# Patient Record
Sex: MALE | Race: WHITE | Employment: OTHER | URBAN - METROPOLITAN AREA
[De-identification: names, ages, dates, MRNs, and addresses within clinical notes are randomized per-mention and may not be internally consistent; named-entity substitution may affect disease eponyms.]

---

## 2013-09-03 LAB
LEFT VENTRICULAR EJECTION FRACTION MODE: NORMAL
LV EF: 65 %

## 2018-04-09 ENCOUNTER — TELEPHONE (OUTPATIENT)
Dept: CARDIOLOGY CLINIC | Age: 68
End: 2018-04-09

## 2018-04-20 DIAGNOSIS — I48.91 ATRIAL FIBRILLATION, UNSPECIFIED TYPE (HCC): ICD-10-CM

## 2018-04-20 DIAGNOSIS — J43.9 PULMONARY EMPHYSEMA, UNSPECIFIED EMPHYSEMA TYPE (HCC): ICD-10-CM

## 2018-10-04 ENCOUNTER — HOSPITAL ENCOUNTER (OUTPATIENT)
Dept: CT IMAGING | Age: 68
Discharge: HOME | End: 2018-10-06
Payer: MEDICARE

## 2018-10-04 DIAGNOSIS — Z87.891 HX OF TOBACCO USE, PRESENTING HAZARDS TO HEALTH: ICD-10-CM

## 2018-10-04 DIAGNOSIS — J98.4 APICAL LUNG SCARRING: ICD-10-CM

## 2018-10-04 PROCEDURE — 71250 CT THORAX DX C-: CPT

## 2018-10-16 ENCOUNTER — HOSPITAL ENCOUNTER (OUTPATIENT)
Age: 68
Discharge: HOME | End: 2018-10-18

## 2018-10-16 PROCEDURE — 88305 TISSUE EXAM BY PATHOLOGIST: CPT

## 2019-03-12 ENCOUNTER — OFFICE VISIT (OUTPATIENT)
Dept: VASCULAR SURGERY | Age: 69
End: 2019-03-12
Payer: MEDICARE

## 2019-03-12 ENCOUNTER — HOSPITAL ENCOUNTER (OUTPATIENT)
Dept: CARDIOLOGY | Age: 69
Discharge: HOME | End: 2019-03-12
Payer: MEDICARE

## 2019-03-12 DIAGNOSIS — Z95.828 S/P AAA REPAIR USING BIFURCATION GRAFT: Primary | ICD-10-CM

## 2019-03-12 DIAGNOSIS — Z86.79 S/P AAA REPAIR USING BIFURCATION GRAFT: ICD-10-CM

## 2019-03-12 DIAGNOSIS — Z95.828 S/P AAA REPAIR USING BIFURCATION GRAFT: ICD-10-CM

## 2019-03-12 DIAGNOSIS — Z86.79 S/P AAA REPAIR USING BIFURCATION GRAFT: Primary | ICD-10-CM

## 2019-03-12 PROCEDURE — 93978 VASCULAR STUDY: CPT

## 2019-03-12 PROCEDURE — 99213 OFFICE O/P EST LOW 20 MIN: CPT | Performed by: SURGERY

## 2019-05-07 ENCOUNTER — OFFICE VISIT (OUTPATIENT)
Dept: CARDIOLOGY CLINIC | Age: 69
End: 2019-05-07
Payer: MEDICARE

## 2019-05-07 VITALS
DIASTOLIC BLOOD PRESSURE: 60 MMHG | RESPIRATION RATE: 20 BRPM | HEART RATE: 77 BPM | HEIGHT: 74 IN | BODY MASS INDEX: 29.39 KG/M2 | SYSTOLIC BLOOD PRESSURE: 132 MMHG | WEIGHT: 229 LBS

## 2019-05-07 DIAGNOSIS — I48.0 PAROXYSMAL ATRIAL FIBRILLATION (HCC): Primary | ICD-10-CM

## 2019-05-07 DIAGNOSIS — I10 ESSENTIAL HYPERTENSION: ICD-10-CM

## 2019-05-07 PROCEDURE — 99213 OFFICE O/P EST LOW 20 MIN: CPT | Performed by: INTERNAL MEDICINE

## 2019-05-07 PROCEDURE — 93000 ELECTROCARDIOGRAM COMPLETE: CPT | Performed by: INTERNAL MEDICINE

## 2019-05-07 RX ORDER — FLUTICASONE FUROATE AND VILANTEROL 100; 25 UG/1; UG/1
1 POWDER RESPIRATORY (INHALATION) DAILY
Status: ON HOLD | COMMUNITY
End: 2019-07-11 | Stop reason: HOSPADM

## 2019-05-07 RX ORDER — DILTIAZEM HYDROCHLORIDE 180 MG/1
180 CAPSULE, COATED, EXTENDED RELEASE ORAL DAILY
COMMUNITY
End: 2019-07-26 | Stop reason: ALTCHOICE

## 2019-05-07 NOTE — PATIENT INSTRUCTIONS
Patient Education        A Healthy Heart: Care Instructions  Your Care Instructions    Heart disease occurs when a substance called plaque builds up in the vessels that supply oxygen-rich blood to your heart. This can narrow the blood vessels and reduce blood flow. A heart attack happens when blood flow is completely blocked. A high-fat diet, smoking, and other factors increase the risk of heart disease. Your doctor has found that you have a chance of having heart disease. You can do lots of things to keep your heart healthy. It may not be easy, but you can change your diet, exercise more, and quit smoking. These steps really work to lower your chance of heart disease. Follow-up care is a key part of your treatment and safety. Be sure to make and go to all appointments, and call your doctor if you are having problems. It's also a good idea to know your test results and keep a list of the medicines you take. How can you care for yourself at home? Diet    · Use less salt when you cook and eat. This helps lower your blood pressure. Taste food before salting. Add only a little salt when you think you need it. With time, your taste buds will adjust to less salt.     · Eat fewer snack items, fast foods, canned soups, and other high-salt, high-fat, processed foods.     · Read food labels and try to avoid saturated and trans fats. They increase your risk of heart disease by raising cholesterol levels.     · Limit the amount of solid fat-butter, margarine, and shortening-you eat. Use olive, peanut, or canola oil when you cook. Bake, broil, and steam foods instead of frying them.     · Eating fish can lower your risk for heart disease. Eat at least 2 servings of fish a week. [place], mackerel, herring, sardines, and chunk light tuna are very good choices. These fish contain omega-3 fatty acids.     · Eat a variety of fruit and vegetables every day.  Dark green, deep orange, red, or yellow fruits and vegetables are especially good for you. Examples include spinach, carrots, peaches, and berries.     · Foods high in fiber can reduce your cholesterol and provide important vitamins and minerals. High-fiber foods include whole-grain cereals and breads, oatmeal, beans, brown rice, citrus fruits, and apples.     · Limit drinks and foods with added sugar. These include candy, desserts, and soda pop.    Lifestyle changes    · If your doctor recommends it, get more exercise. Walking is a good choice. Bit by bit, increase the amount you walk every day. Try for at least 30 minutes on most days of the week. You also may want to swim, bike, or do other activities.     · Do not smoke. If you need help quitting, talk to your doctor about stop-smoking programs and medicines. These can increase your chances of quitting for good. Quitting smoking may be the most important step you can take to protect your heart. It is never too late to quit. You will get health benefits right away.     · Limit alcohol to 2 drinks a day for men and 1 drink a day for women. Too much alcohol can cause health problems. Medicines    · Take your medicines exactly as prescribed. Call your doctor if you think you are having a problem with your medicine.     · If your doctor recommends aspirin, take the amount directed each day. Make sure you take aspirin and not another kind of pain reliever, such as acetaminophen (Tylenol). If you take ibuprofen (such as Advil or Motrin) for other problems, take aspirin at least 2 hours before taking ibuprofen. When should you call for help? Call 911 if you have symptoms of a heart attack.  These may include:    · Chest pain or pressure, or a strange feeling in the chest.     · Sweating.     · Shortness of breath.     · Pain, pressure, or a strange feeling in the back, neck, jaw, or upper belly or in one or both shoulders or arms.     · Lightheadedness or sudden weakness.     · A fast or irregular heartbeat.    After you call 911,

## 2019-05-09 NOTE — PROGRESS NOTES
' [facility] Cardiology  MD Raquel Hall MD Milagros Canary, MD Darreld Gory. MD Tamica Mcpherson   1950  Reg Holland MD    Mr. Mir Mcallister was in the outpatient office on 5/7/2019 for follow up of his PAF. This 25-year-old gentleman has a history of AAA repair, COPD, and atrial fibrillation. He underwent ablation in 01/2017. He was maintained on Sotalol therapy prior to the ablation, which was discontinued. He is maintained on Eliquis for anticoagulation. Event monitor last year revealed sinus rhythm with PAC's. No AF was demonstrated. Stroke risk with occult AF was discussed with him. Patient decided to continue anticoagulation therapy. He presented to our office today for a yearly follow up visit. He notes no new cardiac complaints. He denied any chest pain or shortness of breath. No episodes of palpitations. He monitors his heart rate multiple times a day and his rates have been in the 70's-80's. Past medical history:   1. NKDA. 2. 80-pack-year smoking history; quit 2009. 3. Hyperlipidemia. 4. Sleep apnea. Compliant with CPAP therapy. 5. COPD. Follows Dr. Ashley Hebert. 6. AAA repair. Dr. Nikhil Verdugo. 11/17/2008. 7. AF, status post cardioversion 09/2013. Maintained on sotalol therapy and Pradaxa. 8. Echocardiogram 03/18/2013. EF 65%. Mildly dilated left atrium. 9. DCCV 03/21/2013. Maintained on sotalol therapy. 10. Second opinion at [facility]. Seen by Dr. Christiana Silva. 11. AF ablation at [facility] 01/03/2017. Sotalol discontinued after ablation. 12. 14 day event monitor, 3/5-3/18/2018, predominantly sinus rhythm. PAC's. No AF demonstrated.   Average heart rate 77 bpm.  No pauses noted.      Review of Systems:  HEENT: negative for acute visual and auditory problems  Constitutional: negative for fever and chills  Respiratory: negative for cough and hemoptysis  Cardiovascular: as per HPI  Gastrointestinal: negative for abdominal pain, Gluconate (COPPER CAPS) 2 MG CAPS Take by mouth daily    ELIQUIS 5 MG TABS tablet 2 times daily    Zinc 50 MG TABS Take by mouth daily   CPAP Machine MISC Inhale into the lungs nightly    metoprolol succinate (TOPROL XL) 25 MG extended release tablet Take 25 mg by mouth daily   Selenium (SELENIMIN-200 PO) Take 200 mcg by mouth daily   Arginine 500 MG CAPS Take 500 mg by mouth daily    Coenzyme Q10 (COQ10) 200 MG CAPS Take by mouth daily    b complex vitamins capsule Take 1 capsule by mouth daily   Magnesium 500 MG CAPS Take 250 mg by mouth daily    Flaxseed, Linseed, (FLAXSEED OIL) 1000 MG CAPS Take by mouth daily    albuterol (PROVENTIL) (2.5 MG/3ML) 0.083% nebulizer solution Take 2.5 mg by nebulization every 6 hours as needed for Wheezing    OXYGEN 2 L by Nasal route nightly as needed    albuterol (PROVENTIL HFA;VENTOLIN HFA) 108 (90 BASE) MCG/ACT inhaler Inhale 2 puffs into the lungs every 6 hours as needed. Multiple Vitamins-Minerals (CENTRUM SILVER PO) Take 1 tablet by mouth daily. For his COPD, he follows with Dr. Vladimir Perez. Should he remain cardiovascularly stable, he will be seen back in our office in a year. Thank you for allowing us to participate in the care of this patient.       RPV/tlr

## 2019-07-03 ENCOUNTER — TELEPHONE (OUTPATIENT)
Dept: CARDIOLOGY CLINIC | Age: 69
End: 2019-07-03

## 2019-07-03 PROBLEM — D49.1 LARYNGEAL NEOPLASM: Status: ACTIVE | Noted: 2019-07-03

## 2019-07-03 NOTE — PROGRESS NOTES
[facility] PRE-ADMISSION TESTING INSTRUCTIONS    The Preadmission Testing patient is instructed accordingly using the following criteria (check applicable):    ARRIVAL INSTRUCTIONS:  [x] Parking the day of Surgery is located in the Main Entrance lot. Upon entering the door, make an immediate right to the surgery reception desk    [] [phone] is available Monday through Friday 6 am to 6 pm    [x] Bring photo ID and insurance card    [] Bring in a copy of Living will or Durable Power of  papers. [x] Please be sure to arrange for responsible adult to provide transportation to and from the hospital    [x] Please arrange for responsible adult to be with you for the 24 hour period post procedure due to having anesthesia      GENERAL INSTRUCTIONS:    [x] Nothing by mouth after midnight, including gum, candy, mints or water    [x] You may brush your teeth, but do not swallow any water    [x] Take medications as instructed with 1-2 oz of water    [] Stop herbal supplements and vitamins 5 days prior to procedure    [x] Follow preop dosing of blood thinners per physician instructions    [] Take 1/2 dose of evening insulin, but no insulin after midnight    [] No oral diabetic medications after midnight    [] If diabetic and have low blood sugar or feel symptomatic, take 1-2oz apple juice only    [x] Bring inhalers day of surgery    [x] Bring C-PAP/ Bi-Pap day of surgery    [] Bring urine specimen day of surgery    [x] Shower or bath with soap, lather and rinse well, AM of Surgery, no lotion, powders or creams to surgical site    [] Follow bowel prep as instructed per surgeon    [] No tobacco products within 24 hours of surgery     [] No alcohol or illegal drug use within 24 hours of surgery.     [x] Jewelry, body piercing's, eyeglasses, contact lenses and dentures are not permitted into surgery (bring cases)      [] Please do not wear any nail polish, make up or hair

## 2019-07-03 NOTE — TELEPHONE ENCOUNTER
Viji Peck was instructed by Dr. Rashid Lindo to let you know he is having surgery Monday, 07/08/2019 (throat biopsy).

## 2019-07-05 ENCOUNTER — ANESTHESIA EVENT (OUTPATIENT)
Dept: OPERATING ROOM | Age: 69
DRG: 011 | End: 2019-07-05
Payer: MEDICARE

## 2019-07-08 ENCOUNTER — ANESTHESIA (OUTPATIENT)
Dept: OPERATING ROOM | Age: 69
DRG: 011 | End: 2019-07-08
Payer: MEDICARE

## 2019-07-08 ENCOUNTER — APPOINTMENT (OUTPATIENT)
Dept: GENERAL RADIOLOGY | Age: 69
DRG: 011 | End: 2019-07-08
Attending: OTOLARYNGOLOGY
Payer: MEDICARE

## 2019-07-08 ENCOUNTER — HOSPITAL ENCOUNTER (INPATIENT)
Age: 69
LOS: 3 days | Discharge: HOME HEALTH | DRG: 011 | End: 2019-07-11
Attending: OTOLARYNGOLOGY | Admitting: OTOLARYNGOLOGY
Payer: MEDICARE

## 2019-07-08 VITALS — DIASTOLIC BLOOD PRESSURE: 112 MMHG | SYSTOLIC BLOOD PRESSURE: 175 MMHG | OXYGEN SATURATION: 98 %

## 2019-07-08 DIAGNOSIS — D49.1 LARYNGEAL NEOPLASM: Primary | ICD-10-CM

## 2019-07-08 PROBLEM — C13.9 HYPOPHARYNGEAL CANCER (HCC): Status: ACTIVE | Noted: 2019-07-08

## 2019-07-08 LAB
ANION GAP SERPL CALCULATED.3IONS-SCNC: 11 MMOL/L (ref 7–16)
ANION GAP SERPL CALCULATED.3IONS-SCNC: 12 MMOL/L (ref 7–16)
BASOPHILS ABSOLUTE: 0.06 E9/L (ref 0–0.2)
BASOPHILS RELATIVE PERCENT: 0.5 % (ref 0–2)
BUN BLDV-MCNC: 12 MG/DL (ref 8–23)
BUN BLDV-MCNC: 14 MG/DL (ref 8–23)
CALCIUM SERPL-MCNC: 8.9 MG/DL (ref 8.6–10.2)
CALCIUM SERPL-MCNC: 9.3 MG/DL (ref 8.6–10.2)
CHLORIDE BLD-SCNC: 97 MMOL/L (ref 98–107)
CHLORIDE BLD-SCNC: 99 MMOL/L (ref 98–107)
CO2: 30 MMOL/L (ref 22–29)
CO2: 30 MMOL/L (ref 22–29)
CREAT SERPL-MCNC: 0.6 MG/DL (ref 0.7–1.2)
CREAT SERPL-MCNC: 0.7 MG/DL (ref 0.7–1.2)
EOSINOPHILS ABSOLUTE: 0.01 E9/L (ref 0.05–0.5)
EOSINOPHILS RELATIVE PERCENT: 0.1 % (ref 0–6)
GFR AFRICAN AMERICAN: >60
GFR AFRICAN AMERICAN: >60
GFR NON-AFRICAN AMERICAN: >60 ML/MIN/1.73
GFR NON-AFRICAN AMERICAN: >60 ML/MIN/1.73
GLUCOSE BLD-MCNC: 115 MG/DL (ref 74–99)
GLUCOSE BLD-MCNC: 143 MG/DL (ref 74–99)
HCT VFR BLD CALC: 44.5 % (ref 37–54)
HCT VFR BLD CALC: 44.8 % (ref 37–54)
HEMOGLOBIN: 14.4 G/DL (ref 12.5–16.5)
HEMOGLOBIN: 14.6 G/DL (ref 12.5–16.5)
IMMATURE GRANULOCYTES #: 0.05 E9/L
IMMATURE GRANULOCYTES %: 0.4 % (ref 0–5)
LYMPHOCYTES ABSOLUTE: 0.81 E9/L (ref 1.5–4)
LYMPHOCYTES RELATIVE PERCENT: 6.4 % (ref 20–42)
MCH RBC QN AUTO: 30.1 PG (ref 26–35)
MCH RBC QN AUTO: 30.4 PG (ref 26–35)
MCHC RBC AUTO-ENTMCNC: 32.1 % (ref 32–34.5)
MCHC RBC AUTO-ENTMCNC: 32.8 % (ref 32–34.5)
MCV RBC AUTO: 92.7 FL (ref 80–99.9)
MCV RBC AUTO: 93.5 FL (ref 80–99.9)
MONOCYTES ABSOLUTE: 0.16 E9/L (ref 0.1–0.95)
MONOCYTES RELATIVE PERCENT: 1.3 % (ref 2–12)
NEUTROPHILS ABSOLUTE: 11.54 E9/L (ref 1.8–7.3)
NEUTROPHILS RELATIVE PERCENT: 91.3 % (ref 43–80)
PDW BLD-RTO: 14.5 FL (ref 11.5–15)
PDW BLD-RTO: 14.5 FL (ref 11.5–15)
PLATELET # BLD: 249 E9/L (ref 130–450)
PLATELET # BLD: 249 E9/L (ref 130–450)
PMV BLD AUTO: 10.1 FL (ref 7–12)
PMV BLD AUTO: 10.2 FL (ref 7–12)
POTASSIUM REFLEX MAGNESIUM: 4.6 MMOL/L (ref 3.5–5)
POTASSIUM SERPL-SCNC: 4.4 MMOL/L (ref 3.5–5)
RBC # BLD: 4.79 E12/L (ref 3.8–5.8)
RBC # BLD: 4.8 E12/L (ref 3.8–5.8)
SODIUM BLD-SCNC: 139 MMOL/L (ref 132–146)
SODIUM BLD-SCNC: 140 MMOL/L (ref 132–146)
WBC # BLD: 10.1 E9/L (ref 4.5–11.5)
WBC # BLD: 12.6 E9/L (ref 4.5–11.5)

## 2019-07-08 PROCEDURE — 3600000002 HC SURGERY LEVEL 2 BASE: Performed by: OTOLARYNGOLOGY

## 2019-07-08 PROCEDURE — 2700000000 HC OXYGEN THERAPY PER DAY

## 2019-07-08 PROCEDURE — 88305 TISSUE EXAM BY PATHOLOGIST: CPT

## 2019-07-08 PROCEDURE — 6370000000 HC RX 637 (ALT 250 FOR IP): Performed by: OTOLARYNGOLOGY

## 2019-07-08 PROCEDURE — 7100000000 HC PACU RECOVERY - FIRST 15 MIN: Performed by: OTOLARYNGOLOGY

## 2019-07-08 PROCEDURE — 7100000001 HC PACU RECOVERY - ADDTL 15 MIN: Performed by: OTOLARYNGOLOGY

## 2019-07-08 PROCEDURE — 85027 COMPLETE CBC AUTOMATED: CPT

## 2019-07-08 PROCEDURE — 0CBS8ZX EXCISION OF LARYNX, VIA NATURAL OR ARTIFICIAL OPENING ENDOSCOPIC, DIAGNOSTIC: ICD-10-PCS | Performed by: OTOLARYNGOLOGY

## 2019-07-08 PROCEDURE — 3600000012 HC SURGERY LEVEL 2 ADDTL 15MIN: Performed by: OTOLARYNGOLOGY

## 2019-07-08 PROCEDURE — 71045 X-RAY EXAM CHEST 1 VIEW: CPT

## 2019-07-08 PROCEDURE — 88331 PATH CONSLTJ SURG 1 BLK 1SPC: CPT

## 2019-07-08 PROCEDURE — 2709999900 HC NON-CHARGEABLE SUPPLY: Performed by: OTOLARYNGOLOGY

## 2019-07-08 PROCEDURE — 2000000000 HC ICU R&B

## 2019-07-08 PROCEDURE — 2500000003 HC RX 250 WO HCPCS: Performed by: OTOLARYNGOLOGY

## 2019-07-08 PROCEDURE — 6360000002 HC RX W HCPCS

## 2019-07-08 PROCEDURE — 2500000003 HC RX 250 WO HCPCS

## 2019-07-08 PROCEDURE — 2720000010 HC SURG SUPPLY STERILE: Performed by: OTOLARYNGOLOGY

## 2019-07-08 PROCEDURE — 36415 COLL VENOUS BLD VENIPUNCTURE: CPT

## 2019-07-08 PROCEDURE — 87081 CULTURE SCREEN ONLY: CPT

## 2019-07-08 PROCEDURE — 2580000003 HC RX 258: Performed by: OTOLARYNGOLOGY

## 2019-07-08 PROCEDURE — 80048 BASIC METABOLIC PNL TOTAL CA: CPT

## 2019-07-08 PROCEDURE — 85025 COMPLETE CBC W/AUTO DIFF WBC: CPT

## 2019-07-08 PROCEDURE — 3700000001 HC ADD 15 MINUTES (ANESTHESIA): Performed by: OTOLARYNGOLOGY

## 2019-07-08 PROCEDURE — 6360000002 HC RX W HCPCS: Performed by: STUDENT IN AN ORGANIZED HEALTH CARE EDUCATION/TRAINING PROGRAM

## 2019-07-08 PROCEDURE — 0B113F4 BYPASS TRACHEA TO CUTANEOUS WITH TRACHEOSTOMY DEVICE, PERCUTANEOUS APPROACH: ICD-10-PCS | Performed by: OTOLARYNGOLOGY

## 2019-07-08 PROCEDURE — 2580000003 HC RX 258

## 2019-07-08 PROCEDURE — 3700000000 HC ANESTHESIA ATTENDED CARE: Performed by: OTOLARYNGOLOGY

## 2019-07-08 RX ORDER — OXYCODONE HYDROCHLORIDE AND ACETAMINOPHEN 5; 325 MG/1; MG/1
1 TABLET ORAL PRN
Status: DISCONTINUED | OUTPATIENT
Start: 2019-07-08 | End: 2019-07-08 | Stop reason: HOSPADM

## 2019-07-08 RX ORDER — ACETAMINOPHEN 325 MG/1
650 TABLET ORAL EVERY 4 HOURS PRN
Status: DISCONTINUED | OUTPATIENT
Start: 2019-07-08 | End: 2019-07-11 | Stop reason: HOSPADM

## 2019-07-08 RX ORDER — IPRATROPIUM BROMIDE AND ALBUTEROL SULFATE 2.5; .5 MG/3ML; MG/3ML
1 SOLUTION RESPIRATORY (INHALATION) EVERY 4 HOURS PRN
Status: DISCONTINUED | OUTPATIENT
Start: 2019-07-08 | End: 2019-07-11 | Stop reason: HOSPADM

## 2019-07-08 RX ORDER — DILTIAZEM HYDROCHLORIDE 180 MG/1
180 CAPSULE, COATED, EXTENDED RELEASE ORAL DAILY
Status: DISCONTINUED | OUTPATIENT
Start: 2019-07-09 | End: 2019-07-09

## 2019-07-08 RX ORDER — METOPROLOL SUCCINATE 25 MG/1
25 TABLET, EXTENDED RELEASE ORAL DAILY
Status: DISCONTINUED | OUTPATIENT
Start: 2019-07-09 | End: 2019-07-09

## 2019-07-08 RX ORDER — SODIUM CHLORIDE 0.9 % (FLUSH) 0.9 %
10 SYRINGE (ML) INJECTION PRN
Status: DISCONTINUED | OUTPATIENT
Start: 2019-07-08 | End: 2019-07-11 | Stop reason: HOSPADM

## 2019-07-08 RX ORDER — MIDAZOLAM HYDROCHLORIDE 1 MG/ML
INJECTION INTRAMUSCULAR; INTRAVENOUS PRN
Status: DISCONTINUED | OUTPATIENT
Start: 2019-07-08 | End: 2019-07-08 | Stop reason: SDUPTHER

## 2019-07-08 RX ORDER — FLUTICASONE FUROATE AND VILANTEROL 100; 25 UG/1; UG/1
1 POWDER RESPIRATORY (INHALATION) DAILY
Status: DISCONTINUED | OUTPATIENT
Start: 2019-07-08 | End: 2019-07-08

## 2019-07-08 RX ORDER — CEFAZOLIN SODIUM 2 G/50ML
SOLUTION INTRAVENOUS PRN
Status: DISCONTINUED | OUTPATIENT
Start: 2019-07-08 | End: 2019-07-08 | Stop reason: SDUPTHER

## 2019-07-08 RX ORDER — LIDOCAINE HYDROCHLORIDE AND EPINEPHRINE 10; 10 MG/ML; UG/ML
INJECTION, SOLUTION INFILTRATION; PERINEURAL PRN
Status: DISCONTINUED | OUTPATIENT
Start: 2019-07-08 | End: 2019-07-08 | Stop reason: ALTCHOICE

## 2019-07-08 RX ORDER — KETAMINE HYDROCHLORIDE 10 MG/ML
INJECTION, SOLUTION INTRAMUSCULAR; INTRAVENOUS PRN
Status: DISCONTINUED | OUTPATIENT
Start: 2019-07-08 | End: 2019-07-08 | Stop reason: SDUPTHER

## 2019-07-08 RX ORDER — LIDOCAINE HYDROCHLORIDE 20 MG/ML
INJECTION, SOLUTION EPIDURAL; INFILTRATION; INTRACAUDAL; PERINEURAL PRN
Status: DISCONTINUED | OUTPATIENT
Start: 2019-07-08 | End: 2019-07-08 | Stop reason: SDUPTHER

## 2019-07-08 RX ORDER — SODIUM CHLORIDE, SODIUM LACTATE, POTASSIUM CHLORIDE, CALCIUM CHLORIDE 600; 310; 30; 20 MG/100ML; MG/100ML; MG/100ML; MG/100ML
INJECTION, SOLUTION INTRAVENOUS CONTINUOUS PRN
Status: DISCONTINUED | OUTPATIENT
Start: 2019-07-08 | End: 2019-07-08 | Stop reason: SDUPTHER

## 2019-07-08 RX ORDER — ONDANSETRON 2 MG/ML
4 INJECTION INTRAMUSCULAR; INTRAVENOUS
Status: DISCONTINUED | OUTPATIENT
Start: 2019-07-08 | End: 2019-07-08 | Stop reason: HOSPADM

## 2019-07-08 RX ORDER — EPINEPHRINE NASAL SOLUTION 1 MG/ML
SOLUTION NASAL PRN
Status: DISCONTINUED | OUTPATIENT
Start: 2019-07-08 | End: 2019-07-08 | Stop reason: ALTCHOICE

## 2019-07-08 RX ORDER — MEPERIDINE HYDROCHLORIDE 25 MG/ML
12.5 INJECTION INTRAMUSCULAR; INTRAVENOUS; SUBCUTANEOUS
Status: DISCONTINUED | OUTPATIENT
Start: 2019-07-08 | End: 2019-07-08 | Stop reason: HOSPADM

## 2019-07-08 RX ORDER — MORPHINE SULFATE 2 MG/ML
2 INJECTION, SOLUTION INTRAMUSCULAR; INTRAVENOUS EVERY 5 MIN PRN
Status: DISCONTINUED | OUTPATIENT
Start: 2019-07-08 | End: 2019-07-08 | Stop reason: HOSPADM

## 2019-07-08 RX ORDER — SUCCINYLCHOLINE CHLORIDE 20 MG/ML
INJECTION INTRAMUSCULAR; INTRAVENOUS PRN
Status: DISCONTINUED | OUTPATIENT
Start: 2019-07-08 | End: 2019-07-08 | Stop reason: SDUPTHER

## 2019-07-08 RX ORDER — GLYCOPYRROLATE 1 MG/5 ML
SYRINGE (ML) INTRAVENOUS PRN
Status: DISCONTINUED | OUTPATIENT
Start: 2019-07-08 | End: 2019-07-08 | Stop reason: SDUPTHER

## 2019-07-08 RX ORDER — PROPOFOL 10 MG/ML
INJECTION, EMULSION INTRAVENOUS PRN
Status: DISCONTINUED | OUTPATIENT
Start: 2019-07-08 | End: 2019-07-08 | Stop reason: SDUPTHER

## 2019-07-08 RX ORDER — ALBUTEROL SULFATE 2.5 MG/3ML
2.5 SOLUTION RESPIRATORY (INHALATION) EVERY 6 HOURS PRN
Status: DISCONTINUED | OUTPATIENT
Start: 2019-07-08 | End: 2019-07-11 | Stop reason: HOSPADM

## 2019-07-08 RX ORDER — OXYCODONE HYDROCHLORIDE AND ACETAMINOPHEN 5; 325 MG/1; MG/1
2 TABLET ORAL PRN
Status: DISCONTINUED | OUTPATIENT
Start: 2019-07-08 | End: 2019-07-08 | Stop reason: HOSPADM

## 2019-07-08 RX ORDER — DEXAMETHASONE SODIUM PHOSPHATE 4 MG/ML
INJECTION, SOLUTION INTRA-ARTICULAR; INTRALESIONAL; INTRAMUSCULAR; INTRAVENOUS; SOFT TISSUE PRN
Status: DISCONTINUED | OUTPATIENT
Start: 2019-07-08 | End: 2019-07-08 | Stop reason: SDUPTHER

## 2019-07-08 RX ORDER — HYDROCODONE BITARTRATE AND ACETAMINOPHEN 5; 325 MG/1; MG/1
1 TABLET ORAL EVERY 4 HOURS PRN
Status: DISCONTINUED | OUTPATIENT
Start: 2019-07-08 | End: 2019-07-11 | Stop reason: HOSPADM

## 2019-07-08 RX ORDER — FENTANYL CITRATE 50 UG/ML
INJECTION, SOLUTION INTRAMUSCULAR; INTRAVENOUS PRN
Status: DISCONTINUED | OUTPATIENT
Start: 2019-07-08 | End: 2019-07-08 | Stop reason: SDUPTHER

## 2019-07-08 RX ORDER — SODIUM CHLORIDE 0.9 % (FLUSH) 0.9 %
10 SYRINGE (ML) INJECTION EVERY 12 HOURS SCHEDULED
Status: DISCONTINUED | OUTPATIENT
Start: 2019-07-08 | End: 2019-07-11 | Stop reason: HOSPADM

## 2019-07-08 RX ORDER — SODIUM CHLORIDE, SODIUM LACTATE, POTASSIUM CHLORIDE, CALCIUM CHLORIDE 600; 310; 30; 20 MG/100ML; MG/100ML; MG/100ML; MG/100ML
INJECTION, SOLUTION INTRAVENOUS CONTINUOUS
Status: DISCONTINUED | OUTPATIENT
Start: 2019-07-08 | End: 2019-07-08

## 2019-07-08 RX ORDER — SODIUM CHLORIDE, SODIUM LACTATE, POTASSIUM CHLORIDE, CALCIUM CHLORIDE 600; 310; 30; 20 MG/100ML; MG/100ML; MG/100ML; MG/100ML
INJECTION, SOLUTION INTRAVENOUS CONTINUOUS
Status: DISCONTINUED | OUTPATIENT
Start: 2019-07-08 | End: 2019-07-11 | Stop reason: HOSPADM

## 2019-07-08 RX ORDER — SODIUM CHLORIDE 0.9 % (FLUSH) 0.9 %
10 SYRINGE (ML) INJECTION PRN
Status: DISCONTINUED | OUTPATIENT
Start: 2019-07-08 | End: 2019-07-08 | Stop reason: HOSPADM

## 2019-07-08 RX ORDER — MORPHINE SULFATE 2 MG/ML
1 INJECTION, SOLUTION INTRAMUSCULAR; INTRAVENOUS EVERY 5 MIN PRN
Status: DISCONTINUED | OUTPATIENT
Start: 2019-07-08 | End: 2019-07-08 | Stop reason: HOSPADM

## 2019-07-08 RX ORDER — ONDANSETRON 2 MG/ML
INJECTION INTRAMUSCULAR; INTRAVENOUS PRN
Status: DISCONTINUED | OUTPATIENT
Start: 2019-07-08 | End: 2019-07-08 | Stop reason: SDUPTHER

## 2019-07-08 RX ORDER — FENTANYL CITRATE 50 UG/ML
25 INJECTION, SOLUTION INTRAMUSCULAR; INTRAVENOUS EVERY 6 HOURS PRN
Status: DISCONTINUED | OUTPATIENT
Start: 2019-07-08 | End: 2019-07-11 | Stop reason: HOSPADM

## 2019-07-08 RX ORDER — SODIUM CHLORIDE 0.9 % (FLUSH) 0.9 %
10 SYRINGE (ML) INJECTION EVERY 12 HOURS SCHEDULED
Status: DISCONTINUED | OUTPATIENT
Start: 2019-07-08 | End: 2019-07-08 | Stop reason: HOSPADM

## 2019-07-08 RX ORDER — ALBUTEROL SULFATE 90 UG/1
2 AEROSOL, METERED RESPIRATORY (INHALATION) EVERY 6 HOURS PRN
Status: DISCONTINUED | OUTPATIENT
Start: 2019-07-08 | End: 2019-07-09

## 2019-07-08 RX ORDER — ONDANSETRON 2 MG/ML
4 INJECTION INTRAMUSCULAR; INTRAVENOUS EVERY 6 HOURS PRN
Status: DISCONTINUED | OUTPATIENT
Start: 2019-07-08 | End: 2019-07-11 | Stop reason: HOSPADM

## 2019-07-08 RX ADMIN — FENTANYL CITRATE 50 MCG: 50 INJECTION, SOLUTION INTRAMUSCULAR; INTRAVENOUS at 07:51

## 2019-07-08 RX ADMIN — LIDOCAINE HYDROCHLORIDE 40 MG: 20 INJECTION, SOLUTION EPIDURAL; INFILTRATION; INTRACAUDAL; PERINEURAL at 07:55

## 2019-07-08 RX ADMIN — ONDANSETRON HYDROCHLORIDE 4 MG: 2 INJECTION, SOLUTION INTRAMUSCULAR; INTRAVENOUS at 07:52

## 2019-07-08 RX ADMIN — KETAMINE HYDROCHLORIDE 5 MG: 10 INJECTION INTRAMUSCULAR; INTRAVENOUS at 07:34

## 2019-07-08 RX ADMIN — FENTANYL CITRATE 50 MCG: 50 INJECTION, SOLUTION INTRAMUSCULAR; INTRAVENOUS at 08:01

## 2019-07-08 RX ADMIN — LIDOCAINE HYDROCHLORIDE 60 MG: 20 INJECTION, SOLUTION EPIDURAL; INFILTRATION; INTRACAUDAL; PERINEURAL at 07:51

## 2019-07-08 RX ADMIN — PROPOFOL 150 MG: 10 INJECTION, EMULSION INTRAVENOUS at 07:51

## 2019-07-08 RX ADMIN — KETAMINE HYDROCHLORIDE 5 MG: 10 INJECTION INTRAMUSCULAR; INTRAVENOUS at 07:32

## 2019-07-08 RX ADMIN — Medication 0.1 MG: at 07:24

## 2019-07-08 RX ADMIN — MIDAZOLAM HYDROCHLORIDE 1 MG: 1 INJECTION, SOLUTION INTRAMUSCULAR; INTRAVENOUS at 07:35

## 2019-07-08 RX ADMIN — SUCCINYLCHOLINE CHLORIDE 100 MG: 20 INJECTION, SOLUTION INTRAMUSCULAR; INTRAVENOUS at 08:01

## 2019-07-08 RX ADMIN — CEFAZOLIN SODIUM 2 G: 2 SOLUTION INTRAVENOUS at 07:32

## 2019-07-08 RX ADMIN — SODIUM CHLORIDE, POTASSIUM CHLORIDE, SODIUM LACTATE AND CALCIUM CHLORIDE: 600; 310; 30; 20 INJECTION, SOLUTION INTRAVENOUS at 07:24

## 2019-07-08 RX ADMIN — SODIUM CHLORIDE, POTASSIUM CHLORIDE, SODIUM LACTATE AND CALCIUM CHLORIDE: 600; 310; 30; 20 INJECTION, SOLUTION INTRAVENOUS at 13:50

## 2019-07-08 RX ADMIN — DEXAMETHASONE SODIUM PHOSPHATE 10 MG: 4 INJECTION, SOLUTION INTRAMUSCULAR; INTRAVENOUS at 07:52

## 2019-07-08 RX ADMIN — FENTANYL CITRATE 25 MCG: 50 INJECTION INTRAMUSCULAR; INTRAVENOUS at 20:14

## 2019-07-08 RX ADMIN — MIDAZOLAM HYDROCHLORIDE 1 MG: 1 INJECTION, SOLUTION INTRAMUSCULAR; INTRAVENOUS at 07:31

## 2019-07-08 RX ADMIN — PROPOFOL 50 MG: 10 INJECTION, EMULSION INTRAVENOUS at 07:54

## 2019-07-08 ASSESSMENT — PULMONARY FUNCTION TESTS
PIF_VALUE: 21
PIF_VALUE: 3
PIF_VALUE: 11
PIF_VALUE: 3
PIF_VALUE: 13
PIF_VALUE: 18
PIF_VALUE: 4
PIF_VALUE: 13
PIF_VALUE: 18
PIF_VALUE: 3
PIF_VALUE: 17
PIF_VALUE: 4
PIF_VALUE: 16
PIF_VALUE: 18
PIF_VALUE: 4
PIF_VALUE: 16
PIF_VALUE: 19
PIF_VALUE: 5
PIF_VALUE: 3
PIF_VALUE: 16
PIF_VALUE: 13
PIF_VALUE: 18
PIF_VALUE: 16
PIF_VALUE: 17
PIF_VALUE: 16
PIF_VALUE: 17
PIF_VALUE: 16
PIF_VALUE: 16
PIF_VALUE: 7
PIF_VALUE: 3
PIF_VALUE: 27
PIF_VALUE: 13
PIF_VALUE: 35
PIF_VALUE: 16
PIF_VALUE: 13

## 2019-07-08 ASSESSMENT — PAIN - FUNCTIONAL ASSESSMENT
PAIN_FUNCTIONAL_ASSESSMENT: 0-10
PAIN_FUNCTIONAL_ASSESSMENT: PREVENTS OR INTERFERES SOME ACTIVE ACTIVITIES AND ADLS

## 2019-07-08 ASSESSMENT — PAIN SCALES - GENERAL
PAINLEVEL_OUTOF10: 0
PAINLEVEL_OUTOF10: 7
PAINLEVEL_OUTOF10: 0

## 2019-07-08 ASSESSMENT — PAIN DESCRIPTION - DESCRIPTORS: DESCRIPTORS: ACHING;DISCOMFORT;DULL

## 2019-07-08 ASSESSMENT — PAIN DESCRIPTION - LOCATION: LOCATION: NECK

## 2019-07-08 ASSESSMENT — PAIN DESCRIPTION - PROGRESSION: CLINICAL_PROGRESSION: GRADUALLY WORSENING

## 2019-07-08 ASSESSMENT — PAIN DESCRIPTION - ONSET: ONSET: GRADUAL

## 2019-07-08 ASSESSMENT — PAIN DESCRIPTION - FREQUENCY: FREQUENCY: INTERMITTENT

## 2019-07-08 ASSESSMENT — PAIN DESCRIPTION - ORIENTATION: ORIENTATION: MID;LOWER

## 2019-07-08 ASSESSMENT — PAIN DESCRIPTION - PAIN TYPE: TYPE: SURGICAL PAIN

## 2019-07-08 NOTE — CONSULTS
GENERAL SURGERY  CONSULT NOTE  7/8/2019    Physician Consulted: Dr. Ky Olguin  Reason for Consult: PEG  Referring Physician: Dr. Qureshi Inch    CC: laryngeal cancer, dysphagia    HPI  Sarah Resendiz is a 77 y.o. male who is admitted after elective tracheostomy today for laryngeal cancer. History of EVAR, Afib on Eliquis, COPD. Initially presented with dysphagia and was found to have hypopharyngeal mass, and pathology is pending. Dr. Laura Giles noted that due to mass and edema he will likely have difficulty swallowing, and recommended PEG placement for supplemental tube feeds and medications. No complaints since surgery this morning. No abdominal pain, nausea, or vomiting. His Eliquis has been held for today's tracheostomy. No abdominal surgeries    Past Medical History:   Diagnosis Date    AAA (abdominal aortic aneurysm) ([facility] 2007    repair with stent    Arthritis     Atrial fibrillation ([facility] [facility]     follows with Dr. Lucina Suarez yearly (EPIC)    COPD (chronic obstructive pulmonary disease) ([facility] [address]     alexs with Dr. Jorge Luis Brito every 6 months     CPAP (continuous positive airway pressure) dependence     Hyperlipidemia     Traumatic leg ulcer ([facility] 07/08/2013       Past Surgical History:   Procedure Laterality Date    ABDOMINAL AORTIC ANEURYSM REPAIR, ENDOVASCULAR  11/17/08    Excluder - Delatore    BRONCHOSCOPY  9/9/2015    CARDIOVERSION  9-2013    COLONOSCOPY      ECHOCARDIOGRAM TRANSESOPHAGEAL  7/2013         ECHOCARDIOGRAM TRANSESOPHAGEAL  3/18/2013         ECHOCARDIOGRAM TRANSESOPHAGEAL  9/3/2013         LARYNGOSCOPY N/A 7/8/2019    DIRECT LARYNGOSCOPY WITH BIOPSY, ESOPHAGOSCOPY performed by Jin Ramirez MD at [address] [place] [address] N/A 7/8/2019    AWAKE TRACHEOSTOMY performed by Jin Ramirez MD at [facility] OR       Medications Prior to Admission:    Prior to Admission medications    Medication Sig Start Date End Date Taking?  Authorizing Provider   tiotropium (SPIRIVA RESPIMAT) 2.5 MCG/ACT AERS inhaler

## 2019-07-08 NOTE — ANESTHESIA PRE PROCEDURE
Department of Anesthesiology  Preprocedure Note       Name:  Liz Mathis   Age:  77 y.o.  :  1950                                          MRN:  [id number]         Date:  2019      Surgeon: Shanel Kyle):  Senia Brown MD    Procedure: AWAKE TRACHEOTOMY (N/A )  DIRECT LARYNGOSCOPY WITH BIOPSY, ESOPHAGOSCOPY (N/A Neck)    Medications prior to admission:   Prior to Admission medications    Medication Sig Start Date End Date Taking? Authorizing Provider   tiotropium (SPIRIVA RESPIMAT) 2.5 MCG/ACT AERS inhaler Inhale 2 puffs into the lungs daily   Yes Historical Provider, MD   diltiazem (CARTIA XT) 180 MG extended release capsule Take 180 mg by mouth daily   Yes Historical Provider, MD   fluticasone-vilanterol (BREO ELLIPTA) 100-25 MCG/INH AEPB inhaler Inhale 1 puff into the lungs daily   Yes Historical Provider, MD   ELIQUIS 5 MG TABS tablet 2 times daily  18  Yes Historical Provider, MD   metoprolol succinate (TOPROL XL) 25 MG extended release tablet Take 25 mg by mouth daily 17  Yes Historical Provider, MD   albuterol (PROVENTIL) (2.5 MG/3ML) 0.083% nebulizer solution Take 2.5 mg by nebulization every 6 hours as needed for Wheezing    Yes Historical Provider, MD   OXYGEN 2 L by Nasal route nightly as needed    Yes Historical Provider, MD   albuterol (PROVENTIL HFA;VENTOLIN HFA) 108 (90 BASE) MCG/ACT inhaler Inhale 2 puffs into the lungs every 6 hours as needed.    Yes Historical Provider, MD       Current medications:    Current Facility-Administered Medications   Medication Dose Route Frequency Provider Last Rate Last Dose    lactated ringers infusion   Intravenous Continuous Senia Brown MD        sodium chloride flush 0.9 % injection 10 mL  10 mL Intravenous 2 times per day Senia Brown MD        sodium chloride flush 0.9 % injection 10 mL  10 mL Intravenous PRN Senia Brown MD        ceFAZolin (ANCEF) 2 g in dextrose 5 % 100 mL IVPB  2 g Intravenous On Call to Devendra Bah MD

## 2019-07-08 NOTE — PROGRESS NOTES
INTRAOPERATIVE CONSULTATION (with FROZEN SECTION)    A. Laryngeal tumor, biopsy: Squamous cell carcinoma, moderately differentiated.

## 2019-07-08 NOTE — ANESTHESIA POSTPROCEDURE EVALUATION
Department of Anesthesiology  Postprocedure Note    Patient: Marielle Escudero  MRN: [id number]  YOB: 1950  Date of evaluation: 7/8/2019  Time:  9:51 AM     Procedure Summary     Date:  07/08/19 Room / Location:  [facility] OR 01 / [facility] OR    Anesthesia Start:  0724 Anesthesia Stop:  0830    Procedures:       AWAKE TRACHEOSTOMY (N/A )      DIRECT LARYNGOSCOPY WITH BIOPSY, ESOPHAGOSCOPY (N/A Neck) Diagnosis:  (HYPOPHARYNGEAL LARYNGEAL TUMOR)    Surgeon:  Avery Gonzales MD Responsible Provider:  Catherine Mims DO    Anesthesia Type:  MAC ASA Status:  3          Anesthesia Type: MAC    Mario Phase I: Mario Score: 8    Mario Phase II:      Last vitals: Reviewed and per EMR flowsheets.        Anesthesia Post Evaluation    Patient location during evaluation: PACU  Patient participation: complete - patient participated  Level of consciousness: awake and alert  Airway patency: patent  Nausea & Vomiting: no nausea and no vomiting  Complications: no  Cardiovascular status: blood pressure returned to baseline  Respiratory status: acceptable  Hydration status: euvolemic

## 2019-07-08 NOTE — BRIEF OP NOTE
Brief Postoperative Note  ______________________________________________________________    Patient: Clara Anaya  YOB: 1950  MRN: [id number]  Date of Procedure: 7/8/2019    Pre-Op Diagnosis: HYPOPHARYNGEAL LARYNGEAL TUMOR    Post-Op Diagnosis: Same       Procedure(s):  AWAKE TRACHEOSTOMY  DIRECT LARYNGOSCOPY WITH BIOPSY, ESOPHAGOSCOPY    Anesthesia: Monitor Anesthesia Care    Surgeon(s):  Zack Ulloa MD    Assistant: mel    Estimated Blood Loss (mL): min    Complications: None    Specimens:   ID Type Source Tests Collected by Time Destination   A : LARYNGEAL TUMOR Specimen Neck SURGICAL PATHOLOGY Zack Ulloa MD 7/8/2019 0801    B Virgene Ashley TUMOR Specimen Neck SURGICAL PATHOLOGY Zack Ulloa MD 7/8/2019 0813        Implants:  * No implants in log *      Drains: * No LDAs found *    Findings: large hypopharyngeal neoplasm    Zack Ulloa MD  Date: 7/8/2019  Time: 8:15 AM

## 2019-07-08 NOTE — CONSULTS
of recreational or IV drug use. There is not hot tube exposure. The patient does not pets, dogs, cats turtles or exotic birds. Current Medications   Current Medications    sodium chloride flush  10 mL Intravenous 2 times per day    ceFAZolin (ANCEF) IVPB  2 g Intravenous On Call to OR     sodium chloride flush, HYDROmorphone, HYDROmorphone, morphine, morphine, oxyCODONE-acetaminophen **OR** oxyCODONE-acetaminophen, ondansetron, meperidine, lidocaine-EPINEPHrine, EPINEPHrine  IV Drips/Infusions   lactated ringers       Home Medications  Medications Prior to Admission: tiotropium (SPIRIVA RESPIMAT) 2.5 MCG/ACT AERS inhaler, Inhale 2 puffs into the lungs daily  diltiazem (CARTIA XT) 180 MG extended release capsule, Take 180 mg by mouth daily  fluticasone-vilanterol (BREO ELLIPTA) 100-25 MCG/INH AEPB inhaler, Inhale 1 puff into the lungs daily  ELIQUIS 5 MG TABS tablet, 2 times daily   metoprolol succinate (TOPROL XL) 25 MG extended release tablet, Take 25 mg by mouth daily  albuterol (PROVENTIL) (2.5 MG/3ML) 0.083% nebulizer solution, Take 2.5 mg by nebulization every 6 hours as needed for Wheezing   OXYGEN, 2 L by Nasal route nightly as needed   albuterol (PROVENTIL HFA;VENTOLIN HFA) 108 (90 BASE) MCG/ACT inhaler, Inhale 2 puffs into the lungs every 6 hours as needed. Diet/Nutrition   Diet NPO Effective Now    Allergies   Patient has no known allergies. Social History   Tobacco   reports that he quit smoking about 10 years ago. His smoking use included cigarettes. He has a 80.00 pack-year smoking history. He has never used smokeless tobacco.    Alcohol     reports that he does not drink alcohol. Occupational history :    Family History         Problem Relation Age of Onset    Diabetes Mother     Emphysema Father        Sleep History   n/a    ROS     REVIEW OF SYSTEMS:  Please see HPI above. All bolded are positive. All un-bolded are negative.   Constitutional Symptoms: fever, chills, Alexandria Calixto, DO PGY2 at 9:12 AM on 07/08/19      I personally saw, examined and provided care for the patient. Radiographs, labs and medication list were reviewed by me independently. I spoke with bedside nursing, therapists and consultants. Critical care services and times documented are independent of procedures and multidisciplinary rounds with Residents. Additionally comprehensive, multidisciplinary rounds were conducted with the MICU team. The case was discussed in detail and plans for care were established. Review of Residents documentation was conducted and revisions were made as appropriate. I agree with the above documented exam, problem list and plan of care. Monitor airway for bleeding sutures are in place. ENT following   No need for cpap with a trach. Per ENT, dysphagia is evident will likely need peg tube   Order post operative cxr     Tash Casiano M.D.    Pulmonary/Critical Care Medicine   35 min cct excluding procedures

## 2019-07-09 ENCOUNTER — ANESTHESIA EVENT (OUTPATIENT)
Dept: ENDOSCOPY | Age: 69
DRG: 011 | End: 2019-07-09
Payer: MEDICARE

## 2019-07-09 ENCOUNTER — ANESTHESIA (OUTPATIENT)
Dept: ENDOSCOPY | Age: 69
DRG: 011 | End: 2019-07-09
Payer: MEDICARE

## 2019-07-09 VITALS
OXYGEN SATURATION: 97 % | SYSTOLIC BLOOD PRESSURE: 148 MMHG | RESPIRATION RATE: 20 BRPM | DIASTOLIC BLOOD PRESSURE: 66 MMHG

## 2019-07-09 PROBLEM — J96.90 RESPIRATORY FAILURE (HCC): Status: ACTIVE | Noted: 2019-07-09

## 2019-07-09 LAB
ANION GAP SERPL CALCULATED.3IONS-SCNC: 10 MMOL/L (ref 7–16)
BUN BLDV-MCNC: 13 MG/DL (ref 8–23)
CALCIUM SERPL-MCNC: 9.3 MG/DL (ref 8.6–10.2)
CHLORIDE BLD-SCNC: 98 MMOL/L (ref 98–107)
CO2: 33 MMOL/L (ref 22–29)
CREAT SERPL-MCNC: 0.6 MG/DL (ref 0.7–1.2)
GFR AFRICAN AMERICAN: >60
GFR NON-AFRICAN AMERICAN: >60 ML/MIN/1.73
GLUCOSE BLD-MCNC: 121 MG/DL (ref 74–99)
HCT VFR BLD CALC: 45.2 % (ref 37–54)
HEMOGLOBIN: 14.7 G/DL (ref 12.5–16.5)
INR BLD: 1.2
MAGNESIUM: 2.1 MG/DL (ref 1.6–2.6)
MCH RBC QN AUTO: 30.3 PG (ref 26–35)
MCHC RBC AUTO-ENTMCNC: 32.5 % (ref 32–34.5)
MCV RBC AUTO: 93.2 FL (ref 80–99.9)
PDW BLD-RTO: 14.5 FL (ref 11.5–15)
PHOSPHORUS: 2.7 MG/DL (ref 2.5–4.5)
PLATELET # BLD: 269 E9/L (ref 130–450)
PMV BLD AUTO: 10 FL (ref 7–12)
POTASSIUM SERPL-SCNC: 5 MMOL/L (ref 3.5–5)
PROTHROMBIN TIME: 14.3 SEC (ref 9.3–12.4)
RBC # BLD: 4.85 E12/L (ref 3.8–5.8)
SODIUM BLD-SCNC: 141 MMOL/L (ref 132–146)
WBC # BLD: 15 E9/L (ref 4.5–11.5)

## 2019-07-09 PROCEDURE — 51798 US URINE CAPACITY MEASURE: CPT

## 2019-07-09 PROCEDURE — 3700000001 HC ADD 15 MINUTES (ANESTHESIA): Performed by: SURGERY

## 2019-07-09 PROCEDURE — 6360000002 HC RX W HCPCS: Performed by: OTOLARYNGOLOGY

## 2019-07-09 PROCEDURE — 2060000000 HC ICU INTERMEDIATE R&B

## 2019-07-09 PROCEDURE — 3609013300 HC EGD TUBE PLACEMENT: Performed by: SURGERY

## 2019-07-09 PROCEDURE — 6360000002 HC RX W HCPCS: Performed by: NURSE ANESTHETIST, CERTIFIED REGISTERED

## 2019-07-09 PROCEDURE — 2709999900 HC NON-CHARGEABLE SUPPLY: Performed by: SURGERY

## 2019-07-09 PROCEDURE — 83735 ASSAY OF MAGNESIUM: CPT

## 2019-07-09 PROCEDURE — 2580000003 HC RX 258: Performed by: OTOLARYNGOLOGY

## 2019-07-09 PROCEDURE — 6360000002 HC RX W HCPCS: Performed by: STUDENT IN AN ORGANIZED HEALTH CARE EDUCATION/TRAINING PROGRAM

## 2019-07-09 PROCEDURE — 2500000003 HC RX 250 WO HCPCS: Performed by: STUDENT IN AN ORGANIZED HEALTH CARE EDUCATION/TRAINING PROGRAM

## 2019-07-09 PROCEDURE — 85027 COMPLETE CBC AUTOMATED: CPT

## 2019-07-09 PROCEDURE — 36415 COLL VENOUS BLD VENIPUNCTURE: CPT

## 2019-07-09 PROCEDURE — 85610 PROTHROMBIN TIME: CPT

## 2019-07-09 PROCEDURE — 2580000003 HC RX 258: Performed by: NURSE ANESTHETIST, CERTIFIED REGISTERED

## 2019-07-09 PROCEDURE — 80048 BASIC METABOLIC PNL TOTAL CA: CPT

## 2019-07-09 PROCEDURE — 94640 AIRWAY INHALATION TREATMENT: CPT

## 2019-07-09 PROCEDURE — 2700000000 HC OXYGEN THERAPY PER DAY

## 2019-07-09 PROCEDURE — 6370000000 HC RX 637 (ALT 250 FOR IP): Performed by: OTOLARYNGOLOGY

## 2019-07-09 PROCEDURE — 3700000000 HC ANESTHESIA ATTENDED CARE: Performed by: SURGERY

## 2019-07-09 PROCEDURE — 94664 DEMO&/EVAL PT USE INHALER: CPT

## 2019-07-09 PROCEDURE — 84100 ASSAY OF PHOSPHORUS: CPT

## 2019-07-09 PROCEDURE — 6370000000 HC RX 637 (ALT 250 FOR IP): Performed by: INTERNAL MEDICINE

## 2019-07-09 PROCEDURE — 0DH68UZ INSERTION OF FEEDING DEVICE INTO STOMACH, VIA NATURAL OR ARTIFICIAL OPENING ENDOSCOPIC: ICD-10-PCS | Performed by: SURGERY

## 2019-07-09 RX ORDER — CEFAZOLIN SODIUM 1 G/3ML
INJECTION, POWDER, FOR SOLUTION INTRAMUSCULAR; INTRAVENOUS PRN
Status: DISCONTINUED | OUTPATIENT
Start: 2019-07-09 | End: 2019-07-09 | Stop reason: SDUPTHER

## 2019-07-09 RX ORDER — SODIUM CHLORIDE, SODIUM LACTATE, POTASSIUM CHLORIDE, CALCIUM CHLORIDE 600; 310; 30; 20 MG/100ML; MG/100ML; MG/100ML; MG/100ML
INJECTION, SOLUTION INTRAVENOUS CONTINUOUS PRN
Status: DISCONTINUED | OUTPATIENT
Start: 2019-07-09 | End: 2019-07-09 | Stop reason: SDUPTHER

## 2019-07-09 RX ORDER — BUDESONIDE 0.5 MG/2ML
0.5 INHALANT ORAL 2 TIMES DAILY
Status: DISCONTINUED | OUTPATIENT
Start: 2019-07-09 | End: 2019-07-11 | Stop reason: HOSPADM

## 2019-07-09 RX ORDER — FORMOTEROL FUMARATE 20 UG/2ML
20 SOLUTION RESPIRATORY (INHALATION) 2 TIMES DAILY
Status: DISCONTINUED | OUTPATIENT
Start: 2019-07-09 | End: 2019-07-11 | Stop reason: HOSPADM

## 2019-07-09 RX ORDER — PROPOFOL 10 MG/ML
INJECTION, EMULSION INTRAVENOUS PRN
Status: DISCONTINUED | OUTPATIENT
Start: 2019-07-09 | End: 2019-07-09 | Stop reason: SDUPTHER

## 2019-07-09 RX ORDER — METOPROLOL TARTRATE 5 MG/5ML
5 INJECTION INTRAVENOUS EVERY 8 HOURS PRN
Status: DISCONTINUED | OUTPATIENT
Start: 2019-07-09 | End: 2019-07-11 | Stop reason: HOSPADM

## 2019-07-09 RX ADMIN — SODIUM CHLORIDE, POTASSIUM CHLORIDE, SODIUM LACTATE AND CALCIUM CHLORIDE: 600; 310; 30; 20 INJECTION, SOLUTION INTRAVENOUS at 13:00

## 2019-07-09 RX ADMIN — BUDESONIDE 500 MCG: 0.5 SUSPENSION RESPIRATORY (INHALATION) at 08:19

## 2019-07-09 RX ADMIN — PROPOFOL 150 MG: 10 INJECTION, EMULSION INTRAVENOUS at 14:00

## 2019-07-09 RX ADMIN — SODIUM CHLORIDE, POTASSIUM CHLORIDE, SODIUM LACTATE AND CALCIUM CHLORIDE: 600; 310; 30; 20 INJECTION, SOLUTION INTRAVENOUS at 05:04

## 2019-07-09 RX ADMIN — ONDANSETRON 4 MG: 2 INJECTION INTRAMUSCULAR; INTRAVENOUS at 21:11

## 2019-07-09 RX ADMIN — HYDROCODONE BITARTRATE AND ACETAMINOPHEN 1 TABLET: 5; 325 TABLET ORAL at 21:11

## 2019-07-09 RX ADMIN — FORMOTEROL FUMARATE DIHYDRATE 20 MCG: 20 SOLUTION RESPIRATORY (INHALATION) at 08:19

## 2019-07-09 RX ADMIN — METOPROLOL TARTRATE 25 MG: 25 TABLET ORAL at 23:48

## 2019-07-09 RX ADMIN — FENTANYL CITRATE 25 MCG: 50 INJECTION INTRAMUSCULAR; INTRAVENOUS at 17:19

## 2019-07-09 RX ADMIN — Medication 10 ML: at 20:55

## 2019-07-09 RX ADMIN — FENTANYL CITRATE 25 MCG: 50 INJECTION INTRAMUSCULAR; INTRAVENOUS at 05:32

## 2019-07-09 RX ADMIN — FORMOTEROL FUMARATE DIHYDRATE 20 MCG: 20 SOLUTION RESPIRATORY (INHALATION) at 19:53

## 2019-07-09 RX ADMIN — DILTIAZEM HYDROCHLORIDE 30 MG: 30 TABLET, FILM COATED ORAL at 23:48

## 2019-07-09 RX ADMIN — ONDANSETRON 4 MG: 2 INJECTION INTRAMUSCULAR; INTRAVENOUS at 17:19

## 2019-07-09 RX ADMIN — METOPROLOL TARTRATE 5 MG: 5 INJECTION INTRAVENOUS at 17:19

## 2019-07-09 RX ADMIN — BUDESONIDE 500 MCG: 0.5 SUSPENSION RESPIRATORY (INHALATION) at 19:53

## 2019-07-09 RX ADMIN — CEFAZOLIN 2000 MG: 1 INJECTION, POWDER, FOR SOLUTION INTRAMUSCULAR; INTRAVENOUS at 13:58

## 2019-07-09 ASSESSMENT — PAIN DESCRIPTION - PROGRESSION: CLINICAL_PROGRESSION: GRADUALLY WORSENING

## 2019-07-09 ASSESSMENT — PAIN DESCRIPTION - LOCATION
LOCATION: ABDOMEN
LOCATION: NECK;THROAT

## 2019-07-09 ASSESSMENT — PAIN DESCRIPTION - PAIN TYPE
TYPE: SURGICAL PAIN
TYPE: SURGICAL PAIN

## 2019-07-09 ASSESSMENT — PAIN DESCRIPTION - ORIENTATION: ORIENTATION: LOWER;MID

## 2019-07-09 ASSESSMENT — PAIN SCALES - GENERAL
PAINLEVEL_OUTOF10: 7
PAINLEVEL_OUTOF10: 0
PAINLEVEL_OUTOF10: 0
PAINLEVEL_OUTOF10: 6
PAINLEVEL_OUTOF10: 0
PAINLEVEL_OUTOF10: 5
PAINLEVEL_OUTOF10: 0

## 2019-07-09 ASSESSMENT — PAIN DESCRIPTION - FREQUENCY: FREQUENCY: INTERMITTENT

## 2019-07-09 ASSESSMENT — PAIN - FUNCTIONAL ASSESSMENT: PAIN_FUNCTIONAL_ASSESSMENT: PREVENTS OR INTERFERES SOME ACTIVE ACTIVITIES AND ADLS

## 2019-07-09 ASSESSMENT — PAIN DESCRIPTION - ONSET: ONSET: GRADUAL

## 2019-07-09 ASSESSMENT — PAIN DESCRIPTION - DESCRIPTORS: DESCRIPTORS: ACHING;DISCOMFORT;DULL

## 2019-07-09 NOTE — PLAN OF CARE
Problem: Malnutrition  (NI-5.2)  Goal: Food and/or Nutrient Delivery  Pt tolerate EN at goal rate via new PEG   Description  Individualized approach for food/nutrient provision.   Outcome: Met This Shift

## 2019-07-09 NOTE — CARE COORDINATION
Met w/ patient and wife. Lives in 1 [place] house-2 steps to entrance. Independent PTA. Hx [facility] and agreeable to [facility] on discharge. Currently on 40% trach mask- PTA has home O2 at night and CPAP  through [facility] . PCP is Dr. Christina Day and [facility]. Scheduled for PEG today. Plan is for tube feeding on discharge. Wife drives and will be available to take to any needed appointments- planning on chemo/radiation- oncology to see yet.  Will continue to follow for discharge needs Reza Sarkar

## 2019-07-09 NOTE — PROGRESS NOTES
[x] No    [] Yes    If yes -   [] Levophed       [] Dopamine     [] Vasopressin       [] Dobutamine  [] Phenylephrine         [] Epinephrine    CENTRAL LINES:     [x] No   [] Yes   (Date of Insertion:   )           If yes -     [] Right IJ     [] Left IJ [] Right Femoral [] Left Femoral                   [] Right Subclavian [] Left Subclavian       BARNES'S CATHETER:   [x] No   [] Yes  (Date of Insertion:   )     URINE OUTPUT:            [] Good   [] Low              [] Anuric  Patient urinating freely    OBJECTIVE:     VITAL SIGNS:  /68   Pulse 96   Temp 97.6 °F (36.4 °C) (Oral)   Resp 21   Ht 6' 1.5\" (1.867 m)   Wt 209 lb 7 oz (95 kg)   SpO2 91%   BMI 27.25 kg/m²   Tmax over 24 hours:  Temp (24hrs), Av.2 °F (36.8 °C), Min:97.6 °F (36.4 °C), Max:98.5 °F (36.9 °C)      Patient Vitals for the past 6 hrs:   BP Temp Temp src Pulse Resp SpO2 Weight   19 0820 -- -- -- -- 21 -- --   19 0819 -- -- -- -- (!) 42 -- --   19 0800 137/68 97.6 °F (36.4 °C) Oral 96 30 91 % --   19 0700 120/62 -- -- 85 18 90 % --   19 0600 120/62 -- -- 99 19 99 % --   19 0500 127/62 -- -- 97 (!) 33 95 % 209 lb 7 oz (95 kg)   19 0400 120/62 97.8 °F (36.6 °C) -- 105 18 98 % --         Intake/Output Summary (Last 24 hours) at 2019 0918  Last data filed at 2019 0600  Gross per 24 hour   Intake 1337 ml   Output 325 ml   Net 1012 ml     Wt Readings from Last 2 Encounters:   19 209 lb 7 oz (95 kg)   19 229 lb (103.9 kg)     Body mass index is 27.25 kg/m².         PHYSICAL EXAMINATION:    General: awake, alert, oriented to person, place, time, and purpose, appears stated age, cooperative, no acute distress, pleasant  Eyes: conjunctivae/corneas clear, sclera non icteric, EOMI  Ears: no obvious scars, no lesions, no masses, hearing intact  Mouth: mucous membranes moist, no obvious oral sores  Head: normocephalic, atraumatic  Neck: S/P tracheostomy #6 shiley cuffed, blood and Dysphagia   -Consult general surgery for possible PEG tube placement for tube feeding  -Peg today at 2     Renal   Continue to monitor electrolytes and I/O     Infectious Disease      Afebrile   leukocytosis, 15, continue to monitor     Hematology/Oncology   Eliquis on hold since surgery  surgical pathology 7/8 of laryngeal mass: Squamous cell carcinoma, moderately differentiated. Continue to monitor H&H     Endocrine   Continue to monitor glucose     Social/Spiritual/DNR/Other   Full code    Seen, examined, and discussed with attending. Timmy Ramirez, DO PGY2 at 9:18 AM on 07/09/19      I personally saw, examined and provided care for the patient. Radiographs, labs and medication list were reviewed by me independently. I spoke with bedside nursing, therapists and consultants. Critical care services and times documented are independent of procedures and multidisciplinary rounds with Residents. Additionally comprehensive, multidisciplinary rounds were conducted with the MICU team. The case was discussed in detail and plans for care were established. Review of Residents documentation was conducted and revisions were made as appropriate. I agree with the above documented exam, problem list and plan of care. Cisco Weeks M.D.    Pulmonary/Critical Care Medicine

## 2019-07-09 NOTE — CONSULTS
by mouth daily  albuterol (PROVENTIL) (2.5 MG/3ML) 0.083% nebulizer solution, Take 2.5 mg by nebulization every 6 hours as needed for Wheezing   OXYGEN, 2 L by Nasal route nightly as needed   albuterol (PROVENTIL HFA;VENTOLIN HFA) 108 (90 BASE) MCG/ACT inhaler, Inhale 2 puffs into the lungs every 6 hours as needed. Note that the patient's home medications were reviewed and the above list is accurate to the best of my knowledge at the time of the exam.      Allergies:    Patient has no known allergies. Social History:    reports that he quit smoking about 10 years ago. His smoking use included cigarettes. He has a 80.00 pack-year smoking history. He has never used smokeless tobacco. He reports that he does not drink alcohol or use drugs. Family History:   family history includes Diabetes in his mother; Emphysema in his father. REVIEW OF SYSTEMS:  As above in the HPI, otherwise negative    PHYSICAL EXAM:    Vitals:  /68   Pulse 96   Temp 97.6 °F (36.4 °C) (Oral)   Resp 21   Ht 6' 1.5\" (1.867 m)   Wt 209 lb 7 oz (95 kg)   SpO2 91%   BMI 27.25 kg/m²     General:  Awake, alert, oriented X 3. Well developed, well nourished, well groomed. No apparent distress. HEENT:  Trach w s/s scant drainage, trach mask  Normocephalic, atraumatic. Pupils equal, round, reactive to light. No scleral icterus. No conjunctival injection. Normal lips, teeth, and gums. No nasal discharge. Neck:  Supple  Heart:  RRR, no murmurs, gallops, rubs  Lungs:  CTA bilaterally, bilat symmetrical expansion, no wheeze, rales, or rhonchi  Abdomen:   Bowel sounds present, soft, nontender, no masses, no organomegaly, no peritoneal signs  Extremities:  No clubbing, cyanosis, or edema  Skin:  Warm and dry, no open lesions or rash  Neuro:  Cranial nerves 2-12 intact, no focal deficits  Breast: deferred  Rectal: deferred  Genitalia:  deferred    LABS:    CBC with Differential:    Lab Results   Component Value Date    WBC 15.0

## 2019-07-09 NOTE — PROGRESS NOTES
Otolaryngology Daily Progress Note -- 7/9/19  S:  Patient doing well, communicating with writing pad. No issues per Nursing staff. Denies complaints. O:  Benjamen Kales in place, cuff down, ties secure, SpO2 95%, no bleeding    A/P: 77 y.o. male with left hypopharyngeal/laryngeal SCC POD 1 S/P Trach/DL/Esophagoscopy.   · Patient is doing well  · PEG today with Gen Surg  · Will discuss further plans with Dr. Maximo Kramer      Electronically signed by Fortunato Li DO on 7/9/19 at 6:02 AM

## 2019-07-09 NOTE — OP NOTE
[facility]  ENDOSCOPY LAB  UPPER ENDOSCOPY REPORT      DATE OF PROCEDURE: 7/9/2019     PREOPERATIVE DIAGNOSIS: Failure to take adequate PO    POSTOPERATIVE DIAGNOSIS/FINDINGS: same    SURGEON: SALAS Eppersonanita Myers:     OPERATION: 1. Esophagogastroduodenoscopy 2. Percutaneous Endoscopic Gastrostomy (PEG)  placement    ANESTHESIA: Local monitored anesthesia. COMPLICATIONS: None. PRIOR TO EXAM: Gen: comfortable, no distress, awake and alert; Lungs: Clear;  Heart:regular rate and rhythm, normal S1S2     BRIEF HISTORY:  This is a 77 y.o. male who presents with the complaint of failure to take adequate PO. My recommendation is to proceed with esophagogastroduodenoscopy/ PEG tube placement. The patient was advised of the risks, benefits, complications and options including the risk of bleeding and perforation. The patient understood and agreed to proceed. Under [facility] anesthesia, the patient was positioned in the left side down decubitus position. A bite block was inserted. Under direct visualization the scope was passed through the oral cavity, into the esophagus and then into the stomach. The scope was then passed through a normal appearing pylorus into the duodenum. The proximal duodenum and bulb were unremarkable. The scope was pulled back into the stomach and retroflexed. Visualization on the gastroesophageal junction and fundus was unremarkable. The scope was then straightened and the distal stomach was inspected. This showed no evidence of gastritis or ulcer. No biopsies were performed. A good one to one was obtained with the scope in the distal stomach    The area of the abdomen was prepped with iodine. 10 cc1% lidocaine was used for local anesthetic for the skin and subcutaneous tissue. The 11 blade was used to make a skin incision. The 18 gauge angio cath was used to stick the skin into the stomach. This was performed under direct visualization.   Next the wire was placed

## 2019-07-09 NOTE — ANESTHESIA PRE PROCEDURE
Department of Anesthesiology  Preprocedure Note       Name:  Sarah Resendiz   Age:  77 y.o.  :  1950                                          MRN:  [id number]         Date:  2019      Surgeon: Pedro Arevalo):  Anshu Gale MD    Procedure: EGD PEG TUBE PLACEMENT (N/A )    Medications prior to admission:   Prior to Admission medications    Medication Sig Start Date End Date Taking? Authorizing Provider   tiotropium (SPIRIVA RESPIMAT) 2.5 MCG/ACT AERS inhaler Inhale 2 puffs into the lungs daily    Historical Provider, MD   diltiazem (CARTIA XT) 180 MG extended release capsule Take 180 mg by mouth daily    Historical Provider, MD   fluticasone-vilanterol (BREO ELLIPTA) 100-25 MCG/INH AEPB inhaler Inhale 1 puff into the lungs daily    Historical Provider, MD   ELIQUIS 5 MG TABS tablet 2 times daily  18   Historical Provider, MD   metoprolol succinate (TOPROL XL) 25 MG extended release tablet Take 25 mg by mouth daily 17   Historical Provider, MD   albuterol (PROVENTIL) (2.5 MG/3ML) 0.083% nebulizer solution Take 2.5 mg by nebulization every 6 hours as needed for Wheezing     Historical Provider, MD   OXYGEN 2 L by Nasal route nightly as needed     Historical Provider, MD   albuterol (PROVENTIL HFA;VENTOLIN HFA) 108 (90 BASE) MCG/ACT inhaler Inhale 2 puffs into the lungs every 6 hours as needed. Historical Provider, MD       Current medications:    No current facility-administered medications for this visit. No current outpatient medications on file.      Facility-Administered Medications Ordered in Other Visits   Medication Dose Route Frequency Provider Last Rate Last Dose    budesonide (PULMICORT) nebulizer suspension 500 mcg  0.5 mg Nebulization BID Marina Storm MD   500 mcg at 19 0819    formoterol (PERFOROMIST) nebulizer solution 20 mcg  20 mcg Nebulization BID Marina Storm MD   20 mcg at 19 0819    albuterol (PROVENTIL) nebulizer solution 2.5 mg  2.5 mg Nebulization 07/09/2019    K 4.6 07/08/2019    CL 98 07/09/2019    CO2 33 07/09/2019    BUN 13 07/09/2019    CREATININE 0.6 07/09/2019    GFRAA >60 07/09/2019    LABGLOM >60 07/09/2019    GLUCOSE 121 07/09/2019    GLUCOSE 116 01/20/2011    PROT 8.7 01/09/2014    CALCIUM 9.3 07/09/2019    BILITOT 0.3 01/09/2014    ALKPHOS 101 01/09/2014    AST 13 01/09/2014    ALT 18 01/09/2014       POC Tests: No results for input(s): POCGLU, POCNA, POCK, POCCL, POCBUN, POCHEMO, POCHCT in the last 72 hours. Coags:   Lab Results   Component Value Date    PROTIME 14.3 07/09/2019    PROTIME 11.3 01/20/2011    INR 1.2 07/09/2019    APTT 48.9 01/08/2014       HCG (If Applicable): No results found for: PREGTESTUR, PREGSERUM, HCG, HCGQUANT     ABGs: No results found for: PHART, PO2ART, QUI5NFR, YXM1PEY, BEART, U9BSHOLG     Type & Screen (If Applicable):  No results found for: LABABO, [address]    Anesthesia Evaluation  Patient summary reviewed and Nursing notes reviewed no history of anesthetic complications:   Airway: Mallampati: I  TM distance: >3 FB   Neck ROM: full  Mouth opening: > = 3 FB Dental:    (+) caps  Comment: Front teeth are permanent    Pulmonary:   (+) COPD:  decreased breath sounds,                            ROS comment: Quit smoking 11 years ago    2L oxygen at night   Cardiovascular:  Exercise tolerance: poor (<4 METS),   (+) dysrhythmias (s/p afib ablation 3 years ago): atrial fibrillation,         Rhythm: regular  Rate: normal                    Neuro/Psych:   Negative Neuro/Psych ROS              GI/Hepatic/Renal:             Endo/Other:    (+) blood dyscrasia: anticoagulation therapy:., .                  ROS comment: Stopped eloquis 5 days ago    Laryngeal mass Abdominal:           Vascular:                                          Anesthesia Plan      MAC     ASA 3       Induction: intravenous. Anesthetic plan and risks discussed with patient. Plan discussed with CRNA.     Attending anesthesiologist reviewed and agrees

## 2019-07-10 ENCOUNTER — HOSPITAL ENCOUNTER (OUTPATIENT)
Dept: RADIATION ONCOLOGY | Age: 69
Discharge: HOME | End: 2019-07-10
Payer: MEDICARE

## 2019-07-10 ENCOUNTER — APPOINTMENT (OUTPATIENT)
Dept: CT IMAGING | Age: 69
DRG: 011 | End: 2019-07-10
Attending: OTOLARYNGOLOGY
Payer: MEDICARE

## 2019-07-10 PROBLEM — E43 SEVERE PROTEIN-CALORIE MALNUTRITION (HCC): Status: ACTIVE | Noted: 2019-07-10

## 2019-07-10 LAB — MRSA CULTURE ONLY: NORMAL

## 2019-07-10 PROCEDURE — 2700000000 HC OXYGEN THERAPY PER DAY

## 2019-07-10 PROCEDURE — 6360000002 HC RX W HCPCS: Performed by: OTOLARYNGOLOGY

## 2019-07-10 PROCEDURE — 94640 AIRWAY INHALATION TREATMENT: CPT

## 2019-07-10 PROCEDURE — 92507 TX SP LANG VOICE COMM INDIV: CPT | Performed by: SPEECH-LANGUAGE PATHOLOGIST

## 2019-07-10 PROCEDURE — 92523 SPEECH SOUND LANG COMPREHEN: CPT | Performed by: SPEECH-LANGUAGE PATHOLOGIST

## 2019-07-10 PROCEDURE — 6370000000 HC RX 637 (ALT 250 FOR IP): Performed by: OTOLARYNGOLOGY

## 2019-07-10 PROCEDURE — 92597 ORAL SPEECH DEVICE EVAL: CPT | Performed by: SPEECH-LANGUAGE PATHOLOGIST

## 2019-07-10 PROCEDURE — 6370000000 HC RX 637 (ALT 250 FOR IP): Performed by: INTERNAL MEDICINE

## 2019-07-10 PROCEDURE — 2580000003 HC RX 258: Performed by: OTOLARYNGOLOGY

## 2019-07-10 PROCEDURE — 6360000004 HC RX CONTRAST MEDICATION: Performed by: RADIOLOGY

## 2019-07-10 PROCEDURE — 70491 CT SOFT TISSUE NECK W/DYE: CPT

## 2019-07-10 PROCEDURE — 2060000000 HC ICU INTERMEDIATE R&B

## 2019-07-10 PROCEDURE — 71260 CT THORAX DX C+: CPT

## 2019-07-10 PROCEDURE — 6360000002 HC RX W HCPCS: Performed by: STUDENT IN AN ORGANIZED HEALTH CARE EDUCATION/TRAINING PROGRAM

## 2019-07-10 RX ORDER — METOPROLOL TARTRATE 50 MG/1
50 TABLET, FILM COATED ORAL 2 TIMES DAILY
Status: DISCONTINUED | OUTPATIENT
Start: 2019-07-10 | End: 2019-07-11 | Stop reason: HOSPADM

## 2019-07-10 RX ORDER — METOPROLOL TARTRATE 50 MG/1
50 TABLET, FILM COATED ORAL 2 TIMES DAILY
Status: DISCONTINUED | OUTPATIENT
Start: 2019-07-10 | End: 2019-07-10

## 2019-07-10 RX ADMIN — HYDROCODONE BITARTRATE AND ACETAMINOPHEN 1 TABLET: 5; 325 TABLET ORAL at 11:26

## 2019-07-10 RX ADMIN — DILTIAZEM HYDROCHLORIDE 30 MG: 30 TABLET, FILM COATED ORAL at 12:12

## 2019-07-10 RX ADMIN — ONDANSETRON 4 MG: 2 INJECTION INTRAMUSCULAR; INTRAVENOUS at 11:26

## 2019-07-10 RX ADMIN — METOPROLOL TARTRATE 50 MG: 50 TABLET ORAL at 12:12

## 2019-07-10 RX ADMIN — IOPAMIDOL 90 ML: 755 INJECTION, SOLUTION INTRAVENOUS at 21:08

## 2019-07-10 RX ADMIN — Medication 10 ML: at 20:18

## 2019-07-10 RX ADMIN — BUDESONIDE 500 MCG: 0.5 SUSPENSION RESPIRATORY (INHALATION) at 08:29

## 2019-07-10 RX ADMIN — BUDESONIDE 500 MCG: 0.5 SUSPENSION RESPIRATORY (INHALATION) at 20:05

## 2019-07-10 RX ADMIN — DILTIAZEM HYDROCHLORIDE 30 MG: 30 TABLET, FILM COATED ORAL at 07:18

## 2019-07-10 RX ADMIN — METOPROLOL TARTRATE 50 MG: 50 TABLET ORAL at 20:20

## 2019-07-10 RX ADMIN — Medication 10 ML: at 08:49

## 2019-07-10 RX ADMIN — METOPROLOL TARTRATE 25 MG: 25 TABLET ORAL at 08:49

## 2019-07-10 RX ADMIN — FORMOTEROL FUMARATE DIHYDRATE 20 MCG: 20 SOLUTION RESPIRATORY (INHALATION) at 08:29

## 2019-07-10 RX ADMIN — HYDROCODONE BITARTRATE AND ACETAMINOPHEN 1 TABLET: 5; 325 TABLET ORAL at 15:48

## 2019-07-10 RX ADMIN — FORMOTEROL FUMARATE DIHYDRATE 20 MCG: 20 SOLUTION RESPIRATORY (INHALATION) at 20:05

## 2019-07-10 ASSESSMENT — PAIN - FUNCTIONAL ASSESSMENT: PAIN_FUNCTIONAL_ASSESSMENT: PREVENTS OR INTERFERES SOME ACTIVE ACTIVITIES AND ADLS

## 2019-07-10 ASSESSMENT — PAIN SCALES - GENERAL
PAINLEVEL_OUTOF10: 7
PAINLEVEL_OUTOF10: 0
PAINLEVEL_OUTOF10: 5
PAINLEVEL_OUTOF10: 0
PAINLEVEL_OUTOF10: 0

## 2019-07-10 ASSESSMENT — PAIN DESCRIPTION - PROGRESSION: CLINICAL_PROGRESSION: GRADUALLY WORSENING

## 2019-07-10 ASSESSMENT — PAIN DESCRIPTION - ONSET: ONSET: GRADUAL

## 2019-07-10 ASSESSMENT — PAIN DESCRIPTION - DESCRIPTORS: DESCRIPTORS: ACHING;SORE;DISCOMFORT

## 2019-07-10 ASSESSMENT — PAIN DESCRIPTION - ORIENTATION: ORIENTATION: LEFT;MID

## 2019-07-10 ASSESSMENT — PAIN DESCRIPTION - PAIN TYPE: TYPE: SURGICAL PAIN

## 2019-07-10 ASSESSMENT — PAIN DESCRIPTION - FREQUENCY: FREQUENCY: INTERMITTENT

## 2019-07-10 ASSESSMENT — PAIN DESCRIPTION - LOCATION: LOCATION: ABDOMEN

## 2019-07-10 NOTE — PROGRESS NOTES
Perioperative care per primary  Pain control  trach mask   pum/CCM  Gen Surg sp PEG    Medications for other co morbidities cont as appropriate w dosage adjustments as necessary   -- formulation adjusted for PEG  - increase metoprolol for tachycardia  -- resume Eloquis when OK ENT/surgery  Thank you for allowing me to participate in the care of this patient. Will sign off and follow peripherally. Please call with any questions.         Electronically signed by Margaux Villalpando MD on 7/10/2019 at 10:09 AM

## 2019-07-10 NOTE — CARE COORDINATION
Social Work / Discharge planning : ELLOIT received call from Tha Hollingsworth from [facility]. Tha Hollingsworth did verify pricing with patient spouse in regarding to dietary recommended supplement and Tube feeding. Patient will also need 0xygen through Trache mask. 02 Order noted. Tha Hollingsworth provided SW with a list of items needed in the home prior to discharge in order to provide proper care for patients newly placed trache. ELLIOT did update patient RN with list as well as the need for DME orders. ELLIOT also contacted Nicky/ Sarah from [facility] with the items needed. Await return call to see if [facility] can provide oxygen, trache kit,suction machine and inner cannula at discharge. Also, trache supplies will need to be sent to patient at discharge to get [address]i [address] through until they reorder what is needed in the home per Tha Hollingsworth at [facility]. ELLIOT to follow.  Electronically signed by Sima Gitelman, LSW on 7/10/19 at 9:48 AM

## 2019-07-10 NOTE — CONSULTS
and his chest.  I am  going to proceed forward with chemo and radiation therapy. Hopefully,  we are going to preserve the larynx. I have given him my card. We will  follow him as an outpatient. Let us thank you very much for allowing us to see him in consultation.         Black Man MD    D: 07/10/2019 7:14:35       T: 07/10/2019 8:29:14     [id number]  Job#: [id number]     Doc#: [id number]

## 2019-07-10 NOTE — PROGRESS NOTES
[facility] Quality Flow/Interdisciplinary Rounds Progress Note        Quality Flow Rounds held on July 10, 2019    Disciplines Attending:  Bedside Nurse, ,  and Nursing Unit Leadership    Marielle Escudero was admitted on 7/8/2019  5:34 AM    Anticipated Discharge Date:  Expected Discharge Date: 07/11/19    Disposition:    Antonio Score:  Antonio Scale Score: 20    Readmission Risk              Risk of Unplanned Readmission:        11           Discussed patient goal for the day, patient clinical progression, and barriers to discharge. The following Goal(s) of the Day/Commitment(s) have been identified:  Wean oxygen as tolerated, increase activity, check Pulmonary plan.       Adriel Stearns  July 10, 2019

## 2019-07-10 NOTE — PROGRESS NOTES
SPEAKING VALVE EVALUATION    Consult received for assessment of integrity of voice following PMV placement. Chart reviewed. Patient seen upright in bed. Min amount of secretions noted from trach site. Kaitlin Razo kept deflated. The PMV was placed on outer cannula without incident. Patient was immediately able to achieve purposeful voicing of adequate intensity for a variety of structured/unstructured verbal tasks. No respiratory distress noted, good pacing strategies utilized. Completed education with patient and wife regarding use of the valve. Alert information to deflate trach cuff placed on whiteboard note and alert sticker placed on  balloon tubing. RECOMMENDATIONS    Given positive outcome, will not follow. Nursing aware. Arabella PRICE. CCC/SLP [id number]  Speech-Language Pathologist

## 2019-07-11 VITALS
RESPIRATION RATE: 18 BRPM | HEIGHT: 74 IN | TEMPERATURE: 95.7 F | WEIGHT: 213 LBS | OXYGEN SATURATION: 90 % | SYSTOLIC BLOOD PRESSURE: 132 MMHG | BODY MASS INDEX: 27.34 KG/M2 | DIASTOLIC BLOOD PRESSURE: 57 MMHG | HEART RATE: 101 BPM

## 2019-07-11 DIAGNOSIS — C32.3 MALIGNANT NEOPLASM OF LARYNGEAL CARTILAGE (HCC): ICD-10-CM

## 2019-07-11 DIAGNOSIS — C32.9 LARYNX CANCER (HCC): ICD-10-CM

## 2019-07-11 DIAGNOSIS — C32.9 LARYNX CARCINOMA (HCC): Primary | ICD-10-CM

## 2019-07-11 LAB — TSH SERPL DL<=0.05 MIU/L-ACNC: 0.55 UIU/ML (ref 0.27–4.2)

## 2019-07-11 PROCEDURE — 94640 AIRWAY INHALATION TREATMENT: CPT

## 2019-07-11 PROCEDURE — 99222 1ST HOSP IP/OBS MODERATE 55: CPT | Performed by: RADIOLOGY

## 2019-07-11 PROCEDURE — 36415 COLL VENOUS BLD VENIPUNCTURE: CPT

## 2019-07-11 PROCEDURE — 6360000002 HC RX W HCPCS: Performed by: STUDENT IN AN ORGANIZED HEALTH CARE EDUCATION/TRAINING PROGRAM

## 2019-07-11 PROCEDURE — 84443 ASSAY THYROID STIM HORMONE: CPT

## 2019-07-11 PROCEDURE — 2580000003 HC RX 258: Performed by: OTOLARYNGOLOGY

## 2019-07-11 PROCEDURE — 6370000000 HC RX 637 (ALT 250 FOR IP): Performed by: INTERNAL MEDICINE

## 2019-07-11 PROCEDURE — 6370000000 HC RX 637 (ALT 250 FOR IP): Performed by: OTOLARYNGOLOGY

## 2019-07-11 PROCEDURE — 6360000002 HC RX W HCPCS: Performed by: OTOLARYNGOLOGY

## 2019-07-11 PROCEDURE — 2700000000 HC OXYGEN THERAPY PER DAY

## 2019-07-11 RX ORDER — FORMOTEROL FUMARATE 20 UG/2ML
20 SOLUTION RESPIRATORY (INHALATION) 2 TIMES DAILY
Qty: 120 ML | Refills: 5 | Status: SHIPPED | OUTPATIENT
Start: 2019-07-11 | End: 2020-02-11

## 2019-07-11 RX ORDER — IPRATROPIUM BROMIDE AND ALBUTEROL SULFATE 2.5; .5 MG/3ML; MG/3ML
3 SOLUTION RESPIRATORY (INHALATION) EVERY 6 HOURS PRN
Qty: 360 ML | Refills: 5 | Status: SHIPPED | OUTPATIENT
Start: 2019-07-11 | End: 2020-07-17 | Stop reason: ALTCHOICE

## 2019-07-11 RX ADMIN — BUDESONIDE 500 MCG: 0.5 SUSPENSION RESPIRATORY (INHALATION) at 08:40

## 2019-07-11 RX ADMIN — ONDANSETRON 4 MG: 2 INJECTION INTRAMUSCULAR; INTRAVENOUS at 10:05

## 2019-07-11 RX ADMIN — DILTIAZEM HYDROCHLORIDE 30 MG: 30 TABLET, FILM COATED ORAL at 06:37

## 2019-07-11 RX ADMIN — HYDROCODONE BITARTRATE AND ACETAMINOPHEN 1 TABLET: 5; 325 TABLET ORAL at 10:04

## 2019-07-11 RX ADMIN — DILTIAZEM HYDROCHLORIDE 30 MG: 30 TABLET, FILM COATED ORAL at 12:32

## 2019-07-11 RX ADMIN — Medication 10 ML: at 08:30

## 2019-07-11 RX ADMIN — HYDROCODONE BITARTRATE AND ACETAMINOPHEN 1 TABLET: 5; 325 TABLET ORAL at 01:23

## 2019-07-11 RX ADMIN — DILTIAZEM HYDROCHLORIDE 30 MG: 30 TABLET, FILM COATED ORAL at 01:28

## 2019-07-11 RX ADMIN — FORMOTEROL FUMARATE DIHYDRATE 20 MCG: 20 SOLUTION RESPIRATORY (INHALATION) at 08:40

## 2019-07-11 RX ADMIN — METOPROLOL TARTRATE 50 MG: 50 TABLET ORAL at 08:29

## 2019-07-11 ASSESSMENT — PAIN SCALES - GENERAL
PAINLEVEL_OUTOF10: 4
PAINLEVEL_OUTOF10: 0
PAINLEVEL_OUTOF10: 7
PAINLEVEL_OUTOF10: 6

## 2019-07-11 ASSESSMENT — PAIN DESCRIPTION - FREQUENCY: FREQUENCY: INTERMITTENT

## 2019-07-11 ASSESSMENT — PAIN - FUNCTIONAL ASSESSMENT: PAIN_FUNCTIONAL_ASSESSMENT: PREVENTS OR INTERFERES SOME ACTIVE ACTIVITIES AND ADLS

## 2019-07-11 ASSESSMENT — PAIN DESCRIPTION - PROGRESSION: CLINICAL_PROGRESSION: GRADUALLY WORSENING

## 2019-07-11 ASSESSMENT — PAIN DESCRIPTION - DESCRIPTORS: DESCRIPTORS: ACHING;DISCOMFORT;SORE

## 2019-07-11 ASSESSMENT — PAIN DESCRIPTION - ONSET: ONSET: GRADUAL

## 2019-07-11 ASSESSMENT — PAIN DESCRIPTION - PAIN TYPE: TYPE: ACUTE PAIN

## 2019-07-11 ASSESSMENT — PAIN DESCRIPTION - ORIENTATION: ORIENTATION: OTHER (COMMENT)

## 2019-07-11 ASSESSMENT — PAIN DESCRIPTION - LOCATION: LOCATION: GENERALIZED

## 2019-07-11 NOTE — PROGRESS NOTES
P Quality Flow/Interdisciplinary Rounds Progress Note        Quality Flow Rounds held on July 11, 2019    Disciplines Attending:  Bedside Nurse, ,  and Nursing Unit Leadership    Ramesh Sequeira was admitted on 7/8/2019  5:34 AM    Anticipated Discharge Date:  Expected Discharge Date: 07/11/19    Disposition:    Antonio Score:  Antonio Scale Score: 21    Readmission Risk              Risk of Unplanned Readmission:        12           Discussed patient goal for the day, patient clinical progression, and barriers to discharge. The following Goal(s) of the Day/Commitment(s) have been identified:  Wean oxygen, check Pulmonary plan.       Aimee Navarro  July 11, 2019

## 2019-07-11 NOTE — ONCOLOGY
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 Patient had been treated with a course of antibiotics and a Medrol Dosepak with essentially no change. He was referred to Ashley Gilliland MD of the ENT service on 7/2/2019 and advise to be admitted to the hospital.  Subsequent to Valerio Marilou 7/8/2019 patient underwent exam under anesthesia placement of a tracheostomy tube and panendoscopy under care of Dr. Ashley Gilliland. Operative note indicates that no abnormality was found within the cervical thoracic esophagus. bronchoscopy was not performed but recent CAT scan of chest with contrast the aorta does not reveal any abnormality. In any event,after placement of tracheostomy a direct laryngoscopy was performed revealing a normal epiglottis. The right aryepiglottic fold was also normal.  However, the left aryepiglottic fold was grossly abnormal with extension to the arytenoid with extension into the medial wall of the piriform sinus.   The left vocal cord was Excluder - Delatore    BRONCHOSCOPY  9/9/2015    CARDIOVERSION  9-2013    COLONOSCOPY      ECHOCARDIOGRAM TRANSESOPHAGEAL  7/2013         ECHOCARDIOGRAM TRANSESOPHAGEAL  3/18/2013         ECHOCARDIOGRAM TRANSESOPHAGEAL  9/3/2013         GASTROSTOMY TUBE PLACEMENT N/A 7/9/2019    EGD PEG TUBE PLACEMENT performed by Anshu Gale MD at [facility] N/A 7/8/2019    DIRECT LARYNGOSCOPY WITH BIOPSY, ESOPHAGOSCOPY performed by Jin Ramirez MD at [facility] N/A 7/8/2019    AWAKE TRACHEOSTOMY performed by Jin Ramirez MD at [facility] OR         Prior Radiation Therapy:  denies    Prior Chemotherapy:   denies    Prior Hormonal Treatment:  denies        Family History   Problem Relation Age of Onset    Diabetes Mother     Emphysema Father        Current Facility-Administered Medications   Medication Dose Route Frequency Provider Last Rate Last Dose    metoprolol tartrate (LOPRESSOR) tablet 50 mg  50 mg PEG Tube BID Amy Schaefer MD   50 mg at 07/11/19 0829    budesonide (PULMICORT) nebulizer suspension 500 mcg  0.5 mg Nebulization BID Marina Storm MD   500 mcg at 07/11/19 0840    formoterol (PERFOROMIST) nebulizer solution 20 mcg  20 mcg Nebulization BID Marina Storm MD   20 mcg at 07/11/19 0840    metoprolol (LOPRESSOR) injection 5 mg  5 mg Intravenous Q8H PRN Marina Storm MD   5 mg at 07/09/19 1719    diltiazem (CARDIZEM) tablet 30 mg  30 mg PEG Tube 4 times per day Amy Schaefer MD   30 mg at 07/11/19 1232    albuterol (PROVENTIL) nebulizer solution 2.5 mg  2.5 mg Nebulization Q6H PRN Jin Ramirez MD        lactated ringers infusion   Intravenous Continuous Jin Ramirez MD   Stopped at 07/10/19 1420    sodium chloride flush 0.9 % injection 10 mL  10 mL Intravenous 2 times per day Jin Ramirez MD   10 mL at 07/11/19 0830    sodium chloride flush 0.9 % injection 10 mL  10 mL Intravenous PRN Jin Ramirez MD        acetaminophen (TYLENOL) level: None   Occupational History    Occupation: retired-   Social Needs    Financial resource strain: None    Food insecurity:     Worry: None     Inability: None    Transportation needs:     Medical: None     Non-medical: None   Tobacco Use    Smoking status: Former Smoker     Packs/day: 2.00     Years: 40.00     Pack years: 80.00     Types: Cigarettes     Last attempt to quit: 1/15/2009     Years since quitting: 10.4    Smokeless tobacco: Never Used   Substance and Sexual Activity    Alcohol use: No     Comment:      Drug use: No    Sexual activity: None   Lifestyle    Physical activity:     Days per week: None     Minutes per session: None    Stress: None   Relationships    Social connections:     Talks on phone: None     Gets together: None     Attends Christian service: None     Active member of club or organization: None     Attends meetings of clubs or organizations: None     Relationship status: None    Intimate partner violence:     Fear of current or ex partner: None     Emotionally abused: None     Physically abused: None     Forced sexual activity: None   Other Topics Concern    None   Social History Narrative    3 cups green tea daily; 2 cups coffee daily         Review of Systems the above-mentioned several months of progressively worsening hoarseness with associated left-sided otalgia dysphagia and some odynophagia without any associated loss of weight and some worsening of respiratory distress beyond his baseline from COPD on the remainder of comprehensive review of systems patient denies any other issues or difficultiesother than some issues with the leg or slurred being anticoagulated for atrial fibrillation and some pain and discomfort from arthritis      Physical Exam   Constitutional: He is oriented to person, place, and time. He appears well-developed and well-nourished. HENT:   Head: Normocephalic and atraumatic.    Right Ear: External ear normal.   Left Ear:

## 2019-07-11 NOTE — PROGRESS NOTES
Pulse ox was 80% on room air at rest.  Oxygen applied. Recovery pulse ox was 94% on 40% DEE.   Electronically signed by Tianna Madrid RN on 7/11/2019 at 9:04 AM

## 2019-07-11 NOTE — PROGRESS NOTES
I/O:    Intake/Output Summary (Last 24 hours) at 7/11/2019 1306  Last data filed at 7/11/2019 0951  Gross per 24 hour   Intake 690 ml   Output 200 ml   Net 490 ml       Vent Information  FiO2 : 35 %                CURRENT MEDS :  Scheduled Meds:   metoprolol tartrate  50 mg PEG Tube BID    budesonide  0.5 mg Nebulization BID    formoterol  20 mcg Nebulization BID    diltiazem  30 mg PEG Tube 4 times per day    sodium chloride flush  10 mL Intravenous 2 times per day    ceFAZolin  2 g Intravenous See Admin Instructions       Physical Exam:  General Appearance: appears comfortable in no acute distress. HEENT: Normocephalic atraumatic without obvious abnormality   Neck: Lips, mucosa, and tongue normal.  Supple, symmetrical, trachea midline, no adenopathy;thyroid:  no enlargement/tenderness/nodules or JVD. size 6 cuffed trache intact  Lung: Breath sounds clear. Respirations   unlabored. Symmetrical expansion. Heart: RRR, normal S1, S2. No MRG  Abdomen: Soft, NT, ND. BS present x 4 quadrants. No bruit or organomegaly. Extremities: Pedal pulses 2+ symmetric b/l. Extremities normal, no cyanosis, clubbing, or edema. Musculokeletal: No joint swelling, no muscle tenderness. ROM normal in all joints of extremities. Neurologic: Mental status: Alert and Oriented X3 . Pertinent/ New Labs and Imaging Studies   PFT testing 2018  IMPRESSION   This pulmonary function test is consistent with severe obstructive airways disease.  Although there is not a significant response acutely in flow rates following bronchodilator administration, chronic use of these agents may be of some benefit.  Maximum inspiratory and expiratory pressures are measures of respiratory muscle strength.   Maximum inspiratory and expiratory muscle pressures are Normal. There is not restrictive lung dysfunction present.  The increase in residual volume and functional residual capacity is consistent with air trapping.  Diffusing capacity is

## 2019-07-11 NOTE — CARE COORDINATION
Social Work / Discharge Planning : SW spoke to Gabriel Moses from [facility] and also Nicky/Sarah from [facility]. Patient DME is all set for when patient is ready for discharge. This may occur today per nursing and await final discharge orders. Gabriel Moses from [facility] also updated. Await final discharge order. SW to follow. Electronically signed by DUSTIN Chadwick on 7/11/19 at 9:07 AM      Addendum : ELLIOT spoke to Gabriel Moses from [facility] as well as René Pedraza from [facility]. Both have been in contact with patient family and are ready for when the patient is discharged home. SW to follow.  Electronically signed by DUSTIN Chadwick on 7/11/19 at 12:57 PM

## 2019-07-11 NOTE — DISCHARGE SUMMARY
assist  ______________________________________________________________________  COMPLEXITY OF FOLLOW UP:   [] Moderate Complexity: follow up within 7-14 calendar days (98047)   [] Severe Complexity: follow up within 7 calendar days (32390)    FOLLOW UP TESTING, PENDING RESULTS OR REFERRALS AT TRANSITIONAL CARE VISIT:   []  Yes    []  No    PENDING STUDIES:   PET    DISPOSITION: Home    FACILITY/HOME CARE AGENCY NAME:[facility]  home health care    Follow up with Jacklynn Lanes, MD  [address] , [address] [facility]  [address] [facility], MD  [address] [place] [address], [address] [place] [address] [phone]      As needed    Ynes Box MD  [address] [place] [address]. [place] [address]  [phone]    Go on 7/15/2019  Monday 7/15/19 8:30AM, please fast for 4 hours before coming, may drink water    on per their instuctions    INSTRUCTIONS TO MA/SW: Please call patient on day after discharge (must document patient  contacted within 2 business days of discharge). FOLLOW UP QUESTIONS FOR MA/SW:  1. Did you get medications filled and taking them as instructed from discharge? 2. Are you following your discharge instructions from your hospital stay? 3. Please confirm patient is scheduled for a follow up appointment within the above time frame.     DISCHARGE TIME: > 30 minutes    SIGNED:  Kailash Mckeon MD   7/11/2019, 12:13 PM      Follow up Dr. Natividad Stanley Wednesday, July 17 at 12:30 pm

## 2019-07-15 ENCOUNTER — TELEPHONE (OUTPATIENT)
Dept: CARDIOLOGY CLINIC | Age: 69
End: 2019-07-15

## 2019-07-15 ENCOUNTER — HOSPITAL ENCOUNTER (OUTPATIENT)
Dept: RADIATION ONCOLOGY | Age: 69
Discharge: HOME | End: 2019-07-15
Attending: RADIOLOGY
Payer: MEDICARE

## 2019-07-15 ENCOUNTER — TELEPHONE (OUTPATIENT)
Dept: RADIATION ONCOLOGY | Age: 69
End: 2019-07-15

## 2019-07-15 PROCEDURE — 77334 RADIATION TREATMENT AID(S): CPT | Performed by: RADIOLOGY

## 2019-07-15 PROCEDURE — 6360000004 HC RX CONTRAST MEDICATION: Performed by: RADIOLOGY

## 2019-07-15 RX ADMIN — IOPAMIDOL 125 ML: 755 INJECTION, SOLUTION INTRAVENOUS at 09:30

## 2019-07-16 ENCOUNTER — TELEPHONE (OUTPATIENT)
Dept: CASE MANAGEMENT | Age: 69
End: 2019-07-16

## 2019-07-17 ENCOUNTER — TELEPHONE (OUTPATIENT)
Dept: RADIATION ONCOLOGY | Age: 69
End: 2019-07-17

## 2019-07-18 ENCOUNTER — HOSPITAL ENCOUNTER (OUTPATIENT)
Dept: PET IMAGING | Age: 69
Discharge: HOME | End: 2019-07-20
Payer: MEDICARE

## 2019-07-18 ENCOUNTER — TELEPHONE (OUTPATIENT)
Dept: CASE MANAGEMENT | Age: 69
End: 2019-07-18

## 2019-07-18 DIAGNOSIS — C32.9 LARYNX CARCINOMA (HCC): ICD-10-CM

## 2019-07-18 DIAGNOSIS — C32.3 MALIGNANT NEOPLASM OF LARYNGEAL CARTILAGE (HCC): ICD-10-CM

## 2019-07-18 DIAGNOSIS — C32.9 LARYNX CANCER (HCC): ICD-10-CM

## 2019-07-18 LAB — METER GLUCOSE: 115 MG/DL (ref 74–99)

## 2019-07-18 PROCEDURE — A9552 F18 FDG: HCPCS | Performed by: RADIOLOGY

## 2019-07-18 PROCEDURE — 78815 PET IMAGE W/CT SKULL-THIGH: CPT

## 2019-07-18 PROCEDURE — 82962 GLUCOSE BLOOD TEST: CPT

## 2019-07-18 PROCEDURE — 3430000000 HC RX DIAGNOSTIC RADIOPHARMACEUTICAL: Performed by: RADIOLOGY

## 2019-07-18 RX ORDER — FLUDEOXYGLUCOSE F 18 200 MCI/ML
15 INJECTION, SOLUTION INTRAVENOUS
Status: COMPLETED | OUTPATIENT
Start: 2019-07-18 | End: 2019-07-18

## 2019-07-18 RX ADMIN — FLUDEOXYGLUCOSE F 18 15 MILLICURIE: 200 INJECTION, SOLUTION INTRAVENOUS at 09:15

## 2019-07-23 ENCOUNTER — TELEPHONE (OUTPATIENT)
Dept: CASE MANAGEMENT | Age: 69
End: 2019-07-23

## 2019-07-26 ENCOUNTER — APPOINTMENT (OUTPATIENT)
Dept: GENERAL RADIOLOGY | Age: 69
DRG: 393 | End: 2019-07-26
Payer: MEDICARE

## 2019-07-26 ENCOUNTER — ANESTHESIA EVENT (OUTPATIENT)
Dept: OPERATING ROOM | Age: 69
DRG: 393 | End: 2019-07-26
Payer: MEDICARE

## 2019-07-26 ENCOUNTER — ANESTHESIA (OUTPATIENT)
Dept: OPERATING ROOM | Age: 69
DRG: 393 | End: 2019-07-26
Payer: MEDICARE

## 2019-07-26 ENCOUNTER — APPOINTMENT (OUTPATIENT)
Dept: CT IMAGING | Age: 69
DRG: 393 | End: 2019-07-26
Payer: MEDICARE

## 2019-07-26 ENCOUNTER — HOSPITAL ENCOUNTER (INPATIENT)
Age: 69
LOS: 3 days | Discharge: HOME HEALTH | DRG: 393 | End: 2019-07-29
Attending: EMERGENCY MEDICINE | Admitting: INTERNAL MEDICINE
Payer: MEDICARE

## 2019-07-26 DIAGNOSIS — K94.23 PEG TUBE MALFUNCTION (HCC): Primary | ICD-10-CM

## 2019-07-26 PROBLEM — D49.1 LARYNGEAL NEOPLASM: Status: RESOLVED | Noted: 2019-07-03 | Resolved: 2019-07-26

## 2019-07-26 PROBLEM — J96.01 ACUTE RESPIRATORY FAILURE WITH HYPOXEMIA (HCC): Status: ACTIVE | Noted: 2019-07-26

## 2019-07-26 PROBLEM — I48.0 PAROXYSMAL ATRIAL FIBRILLATION (HCC): Chronic | Status: ACTIVE | Noted: 2019-07-26

## 2019-07-26 PROBLEM — R13.10 DYSPHAGIA: Chronic | Status: ACTIVE | Noted: 2019-07-26

## 2019-07-26 PROBLEM — J96.90 RESPIRATORY FAILURE (HCC): Status: RESOLVED | Noted: 2019-07-09 | Resolved: 2019-07-26

## 2019-07-26 LAB
ALBUMIN SERPL-MCNC: 3 G/DL (ref 3.5–5.2)
ALP BLD-CCNC: 94 U/L (ref 40–129)
ALT SERPL-CCNC: 40 U/L (ref 0–40)
ANION GAP SERPL CALCULATED.3IONS-SCNC: 11 MMOL/L (ref 7–16)
AST SERPL-CCNC: 21 U/L (ref 0–39)
BASOPHILS ABSOLUTE: 0.09 E9/L (ref 0–0.2)
BASOPHILS RELATIVE PERCENT: 0.8 % (ref 0–2)
BILIRUB SERPL-MCNC: 0.5 MG/DL (ref 0–1.2)
BUN BLDV-MCNC: 21 MG/DL (ref 8–23)
CALCIUM SERPL-MCNC: 9.1 MG/DL (ref 8.6–10.2)
CHLORIDE BLD-SCNC: 101 MMOL/L (ref 98–107)
CO2: 31 MMOL/L (ref 22–29)
CREAT SERPL-MCNC: 0.7 MG/DL (ref 0.7–1.2)
EOSINOPHILS ABSOLUTE: 0.23 E9/L (ref 0.05–0.5)
EOSINOPHILS RELATIVE PERCENT: 2.2 % (ref 0–6)
GFR AFRICAN AMERICAN: >60
GFR NON-AFRICAN AMERICAN: >60 ML/MIN/1.73
GLUCOSE BLD-MCNC: 122 MG/DL (ref 74–99)
HCT VFR BLD CALC: 39.6 % (ref 37–54)
HEMOGLOBIN: 12.6 G/DL (ref 12.5–16.5)
IMMATURE GRANULOCYTES #: 0.04 E9/L
IMMATURE GRANULOCYTES %: 0.4 % (ref 0–5)
LYMPHOCYTES ABSOLUTE: 1.7 E9/L (ref 1.5–4)
LYMPHOCYTES RELATIVE PERCENT: 16 % (ref 20–42)
MCH RBC QN AUTO: 30.4 PG (ref 26–35)
MCHC RBC AUTO-ENTMCNC: 31.8 % (ref 32–34.5)
MCV RBC AUTO: 95.4 FL (ref 80–99.9)
MONOCYTES ABSOLUTE: 1.15 E9/L (ref 0.1–0.95)
MONOCYTES RELATIVE PERCENT: 10.8 % (ref 2–12)
NEUTROPHILS ABSOLUTE: 7.44 E9/L (ref 1.8–7.3)
NEUTROPHILS RELATIVE PERCENT: 69.8 % (ref 43–80)
PDW BLD-RTO: 14.7 FL (ref 11.5–15)
PLATELET # BLD: 353 E9/L (ref 130–450)
PMV BLD AUTO: 10.7 FL (ref 7–12)
POTASSIUM REFLEX MAGNESIUM: 4.2 MMOL/L (ref 3.5–5)
RBC # BLD: 4.15 E12/L (ref 3.8–5.8)
SODIUM BLD-SCNC: 143 MMOL/L (ref 132–146)
TOTAL PROTEIN: 7.3 G/DL (ref 6.4–8.3)
WBC # BLD: 10.7 E9/L (ref 4.5–11.5)

## 2019-07-26 PROCEDURE — 2580000003 HC RX 258: Performed by: SURGERY

## 2019-07-26 PROCEDURE — 2060000000 HC ICU INTERMEDIATE R&B

## 2019-07-26 PROCEDURE — 6360000002 HC RX W HCPCS: Performed by: SURGERY

## 2019-07-26 PROCEDURE — 2500000003 HC RX 250 WO HCPCS: Performed by: SURGERY

## 2019-07-26 PROCEDURE — 6360000002 HC RX W HCPCS

## 2019-07-26 PROCEDURE — 0DH63UZ INSERTION OF FEEDING DEVICE INTO STOMACH, PERCUTANEOUS APPROACH: ICD-10-PCS | Performed by: SURGERY

## 2019-07-26 PROCEDURE — 77338 DESIGN MLC DEVICE FOR IMRT: CPT | Performed by: RADIOLOGY

## 2019-07-26 PROCEDURE — 93005 ELECTROCARDIOGRAM TRACING: CPT | Performed by: EMERGENCY MEDICINE

## 2019-07-26 PROCEDURE — 77301 RADIOTHERAPY DOSE PLAN IMRT: CPT | Performed by: RADIOLOGY

## 2019-07-26 PROCEDURE — 96374 THER/PROPH/DIAG INJ IV PUSH: CPT

## 2019-07-26 PROCEDURE — 99285 EMERGENCY DEPT VISIT HI MDM: CPT

## 2019-07-26 PROCEDURE — 2580000003 HC RX 258: Performed by: EMERGENCY MEDICINE

## 2019-07-26 PROCEDURE — 2580000003 HC RX 258: Performed by: INTERNAL MEDICINE

## 2019-07-26 PROCEDURE — 6360000002 HC RX W HCPCS: Performed by: INTERNAL MEDICINE

## 2019-07-26 PROCEDURE — 77300 RADIATION THERAPY DOSE PLAN: CPT | Performed by: RADIOLOGY

## 2019-07-26 PROCEDURE — 6360000004 HC RX CONTRAST MEDICATION: Performed by: RADIOLOGY

## 2019-07-26 PROCEDURE — 6370000000 HC RX 637 (ALT 250 FOR IP): Performed by: INTERNAL MEDICINE

## 2019-07-26 PROCEDURE — 94664 DEMO&/EVAL PT USE INHALER: CPT

## 2019-07-26 PROCEDURE — 74018 RADEX ABDOMEN 1 VIEW: CPT

## 2019-07-26 PROCEDURE — 80053 COMPREHEN METABOLIC PANEL: CPT

## 2019-07-26 PROCEDURE — 94640 AIRWAY INHALATION TREATMENT: CPT

## 2019-07-26 PROCEDURE — 0DH67UZ INSERTION OF FEEDING DEVICE INTO STOMACH, VIA NATURAL OR ARTIFICIAL OPENING: ICD-10-PCS | Performed by: INTERNAL MEDICINE

## 2019-07-26 PROCEDURE — 85025 COMPLETE CBC W/AUTO DIFF WBC: CPT

## 2019-07-26 PROCEDURE — 74177 CT ABD & PELVIS W/CONTRAST: CPT

## 2019-07-26 RX ORDER — 0.9 % SODIUM CHLORIDE 0.9 %
1000 INTRAVENOUS SOLUTION INTRAVENOUS ONCE
Status: COMPLETED | OUTPATIENT
Start: 2019-07-26 | End: 2019-07-26

## 2019-07-26 RX ORDER — BUDESONIDE 0.5 MG/2ML
500 INHALANT ORAL 2 TIMES DAILY
Status: DISCONTINUED | OUTPATIENT
Start: 2019-07-26 | End: 2019-07-29 | Stop reason: HOSPADM

## 2019-07-26 RX ORDER — DEXTROSE AND SODIUM CHLORIDE 5; .45 G/100ML; G/100ML
INJECTION, SOLUTION INTRAVENOUS CONTINUOUS
Status: DISCONTINUED | OUTPATIENT
Start: 2019-07-26 | End: 2019-07-28

## 2019-07-26 RX ORDER — MORPHINE SULFATE 2 MG/ML
2 INJECTION, SOLUTION INTRAMUSCULAR; INTRAVENOUS ONCE
Status: COMPLETED | OUTPATIENT
Start: 2019-07-26 | End: 2019-07-26

## 2019-07-26 RX ORDER — SODIUM CHLORIDE 0.9 % (FLUSH) 0.9 %
10 SYRINGE (ML) INJECTION EVERY 12 HOURS SCHEDULED
Status: DISCONTINUED | OUTPATIENT
Start: 2019-07-26 | End: 2019-07-29 | Stop reason: HOSPADM

## 2019-07-26 RX ORDER — SODIUM CHLORIDE 0.9 % (FLUSH) 0.9 %
10 SYRINGE (ML) INJECTION PRN
Status: DISCONTINUED | OUTPATIENT
Start: 2019-07-26 | End: 2019-07-29 | Stop reason: HOSPADM

## 2019-07-26 RX ORDER — ONDANSETRON 2 MG/ML
4 INJECTION INTRAMUSCULAR; INTRAVENOUS EVERY 6 HOURS PRN
Status: DISCONTINUED | OUTPATIENT
Start: 2019-07-26 | End: 2019-07-29 | Stop reason: HOSPADM

## 2019-07-26 RX ORDER — METOPROLOL TARTRATE 5 MG/5ML
5 INJECTION INTRAVENOUS EVERY 8 HOURS
Status: DISCONTINUED | OUTPATIENT
Start: 2019-07-26 | End: 2019-07-29 | Stop reason: HOSPADM

## 2019-07-26 RX ORDER — FENTANYL CITRATE 50 UG/ML
INJECTION, SOLUTION INTRAMUSCULAR; INTRAVENOUS
Status: COMPLETED
Start: 2019-07-26 | End: 2019-07-26

## 2019-07-26 RX ORDER — IPRATROPIUM BROMIDE AND ALBUTEROL SULFATE 2.5; .5 MG/3ML; MG/3ML
1 SOLUTION RESPIRATORY (INHALATION)
Status: DISCONTINUED | OUTPATIENT
Start: 2019-07-26 | End: 2019-07-29 | Stop reason: HOSPADM

## 2019-07-26 RX ORDER — FENTANYL CITRATE 50 UG/ML
50 INJECTION, SOLUTION INTRAMUSCULAR; INTRAVENOUS ONCE
Status: DISCONTINUED | OUTPATIENT
Start: 2019-07-26 | End: 2019-07-29 | Stop reason: HOSPADM

## 2019-07-26 RX ORDER — FORMOTEROL FUMARATE 20 UG/2ML
20 SOLUTION RESPIRATORY (INHALATION) 2 TIMES DAILY
Status: DISCONTINUED | OUTPATIENT
Start: 2019-07-26 | End: 2019-07-29 | Stop reason: HOSPADM

## 2019-07-26 RX ADMIN — FENTANYL CITRATE 100 MCG: 50 INJECTION INTRAMUSCULAR; INTRAVENOUS at 18:30

## 2019-07-26 RX ADMIN — SODIUM CHLORIDE 1000 ML: 9 INJECTION, SOLUTION INTRAVENOUS at 17:36

## 2019-07-26 RX ADMIN — IOPAMIDOL 110 ML: 755 INJECTION, SOLUTION INTRAVENOUS at 17:26

## 2019-07-26 RX ADMIN — DEXTROSE AND SODIUM CHLORIDE: 5; 450 INJECTION, SOLUTION INTRAVENOUS at 18:31

## 2019-07-26 RX ADMIN — FORMOTEROL FUMARATE DIHYDRATE 20 MCG: 20 SOLUTION RESPIRATORY (INHALATION) at 20:22

## 2019-07-26 RX ADMIN — CEFTRIAXONE SODIUM 2 G: 2 INJECTION, POWDER, FOR SOLUTION INTRAMUSCULAR; INTRAVENOUS at 21:40

## 2019-07-26 RX ADMIN — BUDESONIDE 500 MCG: 0.5 SUSPENSION RESPIRATORY (INHALATION) at 20:22

## 2019-07-26 RX ADMIN — IPRATROPIUM BROMIDE AND ALBUTEROL SULFATE 1 AMPULE: .5; 3 SOLUTION RESPIRATORY (INHALATION) at 20:21

## 2019-07-26 RX ADMIN — METRONIDAZOLE 500 MG: 500 INJECTION, SOLUTION INTRAVENOUS at 20:55

## 2019-07-26 RX ADMIN — MORPHINE SULFATE 2 MG: 2 INJECTION, SOLUTION INTRAMUSCULAR; INTRAVENOUS at 14:45

## 2019-07-26 ASSESSMENT — ENCOUNTER SYMPTOMS
DIARRHEA: 0
WHEEZING: 0
SHORTNESS OF BREATH: 0
EYE REDNESS: 0
SORE THROAT: 0
CONSTIPATION: 1
BACK PAIN: 0
ABDOMINAL PAIN: 0
SINUS PRESSURE: 0
EYE DISCHARGE: 0
EYE PAIN: 0
VOMITING: 0
COUGH: 0
NAUSEA: 0

## 2019-07-26 ASSESSMENT — PAIN DESCRIPTION - LOCATION: LOCATION: ABDOMEN

## 2019-07-26 ASSESSMENT — PAIN SCALES - GENERAL
PAINLEVEL_OUTOF10: 6
PAINLEVEL_OUTOF10: 3
PAINLEVEL_OUTOF10: 3
PAINLEVEL_OUTOF10: 0

## 2019-07-26 ASSESSMENT — PAIN DESCRIPTION - ORIENTATION: ORIENTATION: MID

## 2019-07-26 ASSESSMENT — PAIN DESCRIPTION - PAIN TYPE: TYPE: ACUTE PAIN

## 2019-07-26 NOTE — CONSULTS
lungs every 6 hours as needed for Shortness of Breath 7/11/19   Chick Harada, MD   formoterol (PERFOROMIST) 20 MCG/2ML nebulizer solution Take 2 mLs by nebulization 2 times daily 7/11/19   Chick Harada, MD   Respiratory Therapy Supplies (FULL KIT NEBULIZER SET) MISC Use as directed with nebulized medication. 7/11/19   Chick Harada, MD   diltiazem (CARTIA XT) 180 MG extended release capsule Take 180 mg by mouth daily    Historical Provider, MD   ELIQUIS 5 MG TABS tablet 2 times daily  8/11/18   Historical Provider, MD   metoprolol succinate (TOPROL XL) 25 MG extended release tablet Take 25 mg by mouth daily 9/22/17   Historical Provider, MD   albuterol (PROVENTIL) (2.5 MG/3ML) 0.083% nebulizer solution Take 2.5 mg by nebulization every 6 hours as needed for Wheezing     Historical Provider, MD   albuterol (PROVENTIL HFA;VENTOLIN HFA) 108 (90 BASE) MCG/ACT inhaler Inhale 2 puffs into the lungs every 6 hours as needed. Historical Provider, MD       No Known Allergies    Family History   Problem Relation Age of Onset    Diabetes Mother     Emphysema Father        Social History     Tobacco Use    Smoking status: Former Smoker     Packs/day: 2.00     Years: 40.00     Pack years: 80.00     Types: Cigarettes     Last attempt to quit: 1/15/2009     Years since quitting: 10.5    Smokeless tobacco: Never Used   Substance Use Topics    Alcohol use: No     Comment:      Drug use: No         Review of Systems   General ROS: negative for - chills or fever  Hematological and Lymphatic ROS: negative  Respiratory ROS: trach  Cardiovascular ROS: no chest pain   Gastrointestinal ROS: +abdominal pain around tube        PHYSICAL EXAM:    Vitals:    07/26/19 1449   BP: 124/66   Pulse: 108   Resp: 16   Temp:    SpO2: 96%       General Appearance:  awake, alert, oriented, in no acute distress  Head/face:  NCAT  Eyes:  No gross abnormalities. Lungs:  Normal expansion.    Heart:  Heart regular rate   Abdomen:  Soft, tenderness at

## 2019-07-26 NOTE — ED PROVIDER NOTES
1931 Fri Jul 26, 2019   1540 Informed by surgical resident that there is concern the patient's PEG tube is eroding through his stomach. CT image has been ordered, they are requesting due to patient's multiple comorbidities that he be admitted to medicine and they will provide consultation. [KS]      ED Course User Index  [KS] Kavitha Dawn DO          --------------------------------------------- PAST HISTORY ---------------------------------------------  Past Medical History:  has a past medical history of AAA (abdominal aortic aneurysm) ([facility] [address] Arthritis, Atrial fibrillation ([facility] [address] COPD (chronic obstructive pulmonary disease) ([facility] [address] CPAP (continuous positive airway pressure) dependence, Hyperlipidemia, and Traumatic leg ulcer ([facility] [address] Past Surgical History:  has a past surgical history that includes AAA repair, endovascular (11/17/08); ECHO Transesophageal (7/2013); ECHO Transesophageal (3/18/2013); ECHO Transesophageal (9/3/2013); Cardioversion (5-2165); bronchoscopy (9/9/2015); Colonoscopy; Tracheotomy (N/A, 7/8/2019); laryngoscopy (N/A, 7/8/2019); and Gastrostomy tube placement (N/A, 7/9/2019). Social History:  reports that he quit smoking about 10 years ago. His smoking use included cigarettes. He has a 80.00 pack-year smoking history. He has never used smokeless tobacco. He reports that he does not drink alcohol or use drugs. Family History: family history includes Diabetes in his mother; Emphysema in his father. The patients home medications have been reviewed. Allergies: Patient has no known allergies. -------------------------------------------------- RESULTS -------------------------------------------------    LABS:  No results found for this visit on 07/26/19.     RADIOLOGY:  Ct Soft Tissue Neck W Contrast    Result Date: 7/10/2019  This examination and all examinations utilizing ionizing radiation at this facility are done so according to the ALARA (as low as reasonably achievable) principal for radiation dose reduction. Axial, sagittal, and coronal computed tomography of the neck was performed following intravenous contrast administration. There is abnormal thickening of epiglottis and a fungating mass within the left piriform sinus extending to the aryepiglottic folds. This neoplastic process extends approximately 6.5 cm in craniocaudal, 3.6 cm in anterior to posterior and 3.3 cm in transverse dimensions. There is mild lymphadenopathy within the posterior triangle and jugulodigastric chains. Tracheostomy tubing is appropriately configured. There is emphysema with small cystic spaces within the lung apices. There is scarring of the lung apices. Degenerative spondylosis, rotoscoliosis and facet arthropathy are identified within the spine. There are calcifications within the carotid arteries. Otherwise the regional soft tissue and osseous structures are unremarkable. 6.5 x 3.6 x 3.3 cm mass within the epiglottis extending to the left piriform sinus and glottic portion of the oropharynx. Lymphadenopathy within the posterior triangle and jugulodigastric chains. Ct Chest W Contrast    Result Date: 7/10/2019  Patient MRN: [id number] : 1950 Age:  77 years Gender: Male Order Date: 7/10/2019 3:45 PM Exam: CT CHEST W CONTRAST Number of Images: views Indication:   HEAD/NECK CANCER, LUNG MET SCREEN  Comparison: None. Technique: Contiguous transaxial cuts were obtained from the thoracic inlet to the base of the diaphragm with IV contrast enhancement. Multiplanar reformats were obtained. Radiation Output: CTDIvol 32.60 (mGy); DLP 1197.36 (mGy-cm) Findings: The study demonstrates consolidation of the right lower lung posterior basal segment with air bronchogram.. There is no pleural effusion. There is no nodule seen. Patient has a tracheostomy tube in place.  The cardiovascular structures are normal. There is atherosclerotic change of thoracic aorta and the great vessels with calcified plaque. No mediastinal or hilar lymphadenopathy is seen. The chest wall demonstrate multilevel osteoarthritic change of thoracic spine. Gillie Drones Axillary lymphadenopathy is not identified. The visualized portion of the upper abdomen demonstrates no abnormality. The gallbladder has multiple large gallstones in the lumen of the gallbladder. The visualized portion of the liver spleen and upper pole of both kidneys and adrenal glands appears to be normal.. There is aortic endovascular stent in place. Airspace consolidation right lower lung posterior basal segment with air bronchogram. Cholelithiasis     Xr Chest Portable    Result Date: 2019  Patient MRN: [id number] : 1950 Age:  77 years Gender: Male Order Date: 2019 12:15 PM Exam: XR CHEST PORTABLE Number of Views: 1 Indication:   Postop tracheostomy and neck biopsy. Comparison: Chest x-ray 9/10/2015. CT chest 10/4/2018. Findings: There is a stable cardiomediastinal silhouette with mild central pulmonary vascular congestion with tracheostomy noted. No pneumothorax is seen. Airspace disease lung bases. Vascular calcifications thoracic aorta. . . 1. Nonspecific bibasilar airspace disease, findings can be seen in infiltrate/pneumonia and/or atelectasis. Rafa Major 2. Vascular calcifications thoracic aorta. 3. Suspected underlying mild central pulmonary vascular congestion 4. No identifiable pneumothorax is seen. Xr Abdomen For Ng/og/ne Tube Placement    Result Date: 2019  Patient MRN:  [id number] : 1950 Age: 77 years Gender: Male Order Date:  2019 10:45 AM EXAM: XR ABDOMEN FOR NG/OG/NE TUBE PLACEMENT NUMBER OF IMAGES:  1 view(s) INDICATION:  Confirm G-tube placement Confirm G-tube placement COMPARISON: 2008 FINDINGS: There is a gastrostomy tube indwelling in the left upper quadrant in typical position. A bifurcated aortic stent graft is noted as before. The bowel gas pattern is nonobstructive. No free air or bowel pneumatosis.

## 2019-07-26 NOTE — CARE COORDINATION
7/26/2019 introduced myself to patient and wife and described my role as a . The patient was recently discharge to home after his last visit. on 7/11/2019. The patient was not happy with his discharge from the last visit . The nurses that answered the call light did not take into consideration that the patient could not speak and could not say what he needed because of his tracheostomy. He had a PEG tube placed also and is currently being treated by radiaition and chemo for throat cancer. He uses [facility] 2 days a week. He also gets his oxygen and respiratory supplies through [facility]. He is presently on 40% trach mask. He no longer uses a CPAP. He lives in a one story home with his wife Minnie Oppenheim [phone] with 2 steps to enter. He remains independent in his mobility at this time. He no longer drives and depends on his wife for transportation. He sees Dr Tra Carney as his PCP. He goes to [facility] in [facility] for his prescriptions.

## 2019-07-26 NOTE — ANESTHESIA PRE PROCEDURE
07/26/2019    CREATININE 0.7 07/26/2019    GFRAA >60 07/26/2019    LABGLOM >60 07/26/2019    GLUCOSE 122 07/26/2019    GLUCOSE 116 01/20/2011    PROT 7.3 07/26/2019    CALCIUM 9.1 07/26/2019    BILITOT 0.5 07/26/2019    ALKPHOS 94 07/26/2019    AST 21 07/26/2019    ALT 40 07/26/2019       POC Tests: No results for input(s): POCGLU, POCNA, POCK, POCCL, POCBUN, POCHEMO, POCHCT in the last 72 hours. Coags:   Lab Results   Component Value Date    PROTIME 14.3 07/09/2019    PROTIME 11.3 01/20/2011    INR 1.2 07/09/2019    APTT 48.9 01/08/2014       HCG (If Applicable): No results found for: PREGTESTUR, PREGSERUM, HCG, HCGQUANT     ABGs: No results found for: PHART, PO2ART, BMI4MVT, PVL8XLX, BEART, M6DRNIZJ     Type & Screen (If Applicable):  No results found for: LABABO, [address]    Anesthesia Evaluation  Patient summary reviewed and Nursing notes reviewed no history of anesthetic complications:   Airway: Mallampati: I  TM distance: >3 FB   Neck ROM: full  Mouth opening: > = 3 FB Dental:    (+) caps  Comment: Front teeth are permanent    Pulmonary:   (+) COPD:  decreased breath sounds,                            ROS comment: Quit smoking 11 years ago    2L oxygen at night   Cardiovascular:  Exercise tolerance: poor (<4 METS),   (+) dysrhythmias (s/p afib ablation 3 years ago): atrial fibrillation,         Rhythm: regular  Rate: normal                    Neuro/Psych:   Negative Neuro/Psych ROS              GI/Hepatic/Renal:             Endo/Other:    (+) blood dyscrasia: anticoagulation therapy:., .                  ROS comment: Stopped eloquis 5 days ago    Laryngeal mass Abdominal:           Vascular:                                          Anesthesia Plan      MAC     ASA 3       Induction: intravenous. Anesthetic plan and risks discussed with patient. Plan discussed with CRNA.                   Yvonne Bass, DO   7/26/2019        DOS STAFF ADDENDUM:    Pt seen and examined, physical exam updated,

## 2019-07-27 ENCOUNTER — APPOINTMENT (OUTPATIENT)
Dept: GENERAL RADIOLOGY | Age: 69
DRG: 393 | End: 2019-07-27
Payer: MEDICARE

## 2019-07-27 VITALS — OXYGEN SATURATION: 95 % | SYSTOLIC BLOOD PRESSURE: 143 MMHG | DIASTOLIC BLOOD PRESSURE: 75 MMHG

## 2019-07-27 LAB
ALBUMIN SERPL-MCNC: 2.8 G/DL (ref 3.5–5.2)
ALP BLD-CCNC: 94 U/L (ref 40–129)
ALT SERPL-CCNC: 36 U/L (ref 0–40)
ANION GAP SERPL CALCULATED.3IONS-SCNC: 9 MMOL/L (ref 7–16)
AST SERPL-CCNC: 19 U/L (ref 0–39)
BASOPHILS ABSOLUTE: 0.08 E9/L (ref 0–0.2)
BASOPHILS RELATIVE PERCENT: 0.9 % (ref 0–2)
BILIRUB SERPL-MCNC: 0.5 MG/DL (ref 0–1.2)
BUN BLDV-MCNC: 16 MG/DL (ref 8–23)
C-REACTIVE PROTEIN: 5.6 MG/DL (ref 0–0.4)
CALCIUM SERPL-MCNC: 9 MG/DL (ref 8.6–10.2)
CHLORIDE BLD-SCNC: 101 MMOL/L (ref 98–107)
CO2: 29 MMOL/L (ref 22–29)
CREAT SERPL-MCNC: 0.6 MG/DL (ref 0.7–1.2)
EKG ATRIAL RATE: 102 BPM
EKG P AXIS: 85 DEGREES
EKG P-R INTERVAL: 178 MS
EKG Q-T INTERVAL: 344 MS
EKG QRS DURATION: 84 MS
EKG QTC CALCULATION (BAZETT): 448 MS
EKG R AXIS: 81 DEGREES
EKG T AXIS: 73 DEGREES
EKG VENTRICULAR RATE: 102 BPM
EOSINOPHILS ABSOLUTE: 0.27 E9/L (ref 0.05–0.5)
EOSINOPHILS RELATIVE PERCENT: 3 % (ref 0–6)
FILM ARRAY ADENOVIRUS: NORMAL
FILM ARRAY BORDETELLA PERTUSSIS: NORMAL
FILM ARRAY CHLAMYDOPHILIA PNEUMONIAE: NORMAL
FILM ARRAY CORONAVIRUS 229E: NORMAL
FILM ARRAY CORONAVIRUS HKU1: NORMAL
FILM ARRAY CORONAVIRUS NL63: NORMAL
FILM ARRAY CORONAVIRUS OC43: NORMAL
FILM ARRAY INFLUENZA A VIRUS 09H1: NORMAL
FILM ARRAY INFLUENZA A VIRUS H1: NORMAL
FILM ARRAY INFLUENZA A VIRUS H3: NORMAL
FILM ARRAY INFLUENZA A VIRUS: NORMAL
FILM ARRAY INFLUENZA B: NORMAL
FILM ARRAY METAPNEUMOVIRUS: NORMAL
FILM ARRAY MYCOPLASMA PNEUMONIAE: NORMAL
FILM ARRAY PARAINFLUENZA VIRUS 1: NORMAL
FILM ARRAY PARAINFLUENZA VIRUS 2: NORMAL
FILM ARRAY PARAINFLUENZA VIRUS 3: NORMAL
FILM ARRAY PARAINFLUENZA VIRUS 4: NORMAL
FILM ARRAY RESPIRATORY SYNCITIAL VIRUS: NORMAL
FILM ARRAY RHINOVIRUS/ENTEROVIRUS: NORMAL
GFR AFRICAN AMERICAN: >60
GFR NON-AFRICAN AMERICAN: >60 ML/MIN/1.73
GLUCOSE BLD-MCNC: 148 MG/DL (ref 74–99)
HCT VFR BLD CALC: 40.3 % (ref 37–54)
HEMOGLOBIN: 12.8 G/DL (ref 12.5–16.5)
IMMATURE GRANULOCYTES #: 0.03 E9/L
IMMATURE GRANULOCYTES %: 0.3 % (ref 0–5)
LYMPHOCYTES ABSOLUTE: 1.34 E9/L (ref 1.5–4)
LYMPHOCYTES RELATIVE PERCENT: 14.8 % (ref 20–42)
MAGNESIUM: 2.6 MG/DL (ref 1.6–2.6)
MCH RBC QN AUTO: 30.3 PG (ref 26–35)
MCHC RBC AUTO-ENTMCNC: 31.8 % (ref 32–34.5)
MCV RBC AUTO: 95.3 FL (ref 80–99.9)
MONOCYTES ABSOLUTE: 1.17 E9/L (ref 0.1–0.95)
MONOCYTES RELATIVE PERCENT: 12.9 % (ref 2–12)
NEUTROPHILS ABSOLUTE: 6.16 E9/L (ref 1.8–7.3)
NEUTROPHILS RELATIVE PERCENT: 68.1 % (ref 43–80)
PDW BLD-RTO: 14.8 FL (ref 11.5–15)
PLATELET # BLD: 347 E9/L (ref 130–450)
PMV BLD AUTO: 11 FL (ref 7–12)
POTASSIUM SERPL-SCNC: 3.9 MMOL/L (ref 3.5–5)
PROCALCITONIN: 0.06 NG/ML (ref 0–0.08)
RBC # BLD: 4.23 E12/L (ref 3.8–5.8)
SODIUM BLD-SCNC: 139 MMOL/L (ref 132–146)
TOTAL PROTEIN: 7.4 G/DL (ref 6.4–8.3)
WBC # BLD: 9.1 E9/L (ref 4.5–11.5)

## 2019-07-27 PROCEDURE — 6370000000 HC RX 637 (ALT 250 FOR IP): Performed by: INTERNAL MEDICINE

## 2019-07-27 PROCEDURE — 80053 COMPREHEN METABOLIC PANEL: CPT

## 2019-07-27 PROCEDURE — 6360000002 HC RX W HCPCS: Performed by: INTERNAL MEDICINE

## 2019-07-27 PROCEDURE — 2700000000 HC OXYGEN THERAPY PER DAY

## 2019-07-27 PROCEDURE — 86140 C-REACTIVE PROTEIN: CPT

## 2019-07-27 PROCEDURE — 2500000003 HC RX 250 WO HCPCS: Performed by: INTERNAL MEDICINE

## 2019-07-27 PROCEDURE — 2580000003 HC RX 258: Performed by: NURSE ANESTHETIST, CERTIFIED REGISTERED

## 2019-07-27 PROCEDURE — 83735 ASSAY OF MAGNESIUM: CPT

## 2019-07-27 PROCEDURE — 3700000001 HC ADD 15 MINUTES (ANESTHESIA): Performed by: SURGERY

## 2019-07-27 PROCEDURE — 6360000002 HC RX W HCPCS: Performed by: SURGERY

## 2019-07-27 PROCEDURE — 87633 RESP VIRUS 12-25 TARGETS: CPT

## 2019-07-27 PROCEDURE — 94640 AIRWAY INHALATION TREATMENT: CPT

## 2019-07-27 PROCEDURE — 2720000010 HC SURG SUPPLY STERILE: Performed by: SURGERY

## 2019-07-27 PROCEDURE — 3600007502: Performed by: SURGERY

## 2019-07-27 PROCEDURE — 2500000003 HC RX 250 WO HCPCS: Performed by: SURGERY

## 2019-07-27 PROCEDURE — 93010 ELECTROCARDIOGRAM REPORT: CPT | Performed by: INTERNAL MEDICINE

## 2019-07-27 PROCEDURE — 84145 PROCALCITONIN (PCT): CPT

## 2019-07-27 PROCEDURE — 85025 COMPLETE CBC W/AUTO DIFF WBC: CPT

## 2019-07-27 PROCEDURE — 3700000000 HC ANESTHESIA ATTENDED CARE: Performed by: SURGERY

## 2019-07-27 PROCEDURE — 87486 CHLMYD PNEUM DNA AMP PROBE: CPT

## 2019-07-27 PROCEDURE — 87581 M.PNEUMON DNA AMP PROBE: CPT

## 2019-07-27 PROCEDURE — 2060000000 HC ICU INTERMEDIATE R&B

## 2019-07-27 PROCEDURE — 2580000003 HC RX 258: Performed by: INTERNAL MEDICINE

## 2019-07-27 PROCEDURE — 71045 X-RAY EXAM CHEST 1 VIEW: CPT

## 2019-07-27 PROCEDURE — 87798 DETECT AGENT NOS DNA AMP: CPT

## 2019-07-27 PROCEDURE — 7100000010 HC PHASE II RECOVERY - FIRST 15 MIN: Performed by: SURGERY

## 2019-07-27 PROCEDURE — 2709999900 HC NON-CHARGEABLE SUPPLY: Performed by: SURGERY

## 2019-07-27 PROCEDURE — 2580000003 HC RX 258: Performed by: SURGERY

## 2019-07-27 PROCEDURE — 6360000002 HC RX W HCPCS: Performed by: NURSE ANESTHETIST, CERTIFIED REGISTERED

## 2019-07-27 PROCEDURE — 36415 COLL VENOUS BLD VENIPUNCTURE: CPT

## 2019-07-27 PROCEDURE — 3600007512: Performed by: SURGERY

## 2019-07-27 PROCEDURE — 7100000011 HC PHASE II RECOVERY - ADDTL 15 MIN: Performed by: SURGERY

## 2019-07-27 PROCEDURE — 2500000003 HC RX 250 WO HCPCS: Performed by: NURSE ANESTHETIST, CERTIFIED REGISTERED

## 2019-07-27 RX ORDER — LIDOCAINE HYDROCHLORIDE 20 MG/ML
INJECTION, SOLUTION EPIDURAL; INFILTRATION; INTRACAUDAL; PERINEURAL PRN
Status: DISCONTINUED | OUTPATIENT
Start: 2019-07-27 | End: 2019-07-27 | Stop reason: SDUPTHER

## 2019-07-27 RX ORDER — PROPOFOL 10 MG/ML
INJECTION, EMULSION INTRAVENOUS PRN
Status: DISCONTINUED | OUTPATIENT
Start: 2019-07-27 | End: 2019-07-27 | Stop reason: SDUPTHER

## 2019-07-27 RX ORDER — SODIUM CHLORIDE, SODIUM LACTATE, POTASSIUM CHLORIDE, CALCIUM CHLORIDE 600; 310; 30; 20 MG/100ML; MG/100ML; MG/100ML; MG/100ML
INJECTION, SOLUTION INTRAVENOUS CONTINUOUS PRN
Status: DISCONTINUED | OUTPATIENT
Start: 2019-07-27 | End: 2019-07-27 | Stop reason: SDUPTHER

## 2019-07-27 RX ORDER — MIDAZOLAM HYDROCHLORIDE 1 MG/ML
INJECTION INTRAMUSCULAR; INTRAVENOUS PRN
Status: DISCONTINUED | OUTPATIENT
Start: 2019-07-27 | End: 2019-07-27 | Stop reason: SDUPTHER

## 2019-07-27 RX ORDER — LIDOCAINE HYDROCHLORIDE 10 MG/ML
INJECTION, SOLUTION EPIDURAL; INFILTRATION; INTRACAUDAL; PERINEURAL PRN
Status: DISCONTINUED | OUTPATIENT
Start: 2019-07-27 | End: 2019-07-27 | Stop reason: ALTCHOICE

## 2019-07-27 RX ADMIN — METRONIDAZOLE 500 MG: 500 INJECTION, SOLUTION INTRAVENOUS at 12:09

## 2019-07-27 RX ADMIN — PROPOFOL 50 MG: 10 INJECTION, EMULSION INTRAVENOUS at 09:03

## 2019-07-27 RX ADMIN — FORMOTEROL FUMARATE DIHYDRATE 20 MCG: 20 SOLUTION RESPIRATORY (INHALATION) at 21:15

## 2019-07-27 RX ADMIN — BUDESONIDE 500 MCG: 0.5 SUSPENSION RESPIRATORY (INHALATION) at 10:50

## 2019-07-27 RX ADMIN — FORMOTEROL FUMARATE DIHYDRATE 20 MCG: 20 SOLUTION RESPIRATORY (INHALATION) at 10:50

## 2019-07-27 RX ADMIN — METOPROLOL TARTRATE 5 MG: 5 INJECTION INTRAVENOUS at 02:23

## 2019-07-27 RX ADMIN — SODIUM CHLORIDE, POTASSIUM CHLORIDE, SODIUM LACTATE AND CALCIUM CHLORIDE: 600; 310; 30; 20 INJECTION, SOLUTION INTRAVENOUS at 09:01

## 2019-07-27 RX ADMIN — LIDOCAINE HYDROCHLORIDE 40 MG: 20 INJECTION, SOLUTION EPIDURAL; INFILTRATION; INTRACAUDAL; PERINEURAL at 09:03

## 2019-07-27 RX ADMIN — PROPOFOL 70 MG: 10 INJECTION, EMULSION INTRAVENOUS at 09:05

## 2019-07-27 RX ADMIN — DEXTROSE AND SODIUM CHLORIDE: 5; 450 INJECTION, SOLUTION INTRAVENOUS at 10:54

## 2019-07-27 RX ADMIN — DEXTROSE AND SODIUM CHLORIDE: 5; 450 INJECTION, SOLUTION INTRAVENOUS at 02:23

## 2019-07-27 RX ADMIN — METRONIDAZOLE 500 MG: 500 INJECTION, SOLUTION INTRAVENOUS at 04:58

## 2019-07-27 RX ADMIN — METOPROLOL TARTRATE 5 MG: 5 INJECTION INTRAVENOUS at 10:56

## 2019-07-27 RX ADMIN — ENOXAPARIN SODIUM 40 MG: 40 INJECTION SUBCUTANEOUS at 20:32

## 2019-07-27 RX ADMIN — IPRATROPIUM BROMIDE AND ALBUTEROL SULFATE 1 AMPULE: .5; 3 SOLUTION RESPIRATORY (INHALATION) at 10:50

## 2019-07-27 RX ADMIN — METOPROLOL TARTRATE 5 MG: 5 INJECTION INTRAVENOUS at 18:36

## 2019-07-27 RX ADMIN — IPRATROPIUM BROMIDE AND ALBUTEROL SULFATE 1 AMPULE: .5; 3 SOLUTION RESPIRATORY (INHALATION) at 16:16

## 2019-07-27 RX ADMIN — DEXTROSE AND SODIUM CHLORIDE: 5; 450 INJECTION, SOLUTION INTRAVENOUS at 18:31

## 2019-07-27 RX ADMIN — MIDAZOLAM HYDROCHLORIDE 2 MG: 1 INJECTION, SOLUTION INTRAMUSCULAR; INTRAVENOUS at 09:01

## 2019-07-27 RX ADMIN — BUDESONIDE 500 MCG: 0.5 SUSPENSION RESPIRATORY (INHALATION) at 21:15

## 2019-07-27 RX ADMIN — IPRATROPIUM BROMIDE AND ALBUTEROL SULFATE 1 AMPULE: .5; 3 SOLUTION RESPIRATORY (INHALATION) at 21:15

## 2019-07-27 RX ADMIN — CEFTRIAXONE SODIUM 2 G: 2 INJECTION, POWDER, FOR SOLUTION INTRAMUSCULAR; INTRAVENOUS at 20:29

## 2019-07-27 RX ADMIN — METRONIDAZOLE 500 MG: 500 INJECTION, SOLUTION INTRAVENOUS at 20:54

## 2019-07-27 ASSESSMENT — PAIN SCALES - GENERAL
PAINLEVEL_OUTOF10: 7
PAINLEVEL_OUTOF10: 0
PAINLEVEL_OUTOF10: 0
PAINLEVEL_OUTOF10: 3
PAINLEVEL_OUTOF10: 0

## 2019-07-27 ASSESSMENT — PAIN DESCRIPTION - PAIN TYPE
TYPE: ACUTE PAIN;SURGICAL PAIN
TYPE: ACUTE PAIN;SURGICAL PAIN

## 2019-07-27 ASSESSMENT — ENCOUNTER SYMPTOMS
ABDOMINAL PAIN: 1
COUGH: 1
COLOR CHANGE: 0
EYES NEGATIVE: 1
NAUSEA: 0
VOMITING: 0

## 2019-07-27 ASSESSMENT — PAIN DESCRIPTION - FREQUENCY
FREQUENCY: INTERMITTENT
FREQUENCY: INTERMITTENT

## 2019-07-27 ASSESSMENT — PULMONARY FUNCTION TESTS
PIF_VALUE: 1
PIF_VALUE: 0
PIF_VALUE: 1
PIF_VALUE: 2
PIF_VALUE: 1
PIF_VALUE: 1
PIF_VALUE: 0
PIF_VALUE: 2
PIF_VALUE: 1
PIF_VALUE: 1
PIF_VALUE: 0
PIF_VALUE: 1

## 2019-07-27 ASSESSMENT — PAIN DESCRIPTION - DESCRIPTORS
DESCRIPTORS: ACHING;DISCOMFORT
DESCRIPTORS: ACHING;DISCOMFORT

## 2019-07-27 ASSESSMENT — PAIN DESCRIPTION - ORIENTATION
ORIENTATION: LEFT
ORIENTATION: LEFT

## 2019-07-27 ASSESSMENT — PAIN - FUNCTIONAL ASSESSMENT
PAIN_FUNCTIONAL_ASSESSMENT: ACTIVITIES ARE NOT PREVENTED
PAIN_FUNCTIONAL_ASSESSMENT: ACTIVITIES ARE NOT PREVENTED

## 2019-07-27 ASSESSMENT — PAIN DESCRIPTION - LOCATION
LOCATION: ABDOMEN
LOCATION: ABDOMEN

## 2019-07-27 NOTE — ANESTHESIA POSTPROCEDURE EVALUATION
Department of Anesthesiology  Postprocedure Note    Patient: Magali Lopez  MRN: [id number]  YOB: 1950  Date of evaluation: 7/27/2019  Time:  10:01 AM     Procedure Summary     Date:  07/27/19 Room / Location:  [facility] OR 01 / [facility] OR    Anesthesia Start:  3222 Anesthesia Stop:  0920    Procedures:       EGD ESOPHAGOGASTRODUODENOSCOPY PEG TUBE INSERTION (N/A Esophagus)      PEG REPLACEMENT Diagnosis:  (Peg dysfunction)    Surgeon:  Madeline Campos MD Responsible Provider:  Billie Garzon MD    Anesthesia Type:  MAC ASA Status:  3          Anesthesia Type: MAC    Mario Phase I:      Mario Phase II: Mario Score: 10    Last vitals: Reviewed and per EMR flowsheets.        Anesthesia Post Evaluation    Patient location during evaluation: PACU  Patient participation: complete - patient participated  Level of consciousness: awake and alert  Pain score: 0  Airway patency: patent  Nausea & Vomiting: no vomiting and no nausea  Complications: no  Cardiovascular status: hemodynamically stable  Respiratory status: spontaneous ventilation  Hydration status: stable

## 2019-07-27 NOTE — PROGRESS NOTES
stomach. I would recommend   further evaluation with water-soluble contrast to further delineate   position of percutaneous endogastric feeding tube. There is extensive   stranding and haziness surrounding the tube and bulb within the   anterior subcutaneous tissues an abdominal wall suggesting the   possibility of cellulitis or hemorrhage. Cholelithiasis. No evidence to suggest acute cholecystitis. Aortobiiliac stent is present, in satisfactory and stable   configuration. There is no evidence for endoleak. XR ABDOMEN FOR NG/OG/NE TUBE PLACEMENT   Final Result   G-tube present in the usual position.             XR CHEST PORTABLE    (Results Pending)         Current Facility-Administered Medications:     metoprolol (LOPRESSOR) injection 5 mg, 5 mg, Intravenous, Q8H, Sharifa Vizcaino MD, 5 mg at 07/27/19 1056    sodium chloride flush 0.9 % injection 10 mL, 10 mL, Intravenous, 2 times per day, Sharifa Vizcaino MD    sodium chloride flush 0.9 % injection 10 mL, 10 mL, Intravenous, PRN, Sharifa Vizcaino MD    magnesium hydroxide (MILK OF MAGNESIA) 400 MG/5ML suspension 30 mL, 30 mL, Oral, Daily PRN, Sharifa Vizcaino MD    ondansetron [facility]) injection 4 mg, 4 mg, Intravenous, Q6H PRN, Sharifa Vizcaino MD    enoxaparin (LOVENOX) injection 40 mg, 40 mg, Subcutaneous, Daily, Sharifa Vizcaino MD, Stopped at 07/26/19 2140    budesonide (PULMICORT) nebulizer suspension 500 mcg, 500 mcg, Nebulization, BID, Sharifa Vizcaino MD, 500 mcg at 07/27/19 1050    formoterol (PERFOROMIST) nebulizer solution 20 mcg, 20 mcg, Nebulization, BID, Sharifa Vizcaino MD, 20 mcg at 07/27/19 1050    ipratropium-albuterol (DUONEB) nebulizer solution 1 ampule, 1 ampule, Inhalation, Q4H WA, Sharifa Vizcaino MD, 1 ampule at 07/27/19 1050    dextrose 5 % and 0.45 % sodium chloride infusion, , Intravenous, Continuous, Sharifa Vizcaino MD, Last Rate: 125 mL/hr at 07/27/19 1054    fentaNYL (SUBLIMAZE) injection 50 mcg, 50 mcg, Intravenous,

## 2019-07-27 NOTE — CONSULTS
TRANSESOPHAGEAL  9/3/2013         GASTROSTOMY TUBE PLACEMENT N/A 7/9/2019    EGD PEG TUBE PLACEMENT performed by Roman Davis MD at [facility] N/A 7/8/2019    DIRECT LARYNGOSCOPY WITH BIOPSY, ESOPHAGOSCOPY performed by Loli Valero MD at [facility] N/A 7/8/2019    AWAKE TRACHEOSTOMY performed by Loli Valero MD at [facility] OR     Family History   Problem Relation Age of Onset    Diabetes Mother     Emphysema Father          Social History:   Social History     Socioeconomic History    Marital status:      Spouse name: Not on file    Number of children: Not on file    Years of education: Not on file    Highest education level: Not on file   Occupational History    Occupation: retired-   Social Needs    Financial resource strain: Not on file    Food insecurity:     Worry: Not on file     Inability: Not on file   Webalo needs:     Medical: Not on file     Non-medical: Not on file   Tobacco Use    Smoking status: Former Smoker     Packs/day: 2.00     Years: 40.00     Pack years: 80.00     Types: Cigarettes     Start date: 7/26/1969     Last attempt to quit: 1/15/2009     Years since quitting: 10.5    Smokeless tobacco: Never Used   Substance and Sexual Activity    Alcohol use: No     Comment:      Drug use: Never    Sexual activity: Not Currently     Partners: Female   Lifestyle    Physical activity:     Days per week: Not on file     Minutes per session: Not on file    Stress: Not on file   Relationships    Social connections:     Talks on phone: Not on file     Gets together: Not on file     Attends Gnosticist service: Not on file     Active member of club or organization: Not on file     Attends meetings of clubs or organizations: Not on file     Relationship status: Not on file    Intimate partner violence:     Fear of current or ex partner: Not on file     Emotionally abused: Not on file     Physically abused: Not on file     Forced

## 2019-07-28 PROCEDURE — 2500000003 HC RX 250 WO HCPCS: Performed by: INTERNAL MEDICINE

## 2019-07-28 PROCEDURE — 2060000000 HC ICU INTERMEDIATE R&B

## 2019-07-28 PROCEDURE — 2500000003 HC RX 250 WO HCPCS: Performed by: SURGERY

## 2019-07-28 PROCEDURE — 94640 AIRWAY INHALATION TREATMENT: CPT

## 2019-07-28 PROCEDURE — 6370000000 HC RX 637 (ALT 250 FOR IP): Performed by: INTERNAL MEDICINE

## 2019-07-28 PROCEDURE — 6360000002 HC RX W HCPCS: Performed by: INTERNAL MEDICINE

## 2019-07-28 PROCEDURE — 2580000003 HC RX 258: Performed by: INTERNAL MEDICINE

## 2019-07-28 PROCEDURE — 6360000002 HC RX W HCPCS: Performed by: SURGERY

## 2019-07-28 PROCEDURE — 2580000003 HC RX 258: Performed by: SURGERY

## 2019-07-28 PROCEDURE — 2700000000 HC OXYGEN THERAPY PER DAY

## 2019-07-28 RX ADMIN — METRONIDAZOLE 500 MG: 500 INJECTION, SOLUTION INTRAVENOUS at 12:01

## 2019-07-28 RX ADMIN — BUDESONIDE 500 MCG: 0.5 SUSPENSION RESPIRATORY (INHALATION) at 07:31

## 2019-07-28 RX ADMIN — FORMOTEROL FUMARATE DIHYDRATE 20 MCG: 20 SOLUTION RESPIRATORY (INHALATION) at 07:30

## 2019-07-28 RX ADMIN — Medication 10 ML: at 08:47

## 2019-07-28 RX ADMIN — METOPROLOL TARTRATE 5 MG: 5 INJECTION INTRAVENOUS at 20:33

## 2019-07-28 RX ADMIN — ENOXAPARIN SODIUM 40 MG: 40 INJECTION SUBCUTANEOUS at 20:33

## 2019-07-28 RX ADMIN — Medication 10 ML: at 20:33

## 2019-07-28 RX ADMIN — IPRATROPIUM BROMIDE AND ALBUTEROL SULFATE 1 AMPULE: .5; 3 SOLUTION RESPIRATORY (INHALATION) at 20:54

## 2019-07-28 RX ADMIN — METRONIDAZOLE 500 MG: 500 INJECTION, SOLUTION INTRAVENOUS at 20:33

## 2019-07-28 RX ADMIN — IPRATROPIUM BROMIDE AND ALBUTEROL SULFATE 1 AMPULE: .5; 3 SOLUTION RESPIRATORY (INHALATION) at 17:06

## 2019-07-28 RX ADMIN — BUDESONIDE 500 MCG: 0.5 SUSPENSION RESPIRATORY (INHALATION) at 20:54

## 2019-07-28 RX ADMIN — METOPROLOL TARTRATE 5 MG: 5 INJECTION INTRAVENOUS at 02:24

## 2019-07-28 RX ADMIN — ONDANSETRON 4 MG: 2 INJECTION INTRAMUSCULAR; INTRAVENOUS at 21:28

## 2019-07-28 RX ADMIN — FORMOTEROL FUMARATE DIHYDRATE 20 MCG: 20 SOLUTION RESPIRATORY (INHALATION) at 20:54

## 2019-07-28 RX ADMIN — METRONIDAZOLE 500 MG: 500 INJECTION, SOLUTION INTRAVENOUS at 04:41

## 2019-07-28 RX ADMIN — CEFTRIAXONE SODIUM 2 G: 2 INJECTION, POWDER, FOR SOLUTION INTRAMUSCULAR; INTRAVENOUS at 20:33

## 2019-07-28 RX ADMIN — DEXTROSE AND SODIUM CHLORIDE: 5; 450 INJECTION, SOLUTION INTRAVENOUS at 04:41

## 2019-07-28 RX ADMIN — IPRATROPIUM BROMIDE AND ALBUTEROL SULFATE 1 AMPULE: .5; 3 SOLUTION RESPIRATORY (INHALATION) at 07:30

## 2019-07-28 RX ADMIN — METOPROLOL TARTRATE 5 MG: 5 INJECTION INTRAVENOUS at 12:00

## 2019-07-28 RX ADMIN — IPRATROPIUM BROMIDE AND ALBUTEROL SULFATE 1 AMPULE: .5; 3 SOLUTION RESPIRATORY (INHALATION) at 11:51

## 2019-07-28 ASSESSMENT — PAIN SCALES - GENERAL
PAINLEVEL_OUTOF10: 0

## 2019-07-28 NOTE — PROGRESS NOTES
Carmelo Morrison M.D.,[facility]  Anette Araya D.O., FLUDINORobertI., Chan Dunlap M.D. Miky Lamb M.D., Dallas Hatch M.D. Andrew Ortiz D.O. Daily Pulmonary Progress Note    Patient:  Aurora Hansen 77 y.o. male MRN: [id number]     Date of Service: 7/28/2019        Subjective      Patient was seen and examined. Patient feels well. Breathing near baseline, no cough  Waiting to start tube feeds    Objective   Vitals: /70   Pulse 97   Temp 98.2 °F (36.8 °C) (Oral)   Resp 16   Ht 6' 1\" (1.854 m)   Wt 208 lb 1.8 oz (94.4 kg)   SpO2 93%   BMI 27.46 kg/m²     I/O:    Intake/Output Summary (Last 24 hours) at 7/28/2019 1154  Last data filed at 7/28/2019 1044  Gross per 24 hour   Intake 2245 ml   Output 1045 ml   Net 1200 ml       CURRENT MEDS :  Scheduled Meds:   metoprolol  5 mg Intravenous Q8H    sodium chloride flush  10 mL Intravenous 2 times per day    enoxaparin  40 mg Subcutaneous Daily    budesonide  500 mcg Nebulization BID    formoterol  20 mcg Nebulization BID    ipratropium-albuterol  1 ampule Inhalation Q4H WA    fentanNYL  50 mcg Intravenous Once    cefTRIAXone (ROCEPHIN) IV  2 g Intravenous Q24H    metroNIDAZOLE  500 mg Intravenous Q8H       Physical Exam:  Physical Exam   Constitutional: He is oriented to person, place, and time. No distress. HENT:   Head: Normocephalic and atraumatic. Trach midline with PMV   Eyes: Conjunctivae are normal.   Neck: Neck supple. No tracheal deviation present. Cardiovascular: Normal rate, regular rhythm and normal heart sounds. Pulmonary/Chest: Effort normal and breath sounds normal.   Abdominal: Soft. Bowel sounds are normal. There is no tenderness. Musculoskeletal: He exhibits no edema or deformity. Lymphadenopathy:     He has no cervical adenopathy. Neurological: He is alert and oriented to person, place, and time. Skin: Skin is warm and dry. No rash noted.    Psychiatric: He has a normal mood and affect. His behavior is normal.       Pertinent/ New Labs and Imaging Studies     Pulmonary Function Testing personally reviewed and interpreted. PERTINENT LAB RESULTS: Labs reviewed. DIAGNOSTICS: Pertinent imaging reviewed. Assessment: This is a pleasant 68-year-old male with a medical history of laryngeal cancer status post trach 7/8/2019 and PEG, moderate to severe COPD, ISSAC that presents with PEG tube dysfunction. New PEG tube placed 7/27. He had acute respiratory failure with concern for possible aspiration. 1.  Acute on chronic hypoxemic respiratory failure -improved  Chest x-ray clear, procalcitonin low, in no concern for pulmonary infection at this time    2. PEG tube dysfunction with PEG exchange and 7/27 placement  3. Moderate to severe COPD GOLD  FEV1 49% pred, FEV1 51 % post bronchodilator , not in acute exacerbation  4. ISSAC  5. Hypopharyngeal neoplasm-squamous cell carcinoma  6. S/p tracheostomy 6 shiley insert 7/8/19  7. Dysphagia s/p peg tube insert 7/9/19  8. Hx a fib      Plan:     Continue bronchodilators pulmicort/perforomist, with DuoNeb  Trach care  PMV as tolerated, remove qhs  Supplemental O2 for SpO2 88 - 92%  Resume TF's per GI  Follow with Onc, ENT, Rad Onc as outpatient - has planned chemo/XRT Tuesday    Patient doing well from a pulmonary perspective and can be discharged soon. Patient has regular follow-up appointment with Dr. Kenney Trivedi in the office, September      Thank you for allowing me to participate in the care of Magali Lopez. Please feel free to call with questions.      Electronically signed by Samantha Mitchell DO on 7/28/2019 at 11:54 AM

## 2019-07-29 ENCOUNTER — TELEPHONE (OUTPATIENT)
Dept: CASE MANAGEMENT | Age: 69
End: 2019-07-29

## 2019-07-29 VITALS
RESPIRATION RATE: 16 BRPM | HEIGHT: 73 IN | BODY MASS INDEX: 27.58 KG/M2 | OXYGEN SATURATION: 95 % | DIASTOLIC BLOOD PRESSURE: 68 MMHG | TEMPERATURE: 97.6 F | SYSTOLIC BLOOD PRESSURE: 94 MMHG | HEART RATE: 100 BPM | WEIGHT: 208.11 LBS

## 2019-07-29 LAB
ALBUMIN SERPL-MCNC: 2.9 G/DL (ref 3.5–5.2)
ALP BLD-CCNC: 94 U/L (ref 40–129)
ALT SERPL-CCNC: 25 U/L (ref 0–40)
ANION GAP SERPL CALCULATED.3IONS-SCNC: 10 MMOL/L (ref 7–16)
AST SERPL-CCNC: 17 U/L (ref 0–39)
BASOPHILS ABSOLUTE: 0.07 E9/L (ref 0–0.2)
BASOPHILS RELATIVE PERCENT: 0.8 % (ref 0–2)
BILIRUB SERPL-MCNC: 0.4 MG/DL (ref 0–1.2)
BUN BLDV-MCNC: 14 MG/DL (ref 8–23)
CALCIUM SERPL-MCNC: 9.1 MG/DL (ref 8.6–10.2)
CHLORIDE BLD-SCNC: 100 MMOL/L (ref 98–107)
CO2: 28 MMOL/L (ref 22–29)
CREAT SERPL-MCNC: 0.7 MG/DL (ref 0.7–1.2)
EOSINOPHILS ABSOLUTE: 0.28 E9/L (ref 0.05–0.5)
EOSINOPHILS RELATIVE PERCENT: 3.3 % (ref 0–6)
GFR AFRICAN AMERICAN: >60
GFR NON-AFRICAN AMERICAN: >60 ML/MIN/1.73
GLUCOSE BLD-MCNC: 108 MG/DL (ref 74–99)
HCT VFR BLD CALC: 38.4 % (ref 37–54)
HEMOGLOBIN: 12.6 G/DL (ref 12.5–16.5)
IMMATURE GRANULOCYTES #: 0.02 E9/L
IMMATURE GRANULOCYTES %: 0.2 % (ref 0–5)
LYMPHOCYTES ABSOLUTE: 1.67 E9/L (ref 1.5–4)
LYMPHOCYTES RELATIVE PERCENT: 19.7 % (ref 20–42)
MAGNESIUM: 2.1 MG/DL (ref 1.6–2.6)
MCH RBC QN AUTO: 30.7 PG (ref 26–35)
MCHC RBC AUTO-ENTMCNC: 32.8 % (ref 32–34.5)
MCV RBC AUTO: 93.4 FL (ref 80–99.9)
MONOCYTES ABSOLUTE: 1.13 E9/L (ref 0.1–0.95)
MONOCYTES RELATIVE PERCENT: 13.3 % (ref 2–12)
NEUTROPHILS ABSOLUTE: 5.31 E9/L (ref 1.8–7.3)
NEUTROPHILS RELATIVE PERCENT: 62.7 % (ref 43–80)
PDW BLD-RTO: 14.4 FL (ref 11.5–15)
PLATELET # BLD: 303 E9/L (ref 130–450)
PMV BLD AUTO: 11.8 FL (ref 7–12)
POTASSIUM SERPL-SCNC: 3.6 MMOL/L (ref 3.5–5)
RBC # BLD: 4.11 E12/L (ref 3.8–5.8)
SODIUM BLD-SCNC: 138 MMOL/L (ref 132–146)
TOTAL PROTEIN: 7.1 G/DL (ref 6.4–8.3)
WBC # BLD: 8.5 E9/L (ref 4.5–11.5)

## 2019-07-29 PROCEDURE — 6370000000 HC RX 637 (ALT 250 FOR IP): Performed by: INTERNAL MEDICINE

## 2019-07-29 PROCEDURE — 2500000003 HC RX 250 WO HCPCS: Performed by: SURGERY

## 2019-07-29 PROCEDURE — 2580000003 HC RX 258: Performed by: INTERNAL MEDICINE

## 2019-07-29 PROCEDURE — 80053 COMPREHEN METABOLIC PANEL: CPT

## 2019-07-29 PROCEDURE — 36415 COLL VENOUS BLD VENIPUNCTURE: CPT

## 2019-07-29 PROCEDURE — 2700000000 HC OXYGEN THERAPY PER DAY

## 2019-07-29 PROCEDURE — 97161 PT EVAL LOW COMPLEX 20 MIN: CPT

## 2019-07-29 PROCEDURE — 85025 COMPLETE CBC W/AUTO DIFF WBC: CPT

## 2019-07-29 PROCEDURE — 6360000002 HC RX W HCPCS: Performed by: INTERNAL MEDICINE

## 2019-07-29 PROCEDURE — 83735 ASSAY OF MAGNESIUM: CPT

## 2019-07-29 PROCEDURE — 94640 AIRWAY INHALATION TREATMENT: CPT

## 2019-07-29 RX ADMIN — METRONIDAZOLE 500 MG: 500 INJECTION, SOLUTION INTRAVENOUS at 03:23

## 2019-07-29 RX ADMIN — IPRATROPIUM BROMIDE AND ALBUTEROL SULFATE 1 AMPULE: .5; 3 SOLUTION RESPIRATORY (INHALATION) at 08:10

## 2019-07-29 RX ADMIN — BUDESONIDE 500 MCG: 0.5 SUSPENSION RESPIRATORY (INHALATION) at 08:10

## 2019-07-29 RX ADMIN — FORMOTEROL FUMARATE DIHYDRATE 20 MCG: 20 SOLUTION RESPIRATORY (INHALATION) at 08:09

## 2019-07-29 RX ADMIN — Medication 10 ML: at 09:35

## 2019-07-29 ASSESSMENT — PAIN SCALES - GENERAL: PAINLEVEL_OUTOF10: 0

## 2019-07-29 NOTE — CARE COORDINATION
7/29/2019  Social Work Discharge Planning: This worker met with Pt to discuss  role and transition of care/discharge planning. Pt resides with his spouse. Pt is on 5l o2 here and has 5l home o2 through [facility]. [facility] YANNI order will be needed. Pt has a trache, PEG and is on IVATB. Electronically signed by DUSTIN Interiano on 7/29/2019 at 10:05 AM    7/29/2019  Social Work Discharge Planning:  SW notified Stefano Epperson with [facility] of Pts discharge.  Electronically signed by DUSTIN Interiano on 7/29/2019 at 11:36 AM

## 2019-07-29 NOTE — DISCHARGE SUMMARY
has an NG tube in place. PEG tube was replaced; seen by pulmonary and continue bronchodilators; seen by oncology and discharged in stable condition to follow-up with radiation therapy and oncology. Discharge Exam:  See progress note from today    Disposition: home    Patient Instructions:   Current Discharge Medication List      CONTINUE these medications which have NOT CHANGED    Details   diltiazem (CARDIZEM) 30 MG tablet Take 30 mg by mouth See Admin Instructions Take 3 tabs twice daily      ipratropium-albuterol (DUONEB) 0.5-2.5 (3) MG/3ML SOLN nebulizer solution Inhale 3 mLs into the lungs every 6 hours as needed for Shortness of Breath  Qty: 360 mL, Refills: 5      formoterol (PERFOROMIST) 20 MCG/2ML nebulizer solution Take 2 mLs by nebulization 2 times daily  Qty: 120 mL, Refills: 5      ELIQUIS 5 MG TABS tablet 2 times daily       metoprolol succinate (TOPROL XL) 25 MG extended release tablet Take 25 mg by mouth daily  Refills: 3      albuterol (PROVENTIL) (2.5 MG/3ML) 0.083% nebulizer solution Take 2.5 mg by nebulization every 6 hours as needed for Wheezing       Respiratory Therapy Supplies (FULL KIT NEBULIZER SET) MISC Use as directed with nebulized medication. Qty: 1 each, Refills: 0    Comments: Supply prescription to Pharmacy or [address] [place] [address] location of patient or coverage preference. Dispense full nebulizer kit - compressor, tubing, mouthpiece, mask, ancillary supplies - per requirements of ordered product, patient preference, or coverage allowances. albuterol (PROVENTIL HFA;VENTOLIN HFA) 108 (90 BASE) MCG/ACT inhaler Inhale 2 puffs into the lungs every 6 hours as needed. Activity: activity as tolerated  Diet: tube feedings    Follow-up with Dr Quinten Laurent in 1 week. Note that over 30 minutes was spent in preparing discharge papers, discussing discharge with patient, medication review, etc.    Signed:  RORY Simms  7/29/2019  10:00 AM

## 2019-07-29 NOTE — PROGRESS NOTES
07/29/2019    CO2 28 07/29/2019    BUN 14 07/29/2019    CREATININE 0.7 07/29/2019    LABALBU 2.9 07/29/2019    CALCIUM 9.1 07/29/2019    GFRAA >60 07/29/2019    LABGLOM >60 07/29/2019     Lab Results   Component Value Date    PROTIME 14.3 07/09/2019    PROTIME 11.3 01/20/2011    INR 1.2 07/09/2019     No results for input(s): PROBNP in the last 72 hours. Recent Labs     07/27/19  0430   PROCAL 0.06     This SmartLink has not been configured with any valid records. Micro:  No results for input(s): CULTRESP in the last 72 hours. No results for input(s): LABGRAM in the last 72 hours. No results for input(s): LEGUR in the last 72 hours. No results for input(s): STREPNEUMAGU in the last 72 hours. No results for input(s): LP1UAG in the last 72 hours. Assessment:    1. Acute on chronic respiratory failure with hypoxia-improved   2. PEG tube malfunction-replaced 07/27  3. COPD-moderate to severe chronic, no acute exacerbation  4. ISSAC  5. Hypopharyngeal neoplasm- squamous cell carcinoma  6. S/p tracheostomy #6 shiley-07/08/19  7. Dysphagia s/p peg tube insertion 07/09/19  8. Hx of afib-eliquis at home. Plan:   1. Continue trach care and supplemental O2 as needed for spO2 goal of 88-92%. 2. Continue bronchodilators and duonebs as needed. 3. Tube feeds per GI/Surg  4. Chemo/radiation treatment starts tomorrow, follow up with heme/onc/ENT/radiology outpatient. Patient is stable to be discharged from a pulmonary stand point. He has an appointment with Dr. Isamar Gray in September. This plan of care was reviewed in collaboration with Dr. Royer Dowling    Electronically signed by Richmond Perdomo DO on 7/29/2019 at 9:01 AM    I personally saw, examined, and cared for the patient. Labs, medications, radiographs reviewed.  I agree with history exam and plans detailed in the resident note with the following additions:    Okay for d/c today from a pulmonary standpoint    Mercedes Muhammad

## 2019-07-29 NOTE — PROGRESS NOTES
Physical Therapy    Facility/Department: [address] INTERNAL MEDICINE 2  Initial Assessment    NAME: Anabela Shaw  : 1950  MRN: [id number]    Date of Service: 2019      Patient Diagnosis(es): The encounter diagnosis was PEG tube malfunction ([facility] has a past medical history of AAA (abdominal aortic aneurysm) ([facility] Arthritis, Atrial fibrillation ([facility] COPD (chronic obstructive pulmonary disease) ([facility] CPAP (continuous positive airway pressure) dependence, Hyperlipidemia, and Traumatic leg ulcer ([facility] has a past surgical history that includes AAA repair, endovascular (08); ECHO Transesophageal (2013); ECHO Transesophageal (3/18/2013); ECHO Transesophageal (9/3/2013); Cardioversion (0-8616); bronchoscopy (2015); Colonoscopy; Tracheotomy (N/A, 2019); laryngoscopy (N/A, 2019); and Gastrostomy tube placement (N/A, 2019). Evaluating Therapist: Ferdinand Bell PT    Room #:401  DIAGNOSIS: Acute Respiratory failure  Additional Pertinent History:Laryngeal CA  PRECAUTIONS: PEG, Trach    Social:  Pt lives with wife in a 1 floor plan. Prior to admission independent without device     Initial Evaluation  Date: 19 Treatment      Short Term/ Long Term   Goals   Was pt agreeable to Eval/treatment? yes     Does pt have pain? No c/o pain     Bed Mobility  Rolling: independent  Supine to sit: independent  Sit to supine: NT  Scooting: independent  independent   Transfers Sit to stand: SBA  Stand to sit: SBA  Stand pivot: SBA  independnet   Ambulation    150 feet with no device with SBA  300 feet with no device independent   Stair Negotiation  Ascended and descended  NT   4 steps with 1 rail independent   AM-PAC Raw score               20/24       Pt is alert and Oriented   LE ROM: WFL  LE strength: 4/5  Balance: good  Sensation: intact  Edema: none  Endurance: fair+     ASSESSMENT  Pt displays functional ability as noted in the objective portion of this evaluation. Comments/Treatment:  Spo2 on 8L via trach 98% after ambulation       Patient education  Pt educated on PT objectives    Patient response to education:   Pt verbalized understanding Pt demonstrated skill Pt requires further education in this area   yes         Rehab potential is good for reaching above PT goals. Pts/ family goals   1. To go home    Patient and or family understand(s) diagnosis, prognosis, and plan of care. PLAN  PT care will be provided in accordance with the objectives noted above. Whenever appropriate, clear delegation orders will be provided for nursing staff. Exercises and functional mobility practice will be used as well as appropriate assistive devices or modalities to obtain goals. Patient and family education will also be administered as needed. Frequency of treatments will be 2-5x/week x 3 days.     Phill Canavan PT  [phone]

## 2019-07-30 ENCOUNTER — TELEPHONE (OUTPATIENT)
Dept: CASE MANAGEMENT | Age: 69
End: 2019-07-30

## 2019-07-30 ENCOUNTER — HOSPITAL ENCOUNTER (OUTPATIENT)
Dept: RADIATION ONCOLOGY | Age: 69
Discharge: HOME | End: 2019-07-30
Payer: MEDICARE

## 2019-07-30 ENCOUNTER — HOSPITAL ENCOUNTER (OUTPATIENT)
Dept: RADIATION ONCOLOGY | Age: 69
Discharge: HOME | End: 2019-07-30
Attending: RADIOLOGY
Payer: MEDICARE

## 2019-07-30 PROCEDURE — 77386 HC NTSTY MODUL RAD TX DLVR CPLX: CPT | Performed by: RADIOLOGY

## 2019-07-30 NOTE — PROGRESS NOTES
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

## 2019-07-31 ENCOUNTER — HOSPITAL ENCOUNTER (OUTPATIENT)
Dept: RADIATION ONCOLOGY | Age: 69
Discharge: HOME | End: 2019-07-31
Attending: RADIOLOGY
Payer: MEDICARE

## 2019-07-31 PROCEDURE — 77386 HC NTSTY MODUL RAD TX DLVR CPLX: CPT | Performed by: RADIOLOGY

## 2019-08-01 ENCOUNTER — HOSPITAL ENCOUNTER (OUTPATIENT)
Dept: RADIATION ONCOLOGY | Age: 69
Discharge: HOME | End: 2019-08-01
Attending: RADIOLOGY
Payer: MEDICARE

## 2019-08-01 PROCEDURE — 77386 HC NTSTY MODUL RAD TX DLVR CPLX: CPT | Performed by: RADIOLOGY

## 2019-08-02 ENCOUNTER — HOSPITAL ENCOUNTER (OUTPATIENT)
Dept: RADIATION ONCOLOGY | Age: 69
Discharge: HOME | End: 2019-08-02
Attending: RADIOLOGY
Payer: MEDICARE

## 2019-08-02 PROCEDURE — 77386 HC NTSTY MODUL RAD TX DLVR CPLX: CPT | Performed by: RADIOLOGY

## 2019-08-05 ENCOUNTER — HOSPITAL ENCOUNTER (OUTPATIENT)
Dept: RADIATION ONCOLOGY | Age: 69
Discharge: HOME | End: 2019-08-05
Attending: RADIOLOGY
Payer: MEDICARE

## 2019-08-05 PROCEDURE — 77386 HC NTSTY MODUL RAD TX DLVR CPLX: CPT | Performed by: RADIOLOGY

## 2019-08-05 PROCEDURE — 77336 RADIATION PHYSICS CONSULT: CPT | Performed by: RADIOLOGY

## 2019-08-06 ENCOUNTER — HOSPITAL ENCOUNTER (OUTPATIENT)
Dept: RADIATION ONCOLOGY | Age: 69
Discharge: HOME | End: 2019-08-06
Attending: RADIOLOGY
Payer: MEDICARE

## 2019-08-06 VITALS — BODY MASS INDEX: 26.98 KG/M2 | WEIGHT: 204.5 LBS

## 2019-08-06 PROCEDURE — 77386 HC NTSTY MODUL RAD TX DLVR CPLX: CPT | Performed by: RADIOLOGY

## 2019-08-06 RX ORDER — DIAZEPAM 5 MG/1
5 TABLET ORAL DAILY
COMMUNITY
End: 2019-10-03

## 2019-08-06 NOTE — PROGRESS NOTES
Connie Ruiz  8/6/2019  Wt Readings from Last 3 Encounters:   08/06/19 204 lb 8 oz (92.8 kg)   07/28/19 208 lb 1.8 oz (94.4 kg)   07/11/19 213 lb (96.6 kg)     Body mass index is 26.98 kg/m². Treatment Area hypopharynx    Patient was seen today for weekly visit. Comfort Alteration  KPS:80%  Fatigue: Mild    Mucous Membrane Alteration  Mucositis Due to Radiation: No  Thrush: No  Voice Changes: No    Nutritional Alteration  Anorexia: No   Nausea: No   Vomiting: No     Skin Alteration   Sensation:na    Radiation Dermatitis:  mild    Ventilation Alteration  Cough:No    Emotional  Coping: somewhat effective    Injury, potential bleeding or infection: na    Radioprotectant Tolerance  Yes            Lab Results   Component Value Date    WBC 8.5 07/29/2019     07/29/2019         Wt 204 lb 8 oz (92.8 kg)   BMI 26.98 kg/m²   BP within normal range? yes   -if no, manually recheck in 5-10 min        Assessment/Plan:6/1200 cGy  No new complaints.  Skin care reviewed    Leatha Doran

## 2019-08-07 ENCOUNTER — HOSPITAL ENCOUNTER (OUTPATIENT)
Dept: RADIATION ONCOLOGY | Age: 69
Discharge: HOME | End: 2019-08-07
Attending: RADIOLOGY
Payer: MEDICARE

## 2019-08-07 PROCEDURE — 77386 HC NTSTY MODUL RAD TX DLVR CPLX: CPT | Performed by: RADIOLOGY

## 2019-08-08 ENCOUNTER — HOSPITAL ENCOUNTER (OUTPATIENT)
Dept: RADIATION ONCOLOGY | Age: 69
Discharge: HOME | End: 2019-08-08
Attending: RADIOLOGY
Payer: MEDICARE

## 2019-08-08 PROCEDURE — 77386 HC NTSTY MODUL RAD TX DLVR CPLX: CPT | Performed by: RADIOLOGY

## 2019-08-09 ENCOUNTER — HOSPITAL ENCOUNTER (OUTPATIENT)
Dept: RADIATION ONCOLOGY | Age: 69
Discharge: HOME | End: 2019-08-09
Attending: RADIOLOGY
Payer: MEDICARE

## 2019-08-09 PROCEDURE — 77386 HC NTSTY MODUL RAD TX DLVR CPLX: CPT | Performed by: RADIOLOGY

## 2019-08-12 ENCOUNTER — HOSPITAL ENCOUNTER (OUTPATIENT)
Dept: RADIATION ONCOLOGY | Age: 69
Discharge: HOME | End: 2019-08-12
Attending: RADIOLOGY
Payer: MEDICARE

## 2019-08-12 PROCEDURE — 77386 HC NTSTY MODUL RAD TX DLVR CPLX: CPT | Performed by: RADIOLOGY

## 2019-08-12 PROCEDURE — 77336 RADIATION PHYSICS CONSULT: CPT | Performed by: RADIOLOGY

## 2019-08-13 ENCOUNTER — HOSPITAL ENCOUNTER (OUTPATIENT)
Dept: RADIATION ONCOLOGY | Age: 69
Discharge: HOME | End: 2019-08-13
Attending: RADIOLOGY
Payer: MEDICARE

## 2019-08-13 VITALS — BODY MASS INDEX: 27.05 KG/M2 | WEIGHT: 205 LBS

## 2019-08-13 PROCEDURE — 77386 HC NTSTY MODUL RAD TX DLVR CPLX: CPT | Performed by: RADIOLOGY

## 2019-08-14 ENCOUNTER — HOSPITAL ENCOUNTER (OUTPATIENT)
Dept: RADIATION ONCOLOGY | Age: 69
Discharge: HOME | End: 2019-08-14
Attending: RADIOLOGY
Payer: MEDICARE

## 2019-08-14 PROCEDURE — 77386 HC NTSTY MODUL RAD TX DLVR CPLX: CPT | Performed by: RADIOLOGY

## 2019-08-15 ENCOUNTER — HOSPITAL ENCOUNTER (OUTPATIENT)
Dept: RADIATION ONCOLOGY | Age: 69
Discharge: HOME | End: 2019-08-15
Attending: RADIOLOGY
Payer: MEDICARE

## 2019-08-15 PROCEDURE — 77386 HC NTSTY MODUL RAD TX DLVR CPLX: CPT | Performed by: RADIOLOGY

## 2019-08-16 ENCOUNTER — HOSPITAL ENCOUNTER (OUTPATIENT)
Dept: RADIATION ONCOLOGY | Age: 69
Discharge: HOME | End: 2019-08-16
Attending: RADIOLOGY
Payer: MEDICARE

## 2019-08-16 PROCEDURE — 77386 HC NTSTY MODUL RAD TX DLVR CPLX: CPT | Performed by: RADIOLOGY

## 2019-08-19 ENCOUNTER — HOSPITAL ENCOUNTER (OUTPATIENT)
Dept: RADIATION ONCOLOGY | Age: 69
Discharge: HOME | End: 2019-08-19
Attending: RADIOLOGY
Payer: MEDICARE

## 2019-08-19 PROCEDURE — 77336 RADIATION PHYSICS CONSULT: CPT | Performed by: RADIOLOGY

## 2019-08-19 PROCEDURE — 77386 HC NTSTY MODUL RAD TX DLVR CPLX: CPT | Performed by: RADIOLOGY

## 2019-08-20 ENCOUNTER — HOSPITAL ENCOUNTER (OUTPATIENT)
Dept: RADIATION ONCOLOGY | Age: 69
Discharge: HOME | End: 2019-08-20
Attending: RADIOLOGY
Payer: MEDICARE

## 2019-08-20 ENCOUNTER — TELEPHONE (OUTPATIENT)
Dept: CASE MANAGEMENT | Age: 69
End: 2019-08-20

## 2019-08-20 VITALS — WEIGHT: 212 LBS | BODY MASS INDEX: 27.97 KG/M2

## 2019-08-20 DIAGNOSIS — C13.9 SQUAMOUS CELL CARCINOMA OF HYPOPHARYNX (HCC): Primary | ICD-10-CM

## 2019-08-20 PROCEDURE — 77386 HC NTSTY MODUL RAD TX DLVR CPLX: CPT | Performed by: RADIOLOGY

## 2019-08-20 RX ORDER — ONDANSETRON 8 MG/1
TABLET, ORALLY DISINTEGRATING ORAL
Refills: 3 | COMMUNITY
Start: 2019-07-24 | End: 2019-10-03

## 2019-08-20 RX ORDER — DIAZEPAM 5 MG/1
5 TABLET ORAL DAILY
Qty: 30 TABLET | Refills: 0 | Status: SHIPPED | OUTPATIENT
Start: 2019-08-20 | End: 2019-09-19

## 2019-08-20 NOTE — PROGRESS NOTES
Natacha Mckee  8/20/2019  4:39 PM        Wt Readings from Last 3 Encounters:   08/20/19 212 lb (96.2 kg)   08/13/19 205 lb (93 kg)   08/06/19 204 lb 8 oz (92.8 kg)       Subjective: 30 [address] for locally advanced hypopharyngeal squamous cell carcinoma, doing well significantly decreased dysphonia has not developed any dysphagia as of yet treatment associated anxiety is under excellent control with Valium 5 mg p.o. an hour before arriving for radiation treatment, reports that he was never told about salt and baking soda gargles which I know I discussed with him by the time of his inpatient consultation during which probably patient did not register that much information. Is continuing Aquaphor on a twice daily basis      Objective:   HEENT: Thickened salivary secretions no evidence of candidiasis is no posterior pharyngeal wall hyperemia  Lymph nodes: No palpable cervical supraclavicular lymphadenopathy is appreciated  Skin: Minimal radiation-induced grade 1 dermatitis consisting of pinkish erythema throughout the radiation portal    Radiographic data: Review of daily combing CAT scan imaging appears to indicate enlarging airspace indicative of response to treatment    Assessment: What appears to be clinical and some radiographic indications of response to concurrent chemoradiation therapy      Plan: We will continue current management. I refill the prescription for Valium to be taken an hour before radiation therapy daily with a total of 30 tablets prescribed. Our nurse navigator also reached the [facility] where they refill the patient's Zofran with Dr. Amee Florian being the prescriber.       Electronically signed by Jonas Sandoval MD on 8/20/19 at 4:39 PM

## 2019-08-21 ENCOUNTER — HOSPITAL ENCOUNTER (OUTPATIENT)
Dept: RADIATION ONCOLOGY | Age: 69
Discharge: HOME | End: 2019-08-21
Attending: RADIOLOGY
Payer: MEDICARE

## 2019-08-21 PROCEDURE — 77386 HC NTSTY MODUL RAD TX DLVR CPLX: CPT | Performed by: RADIOLOGY

## 2019-08-22 ENCOUNTER — HOSPITAL ENCOUNTER (OUTPATIENT)
Dept: RADIATION ONCOLOGY | Age: 69
Discharge: HOME | End: 2019-08-22
Attending: RADIOLOGY
Payer: MEDICARE

## 2019-08-22 PROCEDURE — 77386 HC NTSTY MODUL RAD TX DLVR CPLX: CPT | Performed by: RADIOLOGY

## 2019-08-23 ENCOUNTER — HOSPITAL ENCOUNTER (OUTPATIENT)
Dept: RADIATION ONCOLOGY | Age: 69
Discharge: HOME | End: 2019-08-23
Attending: RADIOLOGY
Payer: MEDICARE

## 2019-08-23 PROCEDURE — 77386 HC NTSTY MODUL RAD TX DLVR CPLX: CPT | Performed by: RADIOLOGY

## 2019-08-26 ENCOUNTER — HOSPITAL ENCOUNTER (OUTPATIENT)
Dept: RADIATION ONCOLOGY | Age: 69
Discharge: HOME | End: 2019-08-26
Attending: RADIOLOGY
Payer: MEDICARE

## 2019-08-26 PROCEDURE — 77336 RADIATION PHYSICS CONSULT: CPT | Performed by: RADIOLOGY

## 2019-08-26 PROCEDURE — 77386 HC NTSTY MODUL RAD TX DLVR CPLX: CPT | Performed by: RADIOLOGY

## 2019-08-27 ENCOUNTER — HOSPITAL ENCOUNTER (OUTPATIENT)
Dept: RADIATION ONCOLOGY | Age: 69
Discharge: HOME | End: 2019-08-27
Attending: RADIOLOGY
Payer: MEDICARE

## 2019-08-27 PROCEDURE — 77386 HC NTSTY MODUL RAD TX DLVR CPLX: CPT | Performed by: RADIOLOGY

## 2019-08-28 ENCOUNTER — HOSPITAL ENCOUNTER (OUTPATIENT)
Dept: RADIATION ONCOLOGY | Age: 69
Discharge: HOME | End: 2019-08-28
Attending: RADIOLOGY
Payer: MEDICARE

## 2019-08-28 PROCEDURE — 77386 HC NTSTY MODUL RAD TX DLVR CPLX: CPT | Performed by: RADIOLOGY

## 2019-08-29 ENCOUNTER — HOSPITAL ENCOUNTER (OUTPATIENT)
Dept: RADIATION ONCOLOGY | Age: 69
Discharge: HOME | End: 2019-08-29
Attending: RADIOLOGY
Payer: MEDICARE

## 2019-08-29 VITALS
DIASTOLIC BLOOD PRESSURE: 72 MMHG | HEART RATE: 74 BPM | WEIGHT: 200 LBS | BODY MASS INDEX: 26.39 KG/M2 | RESPIRATION RATE: 16 BRPM | SYSTOLIC BLOOD PRESSURE: 122 MMHG

## 2019-08-29 DIAGNOSIS — C13.9 SQUAMOUS CELL CARCINOMA OF HYPOPHARYNX (HCC): Primary | ICD-10-CM

## 2019-08-29 PROCEDURE — 77386 HC NTSTY MODUL RAD TX DLVR CPLX: CPT | Performed by: RADIOLOGY

## 2019-08-29 PROCEDURE — 99999 PR OFFICE/OUTPT VISIT,PROCEDURE ONLY: CPT | Performed by: RADIOLOGY

## 2019-08-29 NOTE — PROGRESS NOTES
Leonor Fierro  8/29/2019  Wt Readings from Last 3 Encounters:   08/29/19 200 lb (90.7 kg)   08/20/19 212 lb (96.2 kg)   08/13/19 205 lb (93 kg)     Body mass index is 26.39 kg/m². Treatment Area:Hypopharynx    Patient was seen today for weekly visit. Comfort Alteration  KPS:90%  Fatigue: Moderate    Mucous Membrane Alteration  Mucositis Due to Radiation: No  Thrush: No  Voice Changes: No    Nutritional Alteration  Anorexia: No   Nausea: No   Vomiting: No     Skin Alteration   Sensation:na    Radiation Dermatitis:  Mild erythema    Ventilation Alteration  Cough:No    Emotional  Coping: effective    Injury, potential bleeding or infection: NA    Radioprotectant Tolerance  Yes            Lab Results   Component Value Date    WBC 8.5 07/29/2019     07/29/2019         /72   Pulse 74   Resp 16   Wt 200 lb (90.7 kg)   BMI 26.39 kg/m²   BP within normal range?  yes   -if no, manually recheck in 5-10 min        Assessment/Plan:23/4600 cGy  No new complaints, skin care reviewed    [place]

## 2019-08-30 ENCOUNTER — HOSPITAL ENCOUNTER (OUTPATIENT)
Dept: RADIATION ONCOLOGY | Age: 69
Discharge: HOME | End: 2019-08-30
Attending: RADIOLOGY
Payer: MEDICARE

## 2019-08-30 PROCEDURE — 77386 HC NTSTY MODUL RAD TX DLVR CPLX: CPT | Performed by: RADIOLOGY

## 2019-09-03 ENCOUNTER — HOSPITAL ENCOUNTER (OUTPATIENT)
Dept: RADIATION ONCOLOGY | Age: 69
Discharge: HOME | End: 2019-09-03
Attending: RADIOLOGY
Payer: MEDICARE

## 2019-09-03 VITALS
HEART RATE: 77 BPM | SYSTOLIC BLOOD PRESSURE: 116 MMHG | DIASTOLIC BLOOD PRESSURE: 80 MMHG | BODY MASS INDEX: 26.12 KG/M2 | WEIGHT: 198 LBS

## 2019-09-03 PROCEDURE — 77386 HC NTSTY MODUL RAD TX DLVR CPLX: CPT | Performed by: RADIOLOGY

## 2019-09-03 PROCEDURE — 77336 RADIATION PHYSICS CONSULT: CPT | Performed by: RADIOLOGY

## 2019-09-04 ENCOUNTER — HOSPITAL ENCOUNTER (OUTPATIENT)
Dept: RADIATION ONCOLOGY | Age: 69
Discharge: HOME | End: 2019-09-04
Attending: RADIOLOGY
Payer: MEDICARE

## 2019-09-04 PROCEDURE — 77386 HC NTSTY MODUL RAD TX DLVR CPLX: CPT | Performed by: RADIOLOGY

## 2019-09-04 NOTE — PROGRESS NOTES
Ramesh Nichols  1950 68 y.o. Referring Physician: Dr Mine Key    PCP: Davida Maldonado MD     There were no vitals filed for this visit. Wt Readings from Last 3 Encounters:   09/03/19 198 lb (89.8 kg)   08/29/19 200 lb (90.7 kg)   08/20/19 212 lb (96.2 kg)        There is no height or weight on file to calculate BMI. Chief Complaint: No chief complaint on file. Cancer Staging  No matching staging information was found for the patient. Prior Radiation Therapy? NO    Concurrent Chemo/radiation? NO    Prior Chemotherapy? NO    Prior Hormonal Therapy? NO    Head and Neck Cancer? Yes, patient has HN cancer BUT consulting physician DOES NOT agree to place MBSS and speech therapy referral. Reason: Physician states orders do not pertain to this patient. Current Outpatient Medications   Medication Sig Dispense Refill    diazepam (VALIUM) 5 MG tablet Take 1 tablet by mouth daily for 30 days. Take 1 hour before daily radiation treatment 30 tablet 0    ondansetron (ZOFRAN-ODT) 8 MG TBDP disintegrating tablet DISSOLVE ONE (1) TABLET IN MOUTH EVERY 8 HOURS IF NEEDED FOR NAUSEA AND VOMITING  3    diazepam (VALIUM) 5 MG tablet Take 5 mg by mouth daily.  diltiazem (CARDIZEM) 30 MG tablet Take 30 mg by mouth See Admin Instructions Take 3 tabs twice daily      ipratropium-albuterol (DUONEB) 0.5-2.5 (3) MG/3ML SOLN nebulizer solution Inhale 3 mLs into the lungs every 6 hours as needed for Shortness of Breath 360 mL 5    formoterol (PERFOROMIST) 20 MCG/2ML nebulizer solution Take 2 mLs by nebulization 2 times daily 120 mL 5    Respiratory Therapy Supplies (FULL KIT NEBULIZER SET) MISC Use as directed with nebulized medication.  1 each 0    ELIQUIS 5 MG TABS tablet 2 times daily       metoprolol succinate (TOPROL XL) 25 MG extended release tablet Take 25 mg by mouth daily  3    albuterol (PROVENTIL) (2.5 MG/3ML) 0.083% nebulizer solution Take 2.5 mg by nebulization every 6

## 2019-09-05 ENCOUNTER — HOSPITAL ENCOUNTER (OUTPATIENT)
Dept: RADIATION ONCOLOGY | Age: 69
Discharge: HOME | End: 2019-09-05
Attending: RADIOLOGY
Payer: MEDICARE

## 2019-09-05 PROCEDURE — 77386 HC NTSTY MODUL RAD TX DLVR CPLX: CPT | Performed by: RADIOLOGY

## 2019-09-06 ENCOUNTER — HOSPITAL ENCOUNTER (OUTPATIENT)
Dept: RADIATION ONCOLOGY | Age: 69
Discharge: HOME | End: 2019-09-06
Attending: RADIOLOGY
Payer: MEDICARE

## 2019-09-06 PROCEDURE — 77386 HC NTSTY MODUL RAD TX DLVR CPLX: CPT | Performed by: RADIOLOGY

## 2019-09-09 ENCOUNTER — TELEPHONE (OUTPATIENT)
Dept: RADIATION ONCOLOGY | Age: 69
End: 2019-09-09

## 2019-09-09 ENCOUNTER — HOSPITAL ENCOUNTER (OUTPATIENT)
Dept: RADIATION ONCOLOGY | Age: 69
Discharge: HOME | End: 2019-09-09
Attending: RADIOLOGY
Payer: MEDICARE

## 2019-09-09 PROCEDURE — 77386 HC NTSTY MODUL RAD TX DLVR CPLX: CPT | Performed by: RADIOLOGY

## 2019-09-09 NOTE — TELEPHONE ENCOUNTER
Spoke with pt and wife re: ACS programs, resources, and role of ACS navigator. They stated that they have no needs at this time but would follow up if needs [place]. Provided with ACS navigator's contact information.

## 2019-09-10 ENCOUNTER — HOSPITAL ENCOUNTER (OUTPATIENT)
Dept: RADIATION ONCOLOGY | Age: 69
Discharge: HOME | End: 2019-09-10
Attending: RADIOLOGY
Payer: MEDICARE

## 2019-09-10 ENCOUNTER — HOSPITAL ENCOUNTER (OUTPATIENT)
Dept: RADIATION ONCOLOGY | Age: 69
End: 2019-09-10
Attending: RADIOLOGY
Payer: MEDICARE

## 2019-09-10 VITALS — BODY MASS INDEX: 25.99 KG/M2 | WEIGHT: 197 LBS

## 2019-09-10 DIAGNOSIS — C13.9 SQUAMOUS CELL CARCINOMA OF HYPOPHARYNX (HCC): Primary | ICD-10-CM

## 2019-09-10 PROCEDURE — 77386 HC NTSTY MODUL RAD TX DLVR CPLX: CPT | Performed by: RADIOLOGY

## 2019-09-10 PROCEDURE — 77336 RADIATION PHYSICS CONSULT: CPT | Performed by: RADIOLOGY

## 2019-09-10 NOTE — PROGRESS NOTES
Milton Pean  9/10/2019  9:02 AM        Wt Readings from Last 3 Encounters:   09/10/19 197 lb (89.4 kg)   09/03/19 198 lb (89.8 kg)   08/29/19 200 lb (90.7 kg)       Subjective: @60 Gy, more dysphonic, not mych odynophagia, but pain around the trach, thick phlegm      Objective:   HEENT:  Min scant blood on the uvula w clots, + thickened secretions, no candida , +posty pharyngeal wall hyperemia  LN;  No adenopathy  Skin: brisk dusky erythema in the portal w no DD, but has developing MD in the nate-trach area- possibly due to bolusing effect from the trach collar      Assessment: дмитрий chemo Rt well w CBCT evidence of good response      Plan: cont current mgt, will add Silvadine 1 % bid to the peritrach area      Electronically signed by Blake Patrick MD on 9/10/19 at 9:02 AM

## 2019-09-11 ENCOUNTER — HOSPITAL ENCOUNTER (OUTPATIENT)
Dept: RADIATION ONCOLOGY | Age: 69
Discharge: HOME | End: 2019-09-11
Attending: RADIOLOGY
Payer: MEDICARE

## 2019-09-11 ENCOUNTER — TELEPHONE (OUTPATIENT)
Dept: CASE MANAGEMENT | Age: 69
End: 2019-09-11

## 2019-09-11 PROCEDURE — 77386 HC NTSTY MODUL RAD TX DLVR CPLX: CPT | Performed by: RADIOLOGY

## 2019-09-12 ENCOUNTER — HOSPITAL ENCOUNTER (OUTPATIENT)
Dept: RADIATION ONCOLOGY | Age: 69
Discharge: HOME | End: 2019-09-12
Attending: RADIOLOGY
Payer: MEDICARE

## 2019-09-12 PROCEDURE — 77386 HC NTSTY MODUL RAD TX DLVR CPLX: CPT | Performed by: RADIOLOGY

## 2019-09-13 ENCOUNTER — HOSPITAL ENCOUNTER (OUTPATIENT)
Dept: RADIATION ONCOLOGY | Age: 69
Discharge: HOME | End: 2019-09-13
Attending: RADIOLOGY
Payer: MEDICARE

## 2019-09-13 PROCEDURE — 77386 HC NTSTY MODUL RAD TX DLVR CPLX: CPT | Performed by: RADIOLOGY

## 2019-09-16 ENCOUNTER — HOSPITAL ENCOUNTER (OUTPATIENT)
Dept: RADIATION ONCOLOGY | Age: 69
Discharge: HOME | End: 2019-09-16
Attending: RADIOLOGY
Payer: MEDICARE

## 2019-09-16 PROCEDURE — 77386 HC NTSTY MODUL RAD TX DLVR CPLX: CPT | Performed by: RADIOLOGY

## 2019-09-17 ENCOUNTER — HOSPITAL ENCOUNTER (OUTPATIENT)
Dept: RADIATION ONCOLOGY | Age: 69
Discharge: HOME | End: 2019-09-17
Attending: RADIOLOGY
Payer: MEDICARE

## 2019-09-17 VITALS
DIASTOLIC BLOOD PRESSURE: 74 MMHG | RESPIRATION RATE: 16 BRPM | WEIGHT: 205 LBS | BODY MASS INDEX: 27.05 KG/M2 | HEART RATE: 70 BPM | SYSTOLIC BLOOD PRESSURE: 112 MMHG

## 2019-09-17 PROCEDURE — 77336 RADIATION PHYSICS CONSULT: CPT | Performed by: RADIOLOGY

## 2019-09-17 PROCEDURE — 77386 HC NTSTY MODUL RAD TX DLVR CPLX: CPT | Performed by: RADIOLOGY

## 2019-09-17 NOTE — PROGRESS NOTES
Clara Anaya  9/17/2019  Wt Readings from Last 3 Encounters:   09/17/19 205 lb (93 kg)   09/10/19 197 lb (89.4 kg)   09/03/19 198 lb (89.8 kg)     Body mass index is 27.05 kg/m². Treatment Area: Hypopharynx    Patient was seen today for weekly visit. Comfort Alteration  KPS:80%  Fatigue: Moderate    Mucous Membrane Alteration  Mucositis Due to Radiation: No  Thrush: No  Voice Changes: No    Nutritional Alteration  Anorexia: Yes   Nausea: No   Vomiting: No     Skin Alteration   Sensation:na    Radiation Dermatitis:  Brisk erythema    Ventilation Alteration  Cough:No    Emotional  Coping: effective    Injury, potential bleeding or infection:na    Radioprotectant Tolerance  Yes            Lab Results   Component Value Date    WBC 8.5 07/29/2019     07/29/2019         /74   Pulse 70   Resp 16   Wt 205 lb (93 kg)   BMI 27.05 kg/m²   BP within normal range?  yes   -if no, manually recheck in 5-10 min        Assessment/Plan:35/7000 cGy    Biranna Majano

## 2019-09-17 NOTE — PROGRESS NOTES
Ness Clay  9/17/2019  8:01 AM          Current Outpatient Medications   Medication Sig Dispense Refill    silver sulfADIAZINE (SILVADENE) 1 % cream Apply topically 2 times daily Apply topically daily. 50 g 2    diazepam (VALIUM) 5 MG tablet Take 1 tablet by mouth daily for 30 days. Take 1 hour before daily radiation treatment 30 tablet 0    ondansetron (ZOFRAN-ODT) 8 MG TBDP disintegrating tablet DISSOLVE ONE (1) TABLET IN MOUTH EVERY 8 HOURS IF NEEDED FOR NAUSEA AND VOMITING  3    diazepam (VALIUM) 5 MG tablet Take 5 mg by mouth daily.  diltiazem (CARDIZEM) 30 MG tablet Take 30 mg by mouth See Admin Instructions Take 3 tabs twice daily      ipratropium-albuterol (DUONEB) 0.5-2.5 (3) MG/3ML SOLN nebulizer solution Inhale 3 mLs into the lungs every 6 hours as needed for Shortness of Breath 360 mL 5    formoterol (PERFOROMIST) 20 MCG/2ML nebulizer solution Take 2 mLs by nebulization 2 times daily 120 mL 5    Respiratory Therapy Supplies (FULL KIT NEBULIZER SET) MISC Use as directed with nebulized medication. 1 each 0    ELIQUIS 5 MG TABS tablet 2 times daily       metoprolol succinate (TOPROL XL) 25 MG extended release tablet Take 25 mg by mouth daily  3    albuterol (PROVENTIL) (2.5 MG/3ML) 0.083% nebulizer solution Take 2.5 mg by nebulization every 6 hours as needed for Wheezing       albuterol (PROVENTIL HFA;VENTOLIN HFA) 108 (90 BASE) MCG/ACT inhaler Inhale 2 puffs into the lungs every 6 hours as needed. No current facility-administered medications for this encounter. This is an up-to-date medication list.    Please take this list to your next care provider, and discard any previous medication lists.

## 2019-09-18 ENCOUNTER — TELEPHONE (OUTPATIENT)
Dept: CASE MANAGEMENT | Age: 69
End: 2019-09-18

## 2019-10-04 ENCOUNTER — HOSPITAL ENCOUNTER (OUTPATIENT)
Age: 69
Discharge: HOME | End: 2019-10-04
Payer: MEDICARE

## 2019-10-04 ENCOUNTER — HOSPITAL ENCOUNTER (OUTPATIENT)
Dept: GENERAL RADIOLOGY | Age: 69
Discharge: HOME | End: 2019-10-06
Payer: MEDICARE

## 2019-10-04 ENCOUNTER — HOSPITAL ENCOUNTER (OUTPATIENT)
Age: 69
Discharge: HOME | End: 2019-10-06
Payer: MEDICARE

## 2019-10-04 DIAGNOSIS — J04.10 TRACHEITIS: ICD-10-CM

## 2019-10-04 PROCEDURE — 87070 CULTURE OTHR SPECIMN AEROBIC: CPT

## 2019-10-04 PROCEDURE — 87206 SMEAR FLUORESCENT/ACID STAI: CPT

## 2019-10-04 PROCEDURE — 71046 X-RAY EXAM CHEST 2 VIEWS: CPT

## 2019-10-06 LAB
CULTURE, RESPIRATORY: NORMAL
SMEAR, RESPIRATORY: NORMAL

## 2019-10-08 ENCOUNTER — HOSPITAL ENCOUNTER (OUTPATIENT)
Age: 69
Discharge: HOME | End: 2019-10-08
Payer: MEDICARE

## 2019-10-08 LAB
ABO/RH: NORMAL
ANTIBODY SCREEN: NORMAL

## 2019-10-08 PROCEDURE — 86900 BLOOD TYPING SEROLOGIC ABO: CPT

## 2019-10-08 PROCEDURE — 86850 RBC ANTIBODY SCREEN: CPT

## 2019-10-08 PROCEDURE — 86901 BLOOD TYPING SEROLOGIC RH(D): CPT

## 2019-10-08 PROCEDURE — 36415 COLL VENOUS BLD VENIPUNCTURE: CPT

## 2019-10-08 PROCEDURE — 86923 COMPATIBILITY TEST ELECTRIC: CPT

## 2019-10-09 ENCOUNTER — HOSPITAL ENCOUNTER (OUTPATIENT)
Dept: INFUSION THERAPY | Age: 69
Discharge: HOME | End: 2019-10-09
Payer: MEDICARE

## 2019-10-09 VITALS
DIASTOLIC BLOOD PRESSURE: 53 MMHG | SYSTOLIC BLOOD PRESSURE: 113 MMHG | OXYGEN SATURATION: 98 % | TEMPERATURE: 96.9 F | HEART RATE: 77 BPM | RESPIRATION RATE: 20 BRPM

## 2019-10-09 LAB
BLOOD BANK DISPENSE STATUS: NORMAL
BLOOD BANK DISPENSE STATUS: NORMAL
BLOOD BANK PRODUCT CODE: NORMAL
BLOOD BANK PRODUCT CODE: NORMAL
BPU ID: NORMAL
BPU ID: NORMAL
DESCRIPTION BLOOD BANK: NORMAL
DESCRIPTION BLOOD BANK: NORMAL

## 2019-10-09 PROCEDURE — P9016 RBC LEUKOCYTES REDUCED: HCPCS

## 2019-10-09 PROCEDURE — 36430 TRANSFUSION BLD/BLD COMPNT: CPT

## 2019-10-09 PROCEDURE — 2580000003 HC RX 258: Performed by: INTERNAL MEDICINE

## 2019-10-09 RX ORDER — SODIUM CHLORIDE 9 MG/ML
250 INJECTION, SOLUTION INTRAVENOUS ONCE
Status: DISCONTINUED | OUTPATIENT
Start: 2019-10-09 | End: 2019-10-10 | Stop reason: HOSPADM

## 2019-10-09 RX ORDER — SODIUM CHLORIDE 0.9 % (FLUSH) 0.9 %
10 SYRINGE (ML) INJECTION PRN
Status: DISCONTINUED | OUTPATIENT
Start: 2019-10-09 | End: 2019-10-10 | Stop reason: HOSPADM

## 2019-10-09 RX ADMIN — Medication 10 ML: at 08:47

## 2019-10-10 ENCOUNTER — TELEPHONE (OUTPATIENT)
Dept: RADIATION ONCOLOGY | Age: 69
End: 2019-10-10

## 2019-10-24 NOTE — PROGRESS NOTES
Melina Jackmanshante  1950                  68 y.o.                  10/24/2019  3:21 PM      Radiation Oncology Treatment Summary Note    Treatment Start Date:  7/30/2019  Treatment Completion Date: 9/17/2019    Treatment area #1:gross hypopharyngeal tumor, predominantly in the left  Site dose: 70 tru in 35 divided fractions at 2.0 gray per fraction utilizing 6 MV photons and 9 beam IM RT technique with daily cone beam CAT scan imaging for the purpose of image guided radiation therapy IGRT    Patient received simultaneous concurrent systemic chemotherapy with Cool Feather at 100 mg/m² every 3 weeks. Mr. Joaquina Lerner appeared to tolerate his region treatment that with the usually expected toxicity. He lost approximately 20 pounds during the course of radiation treatment. By the end of treatment he had pretty significant and profound odynophagia and dysphagia and was managed with BMX and narcotic analgesics. his skin toxicity predominately consisted of peristomal dry desquamation and eventually moist desquamation and very brisk dusky erythema with significant dry desquamation throughout the rest the radiation portal.  This was managed with the use of aqua 4 and eventually Silvadene 1% prescription cream.  Patient continued to use his PEG tube for his tube feeding and that would our encouragement even as of the last the radiation treatment Stille was sipping water and some brought by mouth. Patient has been instructed to return to my clinic in between 4-6 weeks for routine post radiation follow-up of strongly encouraged patient to continue IV fluid infusions were at least 3-4 weeks at the [facility] with Dr. Royce Das at and staff. We have encouraged him to continue skin care and whenever he can tolerate frequent gargles with dilute solution of salt and baking soda.   Of explained to the patient that his imaging will probably not be repeated for summer in the range of 8-12 weeks post completion of radiation treatment, particularly his PET/CT fusion scan. Obviously, patient needs to maintain close follow-up with his ENT surgeon, Dr. Emmet Severs. We thank you again for allowing us to participate in the care of this patient.   Sincerely,  Sondra Moreno

## 2019-10-29 ENCOUNTER — HOSPITAL ENCOUNTER (OUTPATIENT)
Dept: RADIATION ONCOLOGY | Age: 69
Discharge: HOME | End: 2019-10-29
Attending: RADIOLOGY
Payer: MEDICARE

## 2019-10-29 VITALS
DIASTOLIC BLOOD PRESSURE: 74 MMHG | RESPIRATION RATE: 16 BRPM | HEART RATE: 70 BPM | BODY MASS INDEX: 24.78 KG/M2 | WEIGHT: 193 LBS | SYSTOLIC BLOOD PRESSURE: 114 MMHG

## 2019-11-20 ENCOUNTER — HOSPITAL ENCOUNTER (OUTPATIENT)
Dept: CT IMAGING | Age: 69
Discharge: HOME | End: 2019-11-22
Payer: MEDICARE

## 2019-11-20 DIAGNOSIS — J84.9 ILD (INTERSTITIAL LUNG DISEASE) (HCC): ICD-10-CM

## 2019-11-21 ENCOUNTER — HOSPITAL ENCOUNTER (OUTPATIENT)
Dept: SPEECH THERAPY | Age: 69
Setting detail: THERAPIES SERIES
Discharge: HOME | End: 2019-11-21
Payer: MEDICARE

## 2019-11-21 PROCEDURE — 92610 EVALUATE SWALLOWING FUNCTION: CPT

## 2019-11-25 ENCOUNTER — HOSPITAL ENCOUNTER (OUTPATIENT)
Dept: SPEECH THERAPY | Age: 69
Setting detail: THERAPIES SERIES
Discharge: HOME | End: 2019-11-25
Payer: MEDICARE

## 2019-11-25 PROCEDURE — 92526 ORAL FUNCTION THERAPY: CPT

## 2019-12-04 ENCOUNTER — HOSPITAL ENCOUNTER (OUTPATIENT)
Dept: PET IMAGING | Age: 69
Discharge: HOME | End: 2019-12-06
Payer: MEDICARE

## 2019-12-04 DIAGNOSIS — C32.8 SQUAMOUS CELL CARCINOMA OF OVERLAPPING SITES OF LARYNX (HCC): ICD-10-CM

## 2019-12-04 LAB — METER GLUCOSE: 111 MG/DL (ref 74–99)

## 2019-12-04 PROCEDURE — 78815 PET IMAGE W/CT SKULL-THIGH: CPT

## 2019-12-04 PROCEDURE — A9552 F18 FDG: HCPCS | Performed by: RADIOLOGY

## 2019-12-04 PROCEDURE — 82962 GLUCOSE BLOOD TEST: CPT

## 2019-12-04 PROCEDURE — 3430000000 HC RX DIAGNOSTIC RADIOPHARMACEUTICAL: Performed by: RADIOLOGY

## 2019-12-04 RX ORDER — FLUDEOXYGLUCOSE F 18 200 MCI/ML
15 INJECTION, SOLUTION INTRAVENOUS
Status: COMPLETED | OUTPATIENT
Start: 2019-12-04 | End: 2019-12-04

## 2019-12-04 RX ADMIN — FLUDEOXYGLUCOSE F 18 15 MILLICURIE: 200 INJECTION, SOLUTION INTRAVENOUS at 09:07

## 2019-12-12 ENCOUNTER — TELEPHONE (OUTPATIENT)
Dept: RADIATION ONCOLOGY | Age: 69
End: 2019-12-12

## 2020-01-15 ENCOUNTER — TELEPHONE (OUTPATIENT)
Dept: RADIATION ONCOLOGY | Age: 70
End: 2020-01-15

## 2020-01-15 NOTE — TELEPHONE ENCOUNTER
Faiza Alvarez  1/15/2020  Wt Readings from Last 10 Encounters:   01/14/20 188 lb (85.3 kg)   10/29/19 193 lb (87.5 kg)   10/03/19 192 lb 9.6 oz (87.4 kg)   09/17/19 205 lb (93 kg)   09/10/19 197 lb (89.4 kg)   09/03/19 198 lb (89.8 kg)   08/29/19 200 lb (90.7 kg)   08/20/19 212 lb (96.2 kg)   08/13/19 205 lb (93 kg)   08/06/19 204 lb 8 oz (92.8 kg)     Ht Readings from Last 1 Encounters:   01/14/20 6' 1.5\" (1.867 m)     Pt and pt's wife stopped into this clinician's office today to discuss PEG removal. Pt reported that he has not used his PEG for 3 weeks for anything. Inquired if he has been performing maintenance flushes, pt stated that he was told he didn't need to do this. Discussed that this is typically recommended but that since it has been so long since he has used it, it would not be recommended to start at this time. Pt did admit that he has lost ~3 lbs in the past 3 weeks but stated that he is consuming 2 large meals, 1 medium meal, and 2 snacks daily. Pt added that he is consuming high calorie and protein foods with every meal. Pt does continue to have ageusia and xerostomia. Encouraged pt to continue to try new foods and discussed FASS technique. Additionally discussed trying Therabreath and maintaining hydration status. Discussed with pt and wife that the pt would be acceptable to have his PEG removed from a nutritional standpoint but also discussed that he will need to make an effort to increase his portion size of his smaller meal or his snack. Pt was receptive of information provided. All questions were answered and will continue to follow PRN. Weight change: 5% wt loss in 4.5 months   Appetite: good  N/V/D/C: xerostomia and ageusia     Recommendations: Pt is to consume 3 large meals and 2 snacks daily with high calorie/protein foods at every meal/snack.     [facility] GUIDELINES FOR CLINICAL CHARACTERISTICS OF MALNUTRITION IN CHRONIC ILLNESS     Moderate Malnutrition  Severe Malnutrition    Energy

## 2020-02-11 ENCOUNTER — HOSPITAL ENCOUNTER (OUTPATIENT)
Dept: RADIATION ONCOLOGY | Age: 70
Discharge: HOME | End: 2020-02-11
Payer: MEDICARE

## 2020-02-11 VITALS
TEMPERATURE: 97.8 F | DIASTOLIC BLOOD PRESSURE: 78 MMHG | SYSTOLIC BLOOD PRESSURE: 138 MMHG | WEIGHT: 188 LBS | OXYGEN SATURATION: 92 % | RESPIRATION RATE: 16 BRPM | HEART RATE: 78 BPM | BODY MASS INDEX: 24.47 KG/M2

## 2020-02-11 PROCEDURE — 99213 OFFICE O/P EST LOW 20 MIN: CPT | Performed by: NURSE PRACTITIONER

## 2020-02-11 PROCEDURE — 99212 OFFICE O/P EST SF 10 MIN: CPT

## 2020-02-11 NOTE — PROGRESS NOTES
Radiation Oncology   4 Month Follow Up Note      2/11/2020    Meltia Baez  Vitals:    02/11/20 1155   BP: 138/78   Pulse: 78   Resp: 16   Temp: 97.8 °F (36.6 °C)   SpO2: 92%     Wt Readings from Last 1 Encounters:   02/11/20 188 lb (85.3 kg)       Mayo Dawson MD,      CC: Patient is here for 4 month follow up for post-radiation completion. HPI:  Melita Baez is a pleasant 68 y.o. male with a diagnosis of laryngeal carcinoma. 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 Status post trach and PEG tube placement. The patient completed a course of radiation therapy directed to the HP tumor site on 09/17/2019, received total dose 7000 cGy in 35 fractions. The patient received concurrent chemotherapy under direction of Medical Oncology, chemotherapy completed 09/26/2019 (per chart). The patient present to clinic today for 4 month Radiation Oncology follow-up visit. He is accompanied by his wife Denise Acosta into exam room. The patient reports he is eating by mouth, Trach and PEG tube have been removed. Patient reports residual xerostomia as improving, less than before. He does continue to use OTC dry mouth care products. Patient reports dysgeusia as improved near-resolved. Denies odynophagia or dysphagia. Reports mild sensation of globus that occurs with dry foods only, resolves with sipping some water. No choking, gagging or esophageal regurgitation. Patient reports thick mucous oral secretion as resolved. Patient does reports occasional sputum productive coughing. Patient reports dyspnea with moderate exertion at baseline, no progressive worsening. Patient contributes dyspnea with moderate exertion to his know history of COPD. Patient denies chest pain, pleuritic pain or shortness of breath at rest. No fevers, chills, nausea/ vomiting, abdominal pain or diarrhea.      Patient did undergo PET/CT scan December 4, 2019 which showed no evidence of neoplastic disease recurrence or metastatic disease. Patient reports no evidence of disease on last fiberoptic examination on OV January 2020. Patient is following with:   Dr. Kunal Garcia for Medical Oncology. Patient seen February 3, 2020, note requested, reviewed and scanned under media tab. Dr. Irlanda Peterson for Otolaryngology. Patient reports seen monthly, next appointment scheduled this Thursday February 13, 2020. Dr. Louvella Romberg for Pulmonary most recently seen January 14, 2020. Dr. Vinod Johnston for Vascular Surgery. Next appointment scheduled March 17, 2020  Dr. Joe Mercedes for Cardiology. Next appointment scheduled April 20. 2020.      Past Medical History:   Diagnosis Date    AAA [facility]abdominal aortic [facility]) [facility] [address] 2007    repair with stent    Arthritis     Atrial fibrillation ([address]     Cancer ([address]     throat    COPD (chronic obstructive pulmonary disease) ([facility])     folllows with Dr. Hitesh Garnica every 6 months     CPAP (continuous positive airway pressure) dependence     Hyperlipidemia     Traumatic leg ulcer ([facility] [address] 07/08/2013         Past Surgical History:   Procedure Laterality Date    ABDOMINAL AORTIC ANEURYSM REPAIR, ENDOVASCULAR  11/17/08    Excluder - Delatore    BRONCHOSCOPY  9/9/2015    CARDIOVERSION  9-2013    COLONOSCOPY      ECHOCARDIOGRAM TRANSESOPHAGEAL  7/2013         ECHOCARDIOGRAM TRANSESOPHAGEAL  3/18/2013         ECHOCARDIOGRAM TRANSESOPHAGEAL  9/3/2013         GASTROSTOMY TUBE PLACEMENT N/A 7/9/2019    EGD PEG TUBE PLACEMENT performed by Keegan Burrell MD at [facility] N/A 7/8/2019    DIRECT LARYNGOSCOPY WITH BIOPSY, ESOPHAGOSCOPY performed by Kriss Herrera MD at [facility] N/A 7/8/2019    AWAKE TRACHEOSTOMY performed by Kriss Herrera MD at [facility] Marital status:      Spouse name: Not on file    Number of children: Not on file    30 MG tablet Take 30 mg by mouth See Admin Instructions Take 3 tabs twice daily      ipratropium-albuterol (DUONEB) 0.5-2.5 (3) MG/3ML SOLN nebulizer solution Inhale 3 mLs into the lungs every 6 hours as needed for Shortness of Breath 360 mL 5    ELIQUIS 5 MG TABS tablet 2 times daily       metoprolol succinate (TOPROL XL) 25 MG extended release tablet Take 25 mg by mouth daily  3    albuterol (PROVENTIL) (2.5 MG/3ML) 0.083% nebulizer solution Take 2.5 mg by nebulization every 6 hours as needed for Wheezing       albuterol (PROVENTIL HFA;VENTOLIN HFA) 108 (90 BASE) MCG/ACT inhaler Inhale 2 puffs into the lungs every 6 hours as needed. No current facility-administered medications for this encounter. REVIEW OF SYSTEMS:    Constitutional:  No fever, chills or rigors. Eyes: No changes in vision. No eye discharge or pain  ENT: Taste changes near resolved. Ongoing xerostomia. No headaches, hearing loss or vertigo. No mouth sores or sore throat. No change smell. Cardiovascular: No chest discomfort, palpitations or loss of consciousness. Patient reports dyspnea with moderate exertion at baseline- no progressive worsening. Respiratory: Some sputum productive coughing. No hemoptysis, no thick oral secretions. No wheezes or pleuritic pain. Gastrointestinal: No abdominal pain, appetite loss, blood in stools. No change in bowel habits. No hematemesis   Genitourinary: No dysuria, trouble voiding, or hematuria. No urinary frequency or hesitancy. Musculoskeletal: No gait disturbance, weakness or joint complaints. Integumentary: No rash or pruritis. Neurological: No headache, diplopia, change in muscle strength, numbness or tingling. No change in gait, balance, coordination, mood, affect, memory, mentation, behavior. Psychiatric: No anxiety, or depression. Endocrine: No temperature intolerance. No excessive thirst, fluid intake, or urination. No tremor.    Hematologic/Lymphatic: No abnormal bruising

## 2020-02-11 NOTE — PROGRESS NOTES
Thelma Terrazas  2/11/2020  11:56 AM      Vitals:    02/11/20 1155   BP: 138/78   Pulse: 78   Resp: 16   Temp: 97.8 °F (36.6 °C)   SpO2: 92%    : Wt Readings from Last 3 Encounters:   02/11/20 188 lb (85.3 kg)   01/14/20 188 lb (85.3 kg)   10/29/19 193 lb (87.5 kg)                Current Outpatient Medications:     ferrous sulfate 325 (65 Fe) MG tablet, TAKE ONE TABLET BY MOUTH TWO TIMES A DAY. TAKE WITH VITAMIN C FRUIT JUICE., Disp: , Rfl:     fluticasone-umeclidin-vilant (TRELEGY ELLIPTA) 100-62.5-25 MCG/INH AEPB, Inhale 1 puff into the lungs daily, Disp: 180 each, Rfl: 0    diltiazem (CARDIZEM) 30 MG tablet, Take 30 mg by mouth See Admin Instructions Take 3 tabs twice daily, Disp: , Rfl:     ipratropium-albuterol (DUONEB) 0.5-2.5 (3) MG/3ML SOLN nebulizer solution, Inhale 3 mLs into the lungs every 6 hours as needed for Shortness of Breath, Disp: 360 mL, Rfl: 5    ELIQUIS 5 MG TABS tablet, 2 times daily , Disp: , Rfl:     metoprolol succinate (TOPROL XL) 25 MG extended release tablet, Take 25 mg by mouth daily, Disp: , Rfl: 3    albuterol (PROVENTIL) (2.5 MG/3ML) 0.083% nebulizer solution, Take 2.5 mg by nebulization every 6 hours as needed for Wheezing , Disp: , Rfl:     albuterol (PROVENTIL HFA;VENTOLIN HFA) 108 (90 BASE) MCG/ACT inhaler, Inhale 2 puffs into the lungs every 6 hours as needed. 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, vocal cord fixation and the ipsilateral side and radiographic evidence of multiple foci of ipsilateral left-sided cervical lymphadenopathy, stage IV A [id number] , now status post definitive external beam radiation therapy completed at on 9/17/2019 for a total dose of 70 Gy and 35 divided fractions, delivered to the gross disease along with concurrent systemic therapy under the care of Trisha Lopez MD,     He maintains his weight without PEG feedings, the tube is now removed  He follows Dr Vineet Sparks Q2 months and continues on oral iron supplementation  Appointment with Dr Med Mukherjee coming up this week  Wei Tam has no complaints today and states that he is recovering well. FALLS RISK SCREENING ASSESSMENT    Instructions:  Assess the patient and enter the appropriate indicators that are present for fall risk identification. Total the numbers entered and assign a fall risk score from Table 2.  Reassess patient at a minimum every 12 weeks or with status change. Assessment   Date  2/11/2020     1. Mental Ability: confusion/cognitively impaired No - 0       2. Elimination Issues: incontinence, frequency No - 0       3. Ambulatory: use of assistive devices (walker, cane, off-loading devices), attached to equipment (IV pole, oxygen) No - 0     4. Sensory Limitations: dizziness, vertigo, impaired vision No - 0       5. Age 72 years or greater - 1       10. Medication: diuretics, strong analgesics, hypnotics, sedatives, antihypertensive agents   No - 0   7. Falls:  recent history of falls within the last 3 months (not to include slipping or tripping)   No - 0   TOTAL 1    If score of 4 or greater was education given? No       TABLE 2   Risk Score Risk Level Plan of Care   0-3 Little or  No Risk 1. Provide assistance as indicated for ambulation activities  2. Reorient confused/cognitively impaired patient  3. Call-light/bell within patient's reach  4. Chair/bed in low position, stretcher/bed with siderails up except when performing patient care activities  5. Educate patient/family/caregiver on falls prevention  6.  Reassess in 12 weeks or with any noted change in patient condition which places them at a risk for a fall   4-6 Moderate Risk 1. Provide assistance as indicated for ambulation activities  2. Reorient confused/cognitively impaired patient  3. Call-light/bell within patient's reach  4. Chair/bed in low position, stretcher/bed with siderails up except when performing patient care activities  5. Educate patient/family/caregiver on falls prevention  6. Falls risk precaution (Yellow sticker Level II) placed on patient chart   7 or   Higher High Risk 1. Place patient in easily observable treatment room  2. Patient attended at all times by family member or staff  3. Provide assistance as indicated for ambulation activities  4. Reorient confused/cognitively impaired patient  5. Call-light/bell within patient's reach  6. Chair/bed in low position, stretcher/bed with siderails up except when performing patient care activities  7. Educate patient/family/caregiver on falls prevention  8. Falls risk precaution (Yellow sticker Level III) placed on patient chart           MALNUTRITION RISK SCREENING ASSESSMENT    2/11/2020   Patient:  Suni Escobar  Sex:  male    Instructions:  Assess the patient and enter the appropriate indicators that are present for nutrition risk identification. Total the numbers entered and assign a risk score. Follow the appropriate action for total score listed below. Assessment   Date  2/11/2020     1. Have you lost weight without trying? 0- No     2. Have you been eating poorly because of a decreased appetite? 0- No   3. Do you have a diagnosis of head and neck cancer?       1- Yes                                                                                    TOTAL 1          Score of 0-1: No action  Score 2 or greater:  · For Non-Diabetic Patient: Recommend adding Ensure Complete 2 x daily and provide patient with Ensure wellness bag with coupons  · For Diabetic Patient: Recommend adding Glucerna Shake 2 x daily and provide patient with Glucerna Wellness bag with coupons  · Route to the dietitian via [address] [place] [address]

## 2020-02-11 NOTE — PROGRESS NOTES
Shwetha Arce  2/11/2020  Wt Readings from Last 10 Encounters:   01/14/20 188 lb (85.3 kg)   10/29/19 193 lb (87.5 kg)   10/03/19 192 lb 9.6 oz (87.4 kg)   09/17/19 205 lb (93 kg)   09/10/19 197 lb (89.4 kg)   09/03/19 198 lb (89.8 kg)   08/29/19 200 lb (90.7 kg)   08/20/19 212 lb (96.2 kg)   08/13/19 205 lb (93 kg)   08/06/19 204 lb 8 oz (92.8 kg)     Ht Readings from Last 1 Encounters:   01/14/20 6' 1.5\" (1.867 m)     Met with pt and pt's wife today for follow up. Pt reported that he had his PEG removed 2 weeks ago and has maintained his wt for the past month. Pt weighed 188 lbs at today's visit. He continues to complain of fatigue but also continues to be anemic and is still taking iron supplements. He takes a vitamin C supplement with the iron to increase absorption. Inquired if he is taking any other supplements at this time. He stated that he wanted to start taking a B complex, multivitamin, Co-Q10, and flax oil but he was not sure if he is able to take these. Encouraged him to take the multivitamin separate from the iron to prevent interactions. Inquired about pt's intake. Pt reported that he is consuming at least 3 meals and 2 snacks/ONS daily and continue to try to eat nutritionally dense foods in terms of calories and protein as well as micronutrients and fiber. Encouraged pt to continue this. Pt inquired how to reduce fatigue. Discussed that he needs to make sure he is at least maintaining his wt to assure that he is meeting his caloric needs. Then discussed increasing his activity. Pt commented that he has started going grocery shopping, walking to the mailbox, and other small activities. Encouraged him to continue to build on this to regain muscle mass. Again emphasized the need to meet caloric needs as this will increase with increased activity. Pt was receptive of information provided, will continue to follow PRN.      Karen Monk

## 2020-03-17 ENCOUNTER — HOSPITAL ENCOUNTER (OUTPATIENT)
Dept: CARDIOLOGY | Age: 70
Discharge: HOME | End: 2020-03-17
Payer: MEDICARE

## 2020-03-17 ENCOUNTER — OFFICE VISIT (OUTPATIENT)
Dept: VASCULAR SURGERY | Age: 70
End: 2020-03-17
Payer: MEDICARE

## 2020-03-17 PROCEDURE — 93978 VASCULAR STUDY: CPT

## 2020-03-17 PROCEDURE — 99213 OFFICE O/P EST LOW 20 MIN: CPT | Performed by: PHYSICIAN ASSISTANT

## 2020-03-17 NOTE — PROGRESS NOTES
Vascular Surgery Outpatient Progress Note      Chief Complaint   Patient presents with    Circulatory Problem     Follow up AAA       HISTORY OF PRESENT ILLNESS:                The patient is a 68 y.o. male who returns for follow-up evaluation of previous endovascular abdominal aortic aneurysm repair. He states he was diagnosed with throat CA in 7/2019 and has undergone surgery and radiation and notes they think they have gotten all of the CA. He is doing realtively well now. No new abd pain, back pain. Denies claudication, rest pain, ulceration. Past Medical History:        Diagnosis Date    AAA (abdominal aortic aneurysm) ([address] 2007    repair with stent    Arthritis     [facility] [address]     Cancer ([facility] [address]     throat    COPD [facility]ch[facility]) [facility]     alexs with Dr. Bob Jean Baptiste every 6 months     CPAP (continuous positive airway pressure) dependence     Hyperlipidemia     Traumatic leg ulcer ([facility] [address] 07/08/2013     Past Surgical History:        Procedure Laterality Date    ABDOMINAL AORTIC ANEURYSM REPAIR, ENDOVASCULAR  11/17/08    Excluder - Delatore    BRONCHOSCOPY  9/9/2015    CARDIOVERSION  9-2013    COLONOSCOPY      ECHOCARDIOGRAM TRANSESOPHAGEAL  7/2013         ECHOCARDIOGRAM TRANSESOPHAGEAL  3/18/2013         ECHOCARDIOGRAM TRANSESOPHAGEAL  9/3/2013         GASTROSTOMY TUBE PLACEMENT N/A 7/9/2019    EGD PEG TUBE PLACEMENT performed by Grace Anders MD at [facility] N/A 7/8/2019    DIRECT LARYNGOSCOPY WITH BIOPSY, ESOPHAGOSCOPY performed by Murlene Saint, MD at [facility] N/A 7/8/2019    AWAKE TRACHEOSTOMY performed by Murlene Saint, MD at [facility] OR     Current Medications:   Prior to Admission medications    Medication Sig Start Date End Date Taking?  Authorizing Provider   dilTIAZem (CARDIZEM CD) 180 MG extended release capsule Take 180 mg by mouth daily   Yes Historical Provider, MD   ferrous sulfate 325 (65 Fe) MG tablet  Alcohol use: No     Comment:      Drug use: Never    Sexual activity: Not Currently     Partners: Female   Lifestyle    Physical activity     Days per week: Not on file     Minutes per session: Not on file    Stress: Not on file   Relationships    Social connections     Talks on phone: Not on file     Gets together: Not on file     Attends [employer] service: Not on file     Active member of club or organization: Not on file     Attends meetings of clubs or organizations: Not on file     Relationship status: Not on file    Intimate partner violence     Fear of current or ex partner: Not on file     Emotionally abused: Not on file     Physically abused: Not on file     Forced sexual activity: Not on file   Other Topics Concern    Not on file   Social History Narrative    3 cups green tea daily; 2 cups coffee daily        Family History   Problem Relation Age of Onset    Diabetes Mother     Emphysema Father        REVIEW OF SYSTEMS (New symptoms):    Eyes:      Blurred vision:  No [x]/Yes []               Diplopia:   No [x]/Yes []               Vision loss:       No [x]/Yes []   Ears, nose, throat:             Hearing loss:    No [x]/Yes []      Vertigo:   No [x]/Yes []                       Swallowing problem:  No [x]/Yes []               Nose bleeds:   No [x]/Yes []      Voice hoarseness:  No [x]/Yes []  Respiratory:             Cough:   No [x]/Yes []      Pleuritic chest pain:  No [x]/Yes []                        Dyspnea:   No [x]/Yes []      Wheezing:   No [x]/Yes []  Cardiovascular:             Angina:   No [x]/Yes []      Palpitations:   No [x]/Yes []          Claudication:    No [x]/Yes []      Leg swelling:   No [x]/Yes []  Gastrointestinal:             Nausea or vomiting:  No [x]/Yes []               Abdominal pain:  No [x]/Yes []                     Intestinal bleeding: No [x]/Yes []  Musculoskeletal:             Leg pain:   No [x]/Yes []      Back pain:   No [x]/Yes [] Weakness:   No [x]/Yes []  Neurologic:             Numbness:   No [x]/Yes []      Paralysis:   No [x]/Yes []                       Headaches:   No [x]/Yes []  Hematologic, lymphatic:   Anemia:   No [x]/Yes []              Bleeding or bruising:  No [x]/Yes []              Fevers or chills: No [x]/Yes []  Endocrine:             Temp intolerance:   No [x]/Yes []                       Polydipsia, polyuria:  No [x]/Yes []  Skin:              Rash:    No [x]/Yes []      Ulcers:   No [x]/Yes []              Abnorm pigment: No [x]/Yes []  :              Frequency/urgency:  No [x]/Yes []      Hematuria:    No [x]/Yes []                      Incontinence:    No [x]/Yes []    PHYSICAL EXAM:  There were no vitals filed for this visit. General Appearance: alert and oriented to person, place and time, well developed and well- nourished, in no acute distress, hearing deficits present  Skin: warm and dry, no rash or erythema  Head: normocephalic and atraumatic  Eyes: extraocular eye movements intact, conjunctivae normal  ENT: external ear and ear canal normal bilaterally, nose without deformity  Pulmonary/Chest: clear to auscultation bilaterally- no wheezes, rales or rhonchi, normal air movement, no respiratory distress  Cardiovascular: normal rate, regular rhythm, normal S1 and S2, no murmurs, no carotid bruits  Abdomen: soft, non-tender, non-distended, normal bowel sounds, no masses or organomegaly  Musculoskeletal: normal range of motion, no joint swelling, deformity or tenderness  Neurologic: no cranial nerve deficit, gait, coordination and speech normal  Extremities: no leg edema bilaterally    PULSE EXAM      Right      Left   Brachial     Radial 2 2   Femoral     Popliteal     Dorsalis Pedis     Posterior Tibial 0-1 0-1   (3=normal, 2=diminished, 1=barely palpable, 4=widened)    RADIOLOGY: Abdominal ultrasound:  Max aortic diameter = 5.5 cm. No evidence for endovascular leak.      Problem List Items Addressed This Visit S/P AAA repair using bifurcation graft - Primary    Relevant Orders    US DUPLEX SCAN OF AORTA        I reviewed with the patient the results of the ultrasound. Stable s/p EVAR. I will schedule him for a follow up visit with another ultrasound in one year, but asked him to call sooner with any problems. Pt seen and plan reviewed with Dr. Gail Courtney. Esperanza Loza PA-C    Return in about 1 year (around 3/17/2021).

## 2020-04-03 ENCOUNTER — TELEPHONE (OUTPATIENT)
Dept: CARDIOLOGY CLINIC | Age: 70
End: 2020-04-03

## 2020-04-20 ENCOUNTER — TELEPHONE (OUTPATIENT)
Dept: CARDIOLOGY CLINIC | Age: 70
End: 2020-04-20

## 2020-06-25 ENCOUNTER — HOSPITAL ENCOUNTER (OUTPATIENT)
Dept: CT IMAGING | Age: 70
End: 2020-06-25
Payer: MEDICARE

## 2020-06-25 ENCOUNTER — HOSPITAL ENCOUNTER (OUTPATIENT)
Dept: CT IMAGING | Age: 70
Discharge: HOME | End: 2020-06-27
Payer: MEDICARE

## 2020-06-25 ENCOUNTER — HOSPITAL ENCOUNTER (OUTPATIENT)
Age: 70
Discharge: HOME | End: 2020-06-25
Payer: MEDICARE

## 2020-06-25 LAB
BUN BLDV-MCNC: 30 MG/DL (ref 8–23)
CREAT SERPL-MCNC: 1.4 MG/DL (ref 0.7–1.2)
GFR AFRICAN AMERICAN: >60
GFR NON-AFRICAN AMERICAN: 50 ML/MIN/1.73

## 2020-06-25 PROCEDURE — 70491 CT SOFT TISSUE NECK W/DYE: CPT

## 2020-06-25 PROCEDURE — 82565 ASSAY OF CREATININE: CPT

## 2020-06-25 PROCEDURE — 36415 COLL VENOUS BLD VENIPUNCTURE: CPT

## 2020-06-25 PROCEDURE — 6360000004 HC RX CONTRAST MEDICATION: Performed by: RADIOLOGY

## 2020-06-25 PROCEDURE — 84520 ASSAY OF UREA NITROGEN: CPT

## 2020-06-25 PROCEDURE — 71260 CT THORAX DX C+: CPT

## 2020-06-25 RX ADMIN — IOPAMIDOL 120 ML: 755 INJECTION, SOLUTION INTRAVENOUS at 17:19

## 2020-08-12 ENCOUNTER — OFFICE VISIT (OUTPATIENT)
Dept: CARDIOLOGY CLINIC | Age: 70
End: 2020-08-12
Payer: MEDICARE

## 2020-08-12 VITALS
SYSTOLIC BLOOD PRESSURE: 124 MMHG | HEIGHT: 73 IN | WEIGHT: 182.2 LBS | DIASTOLIC BLOOD PRESSURE: 60 MMHG | HEART RATE: 68 BPM | RESPIRATION RATE: 18 BRPM | TEMPERATURE: 97.6 F | BODY MASS INDEX: 24.15 KG/M2

## 2020-08-12 PROCEDURE — 99214 OFFICE O/P EST MOD 30 MIN: CPT | Performed by: INTERNAL MEDICINE

## 2020-08-12 PROCEDURE — 93000 ELECTROCARDIOGRAM COMPLETE: CPT | Performed by: INTERNAL MEDICINE

## 2020-08-12 RX ORDER — M-VIT,TX,IRON,MINS/CALC/FOLIC 27MG-0.4MG
1 TABLET ORAL DAILY
COMMUNITY

## 2020-08-12 NOTE — PROGRESS NOTES
OUTPATIENT CARDIOLOGY FOLLOW-UP    Name: Adri Franco    Age: 65 y.o. Primary Care Physician: Cathryn Rowe MD    Date of Service: 8/12/2020    Chief Complaint: Follow-up for atrial fibrillation    Interim History:  He was previously followed by Dr. Dave Cutler. No new cardiac complaints since last cardiology evaluation. He denies recent chest pain, worsening SOB, palpitations, syncope, PND, or orthopnea. SR on EKG.     Review of Systems:   Cardiac: As per HPI  General: No fever, chills  Pulmonary: As per HPI  HEENT: No visual disturbances, difficult swallowing  GI: No nausea, vomiting  Endocrine: No thyroid disease or DM  Musculoskeletal: WASSERMAN x 4, no focal motor deficits  Skin: Intact, no rashes  Neuro/Psych: No headache or seizures    Past Medical History:  Past Medical History:   Diagnosis Date    AAA (abdominal aortic aneurysm) ([facility] [address] 2007    repair with stent    Arthritis     Atrial fibrillation ([facility] [address] [facility]     Cancer ([address]     throat    COPD (chronic obstructive pulmonary disease) ([facility] [address]     gurjit with Dr. Belle Lopes every 6 months     CPAP (continuous positive airway pressure) dependence     Hyperlipidemia     Traumatic leg ulcer ([address] 07/08/2013       Past Surgical History:  Past Surgical History:   Procedure Laterality Date    ABDOMINAL AORTIC ANEURYSM REPAIR, ENDOVASCULAR  11/17/08    Excluder - Delatore    BRONCHOSCOPY  9/9/2015    CARDIOVERSION  9-2013    COLONOSCOPY      ECHOCARDIOGRAM TRANSESOPHAGEAL  7/2013         ECHOCARDIOGRAM TRANSESOPHAGEAL  3/18/2013         ECHOCARDIOGRAM TRANSESOPHAGEAL  9/3/2013         GASTROSTOMY TUBE PLACEMENT N/A 7/9/2019    EGD PEG TUBE PLACEMENT performed by Naya Myers MD at [address] [place] [address] N/A 7/8/2019    DIRECT LARYNGOSCOPY WITH BIOPSY, ESOPHAGOSCOPY performed by Kristopher Greco MD at [facility] N/A 7/8/2019    AWAKE TRACHEOSTOMY performed by Kristopher Greco MD at [facility] OR       Family History:  Family History   Problem Relation Age of Onset    Diabetes Mother     Emphysema Father        Social History:  Social History     Socioeconomic History    Marital status:      Spouse name: Not on file    Number of children: Not on file    Years of education: Not on file    Highest education level: Not on file   Occupational History    Occupation: retired-   Social Needs    Financial resource strain: Not on file    Food insecurity     Worry: Not on file     Inability: Not on file   [employer] needs     Medical: Not on file     Non-medical: Not on file   Tobacco Use    Smoking status: Former Smoker     Packs/day: 2.00     Years: 40.00     Pack years: 80.00     Types: Cigarettes     Start date: 1969     Last attempt to quit: 1/15/2009     Years since quittin.5    Smokeless tobacco: Never Used   Substance and Sexual Activity    Alcohol use: No     Comment:      Drug use: Never    Sexual activity: Not Currently     Partners: Female   Lifestyle    Physical activity     Days per week: Not on file     Minutes per session: Not on file    Stress: Not on file   Relationships    Social connections     Talks on phone: Not on file     Gets together: Not on file     Attends Rastafari service: Not on file     Active member of club or organization: Not on file     Attends meetings of clubs or organizations: Not on file     Relationship status: Not on file    Intimate partner violence     Fear of current or ex partner: Not on file     Emotionally abused: Not on file     Physically abused: Not on file     Forced sexual activity: Not on file   Other Topics Concern    Not on file   Social History Narrative    3 cups green tea daily; 2 cups coffee daily       Allergies:  No Known Allergies    Current Medications:  Current Outpatient Medications   Medication Sig Dispense Refill    Multiple Vitamins-Minerals (THERAPEUTIC MULTIVITAMIN-MINERALS) tablet Take 1 tablet by mouth daily      Magnesium Oxide (MAG-CAPS PO) Take 240 mg by mouth daily      B Complex Vitamins (VITAMIN B-COMPLEX PO) Take by mouth daily      Multiple Vitamins-Minerals (PRESERVISION AREDS 2) CAPS Take by mouth daily      fluticasone-umeclidin-vilant (TRELEGY ELLIPTA) 100-62.5-25 MCG/INH AEPB Inhale 1 puff into the lungs daily 180 each 3    dilTIAZem (CARDIZEM LA) 120 MG TB24 extended release tablet Take 120 mg by mouth daily       ferrous sulfate 325 (65 Fe) MG tablet TAKE ONE TABLET BY MOUTH TWO TIMES A DAY. TAKE WITH VITAMIN C FRUIT JUICE.  ELIQUIS 5 MG TABS tablet 2 times daily       metoprolol succinate (TOPROL XL) 25 MG extended release tablet Take 25 mg by mouth daily  3    albuterol (PROVENTIL) (2.5 MG/3ML) 0.083% nebulizer solution Take 2.5 mg by nebulization every 6 hours as needed for Wheezing       albuterol (PROVENTIL HFA;VENTOLIN HFA) 108 (90 BASE) MCG/ACT inhaler Inhale 2 puffs into the lungs every 6 hours as needed.  diltiazem (CARDIZEM) 30 MG tablet Take 30 mg by mouth See Admin Instructions Take 3 tabs twice daily       No current facility-administered medications for this visit. Physical Exam:  /60   Pulse 68   Temp 97.6 °F (36.4 °C)   Resp 18   Ht 6' 1\" (1.854 m)   Wt 182 lb 3.2 oz (82.6 kg)   BMI 24.04 kg/m²   Wt Readings from Last 3 Encounters:   08/12/20 182 lb 3.2 oz (82.6 kg)   07/17/20 182 lb 3.2 oz (82.6 kg)   02/11/20 188 lb (85.3 kg)     Appearance: Awake, alert and oriented x 3, no acute respiratory distress  Skin: Intact, no rash  Head: Normocephalic, atraumatic  Eyes: EOMI, no conjunctival erythema  ENMT: No pharyngeal erythema, MMM, no rhinorrhea  Neck: Supple, no elevated JVP, no carotid bruits  Lungs: Clear to auscultation bilaterally. No wheezes, rales, or rhonchi.   Cardiac: Regular rate and rhythm, +S1S2, no murmurs apparent  Abdomen: Soft, nontender, +bowel sounds  Extremities: Moves all extremities x 4, no lower extremity edema  Neurologic: No focal motor deficits apparent, Tchou.    12. AF ablation at [facility] 01/03/2017.  Sotalol discontinued after ablation. 13. 14 day event monitor, 3/5-3/18/2018, predominantly sinus rhythm. PAC's. No AF demonstrated. Average heart rate 77 bpm.  No pauses noted. 14. Laryngeal carcinoma (7/2019) s/p treatment    - Prior cardiac studies reviewed today  - Continue current medications (including BB and eliquis) -- R/B/A/I for [facility] discussed again today    The patient's current medication list, allergies, problem list and results of all previously ordered testing were reviewed at today's visit.     Shane Marie MD  [facility]) Cardiology

## 2020-08-18 ENCOUNTER — HOSPITAL ENCOUNTER (OUTPATIENT)
Dept: RADIATION ONCOLOGY | Age: 70
Discharge: HOME | End: 2020-08-18
Payer: MEDICARE

## 2020-08-18 VITALS
WEIGHT: 184.38 LBS | HEART RATE: 67 BPM | RESPIRATION RATE: 18 BRPM | TEMPERATURE: 97.9 F | SYSTOLIC BLOOD PRESSURE: 136 MMHG | HEIGHT: 74 IN | BODY MASS INDEX: 23.66 KG/M2 | DIASTOLIC BLOOD PRESSURE: 64 MMHG

## 2020-08-18 PROCEDURE — 99213 OFFICE O/P EST LOW 20 MIN: CPT | Performed by: NURSE PRACTITIONER

## 2020-08-18 NOTE — PROGRESS NOTES
Radiation Oncology   6 Month Follow Up Note  08/18/2020      Ahmet Villafuerte   Vitals:    08/18/20 1114 08/18/20 1129   BP: (!) 158/99 136/64   Site: Right Upper Arm Left Upper Arm   Position: Sitting Sitting   Cuff Size: Medium Adult Medium Adult   Pulse: 67    Resp: 18    Temp: 97.9 °F (36.6 °C)    TempSrc: Oral    Weight: 184 lb 6 oz (83.6 kg)    Height: 6' 1.5\" (1.867 m)        Danica Pritchard MD,      CC: Patient is here for 6 month follow up for post-radiation completion. HPI:  Ahmet Villafuerte is a pleasant 65 y.o. male with a diagnosis of laryngeal carcinoma. 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 Status post trach and PEG tube placement. The patient completed a course of radiation therapy directed to the HP tumor site on 09/17/2019. Total dose received 7000 cGy/ 35 fractions. The patient also received concurrent chemotherapy under the direction of Medical Oncology, chemotherapy completed 09/26/2020- per chart review. The patient present to clinic today for a 6 month Radiation Oncology follow-up visit. Patient last seen in Radiation Oncology office follow-up visit with me 02/11/2020. Madison Leavitt states he is feeling well, he remains active exercising most days of the week (walks on treadmill for 40 minutes). He is eating by mouth solid foods and fluids (Trach/ PEG tube discontinued in past). Patient denies dysphagia or odynophagia. Reports improved but not completely resolved dysgeusia and xerostomia. Patient denies esophageal regurgitation, nausea/ vomiting, decreased appetite or weight changes. Patient denies mucositis or thick oral secretions, no oral sores or mouth pain. Patient reports known medical history of COPD with dyspnea on exertion as unchanged (at baseline).  No chest pain, pleuritic pain or dyspnea at rest. Patient reports diagnosed with hearing loss, both ears; now wearing hearing aides Spouse name: Not on file    Number of children: Not on file    Years of education: Not on file    Highest education level: Not on file   Occupational History    Occupation: retired-   Social Needs    Financial resource strain: Not on file    Food insecurity     Worry: Not on file     Inability: Not on file   [employer] needs     Medical: Not on file     Non-medical: Not on file   Tobacco Use    Smoking status: Former Smoker     Packs/day: 2.00     Years: 40.00     Pack years: 80.00     Types: Cigarettes     Start date: 1969     Last attempt to quit: 1/15/2009     Years since quittin.5    Smokeless tobacco: Never Used   Substance and Sexual Activity    Alcohol use: No     Comment:      Drug use: Never    Sexual activity: Not Currently     Partners: Female   Lifestyle    Physical activity     Days per week: Not on file     Minutes per session: Not on file    Stress: Not on file   Relationships    Social connections     Talks on phone: Not on file     Gets together: Not on file     Attends Evangelical service: Not on file     Active member of club or organization: Not on file     Attends meetings of clubs or organizations: Not on file     Relationship status: Not on file    Intimate partner violence     Fear of current or ex partner: Not on file     Emotionally abused: Not on file     Physically abused: Not on file     Forced sexual activity: Not on file   Other Topics Concern    Not on file   Social History Narrative    3 cups green tea daily; 2 cups coffee daily       Family History   Problem Relation Age of Onset    Diabetes Mother     Emphysema Father        Allergies:   Patient has no known allergies.       Current Outpatient Medications   Medication Sig Dispense Refill    Multiple Vitamins-Minerals (THERAPEUTIC MULTIVITAMIN-MINERALS) tablet Take 1 tablet by mouth daily      Magnesium Oxide (MAG-CAPS PO) Take 240 mg by mouth daily      B Complex Vitamins (VITAMIN B-COMPLEX PO) Take by mouth daily      Multiple Vitamins-Minerals (PRESERVISION AREDS 2) CAPS Take by mouth daily      fluticasone-umeclidin-vilant (TRELEGY ELLIPTA) 100-62.5-25 MCG/INH AEPB Inhale 1 puff into the lungs daily 180 each 3    dilTIAZem (CARDIZEM LA) 120 MG TB24 extended release tablet Take 120 mg by mouth daily       ferrous sulfate 325 (65 Fe) MG tablet TAKE ONE TABLET BY MOUTH TWO TIMES A DAY. TAKE WITH VITAMIN C FRUIT JUICE.  ELIQUIS 5 MG TABS tablet 2 times daily       metoprolol succinate (TOPROL XL) 25 MG extended release tablet Take 25 mg by mouth daily  3    albuterol (PROVENTIL) (2.5 MG/3ML) 0.083% nebulizer solution Take 2.5 mg by nebulization every 6 hours as needed for Wheezing       albuterol (PROVENTIL HFA;VENTOLIN HFA) 108 (90 BASE) MCG/ACT inhaler Inhale 2 puffs into the lungs every 6 hours as needed.  diltiazem (CARDIZEM) 30 MG tablet Take 30 mg by mouth See Admin Instructions Take 3 tabs twice daily       No current facility-administered medications for this encounter. REVIEW OF SYSTEMS:    Constitutional:  No fever, chills or rigors. Eyes: No changes in vision. No eye discharge or pain  ENT: + hearing loss, both ears, wears hearing aides bilaterally. No headaches or vertigo. No mouth sores or sore throat. No change in taste or smell. Cardiovascular: No chest discomfort, palpitations or loss of consciousness. Respiratory: + KING, unchanged at baseline (Hx COPD). No productive coughing. No hemoptysis. No wheezing or pleuritic pain. Gastrointestinal: No abdominal pain, appetite loss, blood in stools. No change in bowel habits. No hematemesis   Genitourinary: No dysuria, trouble voiding, or hematuria. No nocturia or increased frequency. Musculoskeletal: No gait disturbance, weakness or joint complaints. Integumentary: No rash or pruritis. Neurological: No headache, diplopia, change in muscle strength, numbness or tingling.  No change in gait, balance, coordination, mood, affect, memory, mentation, behavior. Psychiatric: No anxiety or depression. Endocrine: No temperature intolerance. No excessive thirst, fluid intake, or urination. No tremor. Hematologic/Lymphatic: No abnormal bruising or bleeding, blood clots or swollen lymph nodes. Allergic/Immunologic: No nasal congestion or hives. PHYSICAL EXAMINATION:     Vitals:    08/18/20 1114 08/18/20 1129   BP: (!) 158/99 136/64   Site: Right Upper Arm Left Upper Arm   Position: Sitting Sitting   Cuff Size: Medium Adult Medium Adult   Pulse: 67    Resp: 18    Temp: 97.9 °F (36.6 °C)    TempSrc: Oral    Weight: 184 lb 6 oz (83.6 kg)    Height: 6' 1.5\" (1.867 m)      Body mass index is 24 kg/m². Appearance: Well-developed, well-nourished 65year old male. Skin: Irradiated skin intact, no erythema. Head: Normocephalic, atraumatic  Eyes: EOMI, no conjunctival erythema  ENMT: No pharyngeal erythema, MMM, no rhinorrhea. No thrush. Neck: Supple, no elevated JVP. No cervical or supraclavicular lymphadenopathy. Lungs: Clear to auscultation bilaterally. No wheezes, rales or rhonchi. Cardiac: Regular rate and rhythm, +S1S2, no murmurs apparent  Abdomen: Soft, round and non-tender. Bowel sounds present x 4. Extremities: Moves all extremities x 4, no lower extremity edema  Neurologic: Alert and oriented x 3. No focal motor deficits apparent         IMAGING:    Old Report-    06/25/2020 CT Soft Tissue Neck + CT chest W Contrast: (ordering physician Dr. Becky Beltrán)  FINDINGS:    Lung window images: Lung window images show interval removal of the    tracheostomy cannula, and pulmonary parenchyma still shows extensive    centrilobular emphysematous change and subpleural emphysematous change    with some central thickening of airways. Consolidation in the medial    basilar segment of the right lower lobe on the prior examination has    resolved without complication.  There is no evidence of organized    pneumonia or effusion, and no evidence of nodules or masses to suggest    metastatic disease.          Soft tissue window images:  Soft tissue window images show no    evidence of axillary or thoracic inlet adenopathy. Small, benign and    normal numbers of lymph nodes are noted in the mediastinum. There is    no evidence of a filling defect in the central pulmonary arterial    tree, and there is early aortic root and coronary calcification    without cardiac enlargement or decompensation.         Upper abdomen: Upper abdominal images show fatty change of the liver    without focal lesions, and normal appearance of the spleen, adrenals,    pancreas, and kidneys. The kidneys show some cortical atrophy. The    gallbladder is filled with partially calcified gallstones. There is an    aorto by iliac stent graft, which is not completely evaluated, but    which appears uncomplicated.         Skeletal: Degenerative changes are noted throughout the thoracic    spine. Impression:  1. CT imaging of the neck shows some residual soft tissue asymmetry at    the surgical site of laryngeal cancer previously documented on July    10, 2019. Measurements are listed above, and show a dramatic decrease. PET CT imaging did not show hypermetabolic activity in this location    on December 4, 2019, which suggests that this may be residual scar. Additionally, there is complete resolution of the prior adenopathy on    the original exam in July 2019.    2. Thoracic CT imaging shows no evidence of acute cardiopulmonary    pathology or metastatic change. Pneumonia documented in the right    lower lobe on July 10, 2019 has resolved without complication. Gallstones, an aortobiiliac stent graft in the abdomen, and interval    removal of the tracheostomy cannula are noted. ASSESSMENT/PLAN:    Patient completed XRT to H+N region on 09/17/2019. Mild residual xerostomia under control with over the counter dry mouth care products. Skin care and H+N ROM exercises discussed. I discussed follow up plans with Ric Jordan. At this time Radiation Oncology will see patient on as needed basis only. Oncology follow-up surveillance per Otolaryngology and Medical Oncology. Instructed to follow up with other physicians/ APPs involved in his care as directed (including but not limited to Otolaryngology, Medical Oncology, Primary Care, Pulmonary, Cardiology and Vascular surgery). The patient was given our contact number in the event that if at any time they change their mind and would like to return to the clinic to see either myself or one of the Radiation Oncologists, they can simply call us and we would be happy to see them. Thank you for involving us in the management of this extremely pleasant patient. More than 25 min was in direct contact with pt coordinating/giving care. >50% of the visit was spent in counseling the pt on the following:   Follow up care      Criss Ramirez, MSN, APRN-CNP  Certified Nurse Practitioner for [address]  Phone: [phone]: [phone]

## 2021-03-22 ENCOUNTER — TELEPHONE (OUTPATIENT)
Dept: VASCULAR SURGERY | Age: 71
End: 2021-03-22

## 2021-03-23 ENCOUNTER — OFFICE VISIT (OUTPATIENT)
Dept: VASCULAR SURGERY | Age: 71
End: 2021-03-23
Payer: MEDICARE

## 2021-03-23 ENCOUNTER — HOSPITAL ENCOUNTER (OUTPATIENT)
Dept: CARDIOLOGY | Age: 71
Discharge: HOME | End: 2021-03-23
Payer: MEDICARE

## 2021-03-23 VITALS — BODY MASS INDEX: 24.78 KG/M2 | HEIGHT: 73 IN | WEIGHT: 187 LBS

## 2021-03-23 DIAGNOSIS — Z95.828 S/P AAA REPAIR USING BIFURCATION GRAFT: ICD-10-CM

## 2021-03-23 DIAGNOSIS — Z86.79 S/P AAA REPAIR USING BIFURCATION GRAFT: ICD-10-CM

## 2021-03-23 DIAGNOSIS — I71.40 AAA (ABDOMINAL AORTIC ANEURYSM) WITHOUT RUPTURE: Primary | ICD-10-CM

## 2021-03-23 PROCEDURE — 99213 OFFICE O/P EST LOW 20 MIN: CPT | Performed by: PHYSICIAN ASSISTANT

## 2021-03-23 PROCEDURE — 93978 VASCULAR STUDY: CPT

## 2021-03-23 NOTE — PROGRESS NOTES
Vascular Surgery Outpatient Progress Note      Chief Complaint   Patient presents with   Lenward Glass     s/p AAA       HISTORY OF PRESENT ILLNESS:                The patient is a 65 y.o. male who returns for follow-up evaluation of previous endovascular abdominal aortic aneurysm repair. States he is doing very well. He is in remission of his throat CA. He is not smoking. He denies rest pain, claudication or rest pain. Past Medical History:        Diagnosis Date    AAA (abdominal aortic aneurysm) ([address] 2007    repair with stent    Arthritis     Atrial fibrillation ([address]     Cancer ([address]     throat    COPD (chronic obstructive pulmonary disease) ([facility])     folllows with Dr. Veronica Frankel every 6 months     CPAP (continuous positive airway pressure) dependence     Hearing loss     Wears bilateral hearing aids     Hyperlipidemia     Traumatic leg ulcer ([facility] 07/08/2013     Past Surgical History:        Procedure Laterality Date    ABDOMINAL AORTIC ANEURYSM REPAIR, ENDOVASCULAR  11/17/08    Excluder - Delatore    BRONCHOSCOPY  9/9/2015    CARDIOVERSION  9-2013    COLONOSCOPY      ECHOCARDIOGRAM TRANSESOPHAGEAL  7/2013         ECHOCARDIOGRAM TRANSESOPHAGEAL  3/18/2013         ECHOCARDIOGRAM TRANSESOPHAGEAL  9/3/2013         GASTROSTOMY TUBE PLACEMENT N/A 7/9/2019    EGD PEG TUBE PLACEMENT performed by Jaymie Mckeon MD at [address] [place] [address] N/A 7/8/2019    DIRECT LARYNGOSCOPY WITH BIOPSY, ESOPHAGOSCOPY performed by Gus Wagoner MD at [facility] N/A 7/8/2019    AWAKE TRACHEOSTOMY performed by Gus Wagoner MD at [facility] OR     Current Medications:   Prior to Admission medications    Medication Sig Start Date End Date Taking?  Authorizing Provider   Multiple Vitamins-Minerals (THERAPEUTIC MULTIVITAMIN-MINERALS) tablet Take 1 tablet by mouth daily   Yes Historical Provider, MD   Magnesium Oxide (MAG-CAPS PO) Take 240 mg by mouth daily   Yes Historical Provider, MD   B Complex Vitamins (VITAMIN B-COMPLEX PO) Take by mouth daily   Yes Historical Provider, MD   Multiple Vitamins-Minerals (PRESERVISION AREDS 2) CAPS Take by mouth daily   Yes Historical Provider, MD   fluticasone-umeclidin-vilant (TRELEGY ELLIPTA) 100-62.5-25 MCG/INH AEPB Inhale 1 puff into the lungs daily 4/27/20  Yes Jose Luis Ly MD   dilTIAZem (CARDIZEM LA) 120 MG TB24 extended release tablet Take 120 mg by mouth daily    Yes Historical Provider, MD   ferrous sulfate 325 (65 Fe) MG tablet TAKE ONE TABLET BY MOUTH TWO TIMES A DAY. TAKE WITH VITAMIN C FRUIT JUICE. 12/30/19  Yes Historical Provider, MD   diltiazem (CARDIZEM) 30 MG tablet Take 30 mg by mouth See Admin Instructions Take 3 tabs twice daily   Yes Historical Provider, MD   ELIQUIS 5 MG TABS tablet 2 times daily  8/11/18  Yes Historical Provider, MD   metoprolol succinate (TOPROL XL) 25 MG extended release tablet Take 25 mg by mouth daily 9/22/17  Yes Historical Provider, MD   albuterol (PROVENTIL) (2.5 MG/3ML) 0.083% nebulizer solution Take 2.5 mg by nebulization every 6 hours as needed for Wheezing    Yes Historical Provider, MD   albuterol (PROVENTIL HFA;VENTOLIN HFA) 108 (90 BASE) MCG/ACT inhaler Inhale 2 puffs into the lungs every 6 hours as needed. Yes Historical Provider, MD     Allergies:  Patient has no known allergies.     Social History     Socioeconomic History    Marital status:      Spouse name: Not on file    Number of children: Not on file    Years of education: Not on file    Highest education level: Not on file   Occupational History    Occupation: retired-   Social Needs    Financial resource strain: Not on file    Food insecurity     Worry: Not on file     Inability: Not on file   [employer] needs     Medical: Not on file     Non-medical: Not on file   Tobacco Use    Smoking status: Former Smoker     Packs/day: 2.00     Years: 40.00     Pack years: 80.00     Types: Cigarettes     Start date: 7/26/1969     Quit Diagnoses     AAA (abdominal aortic aneurysm) without rupture ([address]    -  Primary    Relevant Orders    US DUPLEX SCAN OF AORTA        I reviewed the imaging with the patient showing stable AAA s/p EVAR in 2008. He is currently having no issues. I discussed the importance of continued smoking cessation. I asked him remain on risk reduction therapies. His AAA continues to decrease in size . We will see him back in 2 years with repeat imaging or sooner with any issues. Pt seen and plan reviewed with Dr. Jannette Garcia. Agent Ace, DION      Return in about 2 years (around 3/23/2023).

## 2021-08-10 ENCOUNTER — HOSPITAL ENCOUNTER (OUTPATIENT)
Dept: CT IMAGING | Age: 71
Discharge: HOME | End: 2021-08-12
Payer: MEDICARE

## 2021-08-10 DIAGNOSIS — C32.8 SQUAMOUS CELL CARCINOMA OF OVERLAPPING SITES OF LARYNX (HCC): ICD-10-CM

## 2021-08-10 PROCEDURE — 71260 CT THORAX DX C+: CPT

## 2021-08-10 PROCEDURE — 6360000004 HC RX CONTRAST MEDICATION: Performed by: RADIOLOGY

## 2021-08-10 PROCEDURE — 2580000003 HC RX 258: Performed by: RADIOLOGY

## 2021-08-10 RX ORDER — SODIUM CHLORIDE 0.9 % (FLUSH) 0.9 %
10 SYRINGE (ML) INJECTION PRN
Status: DISCONTINUED | OUTPATIENT
Start: 2021-08-10 | End: 2021-08-13 | Stop reason: HOSPADM

## 2021-08-10 RX ORDER — SODIUM CHLORIDE 9 MG/ML
25 INJECTION, SOLUTION INTRAVENOUS PRN
Status: DISCONTINUED | OUTPATIENT
Start: 2021-08-10 | End: 2021-08-10

## 2021-08-10 RX ADMIN — SODIUM CHLORIDE, PRESERVATIVE FREE 10 ML: 5 INJECTION INTRAVENOUS at 08:21

## 2021-08-10 RX ADMIN — IOPAMIDOL 75 ML: 755 INJECTION, SOLUTION INTRAVENOUS at 08:21

## 2021-09-13 ENCOUNTER — OFFICE VISIT (OUTPATIENT)
Dept: CARDIOLOGY CLINIC | Age: 71
End: 2021-09-13
Payer: MEDICARE

## 2021-09-13 VITALS
RESPIRATION RATE: 16 BRPM | HEIGHT: 74 IN | BODY MASS INDEX: 22.68 KG/M2 | OXYGEN SATURATION: 93 % | SYSTOLIC BLOOD PRESSURE: 102 MMHG | WEIGHT: 176.7 LBS | DIASTOLIC BLOOD PRESSURE: 56 MMHG | HEART RATE: 80 BPM

## 2021-09-13 DIAGNOSIS — E78.2 MIXED HYPERLIPIDEMIA: ICD-10-CM

## 2021-09-13 DIAGNOSIS — I48.0 PAROXYSMAL ATRIAL FIBRILLATION (HCC): ICD-10-CM

## 2021-09-13 DIAGNOSIS — Z79.01 CHRONIC ANTICOAGULATION: ICD-10-CM

## 2021-09-13 DIAGNOSIS — R06.09 DOE (DYSPNEA ON EXERTION): Primary | ICD-10-CM

## 2021-09-13 DIAGNOSIS — I71.40 AAA (ABDOMINAL AORTIC ANEURYSM) WITHOUT RUPTURE: ICD-10-CM

## 2021-09-13 PROCEDURE — 99214 OFFICE O/P EST MOD 30 MIN: CPT | Performed by: INTERNAL MEDICINE

## 2021-09-13 PROCEDURE — 93000 ELECTROCARDIOGRAM COMPLETE: CPT | Performed by: INTERNAL MEDICINE

## 2021-09-13 NOTE — PROGRESS NOTES
OUTPATIENT CARDIOLOGY FOLLOW-UP    Name: Erasmo Walker    Age: 66 y.o. Primary Care Physician: Jose Meadows MD    Date of Service: 9/13/2021    Chief Complaint: Follow-up for atrial fibrillation, dyspnea on exertion    Interim History:  He was previously followed by Dr. Judy Jennings; he established care with me in 8/2020. He denies recent chest pain, palpitations, syncope, PND, or orthopnea. +long-standing KING with moderate levels of exertion. SR on EKG.     Review of Systems:   Cardiac: As per HPI  General: No fever, chills  Pulmonary: As per HPI  HEENT: No visual disturbances, difficult swallowing  GI: No nausea, vomiting  : No dysuria, hematuria  Endocrine: +hypothyroidism, no DM  Musculoskeletal: WASSERMAN x 4, no focal motor deficits  Skin: Intact, no rashes  Neuro: No headache, seizures  Psych: Currently with no depression, anxiety    Past Medical History:  Past Medical History:   Diagnosis Date    AAA (abdominal aortic aneurysm) ([address] 2007    repair with stent    Arthritis     Atrial fibrillation ([address]     Cancer ([address]     throat    COPD (chronic obstructive pulmonary disease) ([address]     folllows with Dr. Sherman Tran every 6 months     CPAP (continuous positive airway pressure) dependence     Hearing loss     Wears bilateral hearing aids     Hyperlipidemia     Traumatic leg ulcer ([address] 07/08/2013       Past Surgical History:  Past Surgical History:   Procedure Laterality Date    ABDOMINAL AORTIC ANEURYSM REPAIR, ENDOVASCULAR  11/17/08    Excluder - Delatore    BRONCHOSCOPY  9/9/2015    CARDIOVERSION  9-2013    COLONOSCOPY      ECHOCARDIOGRAM TRANSESOPHAGEAL  7/2013         ECHOCARDIOGRAM TRANSESOPHAGEAL  3/18/2013         ECHOCARDIOGRAM TRANSESOPHAGEAL  9/3/2013         GASTROSTOMY TUBE PLACEMENT N/A 7/9/2019    EGD PEG TUBE PLACEMENT performed by Quintin Ayala MD at [address] [place] [address] N/A 7/8/2019    DIRECT LARYNGOSCOPY WITH BIOPSY, ESOPHAGOSCOPY performed by Magali Drew MD at [address] [place] [address] N/A 2019    AWAKE TRACHEOSTOMY performed by Junior Antonella MD at [facility] OR       Family History:  Family History   Problem Relation Age of Onset    Diabetes Mother     Emphysema Father        Social History:  Social History     Socioeconomic History    Marital status:      Spouse name: Not on file    Number of children: Not on file    Years of education: Not on file    Highest education level: Not on file   Occupational History    Occupation: retired-   Tobacco Use    Smoking status: Former Smoker     Packs/day: 2.00     Years: 40.00     Pack years: 80.00     Types: Cigarettes     Start date: 1969     Quit date: 1/15/2009     Years since quittin.6    Smokeless tobacco: Never Used    Tobacco comment: Quit    Vaping Use    Vaping Use: Never used    Passive vaping exposure Yes   Substance and Sexual Activity    Alcohol use: No     Comment:      Drug use: Never    Sexual activity: Not Currently     Partners: Female   Other Topics Concern    Not on file   Social History Narrative    3 cups green tea daily; 2 cups coffee daily     Social Determinants of Health     Financial Resource Strain:     Difficulty of Paying Living Expenses:    Food Insecurity:     Worried About Running Out of Food in the Last Year:     Ran Out of Food in the Last Year:    Transportation Needs:     Lack of Transportation (Medical):      Lack of Transportation (Non-Medical):    Physical Activity:     Days of Exercise per Week:     Minutes of Exercise per Session:    Stress:     Feeling of Stress :    Social Connections:     Frequency of Communication with Friends and Family:     Frequency of Social Gatherings with Friends and Family:     Attends [employer] Services:     Active Member of Clubs or Organizations:     Attends Club or Organization Meetings:     Marital Status:    Intimate Partner Violence:     Fear of Current or Ex-Partner:     Emotionally Abused:  Physically Abused:     Sexually Abused: Allergies:  No Known Allergies    Current Medications:  Current Outpatient Medications   Medication Sig Dispense Refill    levothyroxine (SYNTHROID) 25 MCG tablet Take 25 mcg by mouth daily      tamsulosin (FLOMAX) 0.4 MG capsule Take 0.4 mg by mouth daily      coenzyme Q10 100 MG CAPS capsule Take 100 mg by mouth daily      Flaxseed, Linseed, (FLAX SEED OIL) 1000 MG CAPS Take 1,000 mg by mouth daily      TRELEGY ELLIPTA 100-62.5-25 MCG/INH AEPB USE 1 INHALATION ORALLY    DAILY 180 each 3    Multiple Vitamins-Minerals (THERAPEUTIC MULTIVITAMIN-MINERALS) tablet Take 1 tablet by mouth daily      Magnesium Oxide (MAG-CAPS PO) Take 240 mg by mouth daily      B Complex Vitamins (VITAMIN B-COMPLEX PO) Take by mouth daily      Multiple Vitamins-Minerals (PRESERVISION AREDS 2) CAPS Take by mouth daily      dilTIAZem (CARDIZEM LA) 120 MG TB24 extended release tablet Take 120 mg by mouth daily       ferrous sulfate 325 (65 Fe) MG tablet TAKE ONE TABLET BY MOUTH TWO TIMES A DAY. TAKE WITH VITAMIN C FRUIT JUICE.  ELIQUIS 5 MG TABS tablet 2 times daily       metoprolol succinate (TOPROL XL) 25 MG extended release tablet Take 25 mg by mouth daily  3    albuterol (PROVENTIL) (2.5 MG/3ML) 0.083% nebulizer solution Take 2.5 mg by nebulization every 6 hours as needed for Wheezing       diltiazem (CARDIZEM) 30 MG tablet Take 30 mg by mouth See Admin Instructions Take 3 tabs twice daily (Patient not taking: Reported on 9/13/2021)      albuterol (PROVENTIL HFA;VENTOLIN HFA) 108 (90 BASE) MCG/ACT inhaler Inhale 2 puffs into the lungs every 6 hours as needed. (Patient not taking: Reported on 9/13/2021)       No current facility-administered medications for this visit.        Physical Exam:  BP (!) 102/56 (Site: Right Upper Arm, Position: Sitting, Cuff Size: Medium Adult)   Pulse 80   Resp 16   Ht 6' 2\" (1.88 m)   Wt 176 lb 11.2 oz (80.2 kg)   SpO2 93%   BMI 22.69 kg/m²   Wt Readings from Last 3 Encounters:   09/13/21 176 lb 11.2 oz (80.2 kg)   06/18/21 185 lb (83.9 kg)   03/23/21 187 lb (84.8 kg)     Appearance: Awake, alert and oriented x 3, no acute respiratory distress  Skin: Intact, no rash  Head: Normocephalic, atraumatic  Eyes: EOMI, no conjunctival erythema  ENMT: No pharyngeal erythema, MMM, no rhinorrhea  Neck: Supple, no elevated JVP, no carotid bruits  Lungs: Clear to auscultation bilaterally. No wheezes, rales, or rhonchi.   Cardiac: Regular rate and rhythm, +S1S2, no murmurs apparent  Abdomen: Soft, nontender, +bowel sounds  Extremities: Moves all extremities x 4, no lower extremity edema  Neurologic: No focal motor deficits apparent, normal mood and affect, alert and oriented x 3    Laboratory Tests:  Lab Results   Component Value Date    CREATININE 1.4 (H) 06/25/2020    BUN 30 (H) 06/25/2020     07/29/2019    K 3.6 07/29/2019     07/29/2019    CO2 28 07/29/2019     Lab Results   Component Value Date    MG 2.1 07/29/2019     Lab Results   Component Value Date    WBC 8.5 07/29/2019    HGB 12.6 07/29/2019    HCT 38.4 07/29/2019    MCV 93.4 07/29/2019     07/29/2019     Lab Results   Component Value Date    ALT 25 07/29/2019    AST 17 07/29/2019    ALKPHOS 94 07/29/2019    BILITOT 0.4 07/29/2019     Lab Results   Component Value Date    CKTOTAL 56 01/08/2014    CKMB 1.5 01/08/2014    TROPONINI <0.01 01/08/2014    TROPONINI <0.01 01/20/2011     Lab Results   Component Value Date    INR 1.2 07/09/2019    INR 2.2 01/08/2014    INR 1.0 01/20/2011    PROTIME 14.3 (H) 07/09/2019    PROTIME 17.7  (H) 01/08/2014    PROTIME 11.3 01/20/2011     Lab Results   Component Value Date    TSH 0.552 07/11/2019     No results found for: LABA1C  No results found for: EAG  No results found for: CHOL  No results found for: TRIG  No results found for: HDL  No results found for: LDLCALC, LDLCHOLESTEROL  No results found for: LABVLDL, VLDL  No results found for:

## 2021-09-20 ENCOUNTER — TELEPHONE (OUTPATIENT)
Dept: CARDIOLOGY | Age: 71
End: 2021-09-20

## 2021-09-20 ENCOUNTER — HOSPITAL ENCOUNTER (OUTPATIENT)
Age: 71
Discharge: HOME | End: 2021-09-22

## 2021-09-20 PROCEDURE — 88342 IMHCHEM/IMCYTCHM 1ST ANTB: CPT

## 2021-09-20 PROCEDURE — 88305 TISSUE EXAM BY PATHOLOGIST: CPT

## 2021-09-24 ENCOUNTER — HOSPITAL ENCOUNTER (OUTPATIENT)
Dept: CARDIOLOGY | Age: 71
Discharge: HOME | End: 2021-09-24
Payer: MEDICARE

## 2021-09-24 VITALS
OXYGEN SATURATION: 99 % | SYSTOLIC BLOOD PRESSURE: 128 MMHG | HEIGHT: 74 IN | DIASTOLIC BLOOD PRESSURE: 62 MMHG | WEIGHT: 176 LBS | BODY MASS INDEX: 22.59 KG/M2 | HEART RATE: 77 BPM

## 2021-09-24 DIAGNOSIS — R06.09 DOE (DYSPNEA ON EXERTION): ICD-10-CM

## 2021-09-24 DIAGNOSIS — I48.0 PAROXYSMAL ATRIAL FIBRILLATION (HCC): ICD-10-CM

## 2021-09-24 LAB
LV EF: 68 %
LVEF MODALITY: NORMAL

## 2021-09-24 PROCEDURE — 93017 CV STRESS TEST TRACING ONLY: CPT

## 2021-09-24 PROCEDURE — 3430000000 HC RX DIAGNOSTIC RADIOPHARMACEUTICAL: Performed by: INTERNAL MEDICINE

## 2021-09-24 PROCEDURE — 78452 HT MUSCLE IMAGE SPECT MULT: CPT

## 2021-09-24 PROCEDURE — 2580000003 HC RX 258: Performed by: INTERNAL MEDICINE

## 2021-09-24 PROCEDURE — A9500 TC99M SESTAMIBI: HCPCS | Performed by: INTERNAL MEDICINE

## 2021-09-24 RX ORDER — SODIUM CHLORIDE 0.9 % (FLUSH) 0.9 %
10 SYRINGE (ML) INJECTION PRN
Status: DISCONTINUED | OUTPATIENT
Start: 2021-09-24 | End: 2021-09-25 | Stop reason: HOSPADM

## 2021-09-24 RX ADMIN — SODIUM CHLORIDE, PRESERVATIVE FREE 10 ML: 5 INJECTION INTRAVENOUS at 08:45

## 2021-09-24 RX ADMIN — Medication 11 MILLICURIE: at 07:17

## 2021-09-24 RX ADMIN — SODIUM CHLORIDE, PRESERVATIVE FREE 10 ML: 5 INJECTION INTRAVENOUS at 07:17

## 2021-09-24 RX ADMIN — Medication 33.7 MILLICURIE: at 08:44

## 2021-09-24 NOTE — PROCEDURES
[address] [facility] [address] [phone]                Exercise Stress Nuclear Gated SPECT Study    Name: [address] Account Number: [de-identified]    :  1950      Sex: male              Date of Study:  2021    Height: 6' 2\" (188 cm)  Weight: 176 lb (79.8 kg)     Ordering Provider: Seema Guzman MD  PCP: Anmol Zapata MD      Cardiologist: Seema Guzman MD                        Interpreting Physician: Harriett Torres MD  _________________________________________________________________________________    Indication:   Detecting the presence and location of coronary artery disease    Clinical History:   Patient has no known history of coronary artery disease. Resting ECG:    Normal sinus rhythm, normal EKG. Exercise: The patient exercised using a Km protocol, completing 4:47 minutes and reaching an estimated work load of 7.0 metabolic equivalents (METS). Resting HR was 77. Peak exercise heart rate was 119 ( 81% of maximum predicted heart rate for age). Baseline /62/64. Peak exercise /64. The blood pressure response to exercise was normal      Exercise was terminated due to dyspnea and fatigue. The patient experienced no chest pain with exercise. Pulse oximetry was used to monitor oxygen saturation during the stress test.  The study was performed on Room Air. The resting pulse oximeter was 99%. The lowest O2 saturation seen during exercise was 91 %. The average O2 saturation with exercise was 95 %. Exercise ECG:   The patient demonstrated isolated APC's and VPC's  during exercise. With exercise, there were no ST segment changes of significance at the heart rate achieved. Parker treadmill score was 5 implying low risk.      IMAGING: Myocardial perfusion imaging was performed at rest 30-35 minutes following the intravenous injection of 11.0 mCi of (Tc-Sestamibi) followed by 10 ml of Normal Saline. At peak exercise, the patient was injected intravenously with 33.9 mCi of (Tc-Sestamibi) followed by 10 ml of Normal Saline. Gated post-stress tomographic imaging was performed 20-25 minutes after stress. FINDINGS: The overall quality of the study was good. Left ventricular cavity size was noted to be normal.    Rotational analog analysis demonstrated no patient motion or abnormal extracardiac radioactivity. The gated SPECT stress imaging in the short, vertical long, and horizontal long axis demonstrated normal homogeneous tracer distribution throughout the myocardium. A severe defect was present in the mid to distal inferior, inferoseptal, and apical wall(s) that was  large sized by quantification. The resting images show no change. Gated SPECT left ventricular ejection fraction was calculated to be 68%, with hypokinesis of the inferior and inferoseptal walls. TID ratio 0.98. Impression:    1. Exercise EKG was  negative. 2. The patient experienced no chest pain with exercise. 3. The myocardial perfusion imaging was abnormal.    The abnormality was a a large sized fixed defect in the inferior, inferolateral, and apical  Walls suggestive of a prior MI.   4. Overall left ventricular systolic function was normal without regional wall motion abnormalities. 5. Parker treadmill score was 5 implying low risk. 6. Exercise capacity was below average. 7. There was an appropriate blood pressure and heart rate response to exercise and recovery. 8. Intermediate risk general exercise treadmill test.    Thank you for sending your patient to this [facility].      Electronically signed by Nakul Hodges MD on 9/24/21 at 2:12 PM EDT

## 2021-09-27 ENCOUNTER — TELEPHONE (OUTPATIENT)
Dept: CARDIOLOGY CLINIC | Age: 71
End: 2021-09-27

## 2021-09-27 NOTE — TELEPHONE ENCOUNTER
----- Message from Bambi Sheehan MD sent at 9/27/2021  4:17 PM EDT -----  Stress test demonstrated fixed defects and normal EF. Continue current medications.

## 2021-11-30 ENCOUNTER — HOSPITAL ENCOUNTER (OUTPATIENT)
Dept: GENERAL RADIOLOGY | Age: 71
Discharge: HOME | End: 2021-12-02
Payer: MEDICARE

## 2021-11-30 DIAGNOSIS — R13.10 DYSPHAGIA, UNSPECIFIED TYPE: ICD-10-CM

## 2021-11-30 PROCEDURE — 2500000003 HC RX 250 WO HCPCS: Performed by: RADIOLOGY

## 2021-11-30 PROCEDURE — 74230 X-RAY XM SWLNG FUNCJ C+: CPT

## 2021-11-30 PROCEDURE — 92526 ORAL FUNCTION THERAPY: CPT | Performed by: SPEECH-LANGUAGE PATHOLOGIST

## 2021-11-30 PROCEDURE — 92611 MOTION FLUOROSCOPY/SWALLOW: CPT | Performed by: SPEECH-LANGUAGE PATHOLOGIST

## 2021-11-30 RX ADMIN — BARIUM SULFATE 70 G: 0.81 POWDER, FOR SUSPENSION ORAL at 11:07

## 2021-11-30 RX ADMIN — BARIUM SULFATE 10 ML: 400 PASTE ORAL at 11:07

## 2021-11-30 RX ADMIN — BARIUM SULFATE 120 ML: 400 SUSPENSION ORAL at 11:08

## 2021-11-30 NOTE — PROGRESS NOTES
SPEECH/LANGUAGE PATHOLOGY  VIDEOFLUOROSCOPIC STUDY OF SWALLOWING (MBS)   and PLAN OF CARE    PATIENT NAME:  Melchor Cox  (male)     MRN:  [id number]    :  1950  (66 y.o.)  STATUS:  Outpatient    TODAY'S DATE:  2021  REFERRING PROVIDER:   Antonio Ricardo MD  SPECIFIC PROVIDER ORDER: FL modified barium swallow with video Date of order:  11/10/2021  REASON FOR REFERRAL: assess for dysphagia   EVALUATING THERAPIST: Tamar Tucker SLP                 RESULTS:    DYSPHAGIA DIAGNOSIS:   Clinical indicators of severe pharyngeal phase dysphagia including silent aspiration of most consistencies presented    Use of compensatory strategies including chin tuck maneuver did reduce aspiration of mildly thick liquids, however pt is still at risk due to significant residue in the vallecula with inability to clear. Significant silent aspiration still occurred with thin liquid using chin tuck maneuver. DIET RECOMMENDATIONS:   NPO: Unable to safely recommend PO diet at this time due to Pt's high risk of aspiration on all textures assessed. Recommend that Pt, family, and medical team discuss goals of care and wishes to determine best option for nutrition/ hydration. If pt does continue to consume an oral diet, pt may benefit from a modified diet of soft/easy to chew solids and mildly thick (nectar thick) liquids with use of compensatory strategies including chin tuck maneuver and throat clear post swallow. Pt and wife were educated on above and encouraged to discuss with referring physician.       FEEDING RECOMMENDATIONS:     Assistance level:  No assistance needed      Compensatory strategies recommended: Multiple swallow, Effortful swallow, Chin tuck, Small bites/sips, Alternate solids and liquids, Throat clear and No straw      Discussed recommendations with nursing and/or faxed report to referring provider: Yes    SPEECH THERAPY  PLAN OF CARE   The dysphagia POC is established based on physician order, dysphagia diagnosis and results of clinical assessment     Outpatient OR Home Care Skilled SLP intervention for dysphagia management is recommended 1-2 times per week to address the established treatment plan    Conditions Requiring Skilled Therapeutic Intervention for dysphagia:    Reduced pharyngeal clearing of the bolus  Reduced laryngeal closure resulting in penetration  Reduced laryngeal closure resulting in aspiration     Specific dysphagia interventions to include:     Compensatory strategy training   Therapeutic exercises    Specific instructions for next treatment:  initiate instruction of therapeutic exercises  and initiate instruction of compensatory strategies  Patient Treatment Goals:    Short Term Goals:  Pt will implement identified compensatory swallowing strategies on 90% of opportunities or greater to improve airway protection and swallow function. Pt will complete BOTR strength/ ROM exercises to reduce pharyngeal residuals and improve epiglottic inversion minimal verbal prompts  Pt will complete laryngeal strength/ ROM therapeutic exercises to improve airway protection for the least restrictive PO diet minimal verbal prompts  Pt will complete Shaker and/or Chin Tuck Against Resistance (CTAR) to increase UES relaxation diameter and increase anterior laryngeal excursion to reduce pharyngeal residuals and reduce risk of pen/asp with minimal verbal prompts.    Pt will complete Effortful Swallow therapeutically to target increased oral and base of tongue pressure, increased pharyngeal constrictor contractions, and increased UES relaxation duration to reduce pharyngeal residue with minimal verbal prompts   Pt will complete Ailyn Maneuver therapeutically to target increased pharyngeal constrictor contractions to reduce pharyngeal residue with minimal verbal prompts   Pt will practice chin down posture to target earlier laryngeal closure with minimal verbal prompts     Long Term Goals:   Pt will maintain adequate nutrition/hydration via PO intake of the least restrictive oral diet with implementation of safe swallow/ compensatory strategies and decrease signs/symptoms of aspiration to less than 1 x/day. Pt will improve oropharyngeal swallow function to ensure airway protection during PO intake to maintain adequate nutrition/hydration and decrease signs/symptoms of aspiration to less than 1 x/day. Patient/family Goal:    To eat/drink without coughing or choking    Plan of care discussed with Patient and Family   The Patient and Family understand(s) the diagnosis, prognosis and plan of care     Rehabilitation Potential/Prognosis: fair                    ADMITTING DIAGNOSIS: Dysphagia, unspecified type [R13.10]    VISIT DIAGNOSIS:      PATIENT REPORT/COMPLAINT: Pt reports minimal difficulty swallowing and denies recent pneumonia or respiratory changes. Reports he is able to maintain weight. Does admit to xerostomia.          PRIOR LEVEL OF SWALLOW FUNCTION:    PAST HISTORY OF DYSPHAGIA?: yes    Home diet: Regular consistency solids (IDDSI level 7) with  thin liquids (IDDSI level 0)    PROCEDURE:  Consistencies Administered During the Evaluation   Liquids: thin liquid and nectar thick liquid   Solids:  pureed foods      Method of Intake:   cup, spoon  Self fed, Fed by clinician      Position:   Seated, upright, Lateral plane      INSTRUMENTAL ASSESSMENT:    ORAL PREP/ ORAL PHASE:    The oral stage of swallowing was within functional limits     PHARYNGEAL PHASE:     ONSET TIME       Onset time of the pharyngeal swallow was adequate       PHARYNGEAL RESIDUALS        Vallecula/Pharyngeal Wall           Residuals in the vallecula due to inadequate epiglottic inversion were noted for all consistencies administered  which minimally cleared with cued multiple swallow and liquid chaser       Pyriform Sinuses      No significant residuals were noted in the pyriform sinuses     LARYNGEAL PENETRATION   Laryngeal penetration occurred prior to aspiration of thin and mildly thick liquids. Further details under aspiration section. Laryngeal penetration occurred in the absence of aspiration AFTER the swallow for pureed foods due to pharyngeal residuals which remained in the laryngeal vestibule. . Laryngeal penetration was mild and occurred inconsistently. an absent cough/throat clear was noted    ASPIRATION  Aspiration occurred DURING the swallow for thin liquid and nectar consistency liquid due to  inadequate laryngeal closure . Aspiration was moderate-severe and occurred consistently -an absent cough/throat clear was noted  Aspiration occurred AFTER the swallow for thin liquid and nectar consistency liquid due to pharyngeal residuals .  Aspiration was moderate-severe and occurred inconsistently - an absent cough/throat clear was noted    PENETRATION-ASPIRATION SCALE (PAS):  THIN 8 = Material enters the airway, passes below the vocal folds, and no effort is made to eject   MILDLY THICK 8 = Material enters the airway, passes below the vocal folds, and no effort is made to eject   MODERATELY THICK item not administered  PUREE 3 = Material enters the airway, remains above the vocal folds, and is not ejected from the airway  HARD SOLID item not administered       COMPENSATORY STRATEGIES    Compensatory strategies that were partially beneficial included Double swallow, Multiple swallow, Chin tuck, Small bites/sips and Alternate solids and liquids      STRUCTURAL/FUNCTIONAL ANOMALIES   No structural/functional anomalies were noted    CERVICAL ESOPHAGEAL STAGE :     The cervical esophagus appeared adequate          ___________    Cognition:   Within functional limits for this exam    Oral Peripheral Examination   Adequate lingual/labial strength     Current Respiratory Status   room air     Parameters of Speech Production  Respiration:  Adequate for speech production  Quality:   Hoarse  Intensity: Within functional limits    Pain: No pain reported. EDUCATION:   The Speech Language Pathologist (SLP) completed education regarding results of evaluation and that intervention is warranted at this time. Learner: Patient  Education: Reviewed results and recommendations of this evaluation  Evaluation of Education:  [employer] understanding    This plan may be re-evaluated and revised as warranted. Evaluation Time includes thorough review of current medical information, gathering information on past medical history/social history and prior level of function, completion of standardized testing/informal observation of tasks, assessment of data and education on plan of care and goals. [x]The admitting diagnosis and active problem list, have been reviewed prior to initiation of this evaluation. INTERVENTION    Pt/family educated on above results and plan of care with visuals  Pt/family trained on compensatory strategies for safe swallow specific to this pt. Handouts provided as warranted. Pt made aware of NPO recommendation and encouraged to contact physician to discuss. Pt/family encouraged to engage in question/answer session. All questions answered and pt/family verbalized understanding of above. CPT Code: 69740  dysphagia study, 83960 dysphagia    ACTIVE PROBLEM LIST:   Patient Active Problem List   Diagnosis    PVD (peripheral vascular disease) ([address]    S/P AAA repair using bifurcation graft    COPD (chronic obstructive pulmonary disease) (HCC)    Squamous cell carcinoma of hypopharynx (HCC)    Severe protein-calorie malnutrition (HCC)    Paroxysmal atrial fibrillation ([address]    Acute respiratory failure with hypoxemia ([address]    Dysphagia    Benign essential hypertension    Osteoarthrosis       Julio Cesar HAWKINS CCC/SLP [id number]  Speech-Language Pathologist

## 2022-01-17 ENCOUNTER — TELEPHONE (OUTPATIENT)
Dept: SPEECH THERAPY | Age: 72
End: 2022-01-17

## 2022-01-17 NOTE — TELEPHONE ENCOUNTER
SLP called to notify patient that his evaluation is cancelled this date due to weather. SLP spoke with patient's wife. Patient and/or patient's wife to call later in the week to re-scheduled evaluation. Mindi Lam M.A., [place]. [id number]

## 2022-01-26 ENCOUNTER — HOSPITAL ENCOUNTER (OUTPATIENT)
Age: 72
Discharge: HOME | End: 2022-01-28
Payer: MEDICARE

## 2022-01-26 ENCOUNTER — HOSPITAL ENCOUNTER (OUTPATIENT)
Dept: GENERAL RADIOLOGY | Age: 72
Discharge: HOME | End: 2022-01-28
Payer: MEDICARE

## 2022-01-26 DIAGNOSIS — R06.02 SHORTNESS OF BREATH: ICD-10-CM

## 2022-01-26 PROCEDURE — 71046 X-RAY EXAM CHEST 2 VIEWS: CPT

## 2022-02-08 ENCOUNTER — HOSPITAL ENCOUNTER (OUTPATIENT)
Dept: SPEECH THERAPY | Age: 72
Setting detail: THERAPIES SERIES
Discharge: HOME | End: 2022-02-08
Payer: MEDICARE

## 2022-02-08 PROCEDURE — 92610 EVALUATE SWALLOWING FUNCTION: CPT

## 2022-02-08 NOTE — PROGRESS NOTES
SPEECH-LANGUAGE PATHOLOGY  CLINICAL ASSESSMENT OF SWALLOWING FUNCTION   and PLAN OF CARE      PATIENT NAME:  Keven Kocher  (male)     MRN:  [id number]    :  1950  (66 y.o.)  STATUS:  Outpatient clinic   TODAY'S DATE:  2022  REFERRING PROVIDER:    Dr. Katt Ma: speech language pathology (SLP) eval and treat  Date of order:  21  REASON FOR REFERRAL:  Dysphagia unspecified (R13.10)  EVALUATING THERAPIST: ZULY Gibson  CERTIFICATION/RECERTIFICATION PERIOD: 2022 to 5/3/22  REFERRING DIAGNOSIS: Dysphagia, unspecified [R13.10]      SIGNIFICANT INFORMATION:  Keven Kocher was referred by Dr. Skyler Mo to [address] [place] [address]'s Outpatient Speech Pathology Department for evaluation and treatment due to a diagnosis of dysphagia, unspecified (R13.10). Patient was accompanied to the session by his wife, Krissy Doran. Together, they provided medical history information. Patient reported that approximately 2.5 years ago, he was diagnosed with throat cancer. He underwent radiation and chemotherapy treatments. He reported difficulty swallowing at that time and was recommended for therapy, however, he reported that the managing physician did not feel it was necessary. Patient reported that the difficulty swallowing has persisted since that time. Recently, he reported that Dr. Selina Calero evaluated the patient and reported a narrowing of the esophagus. However, the patient is unable to undergo stretching. Therefore, he reported that he was referred to Speech Therapy for evaluation and treatment. Patient has a history of oxygen therapy at night. He has COPD and emphysema. Patient reported a history of atrial fibrillation and abdominal aortic aneurysm. He also reported that he is on tamsulosin for his prostate. Patient had a recent MBSS completed at [facility].   A summary of the results will be provided below:    SUMMARY OF MBSS COMPLETED AT [facility] ON 21 BY SLP, EUGENE ALVETRO:  Patient was presented thin liquid, nectar thick liquid, and pureed foods. Method of intake was by cup and spoon. Patient was self fed and fed by the clinician. Patient was seated, upright, and in a lateral plane. The oral stage of swallowing was [facility]. The onset time of the pharyngeal swallow was adequate. Residuals in the vallecula due to inadequate epiglottic inversion were noted for all consistencies administered which minimally cleared with cued multiple swallows and a liquid chaser. No significant residuals were noted in the pyriform sinuses. Laryngeal penetration occurred prior to aspiration of thin and mildly thick liquids. Laryngeal penetration occurred in the absence of aspiration after the swallow for pureed foods due to pharyngeal residuals which remained in the laryngeal vestibule. Laryngeal penetration was mild and occurred inconsistently. An absent cough/throat clear was noted. Aspiration occurred during the swallow for thin liquid and nectar consistency liquid du to inadequate laryngeal closure. Aspiration was moderate-severe and occurred consistently. An absent cough/throat clear was noted. Aspiration occurred after the swallow for thin liquid and nectar consistency liquid due to pharyngeal residuals. Aspiration was moderate-severe and occurred inconsistently. An absent cough/throat clear was noted. Compensatory strategies that were partially beneficial included:  double swallow, multiple swallow, chin tuck, small bites/sips, and alternate solids and liquids. No structural/functional anomalies were noted. There cervical esophagus appeared adequate. Overall, the patient demonstrated a severe pharyngeal phase dysphagia including silent aspiration of most consistencies presented.   Use of compensatory strategies including chin tuck maneuver did reduce aspiration of mildly thick liquids, however, patient is still at risk due to significant residue in the vallecula with inability to clear. Significant silent aspiration still occurred with thin liquid using chin tuck maneuver. Patient was recommended to be placed NPO: Unable to safely recommend PO diet at this time due to patient's high risk of aspiration on all textures assessed. Recommend that patient, family, and medical team discuss goals of care and wishes to determine best option for nutrition/ hydration. If patient does continue to consume an oral diet, patient may benefit from a modified diet of soft/easy to chew solids and mildly thick (nectar thick) liquids with use of compensatory strategies including chin tuck maneuver and throat clear post swallow. Patient and wife were educated on above and encouraged to discuss with referring physician. No assistance needed during intake. Compensatory strategies recommended: multiple swallow, effortful swallow, chin tuck, small bites/sips, alternate solids and liquids, throat clear and no straw. Outpatient OR Home Care Skilled SLP intervention for dysphagia management is recommended 1-2 times per week to address the established treatment plan. Goals for therapy include: 1) Pt will implement identified compensatory swallowing strategies on 90% of opportunities or greater to improve airway protection and swallow function. 2) Pt will complete BOTR strength/ ROM exercises to reduce pharyngeal residuals and improve epiglottic inversion minimal verbal prompts. 3) Pt will complete laryngeal strength/ ROM therapeutic exercises to improve airway protection for the least restrictive PO diet minimal verbal prompts. 4) Pt will complete Shaker and/or Chin Tuck Against Resistance (CTAR) to increase UES relaxation diameter and increase anterior laryngeal excursion to reduce pharyngeal residuals and reduce risk of pen/asp with minimal verbal prompts.  5) Pt will complete Effortful Swallow therapeutically to target increased oral and base of tongue pressure, increased pharyngeal constrictor contractions, and increased UES relaxation duration to reduce pharyngeal residue with minimal verbal prompts. 6) Pt will complete Ailyn Maneuver therapeutically to target increased pharyngeal constrictor contractions to reduce pharyngeal residue with minimal verbal prompts. 7) Pt will practice chin down posture to target earlier laryngeal closure with minimal verbal prompts. 8) Pt will maintain adequate nutrition/hydration via PO intake of the least restrictive oral diet with implementation of safe swallow/ compensatory strategies and decrease signs/symptoms of aspiration to less than 1 x/day. 9) Pt will improve oropharyngeal swallow function to ensure airway protection during PO intake to maintain adequate nutrition/hydration and decrease signs/symptoms of aspiration to less than 1 x/day. RESULTS:    DYSPHAGIA DIAGNOSIS:   Clinical indicators of severe pharyngeal phase dysphagia as identified on MBSS completed on 11/30/21      DIET RECOMMENDATIONS:  NPO including medications to be given via alternate method. Patient is aware of the risks of aspiration and has opted to continue an oral diet. Therefor, patient is recommended to consider a modified diet of soft/easy to chew solids and mildly thick (nectar thick) liquids with use of compensatory strategies. FEEDING RECOMMENDATIONS:     Assistance level:  No assistance needed      Compensatory strategies recommended: chin tuck maneuver and throat clear post swallow if patient opts to continue oral diet. Discussed recommendations with nursing and/or faxed report to referring provider: Yes    SPEECH THERAPY  PLAN OF CARE   The dysphagia POC is established based on physician order, dysphagia diagnosis and results of clinical assessment     Skilled SLP intervention for dysphagia management 1-2 times per week for 30 minute sessions for 24 visits.     Conditions Requiring Skilled Therapeutic Intervention for dysphagia:  Throat clearing during PO intake   Wet vocal quality during PO intake    Specific dysphagia interventions to include:   Compensatory strategy training   Therapeutic exercises    Specific instructions for next treatment:  initiate instruction of therapeutic exercises  and initiate instruction of compensatory strategies    Patient Treatment Goals:    Short Term Goals:  1. Pt will implement identified compensatory swallowing strategies on 90% of opportunities or greater to improve airway protection and swallow function. 2. Pt will complete oral motor strength/ coordination exercises to improve bolus prep/ control and mastication with  minimal verbal prompts . 3. Pt will complete BOTR strength/ ROM exercises to reduce pharyngeal residuals and improve epiglottic inversion minimal verbal prompts  4. Pt will complete laryngeal strength/ ROM therapeutic exercises to improve airway protection for the least restrictive PO diet minimal verbal prompts  5. Pt will complete Shaker and/or Chin Tuck Against Resistance (CTAR) to increase UES relaxation diameter and increase anterior laryngeal excursion to reduce pharyngeal residuals and reduce risk of pen/asp with minimal verbal prompts. 6. Pt will complete Effortful Swallow therapeutically to target increased oral and base of tongue pressure, increased pharyngeal constrictor contractions, and increased UES relaxation duration to reduce pharyngeal residue with minimal verbal prompts   7. Pt will complete Ailyn Maneuver therapeutically to target increased pharyngeal constrictor contractions to reduce pharyngeal residue with minimal verbal prompts   8. Pt will practice chin down posture to target earlier laryngeal closure with minimal verbal prompts   9. Pt will participate in DPNS if deemed appropriate by the managing SLP to address functional deficits identified during swallow evaluation    Long Term Goals:  1.  Pt will maintain adequate nutrition/hydration via PO intake of the least restrictive oral diet with implementation of safe swallow/ compensatory strategies and decrease signs/symptoms of aspiration to less than 1 x/day. 2. Pt will improve oropharyngeal swallow function to ensure airway protection during PO intake to maintain adequate nutrition/hydration and decrease signs/symptoms of aspiration to less than 1 x/day. Patient/family Goal:    To be able to eat/drink safely. Plan of care discussed with Patient and Family   The Patient and Family understand(s) the diagnosis, prognosis and plan of care     Rehabilitation Potential/Prognosis: fair                    ADMITTING DIAGNOSIS: Dysphagia, unspecified [R13.10]    PATIENT REPORT/COMPLAINT: difficulty swallowing      PRIOR LEVEL OF SWALLOW FUNCTION:    PAST HISTORY OF DYSPHAGIA?: yes    Home diet: Regular consistency solids (IDDSI level 7) with  thin liquids (IDDSI level 0)    PROCEDURE:  Consistencies Administered During the Evaluation   Liquids: thin liquid   Solids:  DNT      Method of Intake:   cup  Self fed      Position:   Seated, upright    CLINICAL ASSESSMENT:  Oral Stage: The oral stage of swallowing was within functional limits for consistencies administered      Pharyngeal Stage:    · Throat clearing present after presentation of thin liquid  · Wet/gurgly vocal quality was noted after presentation of thin liquid  · Delayed initiation of the pharyngeal swallow noted    Cognition:   Within functional limits for this exam    Oral Peripheral Examination   Generalized oral weakness    Current Respiratory Status    room air during the day and 2.5-3 liters of oxygen at night. Parameters of Speech Production  Respiration:  Adequate for speech production  Quality:   Hoarse  Intensity: Within functional limits    Volitional Swallow: DNT     Volitional Cough:   DNT    Pain: No pain reported.       EDUCATION:   The Speech Language Pathologist (SLP) completed education regarding results of evaluation and that intervention is warranted at this time. Learner: Patient and wife  Education: Reviewed results and recommendations of this evaluation  Evaluation of Education:  Verbalizes understanding    This plan may be re-evaluated and revised as warranted. Treatment goals discussed with Patient and Family   The Patient and Family understand(s) the diagnosis, prognosis and plan of care     Evaluation Time includes thorough review of current medical information, gathering information on past medical history/social history and prior level of function, completion of standardized testing/informal observation of tasks, assessment of data and education on plan of care and goals. CPT Codes    Evaluation:   44270  bedside swallow eval    Treatment:   76892  dysphagia tx  59089  oral motor exercises (15 min)  86683  neuromuscular re-education,  DPNS      The admitting diagnosis and active problem list, as listed below have been reviewed prior to initiation of this evaluation. ACTIVE PROBLEM LIST:   Patient Active Problem List   Diagnosis    [facility]VD [facility]peripheral vascular disease) [facility]    S/P AAA repair using bifurcation graft    COPD (chronic obstructive pulmonary disease) ([facility]    Squamous cell carcinoma of hypopharynx (HCC)    Severe protein-calorie malnutrition (HCC)    Paroxysmal atrial fibrillation ([facility]    Acute respiratory failure with hypoxemia (HCC)    Dysphagia    Benign essential hypertension    Osteoarthrosis       Patti Swift M.S., CCC-SLP/L  Speech-Language Pathologist      [address]     Phone: [phone]     If you have any questions or concerns, please don't hesitate to call. Thank you for your referral.    Physician/Provider Signature:________________________________Date:__________________  By signing above, the therapists plan is approved by the physician/provider. All patients under [facility] must be signed by physician/provider.

## 2022-02-15 ENCOUNTER — HOSPITAL ENCOUNTER (OUTPATIENT)
Dept: SPEECH THERAPY | Age: 72
Setting detail: THERAPIES SERIES
Discharge: HOME | End: 2022-02-15
Payer: MEDICARE

## 2022-02-15 PROCEDURE — 92526 ORAL FUNCTION THERAPY: CPT

## 2022-02-15 NOTE — PROGRESS NOTES
Speech Language Pathology  Patient was seen for dysphagia therapy. Temperature was taken prior to the session. [facility] COVID-19 screening questionnaire was negative. Mask was worn by SLP. Patient wore a mask. Student SLP was present with a mask. Student SLP provided therapy under the managing SLP's supervision. The following was targeted: An oral motor exercise program was reviewed with Harriet Hollingsworth. Each exercise was modeled for the patient. Jagjit Hollingsworth completed excellent return demonstration of each exercise. Lingual strength, endurance, and range of motion was rated as good. Labial strength, endurance, and range of motion was rated as good. Laryngeal strength, endurance, and range of motion was rated as fair. Chin Tuck Against Resistance (CTAR) exercises were completed to strengthen the suprahyoid muscles used in swallowing. The suprahyoid muscles impact the opening of the upper esophageal sphincter. A green foam block was provided by the SLP for the patient to use at home. Isometric exercises were introduced first.  Patient was instructed to sit upright and pull shoulders back. The foam block is to be held under the chin with a hand and kept in position during the exercise. A sustained chin tuck against the foam block for duration of 60 seconds is targeted. The patient was recommended to repeat 3 sets with a 1 minute rest in between each isometric exercise. Patient demonstrated appropriate return demonstration of these exercises. No pain was reported. Patient was instructed to discontinue the exercises if any pain or discomfort was noted. Continue plan of care. Treatment plan and goals can be found in the initial evaluation/progress report.     Vinod Guerra   Clinician  [facility]    CPT CODE:       99204  dysphagia tx

## 2022-02-18 ENCOUNTER — HOSPITAL ENCOUNTER (OUTPATIENT)
Dept: SPEECH THERAPY | Age: 72
Setting detail: THERAPIES SERIES
Discharge: HOME | End: 2022-02-18
Payer: MEDICARE

## 2022-02-18 PROCEDURE — 92526 ORAL FUNCTION THERAPY: CPT

## 2022-02-18 NOTE — PROGRESS NOTES
Patient was seen for dysphagia therapy. Temperature was taken prior to the session. [facility] COVID-19 screening questionnaire was negative. Mask was worn by SLP. Patient wore a mask. The following was targeted: An oral motor exercise program was reviewed with Jean Hollingsworth. Each exercise was modeled for the patient. Jagjit Hollingsworth completed good return demonstration of each exercise. Lingual strength, endurance, and range of motion was rated as fair. Labial strength, endurance, and range of motion was rated as good. Laryngeal strength, endurance, and range of motion was rated as fair. Chin Tuck Against Resistance (CTAR) exercises were completed to strengthen the suprahyoid muscles used in swallowing. The suprahyoid muscles impact the opening of the upper esophageal sphincter. A green foam block was provided by the SLP for the patient to use at home. Isometric exercises were introduced first.  Patient was instructed to sit upright and pull shoulders back. The foam block is to be held under the chin with a hand and kept in position during the exercise. A sustained chin tuck against the foam block for duration of 60 seconds is targeted. The patient was recommended to repeat 3 sets with a 1 minute rest in between each isometric exercise. The patient then completes isokinetic or repetitive CTAR exercises in the same manner. Patient is to complete 30 chin tucks against resistance followed by a one minute rest break. Patient demonstrated appropriate return demonstration of these exercises. No pain was reported. Patient was instructed to discontinue the exercises if any pain or discomfort was noted. Continue plan of care. Treatment plan and goals can be found in the initial evaluation/progress report. Patti Luis M.S., JOSEPH-SLP/L  Speech-Language Pathologist    CPT CODE:       97959  dysphagia tx
80

## 2022-02-22 ENCOUNTER — HOSPITAL ENCOUNTER (OUTPATIENT)
Dept: SPEECH THERAPY | Age: 72
Setting detail: THERAPIES SERIES
Discharge: HOME | End: 2022-02-22
Payer: MEDICARE

## 2022-02-22 PROCEDURE — 92526 ORAL FUNCTION THERAPY: CPT

## 2022-02-22 NOTE — PROGRESS NOTES
Patient was seen for dysphagia therapy. Temperature was taken prior to the session. [facility] COVID-19 screening questionnaire was negative. Mask was worn by SLP and student SLP. Patient wore a mask. Student SLP conducted the therapy session under the supervision of the SLP. The following was targeted: An oral motor exercise program was reviewed with Yessenia Hollingsworth. Each exercise was modeled for the patient. Jagjit Hollingsworth completed good return demonstration of each exercise. Lingual strength, endurance, and range of motion was rated as fair. Labial strength, endurance, and range of motion was rated as good. Laryngeal strength, endurance, and range of motion was rated as fair. Chin Tuck Against Resistance (CTAR) exercises were completed to strengthen the suprahyoid muscles used in swallowing. The suprahyoid muscles impact the opening of the upper esophageal sphincter. A green foam block was provided by the SLP for the patient to use at home. Isometric exercises were introduced first.  Patient was instructed to sit upright and pull shoulders back. The foam block is to be held under the chin with a hand and kept in position during the exercise. A sustained chin tuck against the foam block for duration of 60 seconds is targeted. The patient was recommended to repeat 3 sets with a 1 minute rest in between each isometric exercise. The patient then completes isokinetic or repetitive CTAR exercises in the same manner. Patient is to complete 30 chin tucks against resistance followed by a one minute rest break. Patient demonstrated appropriate return demonstration of these exercises. No pain was reported. Patient was instructed to discontinue the exercises if any pain or discomfort was noted. Tongue Base Retraction against resistance was instructed and practiced.  In this exercise the SLP used a piece of gauze to apply resistance against the patient's tongue as he moved it forward and backwards. Patient was instructed to complete 3 sets of 10 repetitions each once a day. Continue plan of care. Treatment plan and goals can be found in the initial evaluation/progress report.      Analy Johnson   Clinician    [facility]  CPT CODE:       10895  dysphagia tx

## 2022-02-25 ENCOUNTER — HOSPITAL ENCOUNTER (OUTPATIENT)
Dept: SPEECH THERAPY | Age: 72
Setting detail: THERAPIES SERIES
Discharge: HOME | End: 2022-02-25
Payer: MEDICARE

## 2022-02-25 PROCEDURE — 92526 ORAL FUNCTION THERAPY: CPT

## 2022-03-01 ENCOUNTER — HOSPITAL ENCOUNTER (OUTPATIENT)
Dept: SPEECH THERAPY | Age: 72
Setting detail: THERAPIES SERIES
Discharge: HOME | End: 2022-03-01
Payer: MEDICARE

## 2022-03-01 PROCEDURE — 92526 ORAL FUNCTION THERAPY: CPT

## 2022-03-01 NOTE — PROGRESS NOTES
Patient was seen for dysphagia therapy. Temperature was taken prior to the session. Temperature was [facility]. COVID-19 screening questionnaire was negative. Mask and goggles were worn by SLP. Patient wore a mask. The following was targeted:    Yolanda Murray was seen for dysphagia therapy today. Deep pharyngeal neuromuscular stimulation treatment was provided. Patient completed 75 exercises during today's session. Overall strength, endurance, and range of motion was rated as fair. Responses to each exercise will be indicated below.  Bilateral soft palate glide technique resulted in palatal reflex triggering and velopharyngeal closure. Responses were noted 100% of the time.  Triple soft palate glide technique resulted in palatal reflex triggering and velopharyngeal closure. Responses were noted 100% of the time.  Lingual glide technique resulted in laryngeal elevation reflex, tongue base retraction and reflexive lingual groove approximately 33% of the time.  Lateral lingual stimulation resulted in laryngeal elevation reflex, tongue base retraction, and reflexive lingual groove. Responses noted 33% of the time.  Lingual septum stimulation resulted in laryngeal elevation reflex and tongue base retraction. Lingual groove was noted. Response was noted 33% of the time.  Bilateral posterior pharyngeal constrictor stimulation resulted in tongue base retraction, palatal reflex and velopharyngeal closure noted approximately 100% of the time.  Tongue base retraction stimulation resulted in laryngeal elevation reflex, tongue base retraction and reflexive lingual groove approximately 33% of the time.  Uvula stimulation resulted in palatal trigger reflex and velopharyngeal closure. Responses were noted 100% of the time.  The nasal spine stimulation technique resulted in palatal trigger reflex and velopharyngeal closure. Responses were noted 100% of the time.    Lastly, the lateral pharyngeal stimulation technique demonstrated palatal trigger reflex and velopharyngeal closure. Responses were noted 100% of the time. Spontaneous swallow was noted greater than 33% of the time following each probe. Nasal sinus drainage was not present. Increased saliva production was noted. Patient demonstrated a yellowish, green, and black coating on the back of the tongue. Patient felt that it was from eating blueberries. Tongue base retraction exercises were completed. Patient demonstrated fair strength, endurance, and range of motion. Continue plan of care. Treatment plan and goals can be found in the initial evaluation/progress report. Patti Huston M.S., JOSEPH-SLP/L  Speech-Language Pathologist    CPT CODE:       68616  dysphagia tx

## 2022-03-04 ENCOUNTER — HOSPITAL ENCOUNTER (OUTPATIENT)
Dept: SPEECH THERAPY | Age: 72
Setting detail: THERAPIES SERIES
Discharge: HOME | End: 2022-03-04
Payer: MEDICARE

## 2022-03-04 PROCEDURE — 92526 ORAL FUNCTION THERAPY: CPT

## 2022-03-04 NOTE — PROGRESS NOTES
Patient was seen for dysphagia therapy. Temperature was taken prior to the session. Temperature was [facility]. COVID-19 screening questionnaire was negative. Mask and goggles were worn by SLP. Patient wore a mask when appropriate. The following was targeted:    Williams Hardwick was seen for dysphagia therapy today. Deep pharyngeal neuromuscular stimulation treatment was provided. Patient completed 75 exercises during today's session. Overall strength, endurance, and range of motion was rated as fair. Responses to each exercise will be indicated below.  Bilateral soft palate glide technique resulted in palatal reflex triggering and velopharyngeal closure. Responses were noted 100% of the time.  Triple soft palate glide technique resulted in palatal reflex triggering and velopharyngeal closure. Responses were noted 100% of the time.  Lingual glide technique resulted in laryngeal elevation reflex, tongue base retraction and reflexive lingual groove approximately 33% of the time.  Lateral lingual stimulation resulted in laryngeal elevation reflex, tongue base retraction, and reflexive lingual groove. Responses noted 33% of the time.  Lingual septum stimulation resulted in laryngeal elevation reflex and tongue base retraction. Lingual groove was noted. Response was noted 33% of the time.  Bilateral posterior pharyngeal constrictor stimulation resulted in tongue base retraction, palatal reflex and velopharyngeal closure noted approximately 100% of the time.  Tongue base retraction stimulation resulted in laryngeal elevation reflex, tongue base retraction and reflexive lingual groove approximately 33% of the time.  Uvula stimulation resulted in palatal trigger reflex and velopharyngeal closure. Responses were noted 100% of the time.  The nasal spine stimulation technique resulted in palatal trigger reflex and velopharyngeal closure. Responses were noted 100% of the time.    Lastly, the lateral pharyngeal stimulation technique demonstrated palatal trigger reflex and velopharyngeal closure. Responses were noted 100% of the time. Spontaneous swallow was noted greater than 33% of the time following each probe. Nasal sinus drainage was not present. Increased saliva production was noted. Patient demonstrated a yellowish, green, and black coating on the back of the tongue. Increased speed and strength noted in responses to DPNS. Tongue base retraction exercises were completed. Patient demonstrated fair strength, endurance, and range of motion. Lingual exercises completed with improved strength, endurance, and range of motion noted. Continue plan of care. Treatment plan and goals can be found in the initial evaluation/progress report. Patti Huston M.S., JOSEPH-SLP/L  Speech-Language Pathologist    CPT CODE:       09123  dysphagia tx

## 2022-03-08 ENCOUNTER — HOSPITAL ENCOUNTER (OUTPATIENT)
Dept: SPEECH THERAPY | Age: 72
Setting detail: THERAPIES SERIES
Discharge: HOME | End: 2022-03-08
Payer: MEDICARE

## 2022-03-08 PROCEDURE — 92526 ORAL FUNCTION THERAPY: CPT

## 2022-03-11 ENCOUNTER — HOSPITAL ENCOUNTER (OUTPATIENT)
Dept: SPEECH THERAPY | Age: 72
Setting detail: THERAPIES SERIES
Discharge: HOME | End: 2022-03-11
Payer: MEDICARE

## 2022-03-11 PROCEDURE — 92526 ORAL FUNCTION THERAPY: CPT

## 2022-03-11 NOTE — PROGRESS NOTES
Patient was seen for dysphagia therapy. Temperature was taken prior to the session. Temperature was [facility]. COVID-19 screening questionnaire was negative. Mask and goggles were worn by SLP. Patient wore a mask when appropriate. The following was targeted:    Williams Hardwick was seen for dysphagia therapy today. Deep pharyngeal neuromuscular stimulation treatment was provided. Patient completed 75 exercises during today's session. Overall strength, endurance, and range of motion was rated as fair-good. Responses to each exercise will be indicated below.  Bilateral soft palate glide technique resulted in palatal reflex triggering and velopharyngeal closure. Responses were noted 100% of the time.  Triple soft palate glide technique resulted in palatal reflex triggering and velopharyngeal closure. Responses were noted 100% of the time.  Lingual glide technique resulted in laryngeal elevation reflex, tongue base retraction and reflexive lingual groove approximately 66% of the time.  Lateral lingual stimulation resulted in laryngeal elevation reflex, tongue base retraction, and reflexive lingual groove. Responses noted 100% of the time.  Lingual septum stimulation resulted in laryngeal elevation reflex and tongue base retraction. Lingual groove was noted. Response was noted 33% of the time.  Bilateral posterior pharyngeal constrictor stimulation resulted in tongue base retraction, palatal reflex and velopharyngeal closure noted approximately 100% of the time.  Tongue base retraction stimulation resulted in laryngeal elevation reflex, tongue base retraction and reflexive lingual groove approximately 33% of the time.  Uvula stimulation resulted in palatal trigger reflex and velopharyngeal closure. Responses were noted 100% of the time.  The nasal spine stimulation technique resulted in palatal trigger reflex and velopharyngeal closure. Responses were noted 100% of the time.    Lastly, the lateral pharyngeal stimulation technique demonstrated palatal trigger reflex and velopharyngeal closure. Responses were noted 100% of the time. Spontaneous swallow was noted greater than 33% of the time following each probe. Nasal sinus drainage was not present. Increased saliva production was noted. Patient demonstrated a yellowish, green, and black coating on the back of the tongue. Increased speed and strength noted in responses to DPNS. Tongue base retraction exercises were completed. Patient demonstrated fair strength, endurance, and range of motion. Lingual exercises completed with improved strength, endurance, and range of motion noted. Continue plan of care. Treatment plan and goals can be found in the initial evaluation/progress report. Patti Brown M.S., [facility]-SLP/L  Speech-Language Pathologist    CPT CODE:       87282  dysphagia tx

## 2022-03-15 ENCOUNTER — HOSPITAL ENCOUNTER (OUTPATIENT)
Dept: SPEECH THERAPY | Age: 72
Setting detail: THERAPIES SERIES
Discharge: HOME | End: 2022-03-15
Payer: MEDICARE

## 2022-03-15 PROCEDURE — 92526 ORAL FUNCTION THERAPY: CPT

## 2022-03-15 NOTE — PROGRESS NOTES
Patient was seen for dysphagia therapy. Temperature was taken prior to the session. Temperature was [facility]. COVID-19 screening questionnaire was negative. Mask was worn by SLP and student SLP. Patient wore a mask. Student SLP conducted the therapy session under the supervision of the SLP. The following was targeted: An oral motor exercise program was reviewed with Mahnaz Hollingsworth. Each exercise was modeled for the patient. Jagjit Hollingsworth completed good return demonstration of each exercise. Lingual strength, endurance, and range of motion was rated as fair. Labial strength, endurance, and range of motion was rated as good. Lingual strength, endurance, and range of motion was rated as good. Laryngeal strength, endurance, and range of motion was rated as good. Chin Tuck Against Resistance (CTAR) exercises were completed to strengthen the suprahyoid muscles used in swallowing. The suprahyoid muscles impact the opening of the upper esophageal sphincter. A green foam block was provided by the SLP for the patient to use at home. Isometric exercises were introduced first.  Patient was instructed to sit upright and pull shoulders back. The foam block is to be held under the chin with a hand and kept in position during the exercise. A sustained chin tuck against the foam block for duration of 60 seconds is targeted. The patient was recommended to repeat 3 sets with a 1 minute rest in between each isometric exercise. The patient then completes isokinetic or repetitive CTAR exercises in the same manner. Patient is to complete 30 chin tucks against resistance followed by a one minute rest break. Patient demonstrated appropriate return demonstration of these exercises. No pain was reported. Patient was instructed to discontinue the exercises if any pain or discomfort was noted. Tongue base retraction exercises were completed. Patient demonstrated fair strength, endurance, and range of motion. Continue plan of care. Treatment plan and goals can be found in the initial evaluation/progress report.      Sara Anne   Clinician  [facility]    CPT CODE:       [id number]  dysphagia tx

## 2022-03-18 ENCOUNTER — HOSPITAL ENCOUNTER (OUTPATIENT)
Dept: SPEECH THERAPY | Age: 72
Setting detail: THERAPIES SERIES
Discharge: HOME | End: 2022-03-18
Payer: MEDICARE

## 2022-03-18 PROCEDURE — 92526 ORAL FUNCTION THERAPY: CPT

## 2022-03-18 NOTE — PROGRESS NOTES
Patient was seen for dysphagia therapy. Temperature was taken prior to the session. Temperature was [facility]. COVID-19 screening questionnaire was negative. Mask and goggles were worn by SLP. Patient wore a mask when appropriate. The following was targeted:    Juliana Medina was seen for dysphagia therapy today. Deep pharyngeal neuromuscular stimulation treatment was provided. Patient completed 75 exercises during today's session. Overall strength, endurance, and range of motion was rated as fair-good. Responses to each exercise will be indicated below.  Bilateral soft palate glide technique resulted in palatal reflex triggering and velopharyngeal closure. Responses were noted 100% of the time.  Triple soft palate glide technique resulted in palatal reflex triggering and velopharyngeal closure. Responses were noted 100% of the time.  Lingual glide technique resulted in laryngeal elevation reflex, tongue base retraction and reflexive lingual groove approximately 66% of the time.  Lateral lingual stimulation resulted in laryngeal elevation reflex, tongue base retraction, and reflexive lingual groove. Responses noted 66% of the time.  Lingual septum stimulation resulted in laryngeal elevation reflex and tongue base retraction. Lingual groove was noted. Response was noted 33% of the time.  Bilateral posterior pharyngeal constrictor stimulation resulted in tongue base retraction, palatal reflex and velopharyngeal closure noted approximately 100% of the time.  Tongue base retraction stimulation resulted in laryngeal elevation reflex, tongue base retraction and reflexive lingual groove approximately 33% of the time.  Uvula stimulation resulted in palatal trigger reflex and velopharyngeal closure. Responses were noted 100% of the time.  The nasal spine stimulation technique resulted in palatal trigger reflex and velopharyngeal closure. Responses were noted 100% of the time.    Lastly, the lateral pharyngeal stimulation technique demonstrated palatal trigger reflex and velopharyngeal closure. Responses were noted 100% of the time. Spontaneous swallow was noted greater than 33% of the time following each probe. Nasal sinus drainage was not present. Increased saliva production was noted. Patient demonstrated a yellowish, green, and black coating on the back of the tongue. Increased speed and strength noted in responses to DPNS. Tongue base retraction exercises were completed. Patient demonstrated poor strength, endurance, and range of motion. Lingual exercises completed with improved strength, endurance, and range of motion noted. Continue plan of care. Treatment plan and goals can be found in the initial evaluation/progress report. Patti Matias M.S., JOSEPH-SLP/L  Speech-Language Pathologist    CPT CODE:       [id number]  dysphagia tx

## 2022-03-22 ENCOUNTER — HOSPITAL ENCOUNTER (OUTPATIENT)
Dept: SPEECH THERAPY | Age: 72
Setting detail: THERAPIES SERIES
Discharge: HOME | End: 2022-03-22
Payer: MEDICARE

## 2022-03-22 PROCEDURE — 92526 ORAL FUNCTION THERAPY: CPT

## 2022-03-22 NOTE — PROGRESS NOTES
Patient was seen for dysphagia therapy. Temperature was taken prior to the session. Temperature was [facility]. COVID-19 screening questionnaire was negative. Mask and goggles were worn by SLP. Patient wore a mask when appropriate. The following was targeted:    Radha Robins was seen for dysphagia therapy today. Deep pharyngeal neuromuscular stimulation treatment was provided. Patient completed 75 exercises during today's session. Overall strength, endurance, and range of motion was rated as fair-good. Responses to each exercise will be indicated below.  Bilateral soft palate glide technique resulted in palatal reflex triggering and velopharyngeal closure. Responses were noted 100% of the time.  Triple soft palate glide technique resulted in palatal reflex triggering and velopharyngeal closure. Responses were noted 100% of the time.  Lingual glide technique resulted in laryngeal elevation reflex, tongue base retraction and reflexive lingual groove approximately 66% of the time.  Lateral lingual stimulation resulted in laryngeal elevation reflex, tongue base retraction, and reflexive lingual groove. Responses noted 66% of the time.  Lingual septum stimulation resulted in laryngeal elevation reflex and tongue base retraction. Lingual groove was noted. Response was noted 33-66% of the time.  Bilateral posterior pharyngeal constrictor stimulation resulted in tongue base retraction, palatal reflex and velopharyngeal closure noted approximately 100% of the time.  Tongue base retraction stimulation resulted in laryngeal elevation reflex, tongue base retraction and reflexive lingual groove approximately 33% of the time.  Uvula stimulation resulted in palatal trigger reflex and velopharyngeal closure. Responses were noted 100% of the time.  The nasal spine stimulation technique resulted in palatal trigger reflex and velopharyngeal closure. Responses were noted 100% of the time.    Lastly, the lateral pharyngeal stimulation technique demonstrated palatal trigger reflex and velopharyngeal closure. Responses were noted 100% of the time. Spontaneous swallow was noted greater than 33% of the time following each probe. Nasal sinus drainage was not present. Increased saliva production was noted. Patient demonstrated a yellowish, green, and black coating on the back of the tongue. Increased speed and strength noted in responses to DPNS. Tongue base retraction exercises were completed. Patient demonstrated poor strength, endurance, and range of motion. Lingual exercises completed with improved strength, endurance, and range of motion noted. Patient completed three Ailyn exercises with good laryngeal lifting and excursion. Continue plan of care. Treatment plan and goals can be found in the initial evaluation/progress report. Patti Ha M.S., JOSEPH-SLP/L  Speech-Language Pathologist    CPT CODE:       [id number]  dysphagia tx

## 2022-03-25 ENCOUNTER — HOSPITAL ENCOUNTER (OUTPATIENT)
Dept: SPEECH THERAPY | Age: 72
Setting detail: THERAPIES SERIES
Discharge: HOME | End: 2022-03-25
Payer: MEDICARE

## 2022-03-25 PROCEDURE — 92526 ORAL FUNCTION THERAPY: CPT

## 2022-03-25 NOTE — PROGRESS NOTES
Patient was seen for dysphagia therapy. Temperature was taken prior to the session. Temperature was [facility]. COVID-19 screening questionnaire was negative. Mask and goggles were worn by SLP. Patient wore a mask when appropriate. The following was targeted:    Rusty Carter was seen for dysphagia therapy today. Deep pharyngeal neuromuscular stimulation treatment was provided. Patient completed 75 exercises during today's session. Overall strength, endurance, and range of motion was rated as fair-good. Responses to each exercise will be indicated below.  Bilateral soft palate glide technique resulted in palatal reflex triggering and velopharyngeal closure. Responses were noted 100% of the time.  Triple soft palate glide technique resulted in palatal reflex triggering and velopharyngeal closure. Responses were noted 100% of the time.  Lingual glide technique resulted in laryngeal elevation reflex, tongue base retraction and reflexive lingual groove approximately 66% of the time.  Lateral lingual stimulation resulted in laryngeal elevation reflex, tongue base retraction, and reflexive lingual groove. Responses noted 66% of the time.  Lingual septum stimulation resulted in laryngeal elevation reflex and tongue base retraction. Lingual groove was noted. Response was noted 33-66% of the time.  Bilateral posterior pharyngeal constrictor stimulation resulted in tongue base retraction, palatal reflex and velopharyngeal closure noted approximately 100% of the time.  Tongue base retraction stimulation resulted in laryngeal elevation reflex, tongue base retraction and reflexive lingual groove approximately 33% of the time.  Uvula stimulation resulted in palatal trigger reflex and velopharyngeal closure. Responses were noted 100% of the time.  The nasal spine stimulation technique resulted in palatal trigger reflex and velopharyngeal closure. Responses were noted 100% of the time.    Lastly, the lateral pharyngeal stimulation technique demonstrated palatal trigger reflex and velopharyngeal closure. Responses were noted 100% of the time. Spontaneous swallow was noted greater than 33% of the time following each probe. Nasal sinus drainage was not present. Increased saliva production was noted. Patient demonstrated a yellowish, green, and black coating on the back of the tongue. Tongue base retraction exercises were completed. Patient demonstrated poor strength, endurance, and range of motion. Motor planning requires moderate cues. Continue plan of care. Treatment plan and goals can be found in the initial evaluation/progress report. Patti Brown M.S., JOSEPH-SLP/L  Speech-Language Pathologist    CPT CODE:       [id number]  dysphagia tx

## 2022-03-29 ENCOUNTER — HOSPITAL ENCOUNTER (OUTPATIENT)
Dept: SPEECH THERAPY | Age: 72
Setting detail: THERAPIES SERIES
Discharge: HOME | End: 2022-03-29
Payer: MEDICARE

## 2022-03-29 PROCEDURE — 92526 ORAL FUNCTION THERAPY: CPT

## 2022-03-29 NOTE — PROGRESS NOTES
Patient was seen for dysphagia therapy. Temperature was taken prior to the session. [facility] COVID-19 screening questionnaire was negative. Mask was worn by SLP and student SLP. Patient wore a mask. Student SLP conducted the therapy session under the supervision of the SLP. The following was targeted: An oral motor exercise program was reviewed with Vinny Hollingsworth. Each exercise was modeled for the patient. Jagjit Hollingsworth completed good return demonstration of each exercise. Lingual strength, endurance, and range of motion was rated as fair. Labial strength, endurance, and range of motion was rated as good. Lingual strength, endurance, and range of motion was rated as good. Laryngeal strength, endurance, and range of motion was rated as good. Chin Tuck Against Resistance (CTAR) exercises were completed to strengthen the suprahyoid muscles used in swallowing. The suprahyoid muscles impact the opening of the upper esophageal sphincter. A green foam block was provided by the SLP for the patient to use at home. Isometric exercises were introduced first.  Patient was instructed to sit upright and pull shoulders back. The foam block is to be held under the chin with a hand and kept in position during the exercise. A sustained chin tuck against the foam block for duration of 60 seconds is targeted. The patient was recommended to repeat 3 sets with a 1 minute rest in between each isometric exercise. The patient then completes isokinetic or repetitive CTAR exercises in the same manner. Patient is to complete 30 chin tucks against resistance followed by a one minute rest break. Patient demonstrated appropriate return demonstration of these exercises. No pain was reported. Patient was instructed to discontinue the exercises if any pain or discomfort was noted. Tongue base retraction exercises were completed. Patient demonstrated fair strength, endurance, and range of motion. Managing SLP contacted the referring physician's office to request a script for repeat MBSS. Continue plan of care. Treatment plan and goals can be found in the initial evaluation/progress report.      Benedicto Morgan   Clinician  [facility]    CPT CODE:       [id number]  dysphagia tx

## 2022-04-01 ENCOUNTER — HOSPITAL ENCOUNTER (OUTPATIENT)
Dept: SPEECH THERAPY | Age: 72
Setting detail: THERAPIES SERIES
Discharge: HOME | End: 2022-04-01
Payer: MEDICARE

## 2022-04-01 PROCEDURE — 92526 ORAL FUNCTION THERAPY: CPT

## 2022-04-01 NOTE — PROGRESS NOTES
Patient was seen for dysphagia therapy. Temperature was taken prior to the session. Temperature was [facility]. COVID-19 screening questionnaire was negative. Mask and goggles were worn by SLP. Patient wore a mask when appropriate. The following was targeted:    Erasmo Walker was seen for dysphagia therapy today. Deep pharyngeal neuromuscular stimulation treatment was provided. Patient completed 75 exercises during today's session. Overall strength, endurance, and range of motion was rated as fair-good. Responses to each exercise will be indicated below.  Bilateral soft palate glide technique resulted in palatal reflex triggering and velopharyngeal closure. Responses were noted 100% of the time.  Triple soft palate glide technique resulted in palatal reflex triggering and velopharyngeal closure. Responses were noted 100% of the time.  Lingual glide technique resulted in laryngeal elevation reflex, tongue base retraction and reflexive lingual groove approximately 66% of the time.  Lateral lingual stimulation resulted in laryngeal elevation reflex, tongue base retraction, and reflexive lingual groove. Responses noted 66% of the time.  Lingual septum stimulation resulted in laryngeal elevation reflex and tongue base retraction. Lingual groove was noted. Response was noted 33-66% of the time.  Bilateral posterior pharyngeal constrictor stimulation resulted in tongue base retraction, palatal reflex and velopharyngeal closure noted approximately 100% of the time.  Tongue base retraction stimulation resulted in laryngeal elevation reflex, tongue base retraction and reflexive lingual groove approximately 33% of the time.  Uvula stimulation resulted in palatal trigger reflex and velopharyngeal closure. Responses were noted 100% of the time.  The nasal spine stimulation technique resulted in palatal trigger reflex and velopharyngeal closure. Responses were noted 100% of the time.    Lastly, the lateral pharyngeal stimulation technique demonstrated palatal trigger reflex and velopharyngeal closure. Responses were noted 100% of the time. Spontaneous swallow was noted greater than 33% of the time following each probe. Nasal sinus drainage was not present. Increased saliva production was noted. Patient demonstrated a yellowish, green, and black coating on the back of the tongue. Tongue base retraction exercises were completed. Patient demonstrated poor strength, endurance, and range of motion. Tongue strengthening exercises demonstrated good strength, endurance, and range of motion. Patient completed 4 Ailyn exercises with good laryngeal lifting and excursion. Patient's MBSS was rescheduled to 4/7/21 at 8:30 at [facility]. Continue plan of care. Treatment plan and goals can be found in the initial evaluation/progress report. Patti Arechiga M.S., CCC-SLP/L  Speech-Language Pathologist    CPT CODE:       [id number]  dysphagia tx

## 2022-04-05 ENCOUNTER — HOSPITAL ENCOUNTER (OUTPATIENT)
Dept: SPEECH THERAPY | Age: 72
Setting detail: THERAPIES SERIES
Discharge: HOME | End: 2022-04-05
Payer: MEDICARE

## 2022-04-05 PROCEDURE — 92526 ORAL FUNCTION THERAPY: CPT

## 2022-04-07 ENCOUNTER — HOSPITAL ENCOUNTER (OUTPATIENT)
Dept: GENERAL RADIOLOGY | Age: 72
Discharge: HOME | End: 2022-04-09
Payer: MEDICARE

## 2022-04-07 DIAGNOSIS — R13.10 DYSPHAGIA, UNSPECIFIED TYPE: ICD-10-CM

## 2022-04-07 PROCEDURE — 92611 MOTION FLUOROSCOPY/SWALLOW: CPT

## 2022-04-07 PROCEDURE — 74230 X-RAY XM SWLNG FUNCJ C+: CPT

## 2022-04-07 PROCEDURE — 2500000003 HC RX 250 WO HCPCS: Performed by: RADIOLOGY

## 2022-04-07 RX ADMIN — BARIUM SULFATE 15 ML: 0.81 POWDER, FOR SUSPENSION ORAL at 08:33

## 2022-04-07 RX ADMIN — BARIUM SULFATE 15 ML: 400 PASTE ORAL at 08:33

## 2022-04-07 RX ADMIN — BARIUM SULFATE 15 ML: 400 SUSPENSION ORAL at 08:33

## 2022-04-07 NOTE — PROGRESS NOTES
Pt may benefit from occupational therapy focusing on head and neck lymphedema treatment to improve oropharyngeal swallow function. Full MBSS report to follow.      Haritha Sawant M.S., [address] Pathologist  [id number]  4/7/2022

## 2022-04-07 NOTE — PROGRESS NOTES
SPEECH/LANGUAGE PATHOLOGY  VIDEOFLUOROSCOPIC STUDY OF SWALLOWING (MBS)   and PLAN OF CARE    PATIENT NAME:  Sonia Jauregui  (male)     MRN:  [id number]    :  1950  (66 y.o.)  STATUS:  Outpatient    TODAY'S DATE:  2022  REFERRING PROVIDER:   Dr. 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 Currently in therapy for dysphagia   EVALUATING THERAPIST: ZULY Epps      RESULTS:      DYSPHAGIA DIAGNOSIS:  severe  pharyngeal phase dysphagia     DIET RECOMMENDATIONS:  NPO      FEEDING RECOMMENDATIONS:    Assistance level:  Not applicable     Compensatory strategies recommended: Not applicable     Discussed recommendations with nursing and/or faxed report to referring provider: Yes      SPEECH THERAPY  PLAN OF CARE   The dysphagia POC is established based on physician order and dysphagia diagnosis    Therapy at the discretion of facility/treating Speech Pathologist       Conditions Requiring Skilled Therapeutic Intervention for dysphagia:    Reduced pharyngeal clearing of the bolus  Reduced laryngeal closure resulting in penetration  Reduced laryngeal closure resulting in aspiration     SPECIFIC DYSPHAGIA INTERVENTIONS TO INCLUDE:     Therapeutic exercises    Specific instructions for next treatment:  initiate instruction of therapeutic exercises   Treatment Goals:    Short Term Goals:  Pt will complete BOTR strength/ ROM exercises to reduce pharyngeal residuals and improve epiglottic inversion minimal verbal prompts  Pt will complete laryngeal strength/ ROM therapeutic exercises to improve airway protection for the least restrictive PO diet minimal verbal prompts  Pt will complete Shaker and/or Chin Tuck Against Resistance (CTAR) to increase UES relaxation diameter and increase anterior laryngeal excursion to reduce pharyngeal residuals and reduce risk of pen/asp with minimal verbal prompts.    Pt will participate in DPNS to address functional deficits identified during swallow evaluation    Long Term Goals:   Pt will improve oropharyngeal swallow function to ensure airway protection during PO intake to maintain adequate nutrition/hydration and decrease signs/symptoms of aspiration to less than 1 x/day. OTHER RECOMMENDATIONS:   Occupational therapy consult for lymphedema therapy to improve swallowing function     Patient/family Goal:    To be able to [facility] discussed with Patient and Family   The Patient and Family understand(s) the diagnosis, prognosis and plan of care     Rehabilitation Potential/Prognosis: good                      ADMITTING DIAGNOSIS: Dysphagia, unspecified type [R13.10]     VISIT DIAGNOSIS:         PATIENT REPORT/COMPLAINT: thickened liquids were recommended however pt reports he has not been following this recommendation.    He does participate with swallowing therapy as an outpatient    PRIOR LEVEL OF SWALLOW FUNCTION:    Past History of Dysphagia?:  yes    Current Diet Order:  No diet orders on file    PROCEDURE:  Consistencies Administered During the Evaluation   Liquids: thin liquid, nectar thick liquid and honey thick liquid   Solids:  pureed foods      Method of Intake:   cup, spoon  Self fed, Fed by clinician      Position:   Seated, upright, Lateral plane    INSTRUMENTAL ASSESSMENT:    ORAL PREP/ ORAL PHASE:    The oral stage of swallowing was within functional limits for consistencies administered     PHARYNGEAL PHASE:     ONSET TIME       Delayed initiation of the pharyngeal swallow was noted with swallow reflex triggered at the level of the vallecula        PHARYNGEAL RESIDUALS        Vallecula/Pharyngeal Wall           Reduced tongue base retraction and inadequate inversion of the epiglottis was noted resulting in residuals in vallecula and/or posterior pharyngeal wall for all consistencies administered which minimally cleared with cued multiple swallow There appeared to be thickening of the epiglottis and posterior pharyngeal wall. Pt may benefit by lymphedema therapy      Pyriform Sinuses      Residuals in the pyriform sinuses were noted due to pharyngeal weakness for all consistencies administered  which  mostly cleared with cued double swallow      LARYNGEAL PENETRATION   Laryngeal penetration occurred prior to aspiration. Further details under aspiration section. Laryngeal penetration occurred in the absence of aspiration DURING the swallow for nectar consistency liquid due to  inadequate laryngeal closure which remained in the laryngeal vestibule. . Laryngeal penetration was mild-moderate and occurred consistently   an absent cough/throat clear was noted  Laryngeal penetration occurred in the absence of aspiration AFTER the swallow for honey consistency liquid due to pharyngeal residuals which remained in the laryngeal vestibule. . Laryngeal penetration was mild-moderate and occurred inconsistently. an absent cough/throat clear was noted   Laryngeal penetration/aspiration did not occur for the pureed consistency however due to the severity of pharyngeal residuals, that did not clear, he is at high risk for aspiration of theses residuals    ASPIRATION  Aspiration occurred DURING the swallow for thin liquid due to  inadequate laryngeal closure . Aspiration was moderate and occurred consistently . a delayed cough was noted    PENETRATION-ASPIRATION SCALE (PAS):  THIN 8 = Material enters the airway, passes below the vocal folds, and no effort is made to eject   MILDLY THICK 5 = Material enters the airway, contacts the vocal folds, and is not ejected from the airway  MODERATELY THICK 5 = Material enters the airway, contacts the vocal folds, and is not ejected from the airway  PUREE 1 = Material does not enter the airway  HARD SOLID item not administered       COMPENSATORY STRATEGIES    Compensatory strategies that were not beneficial included Multiple swallow, Chin tuck, Alternate solids and liquids and Throat clear      STRUCTURAL/FUNCTIONAL ANOMALIES   Slight Inadequate velopharyngeal closure resulting in slight nasopharyngeal reflux. CERVICAL ESOPHAGEAL STAGE :     The cervical esophagus appeared adequate          ___________    Cognition:   Within functional limits for this exam    Oral Peripheral Examination   Generalized oral weakness    Current Respiratory Status   room air     Parameters of Speech Production  Respiration:  Adequate for speech production  Quality:   Strained  Intensity: Within functional limits    Pain: No pain reported. EDUCATION:   The Speech Language Pathologist (SLP) completed education regarding results of evaluation and that intervention is warranted at this time. Learner: Patient and Family  Education: Reviewed results and recommendations of this evaluation  Evaluation of Education:  Toyin Cantrell understanding    This plan may be re-evaluated and revised as warranted. Evaluation Time includes thorough review of current medical information, gathering information on past medical history/social history and prior level of function, completion of standardized testing/informal observation of tasks, assessment of data and education on plan of care and goals. [x]The admitting diagnosis and active problem list, have been reviewed prior to initiation of this evaluation.     CPT Code: 43469  dysphagia study    ACTIVE PROBLEM LIST:   Patient Active Problem List   Diagnosis    [facility]ipheral vascular [facility]    S/P AAA repair using bifurcation graft    COPD (chronic obstructive pulmonary disease) ([facility]    Squamous cell carcinoma of hypopharynx (HCC)    Severe protein-calorie malnutrition (HCC)    Paroxysmal atrial fibrillation ([address]    Acute respiratory failure with hypoxemia (HCC)    Dysphagia    Benign essential hypertension    Osteoarthrosis

## 2022-04-08 ENCOUNTER — HOSPITAL ENCOUNTER (OUTPATIENT)
Dept: SPEECH THERAPY | Age: 72
Setting detail: THERAPIES SERIES
Discharge: HOME | End: 2022-04-08
Payer: MEDICARE

## 2022-04-08 NOTE — PROGRESS NOTES
SLP contacted [facility] [address] to request more visits. Since this is the third request, it will go before a review board. Medicare requested the initial evaluation, last three progress notes, MBSS reports, and any other pertinent notes. This was faxed to the review board on 4/8/22. SLP will contact the insurance next Wednesday to determine the outcome of the request. Until then, the patient's therapy will be placed on hold.

## 2022-04-12 ENCOUNTER — HOSPITAL ENCOUNTER (OUTPATIENT)
Dept: SPEECH THERAPY | Age: 72
Setting detail: THERAPIES SERIES
Discharge: HOME | End: 2022-04-12
Payer: MEDICARE

## 2022-04-12 NOTE — PROGRESS NOTES
SLP contacted [facility] [place] [facility]. Patient was denied continued therapy. However, it is now scheduled for peer to peer review. Medicare requested the initial evaluation, last three progress notes, MBSS reports, and any other pertinent notes. This was faxed to the review board on 4/8/22. SLP will contact the insurance to complete the peer to peer review. Until then, the patient's therapy will be placed on hold.

## 2022-04-14 ENCOUNTER — HOSPITAL ENCOUNTER (OUTPATIENT)
Dept: OCCUPATIONAL THERAPY | Age: 72
Setting detail: THERAPIES SERIES
Discharge: HOME | End: 2022-04-14
Payer: MEDICARE

## 2022-04-14 PROCEDURE — 97165 OT EVAL LOW COMPLEX 30 MIN: CPT | Performed by: OCCUPATIONAL THERAPIST

## 2022-04-14 NOTE — PROGRESS NOTES
Occupational Therapy      OCCUPATIONAL [facility]ERAPY INITIAL [facility]  [address]  Phone: [phone]  Fax: [phone]     Date:  2022  Initial Evaluation Date: 2022     Evaluating Therapist: HARRY Robbins CLT-LANA    Patient Name:  Charity Davila    :  1950    Restrictions/Precautions:  fall risk  Diagnosis:  Laryngeal cancer (z85.21)        Date of Surgery/Injury: n/a    Insurance/Certification information:   [facility] [address] of care signed (Y/N): N  Visit# / total visits:  Evaluation    Referring Practitioner:  Earnestine Aldana MD         NPI  - [id number]   Specific Practitioner Orders: Evaluation and Treatment    Assessment of current deficits   []Pain  []Skin Integrity   [x]Lymphedema   []Functional transfers/mobility   []ADLs   []Strength    []Cognition  []IADLs   []Safety Awareness   []  Motor Endurance    []Fine Motor Coordination   []Balance   []Vision/perception  []Sensation []Gross Motor Coordination  []ROM     OT PLAN OF CARE   OT POC based on physician orders, patient diagnosis and results of clinical assessment    Frequency/Duration:  1-2x per week for 12 treatment sessions from 2022 through [facility]   Specific OT Treatment to include:     Plan of Care:     [x]97140-Manual Lymph Drainage and Combined Decongestive Therapy  [x]84913- Skilled Multilayer Short Stretch Compression Bandaging/ Therapeutic Exercise  [x]Skin Care Education [x]HEP including Self MLD Education and/or Self Bandaging  [x]Education for Lymphedema Risks/Precautions     [x] Caregiver Education   []other:      Patient Specific Goal: to get rid of it    STG: 3 weeks  1. Patient will demonstrate knowledge and understanding for lymphedema precautions, skin care and self management to decrease progression of lymphedema and risk of infection.   2.  Patient will present with decreased limb volume in the involved extremity from minimal to trace for improved functional mobility. 3.  Patient will demonstrate compliance with compression therapy for independent management of lymphedema. LT weeks  1. Patient will be independent with self management of lymphedema including understanding garment wear schedule, self compression bandaging, HEP and self care. 2.  Patient will be fitted for appropriate compression garments for long term management of lymphedema. 3.  Patient will present with decreased limb volume in the involved extremity from trace to none  for improved functional mobility. Past medical History:  Past Medical History:   Past Medical History:   Diagnosis Date    AAA (abdominal aortic aneurysm) [facility] 2007    repair with stent    Arthritis     Atrial fibrillation ([facility]     Cancer ([address]     throat    COPD [facility]ch[facility]) [facility]     gurjit with Dr. Elisabet Holguin every 6 months     CPAP (continuous positive airway pressure) dependence     Hearing loss     Wears bilateral hearing aids     Hyperlipidemia     Traumatic leg ulcer ([facility] [address] [facility] 2013     Past Surgical History:   Past Surgical History:   Procedure Laterality Date    ABDOMINAL AORTIC ANEURYSM REPAIR, ENDOVASCULAR  08    Excluder - Delatore    BRONCHOSCOPY  2015    CARDIOVERSION      COLONOSCOPY      ECHOCARDIOGRAM TRANSESOPHAGEAL  2013         ECHOCARDIOGRAM TRANSESOPHAGEAL  3/18/2013         ECHOCARDIOGRAM TRANSESOPHAGEAL  9/3/2013         GASTROSTOMY TUBE PLACEMENT N/A 2019    EGD PEG TUBE PLACEMENT performed by Nancy Torres MD at [place] [address] 2019    DIRECT LARYNGOSCOPY WITH BIOPSY, ESOPHAGOSCOPY performed by Milton Hannah MD at [address] [place] [address] N/A 2019    AWAKE TRACHEOSTOMY performed by Milton Hannah MD at [facility] OR       [x]Surgery:   ly  Esophagogastroduodenoscopy 2.  Percutaneous Endoscopic Gastrostomy (PEG)  placement  8July 2019: An awake tracheostomy, direct laryngoscopy with  biopsy, rigid esophagoscopy   []Lymph node removal:   [x]Radiation Therapy: completed  [x]Chemotherapy: completed  []History of Cellulitis/Infection: none  []Family history of lymphedema: none  []Previous treatment for lymphedema: none  []Current compression garment use: none    Reason for Referral: Pt was referred to [facility]  due to intractable lymphedema of the head/neck, unrelieved by elevation. Chief Complaint: increased swelling, swallowing issues noted  Triggers: none noted at time of evaluaiton    Home Living: patient lives with spouse in one floor home with step entry with rail. Patient does not use an assisted device. Patient has a basement (does not use), shower stall, tub/shower combo. Patient has shower seat and grab bars. Patient independent with adl's and iadl's prior. Drives. Prior Level of Function: independent all aspects    Cognition:   Alert/Oriented x3     IADL History  Homemaking Responsibility: assist spouse as needed  Shopping Responsibility: assist spouse as needed  Mode of Transportation: car  Leisure & Hobbies: none noted at time of evaluation  Work: retired      Pain Level: none noted at time of evaluation  Pitting Edema: none noted at time of evaluation  Fibrotic tissue: Moderate throughout bilateral neck area with increased hardening throughout  Skin Integrity: increased color and texture changes noted from base of neck to jaw line bilaterally    Measurements marked in one centimeter increments at midline of neck from bottom lip to base of the neck. Patient measurement then taken from ear opening passing through each lou along neckline to opposite ear opening.          Evaluation Follow up Follow up   1cm 28.25cm     2cm 28.5cm     3cm 29.5cm     4cm 29.5cm     5cm 29.75cm     6cm 29.5cm     7cm 29.25cm     8cm 29.75cm     9cm 28.25cm     10cm 27.75cm     11cm 27.5cm     12cm 27.5cm Upper lip 27.25cm     Lower lip 27.75cm           Neck circumference 44cm     Head circumference 68cm         Neck flexion 40degrees     Neck extension 30degrees     Neck lateral rotation right 60degrees     Neck lateral rotation left 60degrees     Neck lateral flexion right 20degrees     Neck lateral flexion left 20degrees           Cognition:   Alert/Oriented x3     Vision: within functional limits with exception of macular degeneration. Has glasses        Hearing: within functional limits for daily life tasks     ADL  Feeding:  independent  Grooming:  independent  Bathing:  independent  UE Dressing:  independent  LE Dressing:  independent  Toileting:  independent  Transfer:  independent    UE ASSESSMENT: Hand Dominance is right handed  ROM within functional limits for daily life tasks  Strength within functional limits for daily life tasks    Sensation: within functional limits for light touch    OT G-codes:  Carrying, Handling, Moving objects   (Current status): CJ   (Discharge Goal):  [facility]  Lymphedema Life Impact Scale Score:  15  Percent Impairment:  21%    Intervention: 40893, 77052, 61600     Eval Complexity: Low Complexity      Rehab Potential:                                 [x] Good  [] Fair  [] Poor        Suggested Professional Referral:       [x] No  [] Yes:  Barriers to Goal Achievement[de-identified]          [x] No  [] Yes:  Domestic Concerns:                           [x] No  [] Yes:       Patient. Education:  [x] Plans/Goals, Risks/Benefits discussed  [] Home exercise program  Method of Education: [x] Verbal  [] Demo  [] Written  Comprehension of Education:  [x] Verbalizes understanding. [] Demonstrates understanding. [] Needs Review. [] Demonstrates/verbalizes understanding of HEP/Ed previously given.         Patient understands diagnosis/prognosis and consents to treatment, plan and goals: [x] Yes    [] No   This patient demonstrates the ability to follow the plan of care and progress towards goals.  Rehab potential is good       Assessment: Secondary Lymphedema, Stage two, Affecting the bilateral head / neck. Patient will benefit from a Complete Decongestive Therapy protocol using manual lymphatic drainage techniques, skilled multilayer compression bandaging using short stretch bandages and foam padding, instruction in a home program of self massage and exercise, compression garment fitting and instruction in independent management strategies for lymphedema. Goal Formulation: Patient  Time In: 1200            Time Out: 1300                      Timed Code Treatment Minutes: 60 minutes      CODE  Minutes  Units   95603 OT Eval Low 60 1   65208 OT Eval Medium     30382 OT Eval High     42392 Fluidotherapy     31278 Manual     86278 Therapeutic Ex     19988 Therapeutic Activity     58534 ADL/COMP Tech Train     [id number] Neuromuscular Re-Ed     05114 OrthoManagementTraining     [id number] Paraffin     88514 Electrical Stim - Attended     22699 Iontophoresis     79508 Ultrasound      Other     Total  60 1        Electronically signed by: ARRON Luna/L, CLT-DIOGENES      Physician's Certification / Comments      Frequency/Duration 1-2 / week for 12 visits. Certification period From: 14Apr. 2022  To: 6July 2022     I have reviewed the Plan of Care established for skilled therapy services and certify that the services are required and that they will be provided while the patient is under my care. Physician's Comments/Revisions:                   Physicians's Printed Name:  Joe Edwards MD                                   Physician's Signature:                                                               Date:      Please review Patient's OT evaluation and if you agree sign/date and fax back to us at our [address] [place] [address] fax [phone].  Thank you for your referral!

## 2022-04-14 NOTE — PROGRESS NOTES
SPEECH-LANGUAGE PATHOLOGY  UPDATED PLAN OF CARE      PATIENT NAME:  Stephanie Dalton  (male)     MRN:  [id number]    :  1950  (66 y.o.)  STATUS:  Outpatient clinic   TODAY'S DATE:  22  REFERRING PROVIDER:    Dr. Rochelle Laurent: speech language pathology (SLP) eval and treat  Date of order:  21  REASON FOR REFERRAL:  Dysphagia unspecified (R13.10)  EVALUATING THERAPIST: ZULY Villanueva  CERTIFICATION/RECERTIFICATION PERIOD: 22 to 22  REFERRING DIAGNOSIS: Dysphagia, unspecified [R13.10]      SIGNIFICANT INFORMATION:  Stephanie Dalton was referred by Dr. Jessica Staples to [address] [place] [address]'s Outpatient Speech Pathology Department for evaluation and treatment due to a diagnosis of dysphagia, unspecified (R13.10). Patient was accompanied to the session by his wife, Kenia Childers. Together, they provided medical history information. Patient reported that approximately 2.5 years ago, he was diagnosed with throat cancer. He underwent radiation and chemotherapy treatments. He reported difficulty swallowing at that time and was recommended for therapy, however, he reported that the managing physician did not feel it was necessary. Patient reported that the difficulty swallowing has persisted since that time. Recently, he reported that Dr. Jordin Diane evaluated the patient and reported a narrowing of the esophagus. However, the patient is unable to undergo stretching. Therefore, he reported that he was referred to Speech Therapy for evaluation and treatment. Patient has a history of oxygen therapy at night. He has COPD and emphysema. Patient reported a history of atrial fibrillation and abdominal aortic aneurysm. He also reported that he is on tamsulosin for his prostate. Patient had a recent MBSS completed at [facility].   A summary of the results will be provided below:    SUMMARY OF MBSS COMPLETED AT [facility] ON 21 BY SLP, EUGENE RODNEY:  Patient was presented thin liquid, nectar thick liquid, and pureed foods. Method of intake was by cup and spoon. Patient was self fed and fed by the clinician. Patient was seated, upright, and in a lateral plane. The oral stage of swallowing was [facility]. The onset time of the pharyngeal swallow was adequate. Residuals in the vallecula due to inadequate epiglottic inversion were noted for all consistencies administered which minimally cleared with cued multiple swallows and a liquid chaser. No significant residuals were noted in the pyriform sinuses. Laryngeal penetration occurred prior to aspiration of thin and mildly thick liquids. Laryngeal penetration occurred in the absence of aspiration after the swallow for pureed foods due to pharyngeal residuals which remained in the laryngeal vestibule. Laryngeal penetration was mild and occurred inconsistently. An absent cough/throat clear was noted. Aspiration occurred during the swallow for thin liquid and nectar consistency liquid du to inadequate laryngeal closure. Aspiration was moderate-severe and occurred consistently. An absent cough/throat clear was noted. Aspiration occurred after the swallow for thin liquid and nectar consistency liquid due to pharyngeal residuals. Aspiration was moderate-severe and occurred inconsistently. An absent cough/throat clear was noted. Compensatory strategies that were partially beneficial included:  double swallow, multiple swallow, chin tuck, small bites/sips, and alternate solids and liquids. No structural/functional anomalies were noted. There cervical esophagus appeared adequate. Overall, the patient demonstrated a severe pharyngeal phase dysphagia including silent aspiration of most consistencies presented. Use of compensatory strategies including chin tuck maneuver did reduce aspiration of mildly thick liquids, however, patient is still at risk due to significant residue in the vallecula with inability to clear.  Significant silent aspiration still occurred with thin liquid using chin tuck maneuver. Patient was recommended to be placed NPO: Unable to safely recommend PO diet at this time due to patient's high risk of aspiration on all textures assessed. Recommend that patient, family, and medical team discuss goals of care and wishes to determine best option for nutrition/ hydration. If patient does continue to consume an oral diet, patient may benefit from a modified diet of soft/easy to chew solids and mildly thick (nectar thick) liquids with use of compensatory strategies including chin tuck maneuver and throat clear post swallow. Patient and wife were educated on above and encouraged to discuss with referring physician. No assistance needed during intake. Compensatory strategies recommended: multiple swallow, effortful swallow, chin tuck, small bites/sips, alternate solids and liquids, throat clear and no straw. Outpatient OR Home Care Skilled SLP intervention for dysphagia management is recommended 1-2 times per week to address the established treatment plan. Goals for therapy include: 1) Pt will implement identified compensatory swallowing strategies on 90% of opportunities or greater to improve airway protection and swallow function. 2) Pt will complete BOTR strength/ ROM exercises to reduce pharyngeal residuals and improve epiglottic inversion minimal verbal prompts. 3) Pt will complete laryngeal strength/ ROM therapeutic exercises to improve airway protection for the least restrictive PO diet minimal verbal prompts. 4) Pt will complete Shaker and/or Chin Tuck Against Resistance (CTAR) to increase UES relaxation diameter and increase anterior laryngeal excursion to reduce pharyngeal residuals and reduce risk of pen/asp with minimal verbal prompts.  5) Pt will complete Effortful Swallow therapeutically to target increased oral and base of tongue pressure, increased pharyngeal constrictor contractions, and increased UES relaxation duration to reduce pharyngeal residue with minimal verbal prompts. 6) Pt will complete Ailyn Maneuver therapeutically to target increased pharyngeal constrictor contractions to reduce pharyngeal residue with minimal verbal prompts. 7) Pt will practice chin down posture to target earlier laryngeal closure with minimal verbal prompts. 8) Pt will maintain adequate nutrition/hydration via PO intake of the least restrictive oral diet with implementation of safe swallow/ compensatory strategies and decrease signs/symptoms of aspiration to less than 1 x/day. 9) Pt will improve oropharyngeal swallow function to ensure airway protection during PO intake to maintain adequate nutrition/hydration and decrease signs/symptoms of aspiration to less than 1 x/day. SUMMARY OF MBSS COMPLETED AT [facility] ON 4/7/22 BY SLP, DANIELLA HENDRIX:  Patient was presented thin liquids, nectar thick liquids, honey thick liquids, and pureed foods. Method of intake as by cup and spoon. Patient was self fed and fed by the clinician. Patient was seated in an upright position and in a lateral plane. The oral stage of swallowing was [facility] for consistencies administered. Delayed initiation of the pharyngeal swallow was noted with the swallow reflex triggered at the level of the vallecula. Reduced tongue base retraction and inadequate inversion of th epiglottis was noted resulting in residuals in the vallecula and /or posterior pharyngeal wall for all consistencies administered which minimally cleared with cued multiple swallows. There appeared to be a thickening of the epiglottis and posterior pharyngeal wall. Patient may benefit from lymphedema therapy. Residuals in the pyriform sinuses were noted due to pharyngeal weakness for all consistencies administered which mostly cleared with cued double swallow.   Laryngeal penetration occurred in the absence of aspiration DURING the swallow for nectar consistency liquid due to inadequate laryngeal closure which remained in the laryngeal vestibule. Laryngeal penetration was mild-moderate and occurred consistently. An absent cough/throat clear was noted. Laryngeal penetration occurred in the absence of aspiration AFTER the swallow for honey consistency liquid due to pharyngeal residuals which remained in the laryngeal vestibule. Laryngeal penetration was mild-moderate and occurred inconsistently. An absent cough/throat clear was noted. Laryngeal penetration/aspiration did not occur for the pureed consistency. However, due to the severity of pharyngeal residuals that did not clear, he is at high risk for aspiration of these residuals. Aspiration occurred DURING the swallow for thin liquid due to inadequate laryngeal closure. Aspiration was moderate and occurred consistently. A delayed cough was noted. Compensatory strategies that were not beneficial included multiple swallow, chin tuck, alternating solids and liquids, and throat clear. Structural/functional anomalies revealed a slightly inadequate velopharyngeal closure resulting in slight nasopharyngeal reflux. The cervical esophagus appeared adequate. Overall, the patient demonstrated a severe pharyngeal phase dysphagia. Diet recommendations were for the patient to be placed NPO. Therapy was recommended to continue. The following goals for therapy were noted:  1) Pt will complete BOTR strength/ ROM exercises to reduce pharyngeal residuals and improve epiglottic inversion minimal verbal prompts. 2) Pt will complete laryngeal strength/ ROM therapeutic exercises to improve airway protection for the least restrictive PO diet minimal verbal prompts. 3) Pt will complete Shaker and/or Chin Tuck Against Resistance (CTAR) to increase UES relaxation diameter and increase anterior laryngeal excursion to reduce pharyngeal residuals and reduce risk of pen/asp with minimal verbal prompts.   4) Pt will participate in DPNS to address functional deficits identified during swallow evaluation. 5) Pt will improve oropharyngeal swallow function to ensure airway protection during PO intake to maintain adequate nutrition/hydration and decrease signs/symptoms of aspiration to less than 1 x/day. Other recommendations included:Occupational therapy consult for lymphedema therapy to improve swallowing function. RESULTS:    DYSPHAGIA DIAGNOSIS:     Admission:  Clinical indicators of severe pharyngeal phase dysphagia as identified on MBSS completed on 11/30/21      Current:   Clinical indicators of severe pharyngeal phase dysphagia as identified on MBSS completed on 4/7/22     DIET RECOMMENDATIONS:     Admission:   NPO including medications to be given via alternate method. Patient is aware of the risks of aspiration and has opted to continue an oral diet. Therefor, patient is recommended to consider a modified diet of soft/easy to chew solids and mildly thick (nectar thick) liquids with use of compensatory strategies. Current:   NPO including medications to be given via alternate method. Patient is aware of the risks of aspiration and has opted to continue an oral diet. FEEDING RECOMMENDATIONS:     Admission:    Assistance level:  No assistance needed      Compensatory strategies recommended: chin tuck maneuver and throat clear post swallow if patient opts to continue oral diet. Current:    Assistance level:  No assistance needed      Compensatory strategies recommended: Not applicable. Discussed recommendations with nursing and/or faxed report to referring provider: Yes    SPEECH THERAPY  PLAN OF CARE   The dysphagia POC is established based on physician order, dysphagia diagnosis and results of clinical assessment     Skilled SLP intervention for dysphagia management 1-2 times per week for 30 minute sessions. Patient has completed 16 visits. This does not include the most recent MBSS.   An additional 24 visits is recommended for this quarter. Referral to OT is also recommended for lymphedema evaluation. Specific dysphagia interventions to include:   Compensatory strategy training   Therapeutic exercises    Patient Treatment Goals:  Progress in therapy has been recorded using the following key:  NC= no change; IMP= improved; ACH= achieved; NEI= not enough information        Short Term Goals:  1. Pt will implement identified compensatory swallowing strategies on 90% of opportunities or greater to improve airway protection and swallow function. -ACH  2. Pt will complete oral motor strength/ coordination exercises to improve bolus prep/ control and mastication with minimal verbal prompts . -ACH  3. Pt will complete BOTR strength/ ROM exercises to reduce pharyngeal residuals and improve epiglottic inversion minimal verbal prompts-ACH  4. Pt will complete laryngeal strength/ ROM therapeutic exercises to improve airway protection for the least restrictive PO diet minimal verbal prompts-IMP  5. Pt will complete Shaker and/or Chin Tuck Against Resistance (CTAR) to increase UES relaxation diameter and increase anterior laryngeal excursion to reduce pharyngeal residuals and reduce risk of pen/asp with minimal verbal prompts. -ACH  6. Pt will complete Effortful Swallow therapeutically to target increased oral and base of tongue pressure, increased pharyngeal constrictor contractions, and increased UES relaxation duration to reduce pharyngeal residue with minimal verbal prompts-ACH   7. Pt will complete Ailyn Maneuver therapeutically to target increased pharyngeal constrictor contractions to reduce pharyngeal residue with minimal verbal prompts -ACH  8. Pt will practice chin down posture to target earlier laryngeal closure with minimal verbal prompts -ACH  9. Pt will participate in DPNS if deemed appropriate by the managing SLP to address functional deficits identified during swallow evaluation-IMP    Long Term Goals:  1.  Pt will maintain adequate nutrition/hydration via PO intake of the least restrictive oral diet with implementation of safe swallow/ compensatory strategies and decrease signs/symptoms of aspiration to less than 1 x/day. -NC  2. Pt will improve oropharyngeal swallow function to ensure airway protection during PO intake to maintain adequate nutrition/hydration and decrease signs/symptoms of aspiration to less than 1 x/day. -NC    UPDATED PLAN OF CARE:  1. Patient will complete 10 of each oral motor exercise presented to target lingual and labial strength, endurance, and range of motion in order improve bolus prep, bolus control and mastication during swallowing with minimal cues (less than 25% of the time). 2. Patient will complete 10 of each BOTR strength/ ROM exercises in order to reduce pharyngeal residuals and improve epiglottic inversion during swallowing with minimal cues (less than 25% of the time). 3. Patient will complete 10 of each laryngeal strength/ ROM therapeutic exercises to improve airway protection during swallowing for the least restrictive PO diet with minimal cues (less than 25% of the time). 4. Patient will complete Shaker and/or Chin Tuck Against Resistance (CTAR) exercises in order to increase UES relaxation diameter and increase anterior laryngeal excursion to reduce pharyngeal residuals and reduce risk of pen/asp with minimal cues(less than 25% of the time) . Patient will complete 3 isometric exercises for 60 seconds each and 30 isokinetic exercises per session. 5. Patient will complete 3 Effortful Swallow exercises therapeutically to target increased oral and base of tongue pressure, increased pharyngeal constrictor contractions, and increased UES relaxation duration to reduce pharyngeal residue by 50%This will determined through repeat MBSS when appropriate.   6. Patient will complete 3 Ailyn Maneuver exercises therapeutically to target increased pharyngeal constrictor contractions to reduce pharyngeal residue by 50%. This will determined through repeat MBSS when appropriate. 7. Patient will advance to the least restrictive PO diet-aspiration free when safest and most appropriate. Patient/family Goal:    To be able to eat/drink safely. Plan of care discussed with Patient and Family   The Patient and Family understand(s) the diagnosis, prognosis and plan of care     Rehabilitation Potential/Prognosis: fair                    ADMITTING DIAGNOSIS: Dysphagia, unspecified [R13.10]    PATIENT REPORT/COMPLAINT: difficulty swallowing      PRIOR LEVEL OF SWALLOW FUNCTION:    PAST HISTORY OF DYSPHAGIA?: yes    Home diet: Regular consistency solids (IDDSI level 7) with  thin liquids (IDDSI level 0)    SUMMARY OF CLINICAL ASSESSMENT OF SWALLOW FUNCTION 2/8/22:  Oral Stage: The oral stage of swallowing was within functional limits for consistencies administered      Pharyngeal Stage:    · Throat clearing present after presentation of thin liquid  · Wet/gurgly vocal quality was noted after presentation of thin liquid  · Delayed initiation of the pharyngeal swallow noted    Cognition:   Within functional limits for this exam    Oral Peripheral Examination   Generalized oral weakness    Current Respiratory Status    room air during the day and 2.5-3 liters of oxygen at night. Parameters of Speech Production  Respiration:  Adequate for speech production  Quality:   Hoarse  Intensity: Within functional limits    Volitional Swallow: DNT     Volitional Cough:   DNT    Pain: No pain reported. SUMMARY:  Mary La has demonstrated improvement in response to speech therapy intervention. Continued therapy is recommended in order to address the above mentioned needs. Prognosis for continued improvement is fair. Measure of functional change noted:  Rosenbek Penetration-Aspiration Scale:  1. Material does not enter the airway  2.    Material enters the airway, remains above the vocal folds, and is ejected from the airway  3. Material enters the airway, remains above the vocal folds, and is not ejected from the airway  4. Material enters the airway, contacts the vocal folds, an is ejected from the airway  5. Material enters the airway, contacts the vocal folds, and is not ejected from the airway  6. Material enters the airway, passes below the vocal folds and is ejected into the larynx or out of the airway  7. Material enters the airway, passes below the vocal folds, and is not ejected from the trachea despite effort  8. Material enters the airway, passes below the vocal folds, and no effort is made to eject. Consistency Presented Penetration-Aspiration Scale Score  11/30/21 Penetration-Aspiration Scale Score  4/7/22   Thin 8 8   Mildly thick 8 5   Moderately thick DNT 5   Puree 3 1   Solid DNT DNT         Patti Blake M.S., CCC-SLP/L  Speech-Language Pathologist      CPT Codes    Evaluation:   41690  bedside swallow eval    Treatment:   49392  dysphagia tx  58933  oral motor exercises (15 min)  73550  neuromuscular re-education,  DPNS      Patti Alaniz M.S., CCC-SLP/L  Speech-Language Pathologist      [address]     Phone: [phone]     If you have any questions or concerns, please don't hesitate to call. Thank you for your referral.    Physician/Provider Signature:________________________________Date:__________________  By signing above, the therapists plan is approved by the physician/provider. All patients under [facility] must be signed by physician/provider.

## 2022-04-15 ENCOUNTER — HOSPITAL ENCOUNTER (OUTPATIENT)
Dept: SPEECH THERAPY | Age: 72
Setting detail: THERAPIES SERIES
End: 2022-04-15
Payer: MEDICARE

## 2022-04-18 ENCOUNTER — HOSPITAL ENCOUNTER (OUTPATIENT)
Dept: OCCUPATIONAL THERAPY | Age: 72
Setting detail: THERAPIES SERIES
Discharge: HOME | End: 2022-04-18
Payer: MEDICARE

## 2022-04-18 PROCEDURE — 97530 THERAPEUTIC ACTIVITIES: CPT | Performed by: OCCUPATIONAL THERAPIST

## 2022-04-18 PROCEDURE — 97140 MANUAL THERAPY 1/> REGIONS: CPT | Performed by: OCCUPATIONAL THERAPIST

## 2022-04-18 NOTE — PROGRESS NOTES
Occupational Therapy        OCCUPATIONAL THERAPY PROGRESS NOTE  [place]  [id number]  Phone: [phone]             Fax: [phone]      Date:  2022  Initial Evaluation Date: 2022     Evaluating Therapist: HARRY Stuart CLT-LANA    Patient Name:  Alida Atkinson    :  1950    Restrictions/Precautions:  fall risk  Diagnosis:  Laryngeal cancer (z85.21)       Date of Surgery/Injury: n/a    Insurance/Certification information:   [facility] [address] of care signed (Y/N): N  Visit# / total visits: Visit #2    Referring Practitioner:  Denis Zaldivar MD   Specific Practitioner Orders: Evaluation and Treatment    Assessment of current deficits   []Pain  []Skin Integrity   [x]Lymphedema   []Functional transfers/mobility   []ADLs   []Strength    []Cognition  []IADLs   []Safety Awareness   []  Motor Endurance    []Fine Motor Coordination   []Balance   []Vision/perception  []Sensation []Gross Motor Coordination  []ROM     OT PLAN OF CARE   OT POC based on physician orders, patient diagnosis and results of clinical assessment    Frequency/Duration:  1-2x per week for 12 treatment sessions from 2022 through [facility]   Specific OT Treatment to include:     Plan of Care:     [x]97140-Manual Lymph Drainage and Combined Decongestive Therapy  [x]82551- Skilled Multilayer Short Stretch Compression Bandaging/ Therapeutic Exercise  [x]Skin Care Education [x]HEP including Self MLD Education and/or Self Bandaging  [x]Education for Lymphedema Risks/Precautions     [x] Caregiver Education   []other:      Patient Specific Goal: to get rid of it    STG: 3 weeks  1. Patient will demonstrate knowledge and understanding for lymphedema precautions, skin care and self management to decrease progression of lymphedema and risk of infection. Therapist initiated patient education regarding lymphedema precautions and skin care / self management. Dry  []Rough []Moist []Rash  Location of problem area/s:  [] Wound: Location/Description   [] Fibrosis: Location/Description  [] Keratosis: Location/Description  [] Papillomas: Location/Description  [] Other    Color:  [] Normal [] Mottled [] Flushed [] Other/Description  Location of problem area/s:    Edema:  Edema noted in cervical area. Subjective:  Patient attended treatment session alone.   Patient with no new issues noted    Objective:  [] Measurement [x]Manual Lymph Drainage  []Bandaging   []Kinesiotaping [x] Education    []Self Massage   []Self Bandaging [] Exercise    []Wound Care  []Hygiene  [] Other        Assessment:  Knowledge of home program:   [] Good [] Fair  [] Poor  Patient is programming at home:             [] Yes  [] No  Family is assisting with programming: [] Yes  [] No  Home programming is consistent:             [] Yes  [] No  Other:   Response to treatment:  good  Instructions for patient:   [x] Verbal [x] Written  Instructions addressed:  Manual lymphatic drainage massage, compression garment        Time In: 0800            Time Out: 0900                      Timed Code Treatment Minutes: 60 minutes      CODE  Minutes  Units   93349 OT Eval Low     99770 OT Eval Medium     39841 OT Eval High     16065 Fluidotherapy     94737 Manual 45 3   22514 Therapeutic Ex     20226 Therapeutic Activity 15 1   55124 ADL/COMP Tech Train     [phone] Neuromuscular Re-Ed     81597 OrthoManagementTraining     65534 Paraffin     55756 Electrical Stim - Attended     17445 Iontophoresis     90454 Ultrasound      Other     Total  60 4       Plan: Continue to address:  [x]Bandaging  [x] Self Bandaging/Family Assist  [x]MLD  [x]Self Massage  [x]Exercise  [x]Education  []Obtain referral for garments  []Medical Hold  []Discharge to [address] [place] [address], OTR/L, [employer]

## 2022-04-19 ENCOUNTER — HOSPITAL ENCOUNTER (OUTPATIENT)
Dept: SPEECH THERAPY | Age: 72
Setting detail: THERAPIES SERIES
Discharge: HOME | End: 2022-04-19
Payer: MEDICARE

## 2022-04-19 NOTE — PROGRESS NOTES
SLP contacted [facility] [address] [facility]. Patient was denied continued therapy. However, it is now scheduled for peer to peer review. Medicare requested additional documentation. This was faxed to the review board on 4/18/22. Until then, the patient's therapy will be placed on hold.

## 2022-04-20 ENCOUNTER — HOSPITAL ENCOUNTER (OUTPATIENT)
Dept: OCCUPATIONAL THERAPY | Age: 72
Setting detail: THERAPIES SERIES
Discharge: HOME | End: 2022-04-20
Payer: MEDICARE

## 2022-04-20 PROCEDURE — 97140 MANUAL THERAPY 1/> REGIONS: CPT | Performed by: OCCUPATIONAL THERAPIST

## 2022-04-20 PROCEDURE — 97110 THERAPEUTIC EXERCISES: CPT | Performed by: OCCUPATIONAL THERAPIST

## 2022-04-20 PROCEDURE — 97530 THERAPEUTIC ACTIVITIES: CPT | Performed by: OCCUPATIONAL THERAPIST

## 2022-04-20 NOTE — PROGRESS NOTES
Occupational Therapy        OCCUPATIONAL THERAPY PROGRESS NOTE  Jamal Moya  [address]  Phone: [phone]             Fax: [phone]      Date:  2022  Initial Evaluation Date: 2022     Evaluating Therapist: HARRY Restrepo CLT-LANA    Patient Name:  Hitesh Pacheco    :  1950    Restrictions/Precautions:  fall risk  Diagnosis:  Laryngeal cancer (z85.21)       Date of Surgery/Injury: n/a    Insurance/Certification information:   [facility] of care signed (Y/N): N  Visit# / total visits: Visit #3    Referring Practitioner:  Oliva Hill MD   Specific Practitioner Orders: Evaluation and Treatment    Assessment of current deficits   []Pain  []Skin Integrity   [x]Lymphedema   []Functional transfers/mobility   []ADLs   []Strength    []Cognition  []IADLs   []Safety Awareness   []  Motor Endurance    []Fine Motor Coordination   []Balance   []Vision/perception  []Sensation []Gross Motor Coordination  []ROM     OT PLAN OF CARE   OT POC based on physician orders, patient diagnosis and results of clinical assessment    Frequency/Duration:  1-2x per week for 12 treatment sessions from 2022 through [facility]   Specific OT Treatment to include:     Plan of Care:     [x]97140-Manual Lymph Drainage and Combined Decongestive Therapy  [x]03830- Skilled Multilayer Short Stretch Compression Bandaging/ Therapeutic Exercise  [x]Skin Care Education [x]HEP including Self MLD Education and/or Self Bandaging  [x]Education for Lymphedema Risks/Precautions     [x] Caregiver Education   []other:      Patient Specific Goal: to get rid of it    STG: 3 weeks  1. Patient will demonstrate knowledge and understanding for lymphedema precautions, skin care and self management to decrease progression of lymphedema and risk of infection. Therapist continued patient education regarding lymphedema precautions and skin care / self management. Patient and spouse able to verbalize understanding. Patient progressing toward goal.  2.  Patient will present with decreased limb volume in the involved extremity from minimal to trace for improved functional mobility. Therapist continued patient education regarding self manual lymphatic drainage massage sequence for head / neck involvement. Patient and spouse provided new hand out for sequence to be utilized at home. Therapist performed manual lymphatic drainage massage sequence for head / neck involvement. Therapist described each step as it was performed. Patient tolerated well. Patient stated he felt improvement with swallowing after sequence was completed. Therapist answered all questions from spouse to her satisfaction. .  Patient progressing toward goal.  3.  Patient will demonstrate compliance with compression therapy for independent management of lymphedema. Patient progressing toward goal.          LT weeks  1. Patient will be independent with self management of lymphedema including understanding garment wear schedule, self compression bandaging, HEP and self care. 2.  Patient will be fitted for appropriate compression garments for long term management of lymphedema. 3.  Patient will present with decreased limb volume in the involved extremity from trace to none  for improved functional mobility.       Restrictions/Precautions:  [] No blood pressure/blood draw in: [] Left Upper Extremity     [] Right Upper Extremity        [x] Fall Risk       Intervention:   []Other    Pain:  [] No    [] Yes  Location:  Pain Rating (0-10 pain scale):  Pain Description:  Interruption of Treatment [] Yes  [] No    Came to Clinic:  [] Bandaged    []Unbandaged    [] With Stocking     [] With Sleeve     [] Kinesiotaped    [] Treva Amparo    [] With Alternative Compression:    Skin Integrity:  [] Normal [] Dry  []Rough []Moist []Rash  Location of problem area/s:  [] Wound: Location/Description   [] Fibrosis: Location/Description  [] Keratosis: Location/Description  [] Papillomas: Location/Description  [] Other    Color:  [] Normal [] Mottled [] Flushed [] Other/Description  Location of problem area/s:    Edema:  Edema noted in cervical area. Subjective:  Patient attended treatment session with spouse.   Patient with no new issues noted    Objective:  [] Measurement [x]Manual Lymph Drainage  []Bandaging   []Kinesiotaping [x] Education    []Self Massage   []Self Bandaging [] Exercise    []Wound Care  []Hygiene  [] Other        Assessment:  Knowledge of home program:   [] Good [] Fair  [] Poor  Patient is programming at home:             [] Yes  [] No  Family is assisting with programming: [] Yes  [] No  Home programming is consistent:             [] Yes  [] No  Other:   Response to treatment:  good  Instructions for patient:   [x] Verbal [x] Written  Instructions addressed:  Manual lymphatic drainage massage, compression garment        Time In: 1030            Time Out: 1130                      Timed Code Treatment Minutes: 60 minutes      CODE  Minutes  Units   76174 OT Eval Low     14550 OT Eval Medium     91578 OT Eval High     83585 Fluidotherapy     41454 Manual 45 3   91708 Therapeutic Ex     94536 Therapeutic Activity 15 1   43668 ADL/COMP Tech Train     [id number] Neuromuscular Re-Ed     83885 OrthoManagementTraining     72660 Paraffin     08156 Electrical Stim - Attended     39232 Iontophoresis     07034 Ultrasound      Other     Total  60 4       Plan: Continue to address:  [x]Bandaging  [x] Self Bandaging/Family Assist  [x]MLD  [x]Self Massage  [x]Exercise  [x]Education  []Obtain referral for garments  []Medical Hold  []Discharge to [address] [place] [address], OTR/L, [employer]

## 2022-04-22 ENCOUNTER — HOSPITAL ENCOUNTER (OUTPATIENT)
Dept: SPEECH THERAPY | Age: 72
Setting detail: THERAPIES SERIES
Discharge: HOME | End: 2022-04-22
Payer: MEDICARE

## 2022-04-22 NOTE — PROGRESS NOTES
ZULY contacted [facility] [address]. Medicare requested additional documentation. This was faxed to the review board on 4/18/22. Until then, the patient's therapy will be placed on hold.

## 2022-04-25 ENCOUNTER — HOSPITAL ENCOUNTER (OUTPATIENT)
Dept: OCCUPATIONAL THERAPY | Age: 72
Setting detail: THERAPIES SERIES
Discharge: HOME | End: 2022-04-25
Payer: MEDICARE

## 2022-04-25 PROCEDURE — 97140 MANUAL THERAPY 1/> REGIONS: CPT | Performed by: OCCUPATIONAL THERAPIST

## 2022-04-25 PROCEDURE — 97530 THERAPEUTIC ACTIVITIES: CPT | Performed by: OCCUPATIONAL THERAPIST

## 2022-04-25 NOTE — PROGRESS NOTES
Occupational Therapy        OCCUPATIONAL THERAPY PROGRESS NOTE  Jamal Moya  [address]  Phone: [phone]             Fax: [phone]      Date:  2022  Initial Evaluation Date: 2022     Evaluating Therapist: HARRY Restrepo CLT-LANA    Patient Name:  Destin Patel    :  1950    Restrictions/Precautions:  fall risk  Diagnosis:  Laryngeal cancer (z85.21)       Date of Surgery/Injury: n/a    Insurance/Certification information:   [facility] [address] of care signed (Y/N): N  Visit# / total visits: Visit #4    Referring Practitioner:  José Manzanares MD   Specific Practitioner Orders: Evaluation and Treatment    Assessment of current deficits   []Pain  []Skin Integrity   [x]Lymphedema   []Functional transfers/mobility   []ADLs   []Strength    []Cognition  []IADLs   []Safety Awareness   []  Motor Endurance    []Fine Motor Coordination   []Balance   []Vision/perception  []Sensation []Gross Motor Coordination  []ROM     OT PLAN OF CARE   OT POC based on physician orders, patient diagnosis and results of clinical assessment    Frequency/Duration:  1-2x per week for 12 treatment sessions from 2022 through [facility]   Specific OT Treatment to include:     Plan of Care:     [x]97140-Manual Lymph Drainage and Combined Decongestive Therapy  [x]77196- Skilled Multilayer Short Stretch Compression Bandaging/ Therapeutic Exercise  [x]Skin Care Education [x]HEP including Self MLD Education and/or Self Bandaging  [x]Education for Lymphedema Risks/Precautions     [x] Caregiver Education   []other:      Patient Specific Goal: to get rid of it    STG: 3 weeks  1. Patient will demonstrate knowledge and understanding for lymphedema precautions, skin care and self management to decrease progression of lymphedema and risk of infection. Therapist continued patient education regarding lymphedema precautions and skin care / self management. Patient and spouse able to verbalize and demonstrate understanding. Patient goal met. 2.  Patient will present with decreased limb volume in the involved extremity from minimal to trace for improved functional mobility. Therapist continued patient education regarding self manual lymphatic drainage massage sequence for head / neck involvement. Patient and spouse stated they are performing at home but spouse required increased observation. Spouse did state after observing that she sees where she made her mistakes with technique. Therapist provided support and encouragement as able. Therapist performed manual lymphatic drainage massage sequence for head / neck involvement. Therapist described each step as it was performed. Patient tolerated well. Patient stated he felt improvement with swallowing after sequence was completed. Therapist answered all questions from spouse to her satisfaction. .  Patient progressing toward goal.  3.  Patient will demonstrate compliance with compression therapy for independent management of lymphedema. Patient progressing toward goal.          LT weeks  1. Patient will be independent with self management of lymphedema including understanding garment wear schedule, self compression bandaging, HEP and self care. 2.  Patient will be fitted for appropriate compression garments for long term management of lymphedema. 3.  Patient will present with decreased limb volume in the involved extremity from trace to none  for improved functional mobility.       Restrictions/Precautions:  [] No blood pressure/blood draw in: [] Left Upper Extremity     [] Right Upper Extremity        [x] Fall Risk       Intervention:   []Other    Pain:  [] No    [] Yes  Location:  Pain Rating (0-10 pain scale):  Pain Description:  Interruption of Treatment [] Yes  [] No    Came to Clinic:  [] Bandaged    []Unbandaged    [] With Stocking     [] With Sleeve     [] Kinesiotaped    [] Loi Villanueva    [] With Alternative Compression:    Skin Integrity:  [] Normal [] Dry  []Rough []Moist []Rash  Location of problem area/s:  [] Wound: Location/Description   [] Fibrosis: Location/Description  [] Keratosis: Location/Description  [] Papillomas: Location/Description  [] Other    Color:  [] Normal [] Mottled [] Flushed [] Other/Description  Location of problem area/s:    Edema:  Edema noted in cervical area. Subjective:  Patient attended treatment session with spouse.   Patient with no new issues noted    Objective:  [] Measurement [x]Manual Lymph Drainage  []Bandaging   []Kinesiotaping [x] Education    []Self Massage   []Self Bandaging [] Exercise    []Wound Care  []Hygiene  [] Other        Assessment:  Knowledge of home program:   [] Good [] Fair  [] Poor  Patient is programming at home:             [] Yes  [] No  Family is assisting with programming: [] Yes  [] No  Home programming is consistent:             [] Yes  [] No  Other:   Response to treatment:  good  Instructions for patient:   [x] Verbal [x] Written  Instructions addressed:  Manual lymphatic drainage massage, compression garment        Time In: 0900            Time Out: 1000                     Timed Code Treatment Minutes: 60 minutes      CODE  Minutes  Units   74943 OT Eval Low     85125 OT Eval Medium     93362 OT Eval High     65009 Fluidotherapy     10453 Manual 45 3   14360 Therapeutic Ex     92057 Therapeutic Activity 15 1   45526 ADL/COMP Tech Train     [id number] Neuromuscular Re-Ed     95848 OrthoManagementTraining     60639 Paraffin     33690 Electrical Stim - Attended     06600 Iontophoresis     72800 Ultrasound      Other     Total  60 4       Plan: Continue to address:  [x]Bandaging  [x] Self Bandaging/Family Assist  [x]MLD  [x]Self Massage  [x]Exercise  [x]Education  []Obtain referral for garments  []Medical Hold  []Discharge to [address] [place] [address], OTR/L, [employer]

## 2022-04-26 ENCOUNTER — HOSPITAL ENCOUNTER (OUTPATIENT)
Dept: SPEECH THERAPY | Age: 72
Setting detail: THERAPIES SERIES
Discharge: HOME | End: 2022-04-26
Payer: MEDICARE

## 2022-04-26 NOTE — PROGRESS NOTES
SLP contacted the insurance company regarding the review of records. Patient's request for more therapy has been denied. Patient can appeal this decision. SLP contacted the patient. He would like to pursue an appeal.  Patient will remain on hold until the appeal is completed.

## 2022-04-29 ENCOUNTER — HOSPITAL ENCOUNTER (OUTPATIENT)
Dept: SPEECH THERAPY | Age: 72
Setting detail: THERAPIES SERIES
Discharge: HOME | End: 2022-04-29
Payer: MEDICARE

## 2022-05-03 ENCOUNTER — HOSPITAL ENCOUNTER (OUTPATIENT)
Dept: SPEECH THERAPY | Age: 72
Setting detail: THERAPIES SERIES
Discharge: HOME | End: 2022-05-03
Payer: MEDICARE

## 2022-05-03 NOTE — PROGRESS NOTES
[facility] EM[facility]GENCY DEPARTMENT ENCOUNTER    Basic Information  Name: Ondina Riley Age: 58 year old Sex: female   MRN: [id number] Encounter: 6/24/2021, 9:05 PM  PCP: Samara Zamarripa MD    Chief Complaint  Chief Complaint   Patient presents with   • Weakness       History of Present Illness    58-year-old female presents to the ED with weakness.  Patient states that over the past 4 weeks she has been progressively more weak.  States has been sleeping more difficult to get out of bed.  Denies any fever or chills.  Denies any dysuria, diarrhea, nausea or vomiting.  Admits he is having lower abdominal pain.  Denies any chest pain, shortness of breath.    Health Status  Allergies:  ALLERGIES:   Allergen Reactions   • Doxycycline RASH   • Mold   (Environmental) Cough     Congestion , watery eyes   • Penicillin G RASH   • Sulfabenzamide Other (See Comments)     redness       Current Medications:  No current facility-administered medications on file prior to encounter.  albuterol 108 (90 Base) MCG/ACT inhaler  cyclobenzaprine (FLEXERIL) 10 MG tablet  hydrochlorothiazide (HYDRODIURIL) 12.5 MG tablet  HYDROcodone-acetaminophen (NORCO) 5-325 MG per tablet  Euthyrox 125 MCG tablet  metFORMIN (GLUCOPHAGE) 500 MG tablet  omeprazole (PriLOSEC) 40 MG capsule  rosuvastatin (CRESTOR) 20 MG tablet  amLODIPine (NORVASC) 10 MG tablet  cholecalciferol (cholecalciferol) 25 mcg (1,000 units) tablet  budesonide-formoterol (SYMBICORT) 160-4.5 MCG/ACT inhaler        Past Medical History    Past Medical History:   Diagnosis Date   • Arthritis    • Bronchitis    • Chronic pain     Back pain   • Diabetes mellitus (CMS/HCC)    • Essential (primary) hypertension    • Gastroesophageal reflux disease    • RAD (reactive airway disease)        History reviewed. No pertinent surgical history.    History reviewed. No pertinent family history.    Social History     Tobacco Use   • Smoking status: Current Every Day Smoker     Packs/day:  Patient requested to remain on hold. He has initiated an appeal with his insurance. 0.50     Years: 30.00     Pack years: 15.00     Types: Cigarettes   • Smokeless tobacco: Never Used   Vaping Use   • Vaping Use: never used   Substance Use Topics   • Alcohol use: Yes     Alcohol/week: 4.0 - 5.0 standard drinks     Types: 4 - 5 Glasses of wine per week   • Drug use: Never       Review of Systems  All systems otherwise negative, ROS reviewed as documented in chart.    Physical Exam  ED Triage Vitals   BP 06/21/21 1857 (!) 148/94   Heart Rate 06/21/21 1857 94   Resp 06/21/21 2103 20   Temp 06/21/21 1857 98.4 °F (36.9 °C)   SpO2 06/21/21 1857 99 %      General:  No acute distress. Alert.  Skin:  Warm. Dry. Intact.  Head:  Normocephalic. Atraumatic.  Eye:  . EOMI. Normal conjunctiva.  ENMT:  Oral mucosa moist.   Cardiovascular:  Regular rate and rhythm. No murmur. Normal peripheral perfusion. No edema.  Respiratory:  Lungs CTA. Respirations are non-labored. BS equal.  Gastrointestinal:  Soft.  Mild suprapubic tenderness. Non distended. Normal BS.  Back: Nontender. Normal alignment.  Musculoskeletal:  Normal ROM. No deformities.  Neurological:  A/O x 4. No focal neurological deficit observed. CN II-XII intact. Normal sensory. Normal motor.  Psychiatric: Cooperative. Appropriate mood and affect.,    Medical Decision Making  Rationale:  Multiple differential diagnoses were considered. The patient / caregivers were apprised of diagnostic / treatment options including alternate modes of care, in addition to the risks and benefits, for this medical condition. Based on this discussion the patient / caregiver agrees with this chosen diagnostic and treatment plan.    Orders:  Medications   iohexol ORAL (OMNIPAQUE 350) oral contrast solution 12.5 mL (12.5 mLs Oral Given 6/21/21 2142)   sodium chloride (NORMAL SALINE) 0.9 % bolus 1,000 mL (0 mLs Intravenous Completed 6/21/21 2333)   iohexol (OMNIPAQUE 350 INJECT) contrast solution 98 mL (98 mLs Intravenous Given 6/21/21 2244)   morphine injection 4 mg (4 mg  Intravenous Given 6/22/21 0000)   morphine injection 4 mg (4 mg Intravenous Given 6/22/21 0221)   iohexol (OMNIPAQUE 350 INJECT) contrast solution 80 mL (80 mLs Intravenous Given 6/22/21 1750)            Laboratory Results:  Results for orders placed or performed during the hospital encounter of 06/21/21   Comprehensive Metabolic Panel   Result Value Ref Range    Fasting Status      Sodium 139 135 - 145 mmol/L    Potassium 4.0 3.4 - 5.1 mmol/L    Chloride 109 (H) 98 - 107 mmol/L    Carbon Dioxide 25 21 - 32 mmol/L    Anion Gap 9 (L) 10 - 20 mmol/L    Glucose 110 (H) 65 - 99 mg/dL    BUN 12 6 - 20 mg/dL    Creatinine 0.84 0.51 - 0.95 mg/dL    Glomerular Filtration Rate 76 (L) >90 mL/min/1.73m2    BUN/ Creatinine Ratio 14 7 - 25    Calcium 9.8 8.4 - 10.2 mg/dL    Bilirubin, Total 0.3 0.2 - 1.0 mg/dL    GOT/AST 17 <=37 Units/L    GPT/ALT 19 <64 Units/L    Alkaline Phosphatase 100 45 - 117 Units/L    Albumin 4.3 3.6 - 5.1 g/dL    Protein, Total 8.1 6.4 - 8.2 g/dL    Globulin 3.8 2.0 - 4.0 g/dL    A/G Ratio 1.1 1.0 - 2.4   Troponin I Ultra Sensitive   Result Value Ref Range    Troponin I, Ultra Sensitive <0.02 <=0.04 ng/mL   CBC with Automated Differential (performable only)   Result Value Ref Range    WBC 8.0 4.2 - 11.0 K/mcL    RBC 4.58 4.00 - 5.20 mil/mcL    HGB 14.7 12.0 - 15.5 g/dL    HCT 43.8 36.0 - 46.5 %    MCV 95.6 78.0 - 100.0 fl    MCH 32.1 26.0 - 34.0 pg    MCHC 33.6 32.0 - 36.5 g/dL    RDW-CV 12.8 11.0 - 15.0 %    RDW-SD 45.1 39.0 - 50.0 fL     140 - 450 K/mcL    NRBC 0 <=0 /100 WBC    Neutrophil, Percent 68 %    Lymphocytes, Percent 25 %    Mono, Percent 5 %    Eosinophils, Percent 1 %    Basophils, Percent 1 %    Immature Granulocytes 0 %    Absolute Neutrophils 5.4 1.8 - 7.7 K/mcL    Absolute Lymphocytes 2.0 1.0 - 4.0 K/mcL    Absolute Monocytes 0.4 0.3 - 0.9 K/mcL    Absolute Eosinophils  0.1 0.0 - 0.5 K/mcL    Absolute Basophils 0.1 0.0 - 0.3 K/mcL    Absolute Immmature Granulocytes 0.0 0.0 - 0.2  K/mcL   Urinalysis & Reflex Microscopy With Culture If Indicated   Result Value Ref Range    COLOR, URINALYSIS Yellow     APPEARANCE, URINALYSIS Clear     GLUCOSE, URINALYSIS Negative Negative mg/dL    BILIRUBIN, URINALYSIS Negative Negative    KETONES, URINALYSIS Negative Negative mg/dL    SPECIFIC GRAVITY, URINALYSIS 1.013 1.005 - 1.030    OCCULT BLOOD, URINALYSIS Negative Negative    PH, URINALYSIS 5.0 5.0 - 7.0    PROTEIN, URINALYSIS Negative Negative mg/dL    UROBILINOGEN, URINALYSIS 0.2 0.2, 1.0 mg/dL    NITRITE, URINALYSIS Negative Negative    LEUKOCYTE ESTERASE, URINALYSIS Negative Negative       Radiology Results:     CT ABDOMEN PELVIS W CONTRAST - Oral and  IV contrast   Final Result   1.  2.3 cm left ovarian cyst noted.  Follow-up ultrasound advised.   2.  Caliber of the appendix is in the upper limits of normal without   obvious periappendiceal infiltration.  No pneumoperitoneum or abscess   finding noted.  Clinical follow-up advised.      **The absence of findings should not deter follow-up or further evaluation   of concerning clinical findings.      Electronically Signed by: YOLANDA NASH M.D.    Signed on: 6/21/2021 11:10 PM                ED Course  Vitals:    06/23/21 1514 06/23/21 1904 06/23/21 2304 06/24/21 0741   BP: (!) 144/57 (!) 140/86 131/82 138/62   BP Location:  RUE - Right upper extremity RUE - Right upper extremity RUE - Right upper extremity   Patient Position:  Sitting Sitting Supine   Pulse: 72 81 79 78   Resp:  12 12 14   Temp: 98.1 °F (36.7 °C) 98.1 °F (36.7 °C) 98.1 °F (36.7 °C) 98.1 °F (36.7 °C)   TempSrc: Oral Oral Oral Oral   SpO2: 95% 98% 97% 97%   Weight:       Height:           ED Course as of Jun 24 2106   Mon Jun 21, 2021   2342 Discussed with Dr. Ruggiero, agrees for observation    [HL]      ED Course User Index  [HL] Radha Low, DO         Impression and Plan  Diagnosis:  1. Weakness generalized    2. Low back pain, unspecified back pain laterality, unspecified chronicity,  unspecified whether sciatica present        Condition:  STABLE      Disposition:  Time: 2342  The patient will be admitted to a Medical Bed by the [facility] Phys[facility].      Placed in OBSERVATION STATUS under Dr. Ruggiero .      Counseled:  Patient, Regarding diagnosis, Regarding diagnostic results, Regarding treatment and Patient understood                   Radha Low DO  06/24/21 2110

## 2022-05-06 ENCOUNTER — HOSPITAL ENCOUNTER (OUTPATIENT)
Dept: OCCUPATIONAL THERAPY | Age: 72
Setting detail: THERAPIES SERIES
Discharge: HOME | End: 2022-05-06
Payer: MEDICARE

## 2022-05-06 ENCOUNTER — HOSPITAL ENCOUNTER (OUTPATIENT)
Dept: SPEECH THERAPY | Age: 72
Setting detail: THERAPIES SERIES
Discharge: HOME | End: 2022-05-06
Payer: MEDICARE

## 2022-05-06 PROCEDURE — 97140 MANUAL THERAPY 1/> REGIONS: CPT | Performed by: OCCUPATIONAL THERAPIST

## 2022-05-06 PROCEDURE — 97530 THERAPEUTIC ACTIVITIES: CPT | Performed by: OCCUPATIONAL THERAPIST

## 2022-05-06 NOTE — PROGRESS NOTES
Occupational Therapy        OCCUPATIONAL THERAPY PROGRESS NOTE  Jamal Moya  [address]  Phone: [phone]             Fax: [phone]      Date:  2022  Initial Evaluation Date: 2022     Evaluating Therapist: HARRY Chadwick CLT-LANA    Patient Name:  Gustavo Flanagan    :  1950    Restrictions/Precautions:  fall risk  Diagnosis:  Laryngeal cancer (z85.21)       Date of Surgery/Injury: n/a    Insurance/Certification information:   [facility] [address] of care signed (Y/N): N  Visit# / total visits: Visit #5    Referring Practitioner:  Siobhan Conroy MD   Specific Practitioner Orders: Evaluation and Treatment    Assessment of current deficits   []Pain  []Skin Integrity   [x]Lymphedema   []Functional transfers/mobility   []ADLs   []Strength    []Cognition  []IADLs   []Safety Awareness   []  Motor Endurance    []Fine Motor Coordination   []Balance   []Vision/perception  []Sensation []Gross Motor Coordination  []ROM     OT PLAN OF CARE   OT POC based on physician orders, patient diagnosis and results of clinical assessment    Frequency/Duration:  1-2x per week for 12 treatment sessions from 2022 through [facility]   Specific OT Treatment to include:     Plan of Care:     [x]97140-Manual Lymph Drainage and Combined Decongestive Therapy  [x]70717- Skilled Multilayer Short Stretch Compression Bandaging/ Therapeutic Exercise  [x]Skin Care Education [x]HEP including Self MLD Education and/or Self Bandaging  [x]Education for Lymphedema Risks/Precautions     [x] Caregiver Education   []other:      Patient Specific Goal: to get rid of it    STG: 3 weeks  1. Patient will demonstrate knowledge and understanding for lymphedema precautions, skin care and self management to decrease progression of lymphedema and risk of infection. Patient goal met.   2.  Patient will present with decreased limb volume in the involved extremity from minimal to trace for improved functional mobility. Therapist continued patient education regarding self manual lymphatic drainage massage sequence for head / neck involvement. Patient and spouse stated they are performing at home but spouse required increased observation. Therapist performed manual lymphatic drainage massage sequence for head / neck involvement. Therapist described each step as it was performed. Patient tolerated well. Patient stated he felt improvement with swallowing after sequence was completed. Therapist answered all questions from spouse to her satisfaction. .  Patient progressing toward goal.  3.  Patient will demonstrate compliance with compression therapy for independent management of lymphedema. Patient progressing toward goal.          LT weeks  1. Patient will be independent with self management of lymphedema including understanding garment wear schedule, self compression bandaging, HEP and self care. 2.  Patient will be fitted for appropriate compression garments for long term management of lymphedema. 3.  Patient will present with decreased limb volume in the involved extremity from trace to none  for improved functional mobility.       Restrictions/Precautions:  [] No blood pressure/blood draw in: [] Left Upper Extremity     [] Right Upper Extremity        [x] Fall Risk       Intervention:   []Other    Pain:  [] No    [] Yes  Location:  Pain Rating (0-10 pain scale):  Pain Description:  Interruption of Treatment [] Yes  [] No    Came to Clinic:  [] Bandaged    []Unbandaged    [] With Stocking     [] With Sleeve     [] Kinesiotaped    [] [facility]    [] With Alternative Compression:    Skin Integrity:  [] Normal [] Dry  []Rough []Moist []Rash  Location of problem area/s:  [] Wound: Location/Description   [] Fibrosis: Location/Description  [] Keratosis: Location/Description  [] Papillomas: Location/Description  [] Other    Color:  [] Normal [] Mottled [] Flushed [] Other/Description  Location of problem area/s:    Edema:  Edema noted in cervical area. Subjective:  Patient attended treatment session with spouse.   Patient with no new issues noted    Objective:  [] Measurement [x]Manual Lymph Drainage  []Bandaging   []Kinesiotaping [x] Education    []Self Massage   []Self Bandaging [] Exercise    []Wound Care  []Hygiene  [] Other        Assessment:  Knowledge of home program:   [] Good [] Fair  [] Poor  Patient is programming at home:             [] Yes  [] No  Family is assisting with programming: [] Yes  [] No  Home programming is consistent:             [] Yes  [] No  Other:   Response to treatment:  good  Instructions for patient:   [x] Verbal [x] Written  Instructions addressed:  Manual lymphatic drainage massage, compression garment        Time In: 1250            Time Out: 1345                    Timed Code Treatment Minutes: 55 minutes      CODE  Minutes  Units   96912 OT Eval Low     44479 OT Eval Medium     48448 OT Eval High     59960 Fluidotherapy     19853 Manual 45 3   87815 Therapeutic Ex     13060 Therapeutic Activity 10 1   84073 ADL/COMP Tech Train     [id number] Neuromuscular Re-Ed     80361 OrthoManagementTraining     01390 Paraffin     85519 Electrical Stim - Attended     21469 Iontophoresis     05069 Ultrasound      Other     Total  55 4       Plan: Continue to address:  [x]Bandaging  [x] Self Bandaging/Family Assist  [x]MLD  [x]Self Massage  [x]Exercise  [x]Education  []Obtain referral for garments  []Medical Hold  []Discharge to [address] [place] [address], OTR/L, [employer]

## 2022-05-06 NOTE — PROGRESS NOTES
Yanira Luis called to report that his appeal was completed and he was granted additional visits by his insurance. SLP has to contact his insurance today to determine number of visits approved.

## 2022-05-06 NOTE — PROGRESS NOTES
SLP contacted the patient's insurance to determine if appeal was approved. I was directed to contact the utilization management department. There was not a live person to speak with. The other number provided had been disconnected. SLP spoke with the patient and requested his approval letter be provided and/or the patient provide the authorization number, number of visits approved, and timeframe.

## 2022-05-17 ENCOUNTER — HOSPITAL ENCOUNTER (OUTPATIENT)
Dept: SPEECH THERAPY | Age: 72
Setting detail: THERAPIES SERIES
Discharge: HOME | End: 2022-05-17
Payer: MEDICARE

## 2022-05-17 PROCEDURE — 9900000076 HC SPEECH THERAPY 30 MIN VIST (SELF-PAY)

## 2022-05-17 PROCEDURE — 92526 ORAL FUNCTION THERAPY: CPT

## 2022-05-17 NOTE — PROGRESS NOTES
Patient was seen for dysphagia therapy. Temperature was taken prior to the session. Temperature was [facility]. COVID-19 screening questionnaire was negative. Mask was worn by SLP. Goggles were also worn. Patient wore a mask. Larry Pearce was seen for dysphagia therapy today. Deep pharyngeal neuromuscular stimulation treatment was provided. Patient completed 75 exercises during today's session. Overall strength, endurance, and range of motion was rated as fair-good. Responses to each exercise will be indicated below.  Bilateral soft palate glide technique resulted in palatal reflex triggering and velopharyngeal closure. Responses were noted 100% of the time.  Triple soft palate glide technique resulted in palatal reflex triggering and velopharyngeal closure. Responses were noted 100% of the time.  Lingual glide technique resulted in laryngeal elevation reflex, tongue base retraction and reflexive lingual groove approximately 66% of the time.  Lateral lingual stimulation resulted in laryngeal elevation reflex, tongue base retraction, and reflexive lingual groove. Responses noted 66% of the time.  Lingual septum stimulation resulted in laryngeal elevation reflex and tongue base retraction. Lingual groove was noted. Response was noted 33% of the time.  Bilateral posterior pharyngeal constrictor stimulation resulted in tongue base retraction, palatal reflex and velopharyngeal closure noted approximately 100% of the time.  Tongue base retraction stimulation resulted in laryngeal elevation reflex, tongue base retraction and reflexive lingual groove approximately 33% of the time.  Uvula stimulation resulted in palatal trigger reflex and velopharyngeal closure. Responses were noted 33% of the time.  The nasal spine stimulation technique resulted in palatal trigger reflex and velopharyngeal closure. Responses were noted 100% of the time.    Lastly, the lateral pharyngeal stimulation technique demonstrated palatal trigger reflex and velopharyngeal closure. Responses were noted 100% of the time. Spontaneous swallow was noted greater than 33% of the time following each probe. Nasal sinus drainage was not present. Increased saliva production was noted. Patient demonstrated a yellowish, green, and black coating on the back of the tongue. Tongue base retraction exercises were completed. Patient demonstrated fair strength, endurance, and range of motion. Patient completed 2 Ailyn exercises and 2 effortful swallows with good laryngeal lifting and excursion. Patient was provided an approval letter through "[employer]. However, when the , Ju Del Valle contacted [employer], the letter was for the sessions already used. Ju Del Valle initiated an extension through the insurance. Patient opted to self pay for today's visit. Continue plan of care. Treatment plan and goals can be found in the initial evaluation/progress report. Patti Ibarra M.S., JOSEPH-SLP/L  Speech-Language Pathologist      CPT CODE:       Self pay

## 2022-05-20 ENCOUNTER — HOSPITAL ENCOUNTER (OUTPATIENT)
Dept: OCCUPATIONAL THERAPY | Age: 72
Setting detail: THERAPIES SERIES
Discharge: HOME | End: 2022-05-20
Payer: MEDICARE

## 2022-05-20 PROCEDURE — 97140 MANUAL THERAPY 1/> REGIONS: CPT | Performed by: OCCUPATIONAL THERAPIST

## 2022-05-20 PROCEDURE — 97530 THERAPEUTIC ACTIVITIES: CPT | Performed by: OCCUPATIONAL THERAPIST

## 2022-05-20 NOTE — PROGRESS NOTES
Occupational Therapy  OCCUPATIONAL THERAPY UPDATE/PROGRESS NOTE  [address] [place] [address]  Phone: [phone]             JIV: [phone]     Date:  2022  Initial Evaluation Date: 2022     Evaluating Therapist: ARRON Brown/L, CLT-DIOGENES    Patient Name:  Donaldo Morales    :  1950    Restrictions/Precautions:  fall risk  Diagnosis:  Laryngeal cancer (z85.21)         Date of Surgery/Injury: n/a    Insurance/Certification informationHowgregory Dunaway  -  [id number]  Plan of care signed (Y/N): N  Visit# / total visits: Visit #6  Total Visits to date:  6 Cancels/No-shows to date: 6    Referring Practitioner:  Sabrina Neil MD  Specific Practitioner Orders: Evaluation and Treatment    Assessment of current deficits   []Pain  []Skin Integrity   [x]Lymphedema   []Functional transfers/mobility   []ADLs   []Strength    []Cognition  []IADLs   []Safety Awareness   []  Motor Endurance    []Fine Motor Coordination   []Balance   []Vision/perception  []Sensation []Gross Motor Coordination  []ROM     OT PLAN OF CARE   OT POC based on physician orders, patient diagnosis and results of clinical assessment    Frequency/Duration:  1-2x per week for 12 treatment sessions from [facility]  through 2022  Specific OT Treatment to include:     Plan of Care:     [x]97140-Manual Lymph Drainage and Combined Decongestive Therapy  [x]75722- Skilled Multilayer Short Stretch Compression Bandaging/ Therapeutic Exercise  [x]Skin Care Education [x]HEP including Self MLD Education and/or Self Bandaging  [x]Education for Lymphedema Risks/Precautions     [x] Caregiver Education   []other:      Patient Specific Goal: to get rid of it    Goals Status:    STG: 3 weeks  1.  Patient will demonstrate knowledge and understanding for lymphedema precautions, skin care and self management to decrease progression of lymphedema and risk of infection. Patient goal met.   2.  Patient will present with decreased limb volume in the involved extremity from minimal to trace for improved functional mobility. Therapist continued patient / family education regarding self manual lymphatic drainage massage sequence for head / neck involvement. Patient and spouse stated increased difficulty when on vacation but getting back to their routine. Therapist performed manual lymphatic drainage massage sequence for head / neck involvement. Therapist described each step as it was performed. Patient tolerated well. Patient stated he felt improvement with swallowing after sequence was completed. Therapist answered all questions from spouse to her satisfaction. .  Patient progressing toward goal.  3.  Patient will demonstrate compliance with compression therapy for independent management of lymphedema. Therapist continued patient education regarding compression garment for night use. Patient stated he uses each night and has been able to increase wear time. Patient progressing toward goal.        LT weeks  1.  Patient will be independent with self management of lymphedema including understanding garment wear schedule, self compression bandaging, HEP and self care. Therapist continued patient education regarding independent self lymphedema management. Therapist discussed current home program, eating lifestyle, exercise, compression use, and general self care. Patient and spouse able to verbalize understanding. Patient progressing toward goal.    2.  Patient will be fitted for appropriate compression garments for long term management of lymphedema. Therapist fitted patient for head/neck garment. Garment continues to fit well and work as intended. Patient progressing toward goal.    3.  Patient will present with decreased limb volume in the involved extremity from trace to none  for improved functional mobility.    Goal not met    Significant Findings At Last Visit/Comments:    Therapist continues to make steady gains toward goals. Patient currently working toward independent management of his lymphedema with assist of spouse. Patient performs manual lymphatic drainage massage sequence daily, uses compression garment hours of sleep, maintains a low sodium eating lifestyle. Patient current measurements as follows:    Measurements marked in one centimeter increments at midline of neck from bottom lip to base of the neck. Patient measurement then taken from ear opening passing through each lou along neckline to opposite ear opening.            Evaluation Follow up  [facility] 2022 Follow up   1cm 28.25cm  27.75cm     2cm 28.5cm  28.25cm     3cm 29.5cm  28.5cm     4cm 29.5cm  29cm     5cm 29.75cm  29.5cm     6cm 29.5cm  29.5cm     7cm 29.25cm  29cm     8cm 29.75cm  28.5cm     9cm 28.25cm  27.5cm     10cm 27.75cm  27.5cm     11cm 27.5cm  27.25cm     12cm 27.5cm  27.25cm                         Upper lip 27.25cm  27cm     Lower lip 27.75cm  27.5cm               Neck circumference 44cm  41.75cm     Head circumference 68cm  68cm         Neck flexion 40degrees  44degrees     Neck extension 30degrees  44degrees     Neck lateral rotation right 60degrees  54degrees     Neck lateral rotation left 60degrees  60degrees     Neck lateral flexion right 20degrees  30degrees     Neck lateral flexion left 20degrees  25degrees        OT G-codes:  Carrying, Handling, Moving objects   (Current status): CI   (Discharge Goal):  CH  Lymphedema Life Impact Scale Score:  13  Percent Impairment:  19%    Restrictions/Precautions:  [] No blood pressure/blood draw in: [] Left Upper Extremity     [] Right Upper Extremity        [x] Fall Risk       Intervention:   []Other    Subjective:  Patient attended treatment session with spouse. Patient with no new issues noted.       Rehab Potential: [] Excellent [x] Good [] Fair  [] Poor     Goal Status:  [] Achieved [x] Partially Achieved  [] Not Achieved     Patient Status: [x] Patient and therapist reviewed current goals and plan of care and agree to continue current treatment plan. Time In: 1130            Time Out: 1230                      Timed Code Treatment Minutes: 60 minutes      CODE  Minutes  Units   88456 OT Eval Low     02949 OT Eval Medium     95855 OT Eval High     17308 Fluidotherapy     69242 Manual 30 2   72289 Therapeutic Ex     65624 Therapeutic Activity 30 2   01816 ADL/COMP Tech Train     03874 Neuromuscular Re-Ed     21824 OrthoManagementTraining     64436 Paraffin     52742 Electrical Stim - Attended     [id number] Iontophoresis     53827 Ultrasound      Other             Electronically signed by: Juan Mata, OTEMILIO/L, CLT-DIOGENES      Physician's Certification / Comments      Frequency/Duration 1-2 / week for 12 visits. Certification period From: 20May 2022  To: 11Aug. 2022     I have reviewed the Plan of Care established for skilled therapy services and certify that the services are required and that they will be provided while the patient is under my care. Physician's Comments/Revisions:                   Physicians's Printed Name:  Bisi Fox MD                                   Physician's Signature:                                                               Date:      Please review Patient's OT evaluation and if you agree sign/date and fax back to us at our [address] [place] [address] fax [phone].  Thank you for your referral!

## 2022-05-27 ENCOUNTER — HOSPITAL ENCOUNTER (OUTPATIENT)
Dept: SPEECH THERAPY | Age: 72
Setting detail: THERAPIES SERIES
Discharge: HOME | End: 2022-05-27
Payer: MEDICARE

## 2022-05-27 ENCOUNTER — HOSPITAL ENCOUNTER (OUTPATIENT)
Dept: OCCUPATIONAL THERAPY | Age: 72
Setting detail: THERAPIES SERIES
Discharge: HOME | End: 2022-05-27
Payer: MEDICARE

## 2022-05-27 PROCEDURE — 97530 THERAPEUTIC ACTIVITIES: CPT | Performed by: OCCUPATIONAL THERAPIST

## 2022-05-27 PROCEDURE — 92526 ORAL FUNCTION THERAPY: CPT

## 2022-05-27 PROCEDURE — 97140 MANUAL THERAPY 1/> REGIONS: CPT | Performed by: OCCUPATIONAL THERAPIST

## 2022-05-27 NOTE — PROGRESS NOTES
were noted 100% of the time.  Lastly, the lateral pharyngeal stimulation technique demonstrated palatal trigger reflex and velopharyngeal closure. Responses were noted 100% of the time. Spontaneous swallow was noted greater than 33% of the time following each probe. Nasal sinus drainage was not present. Increased saliva production was noted. Patient demonstrated a yellowish, green, and black coating on the back of the tongue. Wet vocal quality noted today. Patient had to spit out mucous into the trash multiple times throughout the session. Tongue base retraction exercises were completed. Patient demonstrated fair-good strength, endurance, and range of motion. Patient completed 3 Ailyn exercises with good laryngeal lifting and excursion. Continue plan of care. Treatment plan and goals can be found in the initial evaluation/progress report. Patti Brooks M.S., JOSEPH-SLP/L  Speech-Language Pathologist      CPT CODE:       50156  dysphagia tx

## 2022-05-27 NOTE — PROGRESS NOTES
Occupational Therapy        OCCUPATIONAL THERAPY PROGRESS NOTE  Jamal Moya  [address]  Phone: [phone]             Fax: [phone]      Date:  2022  Initial Evaluation Date: 2022     Evaluating Therapist: HARRY Prajapati CLT-LANA    Patient Name:  Camilla Pond    :  1950    Restrictions/Precautions:  fall risk  Diagnosis:  Laryngeal cancer (z85.21)       Date of Surgery/Injury: n/a    Insurance/Certification information:   [facility] [address] of care signed (Y/N): N  Visit# / total visits: Visit #7/10    Referring Practitioner:  Maricruz Renteria MD   Specific Practitioner Orders: Evaluation and Treatment    Assessment of current deficits   []Pain  []Skin Integrity   [x]Lymphedema   []Functional transfers/mobility   []ADLs   []Strength    []Cognition  []IADLs   []Safety Awareness   []  Motor Endurance    []Fine Motor Coordination   []Balance   []Vision/perception  []Sensation []Gross Motor Coordination  []ROM     OT PLAN OF CARE   OT POC based on physician orders, patient diagnosis and results of clinical assessment    Frequency/Duration:  1-2x per week for 12 treatment sessions from [facility]  through 2022  Specific OT Treatment to include:     Plan of Care:     [x]97140-Manual Lymph Drainage and Combined Decongestive Therapy  [x]96381- Skilled Multilayer Short Stretch Compression Bandaging/ Therapeutic Exercise  [x]Skin Care Education [x]HEP including Self MLD Education and/or Self Bandaging  [x]Education for Lymphedema Risks/Precautions     [x] Caregiver Education   []other:      Patient Specific Goal: to get rid of it    STG: 3 weeks  1. Patient will demonstrate knowledge and understanding for lymphedema precautions, skin care and self management to decrease progression of lymphedema and risk of infection. Patient goal met.   2.  Patient will present with decreased limb volume in the involved extremity from minimal to trace for improved functional mobility. Therapist continued patient education regarding self manual lymphatic drainage massage sequence for head / neck involvement. Patient stated they are performing sequence at home about five times over the last seven days. Therapist performed manual lymphatic drainage massage sequence for head / neck involvement. Therapist described each step as it was performed. Patient tolerated well. Patient stated he felt improvement with swallowing after sequence was completed. Patient also noted increased head movement after sequence was completed. Patient progressing toward goal.  3.  Patient will demonstrate compliance with compression therapy for independent management of lymphedema. Patient continues to utilize compression garment provided by this therapist.  Patient stated he uses nightly and garment continues to fit well. Patient progressing toward goal.          LT weeks  1. Patient will be independent with self management of lymphedema including understanding garment wear schedule, self compression bandaging, HEP and self care. 2.  Patient will be fitted for appropriate compression garments for long term management of lymphedema. Patient was fitted for garment and he continues to utilize hours of sleep. Garment fits well and functions as intended. Therapist will make second garment as needed. Patient goal met. 3.  Patient will present with decreased limb volume in the involved extremity from trace to none  for improved functional mobility.       Restrictions/Precautions:  [] No blood pressure/blood draw in: [] Left Upper Extremity     [] Right Upper Extremity        [x] Fall Risk       Intervention:   []Other    Pain:  [] No    [] Yes  Location:  Pain Rating (0-10 pain scale):  Pain Description:  Interruption of Treatment [] Yes  [] No    Came to Clinic:  [] Bandaged    []Unbandaged    [] With Stocking     [] With Sleeve     [] Kinesiotaped    [] Darrel Faith Boot    [] With Alternative Compression:    Skin Integrity:  [] Normal [] Dry  []Rough []Moist []Rash  Location of problem area/s:  [] Wound: Location/Description   [] Fibrosis: Location/Description  [] Keratosis: Location/Description  [] Papillomas: Location/Description  [] Other    Color:  [] Normal [] Mottled [] Flushed [] Other/Description  Location of problem area/s:    Edema:  Edema noted in cervical area. Subjective:  Patient attended treatment session with spouse who waited in waiting area. Therapist discussed with spouse current home program and patient self management of lymphedema. therapsit provided support and encouragement to spouse. All questions answered as able by this therapist.  Patient stated [employer] came to his home to trial a lymphedema pump. Therapist did send inquiry information to [employer] to determine coverage. Therapist indicated patient was a head and neck patient and this was for an insurance inquiry only. Patient proceeded to explain to therapist they came to demo a leg pump not a head/neck pump. Patient found out several days later that his insurance does not cover a lymphedema pump. Therapist apologized for mistakes made and for a demo being provided even though he did not have insurance coverage or financial means to pay out of pocket.        Objective:  [] Measurement [x]Manual Lymph Drainage  []Bandaging   []Kinesiotaping [x] Education    []Self Massage   []Self Bandaging [] Exercise    []Wound Care  []Hygiene  [] Other        Assessment:  Knowledge of home program:   [] Good [] Fair  [] Poor  Patient is programming at home:             [] Yes  [] No  Family is assisting with programming: [] Yes  [] No  Home programming is consistent:             [] Yes  [] No  Other:   Response to treatment:  good  Instructions for patient:   [x] Verbal [x] Written  Instructions addressed:  Manual lymphatic drainage massage, compression garment        Time In: 0810            Time Out: 0915                   Timed Code Treatment Minutes: 65 minutes      CODE  Minutes  Units   46665 OT Eval Low     95975 OT Eval Medium     01878 OT Eval High     10724 Fluidotherapy     30978 Manual 50 3   86765 Therapeutic Ex     35507 Therapeutic Activity 15 1   93566 ADL/COMP Tech Train     89079 Neuromuscular Re-Ed     66131 OrthoManagementTraining     71613 Paraffin     22447 Electrical Stim - Attended     47111 Iontophoresis     07106 Ultrasound      Other     Total  65 4       Plan: Continue to address:  [x]Bandaging  [x] Self Bandaging/Family Assist  [x]MLD  [x]Self Massage  [x]Exercise  [x]Education  []Obtain referral for garments  []Medical Hold  []Discharge to [address] [place] [address], OTR/L, [employer]

## 2022-05-31 ENCOUNTER — HOSPITAL ENCOUNTER (OUTPATIENT)
Dept: SPEECH THERAPY | Age: 72
Setting detail: THERAPIES SERIES
Discharge: HOME | End: 2022-05-31
Payer: MEDICARE

## 2022-05-31 PROCEDURE — 92526 ORAL FUNCTION THERAPY: CPT

## 2022-05-31 NOTE — PROGRESS NOTES
Patient was seen for dysphagia therapy. Temperature was taken prior to the session. Temperature was [facility]. COVID-19 screening questionnaire was negative. Mask was worn by SLP. Goggles were also worn. Patient wore a mask when appropriate. Natalia Davis was seen for dysphagia therapy today. Deep pharyngeal neuromuscular stimulation treatment was provided. Patient completed 75 exercises during today's session. Overall strength, endurance, and range of motion was rated as fair-good. Responses to each exercise will be indicated below.  Bilateral soft palate glide technique resulted in palatal reflex triggering and velopharyngeal closure. Responses were noted 100% of the time.  Triple soft palate glide technique resulted in palatal reflex triggering and velopharyngeal closure. Responses were noted 100% of the time.  Lingual glide technique resulted in laryngeal elevation reflex, tongue base retraction and reflexive lingual groove approximately 33-66% of the time.  Lateral lingual stimulation resulted in laryngeal elevation reflex, tongue base retraction, and reflexive lingual groove. Responses noted 33-66% of the time.  Lingual septum stimulation resulted in laryngeal elevation reflex and tongue base retraction. Lingual groove was noted. Response was noted 33% of the time.  Bilateral posterior pharyngeal constrictor stimulation resulted in tongue base retraction, palatal reflex and velopharyngeal closure noted approximately 100% of the time.  Tongue base retraction stimulation resulted in laryngeal elevation reflex, tongue base retraction and reflexive lingual groove approximately 33% of the time.  Uvula stimulation resulted in palatal trigger reflex and velopharyngeal closure. Responses were noted 33% of the time.  The nasal spine stimulation technique resulted in palatal trigger reflex and velopharyngeal closure. Responses were noted 100% of the time.    Lastly, the lateral pharyngeal stimulation technique demonstrated palatal trigger reflex and velopharyngeal closure. Responses were noted 100% of the time. Spontaneous swallow was noted greater than 33% of the time following each probe. Nasal sinus drainage was not present. Increased saliva production was noted. Patient demonstrated a yellowish, green, and black coating on the back of the tongue. Wet vocal quality noted today. Patient had to spit out mucous into the trash multiple times throughout the session. Tongue base retraction exercises were completed. Patient demonstrated fair-good strength, endurance, and range of motion. Patient completed 3 Ailyn exercises with good laryngeal lifting and excursion. Continue plan of care. Treatment plan and goals can be found in the initial evaluation/progress report. Patti Ibarra M.S., JOSEPH-SLP/L  Speech-Language Pathologist      CPT CODE:       34925  dysphagia tx

## 2022-06-03 ENCOUNTER — HOSPITAL ENCOUNTER (OUTPATIENT)
Dept: SPEECH THERAPY | Age: 72
Setting detail: THERAPIES SERIES
Discharge: HOME | End: 2022-06-03
Payer: MEDICARE

## 2022-06-03 PROCEDURE — 92507 TX SP LANG VOICE COMM INDIV: CPT

## 2022-06-03 PROCEDURE — 92526 ORAL FUNCTION THERAPY: CPT

## 2022-06-03 NOTE — PROGRESS NOTES
Patient was seen for dysphagia therapy. Temperature was taken prior to the session. Temperature was [facility]. COVID-19 screening questionnaire was negative. Mask was worn by SLP. Goggles were also worn. Patient wore a mask when appropriate. Lang Barrios was seen for dysphagia therapy today. Deep pharyngeal neuromuscular stimulation treatment was provided. Patient completed 75 exercises during today's session. Overall strength, endurance, and range of motion was rated as fair-good. Responses to each exercise will be indicated below.  Bilateral soft palate glide technique resulted in palatal reflex triggering and velopharyngeal closure. Responses were noted 100% of the time.  Triple soft palate glide technique resulted in palatal reflex triggering and velopharyngeal closure. Responses were noted 100% of the time.  Lingual glide technique resulted in laryngeal elevation reflex, tongue base retraction and reflexive lingual groove approximately 66% of the time.  Lateral lingual stimulation resulted in laryngeal elevation reflex, tongue base retraction, and reflexive lingual groove. Responses noted 33-66% of the time.  Lingual septum stimulation resulted in laryngeal elevation reflex and tongue base retraction. Lingual groove was noted. Response was noted 33% of the time.  Bilateral posterior pharyngeal constrictor stimulation resulted in tongue base retraction, palatal reflex and velopharyngeal closure noted approximately 100% of the time.  Tongue base retraction stimulation resulted in laryngeal elevation reflex, tongue base retraction and reflexive lingual groove approximately 33-66% of the time.  Uvula stimulation resulted in palatal trigger reflex and velopharyngeal closure. Responses were noted 33-66% of the time.  The nasal spine stimulation technique resulted in palatal trigger reflex and velopharyngeal closure. Responses were noted 100% of the time.    Lastly, the lateral pharyngeal stimulation technique demonstrated palatal trigger reflex and velopharyngeal closure. Responses were noted 100% of the time. Spontaneous swallow was noted greater than 33% of the time following each probe. Nasal sinus drainage was not present. Increased saliva production was noted. Patient demonstrated a yellowish, green, and black coating on the back of the tongue. Wet vocal quality noted today. Patient had to spit out mucous into the trash multiple times throughout the session. Tongue base retraction exercises were completed. Patient demonstrated fair-good strength, endurance, and range of motion. Improved range of motion and endurance noted. Patient completed 3 Ailyn exercises with good laryngeal lifting and excursion. Lingual strengthening exercises were also targeted. Patient demonstrated good strength, endurance, and range of motion. Continue plan of care. Treatment plan and goals can be found in the initial evaluation/progress report. Patti Rodriguez M.S., CCC-SLP/L  Speech-Language Pathologist      CPT CODE:       26249  dysphagia tx

## 2022-06-07 ENCOUNTER — HOSPITAL ENCOUNTER (OUTPATIENT)
Dept: SPEECH THERAPY | Age: 72
Setting detail: THERAPIES SERIES
Discharge: HOME | End: 2022-06-07
Payer: MEDICARE

## 2022-06-07 PROCEDURE — 92526 ORAL FUNCTION THERAPY: CPT

## 2022-06-07 NOTE — PROGRESS NOTES
SPEECH-LANGUAGE PATHOLOGY  DISCHARGE SUMMARY      PATIENT NAME:  Natalia Davis  (male)     MRN:  [id number]    :  1950  (66 y.o.)  STATUS:  Outpatient clinic   DATE OF DISCHARGE:  22  REFERRING PROVIDER:    Dr. Uma Ny: speech language pathology (SLP) eval and treat  Date of order:  21  REASON FOR REFERRAL:  Dysphagia unspecified (R13.10)  EVALUATING THERAPIST: ZULY Hudson  REFERRING DIAGNOSIS: Dysphagia, unspecified [R13.10]      SIGNIFICANT INFORMATION:  Natalia Davis was referred by Dr. Maximo Butterfield to [address] [place] [address]'s Outpatient Speech Pathology Department for evaluation and treatment due to a diagnosis of dysphagia, unspecified (R13.10). Patient was accompanied to the session by his wife, Rocio Minaya. Together, they provided medical history information. Patient reported that approximately 2.5 years ago, he was diagnosed with throat cancer. He underwent radiation and chemotherapy treatments. He reported difficulty swallowing at that time and was recommended for therapy, however, he reported that the managing physician did not feel it was necessary. Patient reported that the difficulty swallowing has persisted since that time. Recently, he reported that Dr. Masha Tapia evaluated the patient and reported a narrowing of the esophagus. However, the patient is unable to undergo stretching. Therefore, he reported that he was referred to Speech Therapy for evaluation and treatment. Patient has a history of oxygen therapy at night. He has COPD and emphysema. Patient reported a history of atrial fibrillation and abdominal aortic aneurysm. He also reported that he is on tamsulosin for his prostate. Patient had a recent MBSS completed at [facility]. A summary of the results will be provided below:    SUMMARY OF MBSS COMPLETED AT [facility] ON 21 BY SLP, EUGENE RODNEY:  Patient was presented thin liquid, nectar thick liquid, and pureed foods.   Method of intake was by cup and spoon. Patient was self fed and fed by the clinician. Patient was seated, upright, and in a lateral plane. The oral stage of swallowing was [facility]. The onset time of the pharyngeal swallow was adequate. Residuals in the vallecula due to inadequate epiglottic inversion were noted for all consistencies administered which minimally cleared with cued multiple swallows and a liquid chaser. No significant residuals were noted in the pyriform sinuses. Laryngeal penetration occurred prior to aspiration of thin and mildly thick liquids. Laryngeal penetration occurred in the absence of aspiration after the swallow for pureed foods due to pharyngeal residuals which remained in the laryngeal vestibule. Laryngeal penetration was mild and occurred inconsistently. An absent cough/throat clear was noted. Aspiration occurred during the swallow for thin liquid and nectar consistency liquid du to inadequate laryngeal closure. Aspiration was moderate-severe and occurred consistently. An absent cough/throat clear was noted. Aspiration occurred after the swallow for thin liquid and nectar consistency liquid due to pharyngeal residuals. Aspiration was moderate-severe and occurred inconsistently. An absent cough/throat clear was noted. Compensatory strategies that were partially beneficial included:  double swallow, multiple swallow, chin tuck, small bites/sips, and alternate solids and liquids. No structural/functional anomalies were noted. There cervical esophagus appeared adequate. Overall, the patient demonstrated a severe pharyngeal phase dysphagia including silent aspiration of most consistencies presented. Use of compensatory strategies including chin tuck maneuver did reduce aspiration of mildly thick liquids, however, patient is still at risk due to significant residue in the vallecula with inability to clear.  Significant silent aspiration still occurred with thin liquid using chin tuck maneuver. Patient was recommended to be placed NPO: Unable to safely recommend PO diet at this time due to patient's high risk of aspiration on all textures assessed. Recommend that patient, family, and medical team discuss goals of care and wishes to determine best option for nutrition/ hydration. If patient does continue to consume an oral diet, patient may benefit from a modified diet of soft/easy to chew solids and mildly thick (nectar thick) liquids with use of compensatory strategies including chin tuck maneuver and throat clear post swallow. Patient and wife were educated on above and encouraged to discuss with referring physician. No assistance needed during intake. Compensatory strategies recommended: multiple swallow, effortful swallow, chin tuck, small bites/sips, alternate solids and liquids, throat clear and no straw. Outpatient OR Home Care Skilled SLP intervention for dysphagia management is recommended 1-2 times per week to address the established treatment plan. Goals for therapy include: 1) Pt will implement identified compensatory swallowing strategies on 90% of opportunities or greater to improve airway protection and swallow function. 2) Pt will complete BOTR strength/ ROM exercises to reduce pharyngeal residuals and improve epiglottic inversion minimal verbal prompts. 3) Pt will complete laryngeal strength/ ROM therapeutic exercises to improve airway protection for the least restrictive PO diet minimal verbal prompts. 4) Pt will complete Shaker and/or Chin Tuck Against Resistance (CTAR) to increase UES relaxation diameter and increase anterior laryngeal excursion to reduce pharyngeal residuals and reduce risk of pen/asp with minimal verbal prompts.  5) Pt will complete Effortful Swallow therapeutically to target increased oral and base of tongue pressure, increased pharyngeal constrictor contractions, and increased UES relaxation duration to reduce pharyngeal residue with minimal verbal prompts. 6) Pt will complete Ailyn Maneuver therapeutically to target increased pharyngeal constrictor contractions to reduce pharyngeal residue with minimal verbal prompts. 7) Pt will practice chin down posture to target earlier laryngeal closure with minimal verbal prompts. 8) Pt will maintain adequate nutrition/hydration via PO intake of the least restrictive oral diet with implementation of safe swallow/ compensatory strategies and decrease signs/symptoms of aspiration to less than 1 x/day. 9) Pt will improve oropharyngeal swallow function to ensure airway protection during PO intake to maintain adequate nutrition/hydration and decrease signs/symptoms of aspiration to less than 1 x/day. SUMMARY OF MBSS COMPLETED AT [facility] ON 4/7/22 BY SLP, DANIELLA HENDRIX:  Patient was presented thin liquids, nectar thick liquids, honey thick liquids, and pureed foods. Method of intake as by cup and spoon. Patient was self fed and fed by the clinician. Patient was seated in an upright position and in a lateral plane. The oral stage of swallowing was [facility] for consistencies administered. Delayed initiation of the pharyngeal swallow was noted with the swallow reflex triggered at the level of the vallecula. Reduced tongue base retraction and inadequate inversion of th epiglottis was noted resulting in residuals in the vallecula and /or posterior pharyngeal wall for all consistencies administered which minimally cleared with cued multiple swallows. There appeared to be a thickening of the epiglottis and posterior pharyngeal wall. Patient may benefit from lymphedema therapy. Residuals in the pyriform sinuses were noted due to pharyngeal weakness for all consistencies administered which mostly cleared with cued double swallow. Laryngeal penetration occurred in the absence of aspiration DURING the swallow for nectar consistency liquid due to inadequate laryngeal closure which remained in the laryngeal vestibule.  Laryngeal penetration was mild-moderate and occurred consistently. An absent cough/throat clear was noted. Laryngeal penetration occurred in the absence of aspiration AFTER the swallow for honey consistency liquid due to pharyngeal residuals which remained in the laryngeal vestibule. Laryngeal penetration was mild-moderate and occurred inconsistently. An absent cough/throat clear was noted. Laryngeal penetration/aspiration did not occur for the pureed consistency. However, due to the severity of pharyngeal residuals that did not clear, he is at high risk for aspiration of these residuals. Aspiration occurred DURING the swallow for thin liquid due to inadequate laryngeal closure. Aspiration was moderate and occurred consistently. A delayed cough was noted. Compensatory strategies that were not beneficial included multiple swallow, chin tuck, alternating solids and liquids, and throat clear. Structural/functional anomalies revealed a slightly inadequate velopharyngeal closure resulting in slight nasopharyngeal reflux. The cervical esophagus appeared adequate. Overall, the patient demonstrated a severe pharyngeal phase dysphagia. Diet recommendations were for the patient to be placed NPO. Therapy was recommended to continue. The following goals for therapy were noted:  1) Pt will complete BOTR strength/ ROM exercises to reduce pharyngeal residuals and improve epiglottic inversion minimal verbal prompts. 2) Pt will complete laryngeal strength/ ROM therapeutic exercises to improve airway protection for the least restrictive PO diet minimal verbal prompts. 3) Pt will complete Shaker and/or Chin Tuck Against Resistance (CTAR) to increase UES relaxation diameter and increase anterior laryngeal excursion to reduce pharyngeal residuals and reduce risk of pen/asp with minimal verbal prompts. 4) Pt will participate in DPNS to address functional deficits identified during swallow evaluation.  5) Pt will improve oropharyngeal swallow function to ensure airway protection during PO intake to maintain adequate nutrition/hydration and decrease signs/symptoms of aspiration to less than 1 x/day. Other recommendations included:Occupational therapy consult for lymphedema therapy to improve swallowing function. RESULTS:    DYSPHAGIA DIAGNOSIS:     Admission:  Clinical indicators of severe pharyngeal phase dysphagia as identified on MBSS completed on 11/30/21      Discharge:   Clinical indicators of severe pharyngeal phase dysphagia as identified on MBSS completed on 4/7/22     DIET RECOMMENDATIONS:     Admission:   NPO including medications to be given via alternate method. Patient is aware of the risks of aspiration and has opted to continue an oral diet. Therefor, patient is recommended to consider a modified diet of soft/easy to chew solids and mildly thick (nectar thick) liquids with use of compensatory strategies. Discharge:   NPO including medications to be given via alternate method. Patient is aware of the risks of aspiration and has opted to continue an oral diet. FEEDING RECOMMENDATIONS:     Admission:    Assistance level:  No assistance needed      Compensatory strategies recommended: chin tuck maneuver and throat clear post swallow if patient opts to continue oral diet. Discharge:    Assistance level:  No assistance needed      Compensatory strategies recommended: Not applicable. Discussed recommendations with nursing and/or faxed report to referring provider: Yes    SPEECH THERAPY  PLAN OF CARE   The dysphagia POC is established based on physician order, dysphagia diagnosis and results of clinical assessment     Skilled SLP intervention for dysphagia management was recommended 1-2 times per week for 30 minute sessions. Patient completed a total of 21 visits. This included the most recent MBSS on 4/7/22.       Specific dysphagia interventions to include:   Compensatory strategy training   Therapeutic exercises    Patient Treatment Goals:  Progress in therapy was recorded using the following key:  NC= no change; IMP= improved; ACH= achieved; NEI= not enough information      PLAN OF CARE:  Progress in therapy has been recorded using the following key:  NC= no change; IMP= improved; ACH= achieved; NEI= not enough information      1. Patient will complete 10 of each oral motor exercise presented to target lingual and labial strength, endurance, and range of motion in order improve bolus prep, bolus control and mastication during swallowing with minimal cues (less than 25% of the time). -ACH  2. Patient will complete 10 of each BOTR strength/ ROM exercises in order to reduce pharyngeal residuals and improve epiglottic inversion during swallowing with minimal cues (less than 25% of the time). -ACH  3. Patient will complete 10 of each laryngeal strength/ ROM therapeutic exercises to improve airway protection during swallowing for the least restrictive PO diet with minimal cues (less than 25% of the time). -ACH  4. Patient will complete Shaker and/or Chin Tuck Against Resistance (CTAR) exercises in order to increase UES relaxation diameter and increase anterior laryngeal excursion to reduce pharyngeal residuals and reduce risk of pen/asp with minimal cues(less than 25% of the time) . Patient will complete 3 isometric exercises for 60 seconds each and 30 isokinetic exercises per session. -ACH  5. Patient will complete 3 Effortful Swallow exercises therapeutically to target increased oral and base of tongue pressure, increased pharyngeal constrictor contractions, and increased UES relaxation duration to reduce pharyngeal residue by 50%This will determined through repeat MBSS when appropriate. -NEI  6. Patient will complete 3 Ailyn Maneuver exercises therapeutically to target increased pharyngeal constrictor contractions to reduce pharyngeal residue by 50%.  This will determined through repeat MBSS when appropriate. -NEI  7. Patient will advance to the least restrictive PO diet-aspiration free when safest and most appropriate. -NC       Patient/family Goal:    To be able to eat/drink safely. Plan of care discussed with Patient and Family   The Patient and Family understand(s) the diagnosis, prognosis and plan of care     Rehabilitation Potential/Prognosis: fair                    ADMITTING DIAGNOSIS: Dysphagia, unspecified [R13.10]    PATIENT REPORT/COMPLAINT: difficulty swallowing      PRIOR LEVEL OF SWALLOW FUNCTION:    PAST HISTORY OF DYSPHAGIA?: yes    Home diet: Regular consistency solids (IDDSI level 7) with  thin liquids (IDDSI level 0)    SUMMARY OF CLINICAL ASSESSMENT OF SWALLOW FUNCTION 2/8/22:  Oral Stage: The oral stage of swallowing was within functional limits for consistencies administered      Pharyngeal Stage:    · Throat clearing present after presentation of thin liquid  · Wet/gurgly vocal quality was noted after presentation of thin liquid  · Delayed initiation of the pharyngeal swallow noted    Cognition:   Within functional limits for this exam    Oral Peripheral Examination   Generalized oral weakness    Current Respiratory Status    room air during the day and 2.5-3 liters of oxygen at night. Parameters of Speech Production  Respiration:  Adequate for speech production  Quality:   Hoarse  Intensity: Within functional limits    Volitional Swallow: DNT     Volitional Cough:   DNT    Pain: No pain reported. DISCHARGE SUMMARY:  Ramonia Guard demonstrated improvement in response to speech therapy intervention. Continued therapy is recommended in order to address the above mentioned needs. Prognosis for continued improvement is fair. Patient has exhausted the number of visits approved by his insurance. Despite slow, but steady progress, insurance approval for continued treatment has been difficult.   Therefore, the patient is being discharged at this time with recommendation from this SLP to consider a referral for vital stimulation therapy from the managing physician. Patient was in agreement. Measure of functional change noted:  Rosenbek Penetration-Aspiration Scale:  1. Material does not enter the airway  2. Material enters the airway, remains above the vocal folds, and is ejected from the airway  3. Material enters the airway, remains above the vocal folds, and is not ejected from the airway  4. Material enters the airway, contacts the vocal folds, an is ejected from the airway  5. Material enters the airway, contacts the vocal folds, and is not ejected from the airway  6. Material enters the airway, passes below the vocal folds and is ejected into the larynx or out of the airway  7. Material enters the airway, passes below the vocal folds, and is not ejected from the trachea despite effort  8. Material enters the airway, passes below the vocal folds, and no effort is made to eject. Consistency Presented Penetration-Aspiration Scale Score  11/30/21 Penetration-Aspiration Scale Score  4/7/22   Thin 8 8   Mildly thick 8 5   Moderately thick DNT 5   Puree 3 1   Solid DNT DNT         Patti Dunne M.S., CCC-SLP/L  Speech-Language Pathologist      CPT Codes    Evaluation:   53450  bedside swallow eval    Treatment:   58408  dysphagia tx  07726  oral motor exercises (15 min)  73557  neuromuscular re-education,  DPNS      Patti Flowers M.S., CCC-SLP/L  Speech-Language Pathologist      [address] [facility]     Phone: [phone]

## 2022-06-07 NOTE — PROGRESS NOTES
Patient was seen for dysphagia therapy. Temperature was taken prior to the session. Temperature was [facility]. COVID-19 screening questionnaire was negative. Mask was worn by SLP. Goggles were also worn. Patient wore a mask when appropriate. Charity Davila was seen for dysphagia therapy today. Deep pharyngeal neuromuscular stimulation treatment was provided. Patient completed 75 exercises during today's session. Overall strength, endurance, and range of motion was rated as fair-good. Responses to each exercise will be indicated below.  Bilateral soft palate glide technique resulted in palatal reflex triggering and velopharyngeal closure. Responses were noted 100% of the time.  Triple soft palate glide technique resulted in palatal reflex triggering and velopharyngeal closure. Responses were noted 100% of the time.  Lingual glide technique resulted in laryngeal elevation reflex, tongue base retraction and reflexive lingual groove approximately 100% of the time.  Lateral lingual stimulation resulted in laryngeal elevation reflex, tongue base retraction, and reflexive lingual groove. Responses noted 366% of the time.  Lingual septum stimulation resulted in laryngeal elevation reflex and tongue base retraction. Lingual groove was noted. Response was noted 33% of the time.  Bilateral posterior pharyngeal constrictor stimulation resulted in tongue base retraction, palatal reflex and velopharyngeal closure noted approximately 100% of the time.  Tongue base retraction stimulation resulted in laryngeal elevation reflex, tongue base retraction and reflexive lingual groove approximately 66% of the time.  Uvula stimulation resulted in palatal trigger reflex and velopharyngeal closure. Responses were noted 66% of the time.  The nasal spine stimulation technique resulted in palatal trigger reflex and velopharyngeal closure. Responses were noted 100% of the time.    Lastly, the lateral pharyngeal stimulation technique demonstrated palatal trigger reflex and velopharyngeal closure. Responses were noted 100% of the time. Spontaneous swallow was noted greater than 33% of the time following each probe. Nasal sinus drainage was not present. Increased saliva production was noted. Patient demonstrated a yellowish, green, and black coating on the back of the tongue. Wet vocal quality noted today. Patient had to spit out mucous into the trash multiple times throughout the session. Tongue base retraction exercises were completed. Patient demonstrated fair-good strength, endurance, and range of motion. Cues to open jaw were needed. Patient completed 3 effortful swallow exercises with good laryngeal lifting and excursion. He also completed the supraglottic swallowing maneuver three times with success. Water was provided during the session. Wet vocal quality and throat clearing noted post presentation. Patient was able to clear with the supraglottic maneuver. Patient has exhausted the number of visits approved by his insurance. Due to slow, but steady progress, the patient is being discharged at this time with recommendation from the is SLP to consider a referral for vital stimulation therapy from the managing physician. Patient was in agreement. Patti Rose M.S., JOSEPH-SLP/L  Speech-Language Pathologist      CPT CODE:       79170  dysphagia tx

## 2022-06-10 ENCOUNTER — HOSPITAL ENCOUNTER (OUTPATIENT)
Dept: OCCUPATIONAL THERAPY | Age: 72
Setting detail: THERAPIES SERIES
Discharge: HOME | End: 2022-06-10
Payer: MEDICARE

## 2022-06-10 NOTE — PROGRESS NOTES
Occupational Therapy        OCCUPATIONAL THERAPY PROGRESS NOTE  [facility]  [address]  Phone: [phone]             Fax: [phone]      Date:  6/10/2022  Initial Evaluation Date: 2022     Evaluating Therapist: HARRY Black CLT-LANA    Patient Name:  Erasmo Walker    :  1950    Restrictions/Precautions:  fall risk  Diagnosis:  Laryngeal cancer (z85.21)       Date of Surgery/Injury: n/a    Insurance/Certification information:   [facility] [address] of care signed (Y/N): N  Visit# / total visits: Visit #8/10    Referring Practitioner:  Cheng Vicente MD   Specific Practitioner Orders: Evaluation and Treatment    Assessment of current deficits   []Pain  []Skin Integrity   [x]Lymphedema   []Functional transfers/mobility   []ADLs   []Strength    []Cognition  []IADLs   []Safety Awareness   []  Motor Endurance    []Fine Motor Coordination   []Balance   []Vision/perception  []Sensation []Gross Motor Coordination  []ROM     OT PLAN OF CARE   OT POC based on physician orders, patient diagnosis and results of clinical assessment    Frequency/Duration:  1-2x per week for 12 treatment sessions from [facility]  through 2022  Specific OT Treatment to include:     Plan of Care:     [x]97140-Manual Lymph Drainage and Combined Decongestive Therapy  [x]19722- Skilled Multilayer Short Stretch Compression Bandaging/ Therapeutic Exercise  [x]Skin Care Education [x]HEP including Self MLD Education and/or Self Bandaging  [x]Education for Lymphedema Risks/Precautions     [x] Caregiver Education   []other:      Patient Specific Goal: to get rid of it    STG: 3 weeks  1. Patient will demonstrate knowledge and understanding for lymphedema precautions, skin care and self management to decrease progression of lymphedema and risk of infection. Patient goal met.   2.  Patient will present with decreased limb volume in the involved extremity from minimal to trace for improved functional mobility. Therapist continued patient education regarding self manual lymphatic drainage massage sequence for head / neck involvement. Patient stated they are performing sequence at home about once a day and only missed one day or two since last seen by this therapist.  Therapist performed manual lymphatic drainage massage sequence for head / neck involvement. Therapist described each step as it was performed. Patient tolerated well. Patient stated he felt better once completed. Patient progressing toward goal.  3.  Patient will demonstrate compliance with compression therapy for independent management of lymphedema. Patient continues to utilize compression garment provided by this therapist.  Patient stated he uses nightly and needs garment adjusted as it is too big. Therapist to adjust and return next treatment session. Patient progressing toward goal.          LT weeks  1. Patient will be independent with self management of lymphedema including understanding garment wear schedule, self compression bandaging, HEP and self care. Therapist continued patient education regarding home program, self care, eating lifestyle, and compression use. Patient able to verbalize understanding. Patient progressing toward goal.  2.  Patient will be fitted for appropriate compression garments for long term management of lymphedema  Patient was fitted for garment and he continues to utilize hours of sleep. Garment fits well and functions as intended. Therapist will make adjustments to second garment. Patient goal met. 3.  Patient will present with decreased limb volume in the involved extremity from trace to none  for improved functional mobility.       Restrictions/Precautions:  [] No blood pressure/blood draw in: [] Left Upper Extremity     [] Right Upper Extremity        [x] Fall Risk       Intervention:   []Other    Pain:  [] No    [] Yes  Location:  Pain Rating (0-10 pain scale):  Pain Description:  Interruption of Treatment [] Yes  [] No    Came to Clinic:  [] Bandaged    []Unbandaged    [] With Stocking     [] With Sleeve     [] Kinesiotaped    [] Allison Maribell    [] With Alternative Compression:    Skin Integrity:  [] Normal [] Dry  []Rough []Moist []Rash  Location of problem area/s:  [] Wound: Location/Description   [] Fibrosis: Location/Description  [] Keratosis: Location/Description  [] Papillomas: Location/Description  [] Other    Color:  [] Normal [] Mottled [] Flushed [] Other/Description  Location of problem area/s:    Edema:  Edema noted in cervical area. Subjective:  Patient attended treatment session with spouse who waited in waiting area. Therapist discussed with spouse current home program and patient self management of lymphedema. Patient with no new questions or concerns noted.        Objective:  [] Measurement [x]Manual Lymph Drainage  []Bandaging   []Kinesiotaping [x] Education    []Self Massage   []Self Bandaging [] Exercise    []Wound Care  []Hygiene  [] Other        Assessment:  Knowledge of home program:   [] Good [] Fair  [] Poor  Patient is programming at home:             [] Yes  [] No  Family is assisting with programming: [] Yes  [] No  Home programming is consistent:             [] Yes  [] No  Other:   Response to treatment:  good  Instructions for patient:   [x] Verbal [x] Written  Instructions addressed:  Manual lymphatic drainage massage, compression garment        Time In: 1230            Time Out: 1325                   Timed Code Treatment Minutes: 55 minutes      CODE  Minutes  Units   42345 OT Eval Low     34192 OT Eval Medium     50666 OT Eval High     60334 Fluidotherapy     63231 Manual 40 3   58793 Therapeutic Ex     72651 Therapeutic Activity 15 1   20646 ADL/COMP Tech Train     [phone] Neuromuscular Re-Ed     05864 OrthoManagementTraining     52727 Paraffin     59908 Electrical Stim - Attended     [id number] Iontophoresis     20005 Ultrasound Other     Total  55 4       Plan: Continue to address:  [x]Bandaging  [x] Self Bandaging/Family Assist  [x]MLD  [x]Self Massage  [x]Exercise  [x]Education  []Obtain referral for garments  []Medical Hold  []Discharge to [address] [place] [address], OTR/L, [employer]

## 2022-06-16 ENCOUNTER — HOSPITAL ENCOUNTER (OUTPATIENT)
Dept: OCCUPATIONAL THERAPY | Age: 72
Setting detail: THERAPIES SERIES
Discharge: HOME | End: 2022-06-16
Payer: MEDICARE

## 2022-06-16 ENCOUNTER — HOSPITAL ENCOUNTER (OUTPATIENT)
Dept: SPEECH THERAPY | Age: 72
Setting detail: THERAPIES SERIES
Discharge: HOME | End: 2022-06-16
Payer: MEDICARE

## 2022-06-16 PROCEDURE — 97530 THERAPEUTIC ACTIVITIES: CPT | Performed by: OCCUPATIONAL THERAPIST

## 2022-06-16 PROCEDURE — 92610 EVALUATE SWALLOWING FUNCTION: CPT

## 2022-06-16 NOTE — PROGRESS NOTES
Occupational Therapy  OCCUPATIONAL THERAPY DISCHARGE NOTE  [facility]  [address]  Phone: [phone]             Fax: [phone]     Date:  2022  Initial Evaluation Date: 2022     Evaluating Therapist: HARRY Brown CLT-LANA    Patient Name:  Donaldo Morales    :  1950    Restrictions/Precautions:  fall risk  Diagnosis:  Laryngeal cancer (z85.21)         Date of Surgery/Injury: n/a    [facility]  [id number]  Plan of care signed (Y/N): N  Visit# / total visits: Visit #/10  Total Visits to date:  9 Cancels/No-shows to date: 0    Referring Practitioner:  Placido Neil MD  Specific Practitioner Orders: Evaluation and Treatment    Assessment of current deficits   []Pain  []Skin Integrity   [x]Lymphedema   []Functional transfers/mobility   []ADLs   []Strength    []Cognition  []IADLs   []Safety Awareness   []  Motor Endurance    []Fine Motor Coordination   []Balance   []Vision/perception  []Sensation []Gross Motor Coordination  []ROM     OT PLAN OF CARE   OT POC based on physician orders, patient diagnosis and results of clinical assessment    Frequency/Duration:  Patient has reached maximum potential toward goals at this time. Patient has been discharged from the lymphedema clinic.   Specific OT Treatment to include:     Plan of Care:     [x]97140-Manual Lymph Drainage and Combined Decongestive Therapy  [x]64590- Skilled Multilayer Short Stretch Compression Bandaging/ Therapeutic Exercise  [x]Skin Care Education [x]HEP including Self MLD Education and/or Self Bandaging  [x]Education for Lymphedema Risks/Precautions     [x] Caregiver Education   []other:      Patient Specific Goal: to get rid of it    Goals Status:    STG: 3 weeks  1.  Patient will demonstrate knowledge and understanding for lymphedema precautions, skin care and self management to decrease progression of lymphedema and risk of infection. Patient goal met. 2.  Patient will present with decreased limb volume in the involved extremity from minimal to trace for improved functional mobility. Therapist continued patient / family education regarding self manual lymphatic drainage massage sequence for head / neck involvement. Patient stated he performs every day to every other day when he can. Further stated spouse assists as able several times per week. Therapist reviewed manual lymphatic drainage massage sequence for head / neck involvement. Patient and spouse verbalized understanding. Therapist further reviewed benefits of consistent management and patient able to verbalize understanding. Patient presented with minimal swelling this treatment session and stated his swelling has gone up and down and manages as able. Patient goal partially met. 3.  Patient will demonstrate compliance with compression therapy for independent management of lymphedema. Therapist continued patient education regarding compression garment for night use. Patient stated he continues to utilize each night and has been able to increase wear time. Patient garment has been adjusted and he continues to utilize. Patient goal partially met.          LT weeks  1.  Patient will be independent with self management of lymphedema including understanding garment wear schedule, self compression bandaging, HEP and self care. Therapist continued patient education regarding independent self lymphedema management. Therapist discussed current home program, eating lifestyle, exercise, compression use, and general self care. Patient able to verbalize understanding. Patient goal met. 2.  Patient will be fitted for appropriate compression garments for long term management of lymphedema. Therapist fitted patient for head/neck garment. Garment has been adjusted and continues to fit well and work as intended. Patient goal met.     3.  Patient will present with decreased limb volume in the involved extremity from trace to none  for improved functional mobility. Goal not met    Significant Findings At Last Visit/Comments:    Patient continues to make steady gains toward goals. Patient currently independent with management of his lymphedema with assist of spouse. Patient performs manual lymphatic drainage massage sequence daily, uses compression garment hours of sleep, maintains a low sodium eating lifestyle. Patient current measurements as follows:    Measurements marked in one centimeter increments at midline of neck from bottom lip to base of the neck.   Patient measurement then taken from ear opening passing through each lou along neckline to opposite ear opening.            Evaluation Follow up  [facility] 2022 Follow up  16June 2022   1cm 28.25cm  27.75cm  27.75   2cm 28.5cm  28.25cm  28.5   3cm 29.5cm  28.5cm  28.5   4cm 29.5cm  29cm  29.5   5cm 29.75cm  29.5cm  29.5   6cm 29.5cm  29.5cm  28.75   7cm 29.25cm  29cm  27.75   8cm 29.75cm  28.5cm  27.25   9cm 28.25cm  27.5cm  27.25   10cm 27.75cm  27.5cm  27.25   11cm 27.5cm  27.25cm  27.25   12cm 27.5cm  27.25cm  27                       Upper lip 27.25cm  27cm  27   Lower lip 27.75cm  27.5cm  27.25             Neck circumference 44cm  41.75cm     Head circumference 68cm  68cm  67.25       Neck flexion 40degrees  44degrees  32   Neck extension 30degrees  44degrees  41   Neck lateral rotation right 60degrees  54degrees  52   Neck lateral rotation left 60degrees  60degrees  50   Neck lateral flexion right 20degrees  30degrees  31   Neck lateral flexion left 20degrees  25degrees  34      OT G-codes:  Carrying, Handling, Moving objects   (Current status): CI  [id number] (Discharge Goal):  [facility]  Lymphedema Life Impact Scale Score:  13  Percent Impairment:  19%    Restrictions/Precautions:  [] No blood pressure/blood draw in: [] Left Upper Extremity     [] Right Upper Extremity        [x] Fall Risk       Intervention: []Other    Subjective:  Patient attended treatment session with spouse who waited in waiting area. Patient with no new issues noted. Rehab Potential: [] Excellent [x] Good [] Fair  [] Poor     Goal Status:  [] Achieved [x] Partially Achieved  [] Not Achieved     Patient Status: [x] Patient reached maximum potential toward goals and now discharged from lymphedema clinic. Time In: 0800            Time Out: 0830                      Timed Code Treatment Minutes: 30 minutes      CODE  Minutes  Units   09366 OT Eval Low     30780 OT Eval Medium     31669 OT Eval High     63833 Fluidotherapy     51202 Manual     52801 Therapeutic Ex     04717 Therapeutic Activity 30 2   95703 ADL/COMP Tech Train     32723 Neuromuscular Re-Ed     62078 OrthoManagementTraining     25489 Paraffin     03336 Electrical Stim - Attended     84952 Iontophoresis     35622 Ultrasound      Other       30 2     Electronically signed by: Link Borjas OTR/L, ASHLEY      Physician's Certification / Comments         I have reviewed the Plan of Care established for skilled therapy services and certify that the services are required and that they will be provided while the patient is under my care. Physician's Comments/Revisions:                   Physicians's Printed Name:  Ivette Major MD                                   Physician's Signature:                                                               Date:      Please review Patient's OT evaluation and if you agree sign/date and fax back to us at our [address] [place] [address] fax [phone].  Thank you for your referral!

## 2022-06-16 NOTE — PROGRESS NOTES
[address] [address]  Outpatient Speech Therapy  Phone: [phone] Fax: [phone]     SPEECH/LANGUAGE PATHOLOGY  CLINICAL ASSESSMENT OF SWALLOWING FUNCTION   and PLAN OF CARE:      PATIENT NAME:  Destin Patel  (male)     MRN:  [id number]    :  1950  (66 y.o.)  STATUS:  Outpatient clinic   TODAY'S DATE:  2022  REFERRING PROVIDER:   Dr. Raymond Pride NPI: [id number]  SPECIFIC PROVIDER ORDER: [facility] (For dysphagia and Vital Stimulation therapy) Date of order:  22  EVALUATING THERAPIST: Peyton Lopez SLP    CERTIFICATION/RECERTIFICATION PERIOD: 2022 to 22  INSURANCE PROVIDER:  Payor: Tri Brito / Plan: [employer] ESSENTIAL/PLUS / Product Type: *No Product type* /    INSURANCE ID:  [id number] - (Medicare Managed)   SECONDARY INSURANCE (if applicable):      CPT Codes  EVALUATION: 21418  bedside swallow eval    TREATMENT:   Requesting treatment authorization for  12 visits over 12 weeks focusing on the following CPT codes:     47773  dysphagia tx        REFERRING/TREATMENT DIAGNOSIS: Other dysphagia [R13.19]                   RESULTS:    DYSPHAGIA DIAGNOSIS:   Clinical indicators of Severe Pharyngeal Phase Dysphagia       DIET RECOMMENDATIONS:  NPO was recommended following his Video Swallow Study that was completed on 2022; however patient has made the decision to forego NPO recommendations and consume a diet of Regular Solids and Thin Consistency Liquids with use of compensatory strategies (small bites/sips, alternate consistencies, pace during meals, sit upright, proactively cough/throat clear and re-swallow). He is aware of the associated risks with continuing an oral diet.        FEEDING RECOMMENDATIONS:     Assistance level:  No assistance needed      Compensatory strategies recommended: Small bites/sips, Alternate solids and liquids, Throat clear and Supraglottic      Discussed recommendations with nursing and/or faxed report to referring provider: Yes    SPEECH THERAPY  PLAN OF CARE   The dysphagia POC is established based on physician order, dysphagia diagnosis and results of clinical assessment     Outpatient Skilled SLP intervention for dysphagia management is recommended 1-2 times per week to address the established treatment plan    Conditions Requiring Skilled Therapeutic Intervention for dysphagia:    Throat clearing during PO intake   Wet vocal quality during PO intake  Coughing during PO intake    Incoordination of swallow/breathing pattern   Patients with head and neck cancer can experience a series of acute and chronic sequelae. Dysphagia can be the most life-changing and challenging symptom to rehabilitate [employer] 2021[employer] Both acute and late effects result in adverse sequelae including aspiration, feeding tube dependence, and nutritional deficiencies (Chad Paris & Manoj Braswell, 2009.). Specific dysphagia interventions to include:     Compensatory strategy training   Therapeutic exercises  Deep Pharyngeal Neuromuscular Stimulation (DPNS) if available   Neuromuscular Electrical Stimulation (NMES) if available     Specific instructions for next treatment:  development and training of compensatory swallow strategies to improve airway protection and swallow function, initiate instruction of therapeutic exercises and DPNS/NMES therapy     Patient Treatment Goals:    Short Term Goals:    1. Patient will complete 10 of each BOTR strength/ ROM exercises in order to reduce pharyngeal residuals and improve epiglottic inversion during swallowing with minimal cues (less than 25% of the time). 2. Patient will complete 10 of each laryngeal strength/ ROM therapeutic exercises to improve airway protection during swallowing for the least restrictive PO diet with minimal cues (less than 25% of the time).      3. Patient will complete Shaker and/or Chin Tuck Against Resistance (CTAR) exercises in order to increase UES relaxation diameter and increase anterior laryngeal excursion to reduce pharyngeal residuals and reduce risk of pen/asp with minimal cues(less than 25% of the time) . Patient will complete 3 isometric exercises for 60 seconds each and 30 isokinetic exercises per session. 4. Patient will complete 3 Effortful Swallow exercises therapeutically to target increased oral and base of tongue pressure, increased pharyngeal constrictor contractions, and increased UES relaxation duration to reduce pharyngeal residue by 50%This will determined through repeat MBSS when appropriate. 5. Patient will complete 3 Ailyn Maneuver exercises therapeutically to target increased pharyngeal constrictor contractions to reduce pharyngeal residue by 50%. This will determined through repeat MBSS when appropriate. 6. Patient will demonstrate the ability to adequately self-monitor swallowing skills and perform appropriate compensatory techniques to reduce s/s of aspiration. 7. Vital Stimulation treatment to target the following placements/muscle groups/muscle response secondary to the patients symptoms of delayed swallow trigger, penetration/aspiration, pharyngeal weakness, and residuals    A. Placement 2B to increase function to the laryngeal extrinsics and suprahyoid muscles to improve hyolaryngeal excursion. B. Placement 3A to increase function to the digastric and thyrohyoid muscles to improve hyolaryngeal excursion. C. Placement 3B to increase function to the superior/middle/inferior pharyngeal constrictors and the pharyngeal shortening muscles to improve pharyngeal constriction. 8. Provide Deep Pharyngeal Neuromuscular Stimulation (DPNS) with focus on the following goals:         A. Provide increased sensory input to light pressure receptors and increase cutaneous pressure to posterior tongue   for timely initiation of pharyngeal swallow (CN V, VII, IX)        B.  Elicit reflexive motor response to improve integrity of velar elevation, tongue base retraction, hyolaryngeal   excursion, and pharyngeal constrictor movement (CN V, VII, X, XII)         Long Term Goals:    1. Patient will maintain adequate hydration/nutrition with optimum safety and efficiency of swallowing function on P.O. intake without overt signs and symptoms of aspiration for the highest appropriate diet level. 2. Patient will utilize compensatory strategies with optimum safety and efficiency of swallowing function on P.O. intake without overt signs and symptoms of aspiration for the highest appropriate diet level          Patient/family Goal:    To strengthen his swallow muscles and ensure he does not get aspiration pneumonia in the future- he wants to avoid any future medical problems     Plan of care discussed with Patient and Family   The Patient and Family understand(s) the diagnosis, prognosis and plan of care     Rehabilitation Potential/Prognosis: good                    ADMITTING DIAGNOSIS: Other dysphagia [R13.19]    PATIENT REPORT/COMPLAINT: Charity Davila was referred by Dr. Emiliano Vila to [facility] Outpatient Speech Pathology Department for evaluation and treatment due to a diagnosis of dysphagia. Patient was accompanied to the session by his wife, Allana Lesch. Together, they provided medical history information. Patient reported that approximately 2.5 years ago, he was diagnosed with throat cancer. He underwent radiation and chemotherapy treatments. He reported difficulty swallowing at that time and was recommended for therapy, however, he reported that the managing physician did not feel it was necessary. Patient reported that the difficulty swallowing has persisted since that time. Recently, he reported that Dr. Wally Sherman evaluated the patient and reported a narrowing of the esophagus. However, the patient is unable to undergo stretching.   Therefore, he reported that he was referred to Speech Therapy for evaluation and treatment. Patient has a history of oxygen therapy at night. He has COPD and emphysema. Patient reported a history of atrial fibrillation and abdominal aortic aneurysm. He also reported that he is on tamsulosin for his prostate. He underwent 21 speech therapy sessions from February 8, 2022 through June 7, 2022 with ZULY Ng. These sessions consisted of traditional dysphagia exercises, as well as Deep Pharyngeal Neuromuscular Stimulation. His most recent MBSS was completed in April 2022 and it revealed the following:    [address] 4/7/22 BY SLP, DANIELLA HENDRIX:  Patient was presented thin liquids, nectar thick liquids, honey thick liquids, and pureed foods. Method of intake as by cup and spoon. Patient was self fed and fed by the clinician. Patient was seated in an upright position and in a lateral plane. The oral stage of swallowing was [facility] for consistencies administered. Delayed initiation of the pharyngeal swallow was noted with the swallow reflex triggered at the level of the vallecula. Reduced tongue base retraction and inadequate inversion of th epiglottis was noted resulting in residuals in the vallecula and /or posterior pharyngeal wall for all consistencies administered which minimally cleared with cued multiple swallows. There appeared to be a thickening of the epiglottis and posterior pharyngeal wall. Patient may benefit from lymphedema therapy. Residuals in the pyriform sinuses were noted due to pharyngeal weakness for all consistencies administered which mostly cleared with cued double swallow. Laryngeal penetration occurred in the absence of aspiration DURING the swallow for nectar consistency liquid due to inadequate laryngeal closure which remained in the laryngeal vestibule. Laryngeal penetration was mild-moderate and occurred consistently.   An absent cough/throat clear was noted.  Laryngeal penetration occurred in the absence of aspiration AFTER the swallow for honey consistency liquid due to pharyngeal residuals which remained in the laryngeal vestibule. Laryngeal penetration was mild-moderate and occurred inconsistently.  An absent cough/throat clear was noted. Laryngeal penetration/aspiration did not occur for the pureed consistency. However, due to the severity of pharyngeal residuals that did not clear, he is at high risk for aspiration of these residuals. Aspiration occurred DURING the swallow for thin liquid due to inadequate laryngeal closure. Aspiration was moderate and occurred consistently. A delayed cough was noted. Compensatory strategies that were not beneficial included multiple swallow, chin tuck, alternating solids and liquids, and throat clear. Structural/functional anomalies revealed a slightly inadequate velopharyngeal closure resulting in slight nasopharyngeal reflux. The cervical esophagus appeared adequate. Overall, the patient demonstrated a severe pharyngeal phase dysphagia. Diet recommendations were for the patient to be placed NPO. Therapy was recommended to continue. Measure of functional change noted:  Rosenbek Penetration-Aspiration Scale:  1. Material does not enter the airway  2. Material enters the airway, remains above the vocal folds, and is ejected from the airway  3. Material enters the airway, remains above the vocal folds, and is not ejected from the airway  4. Material enters the airway, contacts the vocal folds, an is ejected from the airway  5. Material enters the airway, contacts the vocal folds, and is not ejected from the airway  6. Material enters the airway, passes below the vocal folds and is ejected into the larynx or out of the airway  7. Material enters the airway, passes below the vocal folds, and is not ejected from the trachea despite effort  8.    Material enters the airway, passes below the vocal folds, and no effort is made to eject.     Consistency Presented Penetration-Aspiration Scale Score  11/30/21 Penetration-Aspiration Scale Score  4/7/22   Thin 8 8   Mildly thick 8 5   Moderately thick DNT 5   Puree 3 1   Solid DNT DNT         Upon discharge June 7, 2022, he was recommended to switch facilities and initiate a more intense dysphagia program with inclusion of Vital Stimulation therapy. This was ordered by Dr. Dubois Leader. PRIOR LEVEL OF SWALLOW FUNCTION:    PAST HISTORY OF DYSPHAGIA?: yes    Home diet: Regular consistency solids (IDDSI level 7) with  thin liquids (IDDSI level 0)    PROCEDURE:  Consistencies Administered During the Evaluation   Liquids: thin liquid and pudding thick liquid   Solids:  pureed foods and solid foods      Method of Intake:   cup, spoon  Self fed      Position:   Seated, upright    CLINICAL ASSESSMENT:  Oral Stage: The oral stage of swallowing was within functional limits for consistencies administered      Pharyngeal Stage:    Throat clearing present after presentation of thin liquid and purees  Latent wet cough was noted after presentation of thin liquid  Multiple swallows were noted after presentation of pureed foods and solid foods    Patient felt that the solid trial cleared easier than the applesauce trial.  He utilized 3 swallows to clear, as well as a liquid wash. Cognition:   Within functional limits for this exam    Oral Peripheral Examination   Adequate lingual/labial strength     Current Respiratory Status    room air     Parameters of Speech Production  Respiration:  Adequate for speech production  Quality:   Hoarse  Intensity: Within functional limits    Volitional Swallow: present     Volitional Cough:   present     Pain: No pain reported. EDUCATION:   The Speech Language Pathologist (SLP) completed education regarding results of evaluation and that intervention is warranted at this time.   Learner: Patient  Education: Reviewed results and recommendations of this evaluation, Reviewed diet and strategies and Reviewed signs, symptoms and risks of aspiration  Evaluation of Education:  Verbalizes understanding    This plan may be re-evaluated and revised as warranted. Treatment goals discussed with Patient and Family   The Patient and Family understand(s) the diagnosis, prognosis and plan of care     Evaluation Time includes thorough review of current medical information, gathering information on past medical history/social history and prior level of function, completion of standardized testing/informal observation of tasks, assessment of data and education on plan of care and goals. The admitting diagnosis and active problem list, as listed below have been reviewed prior to initiation of this evaluation. ACTIVE PROBLEM LIST:   Patient Active Problem List   Diagnosis    PVD (peripheral vascular disease) ([facility]    S/P AAA repair using bifurcation graft    COPD (chronic obstructive pulmonary disease) ([facility]    Squamous cell carcinoma of hypopharynx (HCC)    Severe protein-calorie malnutrition ([facility]    [facility]    Acute respiratory failure with hypoxemia ([facility]    Dysphagia    Benign essential hypertension   [id number] [facility]  [address]  6/16/2022      [address]        Phone: [phone]     If you have any questions or concerns, please don't hesitate to call. Thank you for your referral.    Physician/Provider Signature:________________________________Date:__________________  By signing above, the therapists plan is approved by the physician/provider. All patients under [employer] must be signed by physician/provider.

## 2022-06-17 ENCOUNTER — APPOINTMENT (OUTPATIENT)
Dept: SPEECH THERAPY | Age: 72
End: 2022-06-17
Payer: MEDICARE

## 2022-06-17 ENCOUNTER — APPOINTMENT (OUTPATIENT)
Dept: OCCUPATIONAL THERAPY | Age: 72
End: 2022-06-17
Payer: MEDICARE

## 2022-06-23 ENCOUNTER — HOSPITAL ENCOUNTER (OUTPATIENT)
Dept: SPEECH THERAPY | Age: 72
Setting detail: THERAPIES SERIES
Discharge: HOME | End: 2022-06-23
Payer: MEDICARE

## 2022-06-23 PROCEDURE — 92526 ORAL FUNCTION THERAPY: CPT

## 2022-06-23 NOTE — PROGRESS NOTES
[address]  Outpatient Speech Therapy  Phone: [phone] Fax: [phone]     PATIENT NAME:  Zay Godinez  (male)                MRN:  [id number]                       :  1950    STATUS:  Outpatient clinic       REFERRING PROVIDER:   Dr. Brandon Downs          NPI: [id number]  REFERRING/TREATMENT DIAGNOSIS: Other dysphagia [R13.19]   [facility]ital [facility] Date of order:  46  CERTIFICATION/RECERTIFICATION PERIOD: 2022 to 22  INSURANCE PROVIDER:  Payor: Lopez Ibanez / Plan: Shira Freeman ESSENTIAL/PLUS / Product Type: *No Product type* /    INSURANCE ID:  [id number] - (Medicare Managed)              SECONDARY INSURANCE (if applicable):      SPEECH LANGUAGE PATHOLOGY  DAILY PROGRESS NOTE      SUBJECTIVE:  Patient was seen this date for 60 minute speech therapy session to target dysphagia. He was pleasant and cooperative. OBJECTIVE:  Patient made the decision to forego NPO recommendations and continue consuming a diet of regular solids and thin consistency liquids with use of compensatory strategies (small bites/sips, alternate consistencies, pace during meals, sit upright, proactively cough/throat clear and re-swallow). He is aware of the associated risks with continuing an oral diet. His spouse reported some coughing and expectoration of food during meals, but he does not want to use thicken or change his food textures. He is going to monitor these symptoms over the next 6-8 weeks of therapy.       Explained the purpose and procedure of Vital Stimulation. Patient provided verbal consent to treat.  Vital Stim placement 3B used to increase function to the superior/middle/inferior pharyngeal constrictors and the pharyngeal shortening muscles to improve pharyngeal constriction.   Top channel initially set at 5.0mA and bottom channel initially set at 6.0mA to evoke a transition from a tingling sensation to a pulling sensation. An effortful swallow noted. During the session, top channel increased to 11. 0mA and bottom channel increased to 12. 0mA to maintain a pulling sensation and audible swallow.  Patient alerted SLP when he did not feel the machine as strong.     While Vital Stim unit was in place, patient completed Deep Pharyngeal Neuromuscular Stimulation, lollipop trials, and traditional exercises to maintain continuous swallowing during a functionally based exercise.       Explained the purpose of Deep Pharyngeal Neuromuscular Stimulation (DPNS) with focus on the following goals:      1. Provide increased sensory input to light pressure receptors and increase cutaneous pressure to posterior tongue for timely initiation of pharyngeal swallow (CN V, VII, IX)     2. Elicit reflexive motor response to improve integrity of velar elevation, tongue base retraction, hyolaryngeal excursion, and pharyngeal constrictor movement (CN V, VII, X, XII)     Results -- percentage indicates elicitation of desired motor response in targeted area     Velar elevation: 100%     Tongue base retraction: 100%     Superior pharyngeal constrictor wall contraction: 100%     Lateral pharyngeal wall contraction: 100%     Physiologic modified gag response: absent     Lingual groove: Fair     Laryngeal elevation/depression time: All timely this date       Spontaneous elicitation of reflexive swallow post stimulation: 100%      Patient tolerated 42 stimulations this session utilizing frozen/cold lemon glycerine swabs, and demonstrated good therapeutic response to intervention.        During lollipop, SLP cued a small lick and an immediate effortful swallow to continue swallowing with the Vital Stim unit in place. He presented with 1 cough during this task with expectoration of phlegm. Wet vocal quality noted after 20 effortful swallow.   SLP cued a cough and re-swallow and is vocal quality was clear at the end of this task. SLP student introduced patient to tongue base and laryngeal/pharyngeal strengthening exercises. He was able to complete resistive tongue base strengthening, CTAR, effortful swallow, Ailyn, pitch glides, and Hyoid Lift maneuver with good strength. Hand-outs of all exercises provided for home carryover.     No signs of respiratory distress or mucosal irritation present following completion of this intervention.      No discomfort observed or reported following removal of Vital Stim unit.        ASSESSMENT:  Making progress toward meeting therapy goals. PLAN:    Will continue speech pathology intervention as per established Plan of Care. Short Term Goals:     1. Patient will complete 10 of each BOTR strength/ ROM exercises in order to reduce pharyngeal residuals and improve epiglottic inversion during swallowing with minimal cues (less than 25% of the time).     2. Patient will complete 10 of each laryngeal strength/ ROM therapeutic exercises to improve airway protection during swallowing for the least restrictive PO diet with minimal cues (less than 25% of the time).       3. Patient will complete Shaker and/or Chin Tuck Against Resistance (CTAR) exercises in order to increase UES relaxation diameter and increase anterior laryngeal excursion to reduce pharyngeal residuals and reduce risk of pen/asp with minimal cues(less than 25% of the time) .  Patient will complete 3 isometric exercises for 60 seconds each and 30 isokinetic exercises per session.     4. Patient will complete 3 Effortful Swallow exercises therapeutically to target increased oral and base of tongue pressure, increased pharyngeal constrictor contractions, and increased UES relaxation duration to reduce pharyngeal residue by 50%This will determined through repeat MBSS when appropriate.     5.  Patient will complete 3 Ailyn Maneuver exercises therapeutically to target increased pharyngeal constrictor contractions to reduce pharyngeal residue by 50%. This will determined through repeat MBSS when appropriate.     6. Patient will demonstrate the ability to adequately self-monitor swallowing skills and perform appropriate compensatory techniques to reduce s/s of aspiration.     7. Vital Stimulation treatment to target the following placements/muscle groups/muscle response secondary to the patients symptoms of delayed swallow trigger, penetration/aspiration, pharyngeal weakness, and residuals     A. Placement 2B to increase function to the laryngeal extrinsics and suprahyoid muscles to improve hyolaryngeal excursion.     B. Placement 3A to increase function to the digastric and thyrohyoid muscles to improve hyolaryngeal excursion.      C. Placement 3B to increase function to the superior/middle/inferior pharyngeal constrictors and the pharyngeal shortening muscles to improve pharyngeal constriction.                                 8. Provide Deep Pharyngeal Neuromuscular Stimulation (DPNS) with focus on the following goals:                               A. Provide increased sensory input to light pressure receptors and increase cutaneous pressure to posterior tongue                for timely initiation of pharyngeal swallow (CN V, VII, IX)                              B. Elicit reflexive motor response to improve integrity of velar elevation, tongue base retraction, hyolaryngeal                       excursion, and pharyngeal constrictor movement (CN V, VII, X, XII)           Long Term Goals:     1.  Patient will maintain adequate hydration/nutrition with optimum safety and efficiency of swallowing function on P.O. intake without overt signs and symptoms of aspiration for the highest appropriate diet level.     2. Patient will utilize compensatory strategies with optimum safety and efficiency of swallowing function on P.O. intake without overt signs and symptoms of aspiration for the highest appropriate diet level [address] [facility] [address]  6/23/2022    CPT CODE:       31186  dysphagia tx

## 2022-06-27 ENCOUNTER — HOSPITAL ENCOUNTER (OUTPATIENT)
Dept: SPEECH THERAPY | Age: 72
Setting detail: THERAPIES SERIES
Discharge: HOME | End: 2022-06-27
Payer: MEDICARE

## 2022-06-27 PROCEDURE — 92526 ORAL FUNCTION THERAPY: CPT

## 2022-06-27 NOTE — PROGRESS NOTES
[address]  Outpatient Speech Therapy  Phone: [phone] Fax: [phone]     PATIENT NAME:  Charity Davila  (male)                MRN:  [id number]                       :  1950    STATUS:  Outpatient clinic       REFERRING PROVIDER:   Dr. Emiliano Vila          NPI: [id number]  REFERRING/TREATMENT DIAGNOSIS: Other dysphagia [R13.19]   [facility] Vital Stimulation [facility] Date of order:  63  CERTIFICATION/RECERTIFICATION PERIOD: 2022 to 22  INSURANCE PROVIDER:  Payor: Rogelio Point / Plan: [employer] ESSENTIAL/PLUS / Product Type: *No Product type* /    INSURANCE ID:  [id number] - (Medicare Managed)              SECONDARY INSURANCE (if applicable):      SPEECH LANGUAGE PATHOLOGY  DAILY PROGRESS NOTE      SUBJECTIVE:  Patient was seen this date for 60 minute speech therapy session to target dysphagia. He was pleasant and cooperative. OBJECTIVE:  Patient made the decision to forego NPO recommendations and continue consuming a diet of regular solids and thin consistency liquids with use of compensatory strategies (small bites/sips, alternate consistencies, pace during meals, sit upright, proactively cough/throat clear and re-swallow). He is aware of the associated risks with continuing an oral diet. His spouse reported some coughing and expectoration of food during meals, but he does not want to use thicken or change his food textures. He is going to monitor these symptoms over the next 6-8 weeks of therapy. Reviewed the purpose and procedure of Vital Stimulation. Patient provided verbal consent to treat.  Vital Stim placement 3B used to increase function to the superior/middle/inferior pharyngeal constrictors and the pharyngeal shortening muscles to improve pharyngeal constriction.   Top channel initially set at 5.0mA and bottom channel initially set at 6.0mA to evoke a transition from a tingling sensation to a pulling sensation. An effortful swallow noted. During the session, top channel increased to 15. 0mA and bottom channel increased to 16. 0mA to maintain a pulling sensation and audible swallow.  Patient alerted SLP when he did not feel the machine as strong.     While Vital Stim unit was in place, patient completed Deep Pharyngeal Neuromuscular Stimulation and  trials to maintain continuous swallowing during a functionally based exercise.       Explained the purpose of Deep Pharyngeal Neuromuscular Stimulation (DPNS) with focus on the following goals:      1. Provide increased sensory input to light pressure receptors and increase cutaneous pressure to posterior tongue for timely initiation of pharyngeal swallow (CN V, VII, IX)     2. Elicit reflexive motor response to improve integrity of velar elevation, tongue base retraction, hyolaryngeal excursion, and pharyngeal constrictor movement (CN V, VII, X, XII)     Results -- percentage indicates elicitation of desired motor response in targeted area     Velar elevation: 100%     Tongue base retraction: 100%     Superior pharyngeal constrictor wall contraction: 100%     Lateral pharyngeal wall contraction: 100%     Physiologic modified gag response: observed x3 this date      Lingual groove: Fair +     Laryngeal elevation/depression time: All timely this date       Spontaneous elicitation of reflexive swallow post stimulation: 100%      Patient tolerated 66 stimulations this session utilizing frozen/cold lemon glycerine swabs, and demonstrated good therapeutic response to intervention.        During water trials, SLP cued a small sip and an immediate effortful swallow to continue swallowing with the Vital Stim unit in place. He presented with 1 cough during this task with expectoration of phlegm. SLP reviewed his tongue base and laryngeal/pharyngeal strengthening exercises.   Encouraged daily completion.     No signs of respiratory distress or mucosal irritation present following completion of this intervention.      No discomfort observed or reported following removal of Vital Stim unit.        ASSESSMENT:  Making progress toward meeting therapy goals. PLAN:    Will continue speech pathology intervention as per established Plan of Care. Short Term Goals:     1. Patient will complete 10 of each BOTR strength/ ROM exercises in order to reduce pharyngeal residuals and improve epiglottic inversion during swallowing with minimal cues (less than 25% of the time).     2. Patient will complete 10 of each laryngeal strength/ ROM therapeutic exercises to improve airway protection during swallowing for the least restrictive PO diet with minimal cues (less than 25% of the time).       3. Patient will complete Shaker and/or Chin Tuck Against Resistance (CTAR) exercises in order to increase UES relaxation diameter and increase anterior laryngeal excursion to reduce pharyngeal residuals and reduce risk of pen/asp with minimal cues(less than 25% of the time) .  Patient will complete 3 isometric exercises for 60 seconds each and 30 isokinetic exercises per session.     4. Patient will complete 3 Effortful Swallow exercises therapeutically to target increased oral and base of tongue pressure, increased pharyngeal constrictor contractions, and increased UES relaxation duration to reduce pharyngeal residue by 50%This will determined through repeat MBSS when appropriate.     5. Patient will complete 3 Ailyn Maneuver exercises therapeutically to target increased pharyngeal constrictor contractions to reduce pharyngeal residue by 50%.  This will determined through repeat MBSS when appropriate.     6. Patient will demonstrate the ability to adequately self-monitor swallowing skills and perform appropriate compensatory techniques to reduce s/s of aspiration.     7. Vital Stimulation treatment to target the following placements/muscle groups/muscle response secondary to the patients symptoms of delayed swallow trigger, penetration/aspiration, pharyngeal weakness, and residuals     A. Placement 2B to increase function to the laryngeal extrinsics and suprahyoid muscles to improve hyolaryngeal excursion.     B. Placement 3A to increase function to the digastric and thyrohyoid muscles to improve hyolaryngeal excursion.      C. Placement 3B to increase function to the superior/middle/inferior pharyngeal constrictors and the pharyngeal shortening muscles to improve pharyngeal constriction.                                 8. Provide Deep Pharyngeal Neuromuscular Stimulation (DPNS) with focus on the following goals:                               A. Provide increased sensory input to light pressure receptors and increase cutaneous pressure to posterior tongue                for timely initiation of pharyngeal swallow (CN V, VII, IX)                              B. Elicit reflexive motor response to improve integrity of velar elevation, tongue base retraction, hyolaryngeal                       excursion, and pharyngeal constrictor movement (CN V, VII, X, XII)           Long Term Goals:     1.  Patient will maintain adequate hydration/nutrition with optimum safety and efficiency of swallowing function on P.O. intake without overt signs and symptoms of aspiration for the highest appropriate diet level.     2. Patient will utilize compensatory strategies with optimum safety and efficiency of swallowing function on P.O. intake without overt signs and symptoms of aspiration for the highest appropriate diet level                      Liza Grimm M.S. CCC-SLP  SP [id number]  6/27/2022    CPT CODE:       56465  dysphagia tx

## 2022-07-07 ENCOUNTER — HOSPITAL ENCOUNTER (OUTPATIENT)
Dept: SPEECH THERAPY | Age: 72
Setting detail: THERAPIES SERIES
Discharge: HOME | End: 2022-07-07
Payer: MEDICARE

## 2022-07-07 PROCEDURE — 92526 ORAL FUNCTION THERAPY: CPT

## 2022-07-07 NOTE — PROGRESS NOTES
[address] [address]  Outpatient Speech Therapy  Phone: [phone] Fax: [phone]     PATIENT NAME:  Nnamdi Hodges  (male)                MRN:  [id number]                       :  1950    STATUS:  Outpatient clinic       REFERRING PROVIDER:   Dr. Anika Sharp          NPI: [id number]  REFERRING/TREATMENT DIAGNOSIS: Other dysphagia [R13.19]   [facility] Vital Stimulation [facility] Date of order:  50  CERTIFICATION/RECERTIFICATION PERIOD: 2022 to 22  INSURANCE PROVIDER:  Payor: Cynthia Brady / Plan: [employer] ESSENTIAL/PLUS / Product Type: *No Product type* /    INSURANCE ID:  [id number] - (Medicare Managed)              SECONDARY INSURANCE (if applicable):      SPEECH LANGUAGE PATHOLOGY  DAILY PROGRESS NOTE      SUBJECTIVE:  Patient was seen this date for 60 minute speech therapy session to target dysphagia. He was pleasant and cooperative. OBJECTIVE:  Patient made the decision to forego NPO recommendations and continue consuming a diet of regular solids and thin consistency liquids with use of compensatory strategies (small bites/sips, alternate consistencies, pace during meals, sit upright, proactively cough/throat clear and re-swallow). He is aware of the associated risks with continuing an oral diet. His spouse reported some coughing and expectoration of food during meals, but he does not want to use thicken or change his food textures. He is going to monitor these symptoms over the next 6-8 weeks of therapy. Reviewed the purpose and procedure of Vital Stimulation. Patient provided verbal consent to treat.   Vital Stim placement 3A used this date to increase function to the digastric and thyrohyoid muscles to improve hyolaryngeal excursion.  Left channel initially set at 8.0mA and right channel initially set at 8.0mA to evoke a transition from a tingling sensation to a pulling sensation. An effortful swallow noted. During the session, left channel increased to 16. 0mA and right channel increased to 16. 0mA to maintain a pulling sensation and audible swallow.  Patient alerted SLP when he did not feel the machine as strong. While Vital Stim unit was in place, patient completed Deep Pharyngeal Neuromuscular Stimulation and ice chip trials to maintain continuous swallowing during a functionally based exercise. Explained the purpose of Deep Pharyngeal Neuromuscular Stimulation (DPNS) with focus on the following goals:      1. Provide increased sensory input to light pressure receptors and increase cutaneous pressure to posterior tongue for timely initiation of pharyngeal swallow (CN V, VII, IX)     2. Elicit reflexive motor response to improve integrity of velar elevation, tongue base retraction, hyolaryngeal excursion, and pharyngeal constrictor movement (CN V, VII, X, XII)     Results -- percentage indicates elicitation of desired motor response in targeted area     Velar elevation: 100%     Tongue base retraction: 100%     Superior pharyngeal constrictor wall contraction: 100%     Lateral pharyngeal wall contraction: 100%     Physiologic modified gag response: observed x4 this date      Lingual groove: Strong     Laryngeal elevation/depression time: All timely this date       Spontaneous elicitation of reflexive swallow post stimulation: 100%      Patient tolerated 60 stimulations this session utilizing frozen/cold lemon glycerine swabs, and demonstrated good therapeutic response to intervention. During ice chip trials, SLP cued a small bite and an immediate effortful swallow to continue swallowing with the Vital Stim unit in place. He presented with 2 coughs during this task with expectoration of phlegm. SLP reviewed his tongue base and laryngeal/pharyngeal strengthening exercises. Encouraged daily completion.      No signs of respiratory distress or mucosal irritation present following completion of this intervention. No discomfort observed or reported following removal of Vital Stim unit. ASSESSMENT:  Making progress toward meeting therapy goals. PLAN:    Will continue speech pathology intervention as per established Plan of Care. Short Term Goals:     1. Patient will complete 10 of each BOTR strength/ ROM exercises in order to reduce pharyngeal residuals and improve epiglottic inversion during swallowing with minimal cues (less than 25% of the time). 2. Patient will complete 10 of each laryngeal strength/ ROM therapeutic exercises to improve airway protection during swallowing for the least restrictive PO diet with minimal cues (less than 25% of the time). 3. Patient will complete Shaker and/or Chin Tuck Against Resistance (CTAR) exercises in order to increase UES relaxation diameter and increase anterior laryngeal excursion to reduce pharyngeal residuals and reduce risk of pen/asp with minimal cues(less than 25% of the time) . Patient will complete 3 isometric exercises for 60 seconds each and 30 isokinetic exercises per session. 4. Patient will complete 3 Effortful Swallow exercises therapeutically to target increased oral and base of tongue pressure, increased pharyngeal constrictor contractions, and increased UES relaxation duration to reduce pharyngeal residue by 50%This will determined through repeat MBSS when appropriate. 5. Patient will complete 3 Ailyn Maneuver exercises therapeutically to target increased pharyngeal constrictor contractions to reduce pharyngeal residue by 50%. This will determined through repeat MBSS when appropriate. 6. Patient will demonstrate the ability to adequately self-monitor swallowing skills and perform appropriate compensatory techniques to reduce s/s of aspiration.      7. Vital Stimulation treatment to target the following placements/muscle groups/muscle response secondary to the patients symptoms of delayed swallow trigger, penetration/aspiration, pharyngeal weakness, and residuals     A. Placement 2B to increase function to the laryngeal extrinsics and suprahyoid muscles to improve hyolaryngeal excursion. B. Placement 3A to increase function to the digastric and thyrohyoid muscles to improve hyolaryngeal excursion. C. Placement 3B to increase function to the superior/middle/inferior pharyngeal constrictors and the pharyngeal shortening muscles to improve pharyngeal constriction. 8. Provide Deep Pharyngeal Neuromuscular Stimulation (DPNS) with focus on the following goals:                               A. Provide increased sensory input to light pressure receptors and increase cutaneous pressure to posterior tongue                for timely initiation of pharyngeal swallow (CN V, VII, IX)                              B. Elicit reflexive motor response to improve integrity of velar elevation, tongue base retraction, hyolaryngeal                       excursion, and pharyngeal constrictor movement (CN V, VII, X, XII)           Long Term Goals:     1. Patient will maintain adequate hydration/nutrition with optimum safety and efficiency of swallowing function on P.O. intake without overt signs and symptoms of aspiration for the highest appropriate diet level.      2. Patient will utilize compensatory strategies with optimum safety and efficiency of swallowing function on P.O. intake without overt signs and symptoms of aspiration for the highest appropriate diet level                      Nancy LOYA [id number]  7/7/2022    CPT CODE:       14050  dysphagia tx

## 2022-07-11 ENCOUNTER — HOSPITAL ENCOUNTER (OUTPATIENT)
Dept: SPEECH THERAPY | Age: 72
Setting detail: THERAPIES SERIES
Discharge: HOME | End: 2022-07-11
Payer: MEDICARE

## 2022-07-11 PROCEDURE — 92526 ORAL FUNCTION THERAPY: CPT

## 2022-07-11 NOTE — PROGRESS NOTES
[address] [address]  Outpatient Speech Therapy  Phone: [phone] Fax: [phone]     PATIENT NAME:  Aldo Crespo  (male)                MRN:  [id number]                       :  1950    STATUS:  Outpatient clinic       REFERRING PROVIDER:   Dr. Jennifer Estrada          NPI: [id number]  REFERRING/TREATMENT DIAGNOSIS: Other dysphagia [R13.19]   [facility] Vital Stimulation therapy[facility] Date of order:  34  CERTIFICATION/RECERTIFICATION PERIOD: 2022 to 22  INSURANCE PROVIDER:  Payor: Aura Woodall / Plan: [employer] ESSENTIAL/PLUS / Product Type: *No Product type* /    INSURANCE ID:  [id number] - (Medicare Managed)              SECONDARY INSURANCE (if applicable):      SPEECH LANGUAGE PATHOLOGY  DAILY PROGRESS NOTE      SUBJECTIVE:  Patient was seen this date for 60 minute speech therapy session to target dysphagia. He was pleasant and cooperative. OBJECTIVE:  Patient made the decision to forego NPO recommendations and continue consuming a diet of regular solids and thin consistency liquids with use of compensatory strategies (small bites/sips, alternate consistencies, pace during meals, sit upright, proactively cough/throat clear and re-swallow). He is aware of the associated risks with continuing an oral diet. His spouse reported some coughing and expectoration of food during meals, but he does not want to use thicken or change his food textures. He is going to monitor these symptoms over the next 6-8 weeks of therapy. Reviewed the purpose and procedure of Vital Stimulation. Patient provided verbal consent to treat.   Vital Stim placement 3A used this date to increase function to the digastric and thyrohyoid muscles to improve hyolaryngeal excursion.  Left channel initially set at 9.0mA and right channel initially set at 9.0mA to evoke a transition from a tingling sensation to a pulling sensation. An effortful swallow noted. During the session, left channel increased to 18. 0mA and right channel increased [id number]. 0mA to maintain a pulling sensation and audible swallow.  Patient alerted SLP when he did not feel the machine as strong. While Vital Stim unit was in place, patient completed Deep Pharyngeal Neuromuscular Stimulation and ice chip trials to maintain continuous swallowing during a functionally based exercise. Explained the purpose of Deep Pharyngeal Neuromuscular Stimulation (DPNS) with focus on the following goals:      1. Provide increased sensory input to light pressure receptors and increase cutaneous pressure to posterior tongue for timely initiation of pharyngeal swallow (CN V, VII, IX)     2. Elicit reflexive motor response to improve integrity of velar elevation, tongue base retraction, hyolaryngeal excursion, and pharyngeal constrictor movement (CN V, VII, X, XII)     Results -- percentage indicates elicitation of desired motor response in targeted area     Velar elevation: 100%     Tongue base retraction: 100%     Superior pharyngeal constrictor wall contraction: 100%     Lateral pharyngeal wall contraction: 100%     Physiologic modified gag response: Strong and consistent      Lingual groove: Strong     Laryngeal elevation/depression time: All timely this date       Spontaneous elicitation of reflexive swallow post stimulation: 100%      Patient tolerated 60 stimulations this session utilizing frozen/cold lemon glycerine swabs, and demonstrated good therapeutic response to intervention. During ice chip trials, SLP cued a small bite and an immediate effortful swallow to continue swallowing with the Vital Stim unit in place. He presented with 1 cough and 2 throat clears during this task with expectoration of clear phlegm. SLP reviewed his tongue base and laryngeal/pharyngeal strengthening exercises. Encouraged daily completion.      No signs of respiratory distress or mucosal irritation present following completion of this intervention. No discomfort observed or reported following removal of Vital Stim unit. ASSESSMENT:  Making progress toward meeting therapy goals. PLAN:    Will continue speech pathology intervention as per established Plan of Care. Short Term Goals:     1. Patient will complete 10 of each BOTR strength/ ROM exercises in order to reduce pharyngeal residuals and improve epiglottic inversion during swallowing with minimal cues (less than 25% of the time). 2. Patient will complete 10 of each laryngeal strength/ ROM therapeutic exercises to improve airway protection during swallowing for the least restrictive PO diet with minimal cues (less than 25% of the time). 3. Patient will complete Shaker and/or Chin Tuck Against Resistance (CTAR) exercises in order to increase UES relaxation diameter and increase anterior laryngeal excursion to reduce pharyngeal residuals and reduce risk of pen/asp with minimal cues(less than 25% of the time) . Patient will complete 3 isometric exercises for 60 seconds each and 30 isokinetic exercises per session. 4. Patient will complete 3 Effortful Swallow exercises therapeutically to target increased oral and base of tongue pressure, increased pharyngeal constrictor contractions, and increased UES relaxation duration to reduce pharyngeal residue by 50%This will determined through repeat MBSS when appropriate. 5. Patient will complete 3 Ailyn Maneuver exercises therapeutically to target increased pharyngeal constrictor contractions to reduce pharyngeal residue by 50%. This will determined through repeat MBSS when appropriate. 6. Patient will demonstrate the ability to adequately self-monitor swallowing skills and perform appropriate compensatory techniques to reduce s/s of aspiration.      7. Vital Stimulation treatment to target the following placements/muscle groups/muscle response secondary to the patients symptoms of delayed swallow trigger, penetration/aspiration, pharyngeal weakness, and residuals     A. Placement 2B to increase function to the laryngeal extrinsics and suprahyoid muscles to improve hyolaryngeal excursion. B. Placement 3A to increase function to the digastric and thyrohyoid muscles to improve hyolaryngeal excursion. C. Placement 3B to increase function to the superior/middle/inferior pharyngeal constrictors and the pharyngeal shortening muscles to improve pharyngeal constriction. 8. Provide Deep Pharyngeal Neuromuscular Stimulation (DPNS) with focus on the following goals:                               A. Provide increased sensory input to light pressure receptors and increase cutaneous pressure to posterior tongue                for timely initiation of pharyngeal swallow (CN V, VII, IX)                              B. Elicit reflexive motor response to improve integrity of velar elevation, tongue base retraction, hyolaryngeal                       excursion, and pharyngeal constrictor movement (CN V, VII, X, XII)           Long Term Goals:     1. Patient will maintain adequate hydration/nutrition with optimum safety and efficiency of swallowing function on P.O. intake without overt signs and symptoms of aspiration for the highest appropriate diet level.      2. Patient will utilize compensatory strategies with optimum safety and efficiency of swallowing function on P.O. intake without overt signs and symptoms of aspiration for the highest appropriate diet level                      Dereck Negrete M.S. CCC-SLP  SP [id number]  7/11/2022    CPT CODE:       58739  dysphagia tx

## 2022-07-14 ENCOUNTER — HOSPITAL ENCOUNTER (OUTPATIENT)
Dept: SPEECH THERAPY | Age: 72
Setting detail: THERAPIES SERIES
Discharge: HOME | End: 2022-07-14
Payer: MEDICARE

## 2022-07-14 PROCEDURE — 92526 ORAL FUNCTION THERAPY: CPT

## 2022-07-14 NOTE — PROGRESS NOTES
[address]  Outpatient Speech Therapy  Phone: [phone] Fax: [phone]     PATIENT NAME:  Nabil Murray  (male)                MRN:  [id number]                       :  1950    STATUS:  Outpatient clinic       REFERRING PROVIDER:   Dr. Roger Lee          NPI: [id number]  REFERRING/TREATMENT DIAGNOSIS: Other dysphagia [R13.19]   SPECIFIC PROVIDER ORDER: [facility]-Speech Therapy (For dysphagia and Vital Stimulation therapy) Date of order:  19  CERTIFICATION/RECERTIFICATION PERIOD: 2022 to 22  INSURANCE PROVIDER:  Payor: Ju Murilloer / Plan: Vonore Sours ESSENTIAL/PLUS / Product Type: *No Product type* /    INSURANCE ID:  [id number] - (Medicare Managed)              SECONDARY INSURANCE (if applicable):      SPEECH LANGUAGE PATHOLOGY  DAILY PROGRESS NOTE      SUBJECTIVE:  Patient was seen this date for 60 minute speech therapy session to target dysphagia. He was pleasant and cooperative. OBJECTIVE:  Patient made the decision to forego NPO recommendations and continue consuming a diet of regular solids and thin consistency liquids with use of compensatory strategies (small bites/sips, alternate consistencies, pace during meals, sit upright, proactively cough/throat clear and re-swallow). He is aware of the associated risks with continuing an oral diet. His spouse reported some coughing and expectoration of food during meals, but he does not want to use thicken or change his food textures. He is going to monitor these symptoms over the next 3 weeks of therapy. Patient brought in papers with the side effects from one of his medications (Synthyroid). One of the listed side effects is difficulty swallowing. He is going to talk to his physician about possibly switching this medication to a different one without that side effect. Reviewed the purpose and procedure of Vital Stimulation.  Patient provided verbal consent to treat. Vital Stim placement 2B to increase function to the laryngeal extrinsics and suprahyoid muscles to improve hyolaryngeal excursion. Top channel initially set at 6.0mA and bottom channel initially set at 10. 0mA to evoke a transition from a tingling sensation to a pulling sensation. An effortful swallow noted. During the session, top channel increased to 7.0mA and bottom channel increased to 11. 0mA to maintain a pulling sensation and audible swallow.  Patient alerted SLP when he did not feel the machine as strong. While Vital Stim unit was in place, patient completed Deep Pharyngeal Neuromuscular Stimulation and lollipop trials to maintain continuous swallowing during a functionally based exercise. Explained the purpose of Deep Pharyngeal Neuromuscular Stimulation (DPNS) with focus on the following goals:      1. Provide increased sensory input to light pressure receptors and increase cutaneous pressure to posterior tongue for timely initiation of pharyngeal swallow (CN V, VII, IX)     2. Elicit reflexive motor response to improve integrity of velar elevation, tongue base retraction, hyolaryngeal excursion, and pharyngeal constrictor movement (CN V, VII, X, XII)     Results -- percentage indicates elicitation of desired motor response in targeted area     Velar elevation: 100%     Tongue base retraction: 100%     Superior pharyngeal constrictor wall contraction: 100%     Lateral pharyngeal wall contraction: 100%     Physiologic modified gag response: Strong and consistent      Lingual groove: Strong     Laryngeal elevation/depression time: All timely this date       Spontaneous elicitation of reflexive swallow post stimulation: 100%      Patient tolerated 60 stimulations this session utilizing frozen/cold lemon glycerine swabs, and demonstrated good therapeutic response to intervention.         During lolipop trials, SLP cued a small bite and an immediate effortful swallow to continue swallowing with the Vital Stim unit in place. He presented with 2 coughs and intermittent wet vocal quality during this task with expectoration of clear phlegm x3. SLP reviewed his tongue base and laryngeal/pharyngeal strengthening exercises. Encouraged daily completion. No signs of respiratory distress or mucosal irritation present following completion of this intervention. No discomfort observed or reported following removal of Vital Stim unit. ASSESSMENT:  Making progress toward meeting therapy goals. PLAN:    Will continue speech pathology intervention as per established Plan of Care. Short Term Goals:     1. Patient will complete 10 of each BOTR strength/ ROM exercises in order to reduce pharyngeal residuals and improve epiglottic inversion during swallowing with minimal cues (less than 25% of the time). 2. Patient will complete 10 of each laryngeal strength/ ROM therapeutic exercises to improve airway protection during swallowing for the least restrictive PO diet with minimal cues (less than 25% of the time). 3. Patient will complete Shaker and/or Chin Tuck Against Resistance (CTAR) exercises in order to increase UES relaxation diameter and increase anterior laryngeal excursion to reduce pharyngeal residuals and reduce risk of pen/asp with minimal cues(less than 25% of the time) . Patient will complete 3 isometric exercises for 60 seconds each and 30 isokinetic exercises per session. 4. Patient will complete 3 Effortful Swallow exercises therapeutically to target increased oral and base of tongue pressure, increased pharyngeal constrictor contractions, and increased UES relaxation duration to reduce pharyngeal residue by 50%This will determined through repeat MBSS when appropriate. 5. Patient will complete 3 Ailyn Maneuver exercises therapeutically to target increased pharyngeal constrictor contractions to reduce pharyngeal residue by 50%.  This will determined through repeat MBSS when appropriate. 6. Patient will demonstrate the ability to adequately self-monitor swallowing skills and perform appropriate compensatory techniques to reduce s/s of aspiration. 7. Vital Stimulation treatment to target the following placements/muscle groups/muscle response secondary to the patients symptoms of delayed swallow trigger, penetration/aspiration, pharyngeal weakness, and residuals     A. Placement 2B to increase function to the laryngeal extrinsics and suprahyoid muscles to improve hyolaryngeal excursion. B. Placement 3A to increase function to the digastric and thyrohyoid muscles to improve hyolaryngeal excursion. C. Placement 3B to increase function to the superior/middle/inferior pharyngeal constrictors and the pharyngeal shortening muscles to improve pharyngeal constriction. 8. Provide Deep Pharyngeal Neuromuscular Stimulation (DPNS) with focus on the following goals:                               A. Provide increased sensory input to light pressure receptors and increase cutaneous pressure to posterior tongue                for timely initiation of pharyngeal swallow (CN V, VII, IX)                              B. Elicit reflexive motor response to improve integrity of velar elevation, tongue base retraction, hyolaryngeal                       excursion, and pharyngeal constrictor movement (CN V, VII, X, XII)           Long Term Goals:     1. Patient will maintain adequate hydration/nutrition with optimum safety and efficiency of swallowing function on P.O. intake without overt signs and symptoms of aspiration for the highest appropriate diet level.      2. Patient will utilize compensatory strategies with optimum safety and efficiency of swallowing function on P.O. intake without overt signs and symptoms of aspiration for the highest appropriate diet level                      Lilli Joyner M.S. [address]  7/14/2022    CPT CODE:       81282  dysphagia tx

## 2022-07-20 ENCOUNTER — HOSPITAL ENCOUNTER (OUTPATIENT)
Dept: SPEECH THERAPY | Age: 72
Setting detail: THERAPIES SERIES
Discharge: HOME | End: 2022-07-20
Payer: MEDICARE

## 2022-07-20 PROCEDURE — 92526 ORAL FUNCTION THERAPY: CPT

## 2022-07-20 NOTE — PROGRESS NOTES
[address]  Outpatient Speech Therapy  Phone: [phone] Fax: [phone]     PATIENT NAME:  Megha Lozano  (male)                MRN:  [id number]                       :  1950    STATUS:  Outpatient clinic       REFERRING PROVIDER:   Dr. Stew Palumbo          NPI: [id number]  REFERRING/TREATMENT DIAGNOSIS: Other dysphagia [R13.19]   [facility]ital [facility]ulation [facility] Date of order:  73  CERTIFICATION/RECERTIFICATION PERIOD: 2022 to 22  INSURANCE PROVIDER:  Payor: Verónica Hernandez / Plan: Yoanna Ramirez ESSENTIAL/PLUS / Product Type: *No Product type* /    INSURANCE ID:  [id number] - (Medicare Managed)              SECONDARY INSURANCE (if applicable):      SPEECH LANGUAGE PATHOLOGY  DAILY PROGRESS NOTE      SUBJECTIVE:  Patient was seen this date for 60 minute speech therapy session to target dysphagia. He was pleasant and cooperative. OBJECTIVE:  Patient made the decision to forego NPO recommendations and continue consuming a diet of regular solids and thin consistency liquids with use of compensatory strategies (small bites/sips, alternate consistencies, pace during meals, sit upright, proactively cough/throat clear and re-swallow). He is aware of the associated risks with continuing an oral diet. His spouse reported some coughing and expectoration of food during meals, but he does not want to use thicken or change his food textures. He is going to monitor these symptoms over the next 2 weeks of therapy. Reviewed the purpose and procedure of Vital Stimulation. Patient provided verbal consent to treat. Vital Stim placement 2B to increase function to the laryngeal extrinsics and suprahyoid muscles to improve hyolaryngeal excursion.   Top channel initially set at 9.0mA and bottom channel initially set at 9.0mA to evoke a transition from a tingling sensation to a pulling sensation. An effortful swallow noted. During the session, top channel increased to 15. 0mA and bottom channel increased to 15. 0mA to maintain a pulling sensation and audible swallow. Patient alerted SLP when he did not feel the machine as strong. While Vital Stim unit was in place, patient completed Deep Pharyngeal Neuromuscular Stimulation and ice chip trials to maintain continuous swallowing during a functionally based exercise. Explained the purpose of Deep Pharyngeal Neuromuscular Stimulation (DPNS) with focus on the following goals:      1. Provide increased sensory input to light pressure receptors and increase cutaneous pressure to posterior tongue for timely initiation of pharyngeal swallow (CN V, VII, IX)     2. Elicit reflexive motor response to improve integrity of velar elevation, tongue base retraction, hyolaryngeal excursion, and pharyngeal constrictor movement (CN V, VII, X, XII)     Results -- percentage indicates elicitation of desired motor response in targeted area     Velar elevation: 100%     Tongue base retraction: 100%     Superior pharyngeal constrictor wall contraction: 100%     Lateral pharyngeal wall contraction: 100%     Physiologic modified gag response: Strong and consistent      Lingual groove: Strong     Laryngeal elevation/depression time: All timely this date       Spontaneous elicitation of reflexive swallow post stimulation: 100%      Patient tolerated 60 stimulations this session utilizing frozen/cold lemon glycerine swabs, and demonstrated good therapeutic response to intervention. During DPNS completion, he expectorated clear phlegm x3. During ice chip trials, SLP cued a small bite and an immediate effortful swallow to continue swallowing with the Vital Stim unit in place. He presented with 1 cough and intermittent wet vocal quality during this task with expectoration of clear phlegm x2.       SLP reviewed his tongue base and laryngeal/pharyngeal strengthening exercises. Encouraged daily completion. No signs of respiratory distress or mucosal irritation present following completion of this intervention. No discomfort observed or reported following removal of Vital Stim unit. ASSESSMENT:  Making progress toward meeting therapy goals. PLAN:    Will continue speech pathology intervention as per established Plan of Care. Short Term Goals:     1. Patient will complete 10 of each BOTR strength/ ROM exercises in order to reduce pharyngeal residuals and improve epiglottic inversion during swallowing with minimal cues (less than 25% of the time). 2. Patient will complete 10 of each laryngeal strength/ ROM therapeutic exercises to improve airway protection during swallowing for the least restrictive PO diet with minimal cues (less than 25% of the time). 3. Patient will complete Shaker and/or Chin Tuck Against Resistance (CTAR) exercises in order to increase UES relaxation diameter and increase anterior laryngeal excursion to reduce pharyngeal residuals and reduce risk of pen/asp with minimal cues(less than 25% of the time) . Patient will complete 3 isometric exercises for 60 seconds each and 30 isokinetic exercises per session. 4. Patient will complete 3 Effortful Swallow exercises therapeutically to target increased oral and base of tongue pressure, increased pharyngeal constrictor contractions, and increased UES relaxation duration to reduce pharyngeal residue by 50%This will determined through repeat MBSS when appropriate. 5. Patient will complete 3 Ailyn Maneuver exercises therapeutically to target increased pharyngeal constrictor contractions to reduce pharyngeal residue by 50%. This will determined through repeat MBSS when appropriate. 6. Patient will demonstrate the ability to adequately self-monitor swallowing skills and perform appropriate compensatory techniques to reduce s/s of aspiration.      7. Vital Stimulation treatment to target the following placements/muscle groups/muscle response secondary to the patients symptoms of delayed swallow trigger, penetration/aspiration, pharyngeal weakness, and residuals     A. Placement 2B to increase function to the laryngeal extrinsics and suprahyoid muscles to improve hyolaryngeal excursion. B. Placement 3A to increase function to the digastric and thyrohyoid muscles to improve hyolaryngeal excursion. C. Placement 3B to increase function to the superior/middle/inferior pharyngeal constrictors and the pharyngeal shortening muscles to improve pharyngeal constriction. 8. Provide Deep Pharyngeal Neuromuscular Stimulation (DPNS) with focus on the following goals:                               A. Provide increased sensory input to light pressure receptors and increase cutaneous pressure to posterior tongue                for timely initiation of pharyngeal swallow (CN V, VII, IX)                              B. Elicit reflexive motor response to improve integrity of velar elevation, tongue base retraction, hyolaryngeal                       excursion, and pharyngeal constrictor movement (CN V, VII, X, XII)           Long Term Goals:     1. Patient will maintain adequate hydration/nutrition with optimum safety and efficiency of swallowing function on P.O. intake without overt signs and symptoms of aspiration for the highest appropriate diet level.      2. Patient will utilize compensatory strategies with optimum safety and efficiency of swallowing function on P.O. intake without overt signs and symptoms of aspiration for the highest appropriate diet level                      Clearance Lavelle RIDDLE CCC-SLP  SP [id number]  7/20/2022    CPT CODE:       96970  dysphagia tx

## 2022-07-26 ENCOUNTER — HOSPITAL ENCOUNTER (OUTPATIENT)
Age: 72
Discharge: HOME | End: 2022-07-28
Payer: MEDICARE

## 2022-07-26 ENCOUNTER — HOSPITAL ENCOUNTER (OUTPATIENT)
Dept: GENERAL RADIOLOGY | Age: 72
Discharge: HOME | End: 2022-07-28
Payer: MEDICARE

## 2022-07-26 DIAGNOSIS — M54.50 LOW BACK PAIN, UNSPECIFIED BACK PAIN LATERALITY, UNSPECIFIED CHRONICITY, UNSPECIFIED WHETHER SCIATICA PRESENT: ICD-10-CM

## 2022-07-26 PROCEDURE — 72110 X-RAY EXAM L-2 SPINE 4/>VWS: CPT

## 2022-07-28 ENCOUNTER — APPOINTMENT (OUTPATIENT)
Dept: SPEECH THERAPY | Age: 72
End: 2022-07-28
Payer: MEDICARE

## 2022-08-01 ENCOUNTER — HOSPITAL ENCOUNTER (OUTPATIENT)
Dept: SPEECH THERAPY | Age: 72
Setting detail: THERAPIES SERIES
End: 2022-08-01
Payer: MEDICARE

## 2022-08-03 ENCOUNTER — HOSPITAL ENCOUNTER (OUTPATIENT)
Dept: SPEECH THERAPY | Age: 72
Setting detail: THERAPIES SERIES
Discharge: HOME | End: 2022-08-03
Payer: MEDICARE

## 2022-08-03 PROCEDURE — 92526 ORAL FUNCTION THERAPY: CPT

## 2022-08-03 NOTE — PROGRESS NOTES
[address] [address]  Outpatient Speech Therapy  Phone: [phone] Fax: [phone]     PATIENT NAME:  Tiffanie Leach  (male)                MRN:  [id number]                       :  1950    STATUS:  Outpatient clinic       REFERRING PROVIDER:   Dr. Martínez Ellis          NPI: [id number]  REFERRING/TREATMENT DIAGNOSIS: Other dysphagia [R13.19]   SPECIFIC PROVIDER ORDER: [facility]-Speech Therapy (For dysphagia and Vital Stimulation therapy) Date of order:    CERTIFICATION/RECERTIFICATION PERIOD: 2022 to 22  INSURANCE PROVIDER:  Payor: Fidencio Morris / Plan: [employer] ESSENTIAL/PLUS / Product Type: *No Product type* /    INSURANCE ID:  [id number] - (Medicare Managed)              SECONDARY INSURANCE (if applicable):      SPEECH LANGUAGE PATHOLOGY  DAILY PROGRESS NOTE      SUBJECTIVE:  Patient was seen this date for 60 minute speech therapy session to target dysphagia. He was pleasant and cooperative. OBJECTIVE:  Patient made the decision to forego NPO recommendations and continue consuming a diet of regular solids and thin consistency liquids with use of compensatory strategies (small bites/sips, alternate consistencies, pace during meals, sit upright, proactively cough/throat clear and re-swallow). He is aware of the associated risks with continuing an oral diet. His spouse reported some coughing and expectoration of food during meals, but he does not want to use thicken or change his food textures. He did report that he feels his overall coughing has lessened since the onset of therapy. Reviewed the purpose and procedure of Vital Stimulation. Patient provided verbal consent to treat. Vital Stim placement 2B to increase function to the laryngeal extrinsics and suprahyoid muscles to improve hyolaryngeal excursion.   Top channel initially set at 8.0mA and bottom channel initially set at 8.0mA to evoke a transition from a laryngeal/pharyngeal strengthening exercises. Encouraged daily completion. No signs of respiratory distress or mucosal irritation present following completion of this intervention. No discomfort observed or reported following removal of Vital Stim unit. ASSESSMENT:  Making progress toward meeting therapy goals. PLAN:    Will continue speech pathology intervention as per established Plan of Care. Short Term Goals:     1. Patient will complete 10 of each BOTR strength/ ROM exercises in order to reduce pharyngeal residuals and improve epiglottic inversion during swallowing with minimal cues (less than 25% of the time). 2. Patient will complete 10 of each laryngeal strength/ ROM therapeutic exercises to improve airway protection during swallowing for the least restrictive PO diet with minimal cues (less than 25% of the time). 3. Patient will complete Shaker and/or Chin Tuck Against Resistance (CTAR) exercises in order to increase UES relaxation diameter and increase anterior laryngeal excursion to reduce pharyngeal residuals and reduce risk of pen/asp with minimal cues(less than 25% of the time) . Patient will complete 3 isometric exercises for 60 seconds each and 30 isokinetic exercises per session. 4. Patient will complete 3 Effortful Swallow exercises therapeutically to target increased oral and base of tongue pressure, increased pharyngeal constrictor contractions, and increased UES relaxation duration to reduce pharyngeal residue by 50%This will determined through repeat MBSS when appropriate. 5. Patient will complete 3 Ailyn Maneuver exercises therapeutically to target increased pharyngeal constrictor contractions to reduce pharyngeal residue by 50%. This will determined through repeat MBSS when appropriate. 6. Patient will demonstrate the ability to adequately self-monitor swallowing skills and perform appropriate compensatory techniques to reduce s/s of aspiration. 7. Vital Stimulation treatment to target the following placements/muscle groups/muscle response secondary to the patients symptoms of delayed swallow trigger, penetration/aspiration, pharyngeal weakness, and residuals     A. Placement 2B to increase function to the laryngeal extrinsics and suprahyoid muscles to improve hyolaryngeal excursion. B. Placement 3A to increase function to the digastric and thyrohyoid muscles to improve hyolaryngeal excursion. C. Placement 3B to increase function to the superior/middle/inferior pharyngeal constrictors and the pharyngeal shortening muscles to improve pharyngeal constriction. 8. Provide Deep Pharyngeal Neuromuscular Stimulation (DPNS) with focus on the following goals:                               A. Provide increased sensory input to light pressure receptors and increase cutaneous pressure to posterior tongue                for timely initiation of pharyngeal swallow (CN V, VII, IX)                              B. Elicit reflexive motor response to improve integrity of velar elevation, tongue base retraction, hyolaryngeal                       excursion, and pharyngeal constrictor movement (CN V, VII, X, XII)           Long Term Goals:     1. Patient will maintain adequate hydration/nutrition with optimum safety and efficiency of swallowing function on P.O. intake without overt signs and symptoms of aspiration for the highest appropriate diet level.      2. Patient will utilize compensatory strategies with optimum safety and efficiency of swallowing function on P.O. intake without overt signs and symptoms of aspiration for the highest appropriate diet level                      Harika Thakur M.S. [facility]-SLP  SP [id number]  8/3/2022    CPT CODE:       74935  dysphagia tx

## 2022-08-04 ENCOUNTER — HOSPITAL ENCOUNTER (OUTPATIENT)
Dept: SPEECH THERAPY | Age: 72
Setting detail: THERAPIES SERIES
Discharge: HOME | End: 2022-08-04
Payer: MEDICARE

## 2022-08-04 PROCEDURE — 92526 ORAL FUNCTION THERAPY: CPT

## 2022-08-04 NOTE — PROGRESS NOTES
[address] [address]  Outpatient Speech Therapy  Phone: [phone] Fax: [phone]     PATIENT NAME:  Sunil Aguillon  (male)                MRN:  [id number]                       :  1950    STATUS:  Outpatient clinic       REFERRING PROVIDER:   Dr. John Peterson          NPI: [id number]  REFERRING/TREATMENT DIAGNOSIS: Other dysphagia [R13.19]   SPECIFIC PROVIDER ORDER: [facility]-Speech Therapy (For dysphagia and Vital Stimulation therapy) Date of order:  49  CERTIFICATION/RECERTIFICATION PERIOD: 2022 to 22  INSURANCE PROVIDER:  Payor: Neisha Rubio / Plan: Ruth Abreu ESSENTIAL/PLUS / Product Type: *No Product type* /    INSURANCE ID:  [id number] - (Medicare Managed)              SECONDARY INSURANCE (if applicable):      SPEECH LANGUAGE PATHOLOGY  DAILY PROGRESS NOTE      SUBJECTIVE:  Patient was seen this date for 60 minute speech therapy session to target dysphagia. He was pleasant and cooperative. OBJECTIVE:  Patient made the decision to forego NPO recommendations and continue consuming a diet of regular solids and thin consistency liquids with use of compensatory strategies (small bites/sips, alternate consistencies, pace during meals, sit upright, proactively cough/throat clear and re-swallow). He is aware of the associated risks with continuing an oral diet. His spouse reported some coughing and expectoration of food during meals, but he does not want to use thicken or change his food textures. He did report that he feels his overall coughing has lessened since the onset of therapy. He is consuming hot cereal for breakfast rather than shredded wheat, as this has triggered coughing episodes in the past.      Reviewed the purpose and procedure of Vital Stimulation. Patient provided verbal consent to treat.     Vital Stim placement 3A used this date to increase function to the digastric and thyrohyoid muscles to improve hyolaryngeal excursion. Left channel initially set at 8.0mA and right channel initially set at 8.0mA to evoke a transition from a tingling sensation to a pulling sensation. An effortful swallow noted. During the session, left channel increased to 15. 0mA and right channel increased to 15. 0mA to maintain a pulling sensation and audible swallow. Patient alerted SLP when he did not feel the machine as strong. While Vital Stim unit was in place, patient completed Deep Pharyngeal Neuromuscular Stimulation and ice chip trials to maintain continuous swallowing during a functionally based exercise. Explained the purpose of Deep Pharyngeal Neuromuscular Stimulation (DPNS) with focus on the following goals:      1. Provide increased sensory input to light pressure receptors and increase cutaneous pressure to posterior tongue for timely initiation of pharyngeal swallow (CN V, VII, IX)     2. Elicit reflexive motor response to improve integrity of velar elevation, tongue base retraction, hyolaryngeal excursion, and pharyngeal constrictor movement (CN V, VII, X, XII)     Results -- percentage indicates elicitation of desired motor response in targeted area     Velar elevation: 100%     Tongue base retraction: 100%     Superior pharyngeal constrictor wall contraction: 100%     Lateral pharyngeal wall contraction: 100%     Physiologic modified gag response: Strong and consistent      Lingual groove: Strong     Laryngeal elevation/depression time: All timely this date       Spontaneous elicitation of reflexive swallow post stimulation: 100%      Patient tolerated 48 stimulations this session utilizing frozen/cold lemon glycerine swabs, and demonstrated good therapeutic response to intervention. During DPNS completion, he expectorated clear phlegm x2. During ice chip trials, SLP cued a small bite and an immediate effortful swallow to continue swallowing with the Vital Stim unit in place.   He presented with 3 coughs and intermittent wet vocal quality during this task with expectoration of clear phlegm x2. SLP reviewed his tongue base and laryngeal/pharyngeal strengthening exercises. Encouraged daily completion. No signs of respiratory distress or mucosal irritation present following completion of this intervention. No discomfort observed or reported following removal of Vital Stim unit. ASSESSMENT:  Making progress toward meeting therapy goals. PLAN:    Will continue speech pathology intervention as per established Plan of Care. Short Term Goals:     1. Patient will complete 10 of each BOTR strength/ ROM exercises in order to reduce pharyngeal residuals and improve epiglottic inversion during swallowing with minimal cues (less than 25% of the time). 2. Patient will complete 10 of each laryngeal strength/ ROM therapeutic exercises to improve airway protection during swallowing for the least restrictive PO diet with minimal cues (less than 25% of the time). 3. Patient will complete Shaker and/or Chin Tuck Against Resistance (CTAR) exercises in order to increase UES relaxation diameter and increase anterior laryngeal excursion to reduce pharyngeal residuals and reduce risk of pen/asp with minimal cues(less than 25% of the time) . Patient will complete 3 isometric exercises for 60 seconds each and 30 isokinetic exercises per session. 4. Patient will complete 3 Effortful Swallow exercises therapeutically to target increased oral and base of tongue pressure, increased pharyngeal constrictor contractions, and increased UES relaxation duration to reduce pharyngeal residue by 50%This will determined through repeat MBSS when appropriate. 5. Patient will complete 3 Ailyn Maneuver exercises therapeutically to target increased pharyngeal constrictor contractions to reduce pharyngeal residue by 50%. This will determined through repeat MBSS when appropriate.      6. Patient will demonstrate the ability to adequately self-monitor swallowing skills and perform appropriate compensatory techniques to reduce s/s of aspiration. 7. Vital Stimulation treatment to target the following placements/muscle groups/muscle response secondary to the patients symptoms of delayed swallow trigger, penetration/aspiration, pharyngeal weakness, and residuals     A. Placement 2B to increase function to the laryngeal extrinsics and suprahyoid muscles to improve hyolaryngeal excursion. B. Placement 3A to increase function to the digastric and thyrohyoid muscles to improve hyolaryngeal excursion. C. Placement 3B to increase function to the superior/middle/inferior pharyngeal constrictors and the pharyngeal shortening muscles to improve pharyngeal constriction. 8. Provide Deep Pharyngeal Neuromuscular Stimulation (DPNS) with focus on the following goals:                               A. Provide increased sensory input to light pressure receptors and increase cutaneous pressure to posterior tongue                for timely initiation of pharyngeal swallow (CN V, VII, IX)                              B. Elicit reflexive motor response to improve integrity of velar elevation, tongue base retraction, hyolaryngeal                       excursion, and pharyngeal constrictor movement (CN V, VII, X, XII)           Long Term Goals:     1. Patient will maintain adequate hydration/nutrition with optimum safety and efficiency of swallowing function on P.O. intake without overt signs and symptoms of aspiration for the highest appropriate diet level.      2. Patient will utilize compensatory strategies with optimum safety and efficiency of swallowing function on P.O. intake without overt signs and symptoms of aspiration for the highest appropriate diet level                      Nancy Villalobos M.S. CCC-SLP  SP [id number]  8/4/2022    CPT CODE:       68858  dysphagia tx

## 2022-08-11 ENCOUNTER — HOSPITAL ENCOUNTER (OUTPATIENT)
Dept: SPEECH THERAPY | Age: 72
Setting detail: THERAPIES SERIES
End: 2022-08-11
Payer: MEDICARE

## 2022-08-19 ENCOUNTER — HOSPITAL ENCOUNTER (OUTPATIENT)
Dept: SPEECH THERAPY | Age: 72
Setting detail: THERAPIES SERIES
Discharge: HOME | End: 2022-08-19
Payer: MEDICARE

## 2022-08-19 PROCEDURE — 92526 ORAL FUNCTION THERAPY: CPT

## 2022-08-19 NOTE — PROGRESS NOTES
[address] [facility]  Outpatient Speech Therapy  Phone: [phone] Fax: [phone]        PATIENT NAME:  Adriel Jones  (male)                MRN:  [id number]                       :  1950    STATUS:  Outpatient clinic       REFERRING PROVIDER:   Dr. Flo Lobo          NPI: [id number]  REFERRING/TREATMENT DIAGNOSIS: Other dysphagia [R13.19]   [facility] Vital Stimulation [facility] Date of order:  34  CERTIFICATION/RECERTIFICATION PERIOD: 2022 to 22  INSURANCE PROVIDER:  Payor: Rickey Mariano / Plan: Filomena Dominguez ESSENTIAL/PLUS / Product Type: *No Product type* /    INSURANCE ID:  [id number] - (Medicare Managed)              SECONDARY INSURANCE (if applicable):       SPEECH LANGUAGE PATHOLOGY  DAILY PROGRESS NOTE        SUBJECTIVE:  Patient was seen this date for 60 minute speech therapy session to target dysphagia. He was pleasant and cooperative throughout session. OBJECTIVE:  Patient had made the decision to forego NPO recommendations and continue consuming a diet of regular solids and thin consistency liquids with use of compensatory strategies (small bites/sips, alternate consistencies, pace during meals, sit upright, proactively cough/throat clear and re-swallow). He is aware of the associated risks with continuing an oral diet. Patient reports some coughing and throat clearing with liquids,  but he continues to state he does not want to use thicken or change his food textures. He continues to report that he feels his overall coughing has lessened since the onset of therapy. Reviewed the purpose and procedure of Vital Stimulation. Patient provided verbal consent to treat.   Vital Stim placement 3B used this date to increase function to the superior/middle/inferior pharyngeal constrictors and the pharyngeal shortening muscles to improve pharyngeal constriction. Top channel initially set at 8.0mA and bottom channel initially set at 8.0mA to evoke a transition from a tingling sensation to a pulling sensation. An effortful swallow noted. During the session, top channel increased to 13.5 mA and bottom channel increased to 13.5 mA to maintain a pulling sensation and audible swallow. Patient alerted SLP when he did not feel the machine as strong. While Vital Stim unit was in place, patient completed trials of small sips of thin water via cup to maintain continuous swallowing during a functionally based exercise. During thin liquid trials, SLP cued a small sips and an immediate effortful swallow to continue swallowing with the Vital Stim unit in place. He presented with 1 cough and intermittent throat clearing during this task. SLP reviewed his tongue base and laryngeal/pharyngeal strengthening exercises. Encouraged daily completion. No signs of respiratory distress or mucosal irritation present following completion of this intervention. No discomfort observed or reported following removal of Vital Stim unit. ASSESSMENT:  Making progress toward meeting therapy goals. PLAN:    Will continue speech pathology intervention as per established Plan of Care. Short Term Goals:     1. Patient will complete 10 of each BOTR strength/ ROM exercises in order to reduce pharyngeal residuals and improve epiglottic inversion during swallowing with minimal cues (less than 25% of the time). 2. Patient will complete 10 of each laryngeal strength/ ROM therapeutic exercises to improve airway protection during swallowing for the least restrictive PO diet with minimal cues (less than 25% of the time).       3. Patient will complete Shaker and/or Chin Tuck Against Resistance (CTAR) exercises in order to increase UES relaxation diameter and increase anterior laryngeal excursion to reduce pharyngeal residuals and reduce risk of pen/asp with minimal cues(less than 25% of the time) . Patient will complete 3 isometric exercises for 60 seconds each and 30 isokinetic exercises per session. 4. Patient will complete 3 Effortful Swallow exercises therapeutically to target increased oral and base of tongue pressure, increased pharyngeal constrictor contractions, and increased UES relaxation duration to reduce pharyngeal residue by 50%This will determined through repeat MBSS when appropriate. 5. Patient will complete 3 Ailyn Maneuver exercises therapeutically to target increased pharyngeal constrictor contractions to reduce pharyngeal residue by 50%. This will determined through repeat MBSS when appropriate. 6. Patient will demonstrate the ability to adequately self-monitor swallowing skills and perform appropriate compensatory techniques to reduce s/s of aspiration. 7. Vital Stimulation treatment to target the following placements/muscle groups/muscle response secondary to the patients symptoms of delayed swallow trigger, penetration/aspiration, pharyngeal weakness, and residuals     A. Placement 2B to increase function to the laryngeal extrinsics and suprahyoid muscles to improve hyolaryngeal excursion. B. Placement 3A to increase function to the digastric and thyrohyoid muscles to improve hyolaryngeal excursion. C. Placement 3B to increase function to the superior/middle/inferior pharyngeal constrictors and the pharyngeal shortening muscles to improve pharyngeal constriction.                                  8. Provide Deep Pharyngeal Neuromuscular Stimulation (DPNS) with focus on the following goals:                              A. Provide increased sensory input to light pressure receptors and increase cutaneous pressure to posterior tongue                for timely initiation of pharyngeal swallow (CN V, VII, IX)                              B. Elicit reflexive motor response to improve integrity of velar elevation, tongue base retraction, hyolaryngeal                       excursion, and pharyngeal constrictor movement (CN V, VII, X, XII)           Long Term Goals:     1. Patient will maintain adequate hydration/nutrition with optimum safety and efficiency of swallowing function on P.O. intake without overt signs and symptoms of aspiration for the highest appropriate diet level.      2. Patient will utilize compensatory strategies with optimum safety and efficiency of swallowing function on P.O. intake without overt signs and symptoms of aspiration for the highest appropriate diet level       Divya King M.S., [address] Pathologist  [phone]

## 2022-08-24 ENCOUNTER — HOSPITAL ENCOUNTER (OUTPATIENT)
Age: 72
Discharge: HOME | End: 2022-08-26
Payer: MEDICARE

## 2022-08-24 ENCOUNTER — HOSPITAL ENCOUNTER (OUTPATIENT)
Dept: GENERAL RADIOLOGY | Age: 72
Discharge: HOME | End: 2022-08-26
Payer: MEDICARE

## 2022-08-24 DIAGNOSIS — R06.02 SHORTNESS OF BREATH: ICD-10-CM

## 2022-08-24 PROCEDURE — 71046 X-RAY EXAM CHEST 2 VIEWS: CPT

## 2022-08-26 ENCOUNTER — HOSPITAL ENCOUNTER (OUTPATIENT)
Dept: SPEECH THERAPY | Age: 72
Setting detail: THERAPIES SERIES
Discharge: HOME | End: 2022-08-26
Payer: MEDICARE

## 2022-08-26 NOTE — PROGRESS NOTES
Speech-Language Pathology  Cancellation/No Show Note      For today's appointment patient:    []  Cancelled                  []  Rescheduled appointment    [x]  No show       []  Therapist cancelled             Reason given by patient:  [x]  No reason given  []  Conflicting appointment  []  No transportation  []  Conflict with work  []  Illness  []  [address] weather   []  Insurance related issues  []  Other           Comments: Patient no show for therapy this a.m. Will follow up with patient to reschedule.      Continue as per established POC    Steve Cabrera M.S., [address] Pathologist  [place] [phone]. [phone]      8/26/2022

## 2022-09-07 ENCOUNTER — OFFICE VISIT (OUTPATIENT)
Dept: CARDIOLOGY CLINIC | Age: 72
End: 2022-09-07
Payer: MEDICARE

## 2022-09-07 VITALS
HEIGHT: 73 IN | SYSTOLIC BLOOD PRESSURE: 110 MMHG | BODY MASS INDEX: 22.09 KG/M2 | WEIGHT: 166.7 LBS | RESPIRATION RATE: 16 BRPM | HEART RATE: 67 BPM | DIASTOLIC BLOOD PRESSURE: 60 MMHG

## 2022-09-07 DIAGNOSIS — I71.40 AAA (ABDOMINAL AORTIC ANEURYSM) WITHOUT RUPTURE: ICD-10-CM

## 2022-09-07 DIAGNOSIS — E78.2 MIXED HYPERLIPIDEMIA: ICD-10-CM

## 2022-09-07 DIAGNOSIS — R06.09 DOE (DYSPNEA ON EXERTION): ICD-10-CM

## 2022-09-07 DIAGNOSIS — Z79.01 CHRONIC ANTICOAGULATION: ICD-10-CM

## 2022-09-07 DIAGNOSIS — I48.0 PAROXYSMAL ATRIAL FIBRILLATION (HCC): Primary | ICD-10-CM

## 2022-09-07 PROCEDURE — 99214 OFFICE O/P EST MOD 30 MIN: CPT | Performed by: INTERNAL MEDICINE

## 2022-09-07 PROCEDURE — 1123F ACP DISCUSS/DSCN MKR DOCD: CPT | Performed by: INTERNAL MEDICINE

## 2022-09-07 PROCEDURE — 93000 ELECTROCARDIOGRAM COMPLETE: CPT | Performed by: INTERNAL MEDICINE

## 2022-09-07 RX ORDER — FEXOFENADINE HCL 180 MG/1
180 TABLET ORAL DAILY
COMMUNITY

## 2022-09-07 RX ORDER — PREDNISONE 10 MG/1
TABLET ORAL
COMMUNITY
Start: 2022-08-24

## 2022-09-07 RX ORDER — AZITHROMYCIN 250 MG/1
250 TABLET, FILM COATED ORAL DAILY
COMMUNITY

## 2022-09-07 NOTE — PROGRESS NOTES
auscultation bilaterally. No wheezes, rales, or rhonchi. Cardiac: Regular rate and rhythm, +S1S2, no murmurs apparent  Abdomen: Soft, nontender, +bowel sounds  Extremities: Moves all extremities x 4, no lower extremity edema  Neurologic: No focal motor deficits apparent, normal mood and affect, alert and oriented x 3    Laboratory Tests:  Lab Results   Component Value Date    CREATININE 1.4 (H) 06/25/2020    BUN 30 (H) 06/25/2020     07/29/2019    K 3.6 07/29/2019     07/29/2019    CO2 28 07/29/2019     Lab Results   Component Value Date/Time    MG 2.1 07/29/2019 03:15 AM     Lab Results   Component Value Date    WBC 8.5 07/29/2019    HGB 12.6 07/29/2019    HCT 38.4 07/29/2019    MCV 93.4 07/29/2019     07/29/2019     Lab Results   Component Value Date    ALT 25 07/29/2019    AST 17 07/29/2019    ALKPHOS 94 07/29/2019    BILITOT 0.4 07/29/2019     Lab Results   Component Value Date    CKTOTAL 56 01/08/2014    CKMB 1.5 01/08/2014    TROPONINI <0.01 01/08/2014    TROPONINI <0.01 01/20/2011     Lab Results   Component Value Date    INR 1.2 07/09/2019    INR 2.2 01/08/2014    INR 1.0 01/20/2011    PROTIME 14.3 (H) 07/09/2019    PROTIME 17.7  (H) 01/08/2014    PROTIME 11.3 01/20/2011     Lab Results   Component Value Date    TSH 0.552 07/11/2019     No results found for: LABA1C  No results found for: EAG  No results found for: CHOL  No results found for: TRIG  No results found for: HDL  No results found for: LDLCALC, LDLCHOLESTEROL  No results found for: LABVLDL, VLDL  No results found for: CHOLHDLRATIO  No results for input(s): PROBNP in the last 72 hours. Cardiac Tests:  ECG: SR, rate 67, no acute STT changes    Exercise nuclear stress test: 9/24/21 (Dr. Sebastian Garcia)  Exercise EKG was  negative. The patient experienced no chest pain with exercise.    The myocardial perfusion imaging was abnormal.    The abnormality was a a large sized fixed defect in the inferior, inferolateral, and apical  Walls suggestive of a prior MI. Overall left ventricular systolic function was normal without regional wall motion abnormalities. Parker treadmill score was 5 implying low risk. Exercise capacity was below average. There was an appropriate blood pressure and heart rate response to exercise and recovery. Intermediate risk general exercise treadmill test.  Gated SPECT left ventricular ejection fraction was calculated to be 68%, with hypokinesis of the inferior and inferoseptal walls. TID ratio 0.98. Impression/Plan:  Paroxysmal atrial fibrillation (see below) -- maintaining SR  Chronic anticoagulation with Eliquis  Dyspnea on exertion  80-pack-year smoking history; quit 2009  COPD -- follows Dr. Carlos Alberto Melchor  Hyperlipidemia. Hypothyroidism  Sleep apnea. Compliant with CPAP therapy. AAA repair -- he follows with Dr. Carlos Shea  AF, status post cardioversion 09/2013. Maintained on sotalol therapy and Pradaxa. Echocardiogram 03/18/2013. EF 65%. Mildly dilated left atrium. DCCV 03/21/2013. Maintained on sotalol therapy. Second opinion at [facility]. Seen by Dr. Emma Quezada. AF ablation at [facility] 01/03/2017. Sotalol discontinued after ablation. 14 day event monitor, 3/5-3/18/2018, predominantly sinus rhythm. PAC's. No AF demonstrated. Average heart rate 77 bpm.  No pauses noted. 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     Luisa Barrios Cong Fink MD  [facility]) Cardiology

## 2022-09-09 ENCOUNTER — HOSPITAL ENCOUNTER (OUTPATIENT)
Dept: SPEECH THERAPY | Age: 72
Setting detail: THERAPIES SERIES
Discharge: HOME | End: 2022-09-09
Payer: MEDICARE

## 2022-09-09 PROCEDURE — 92526 ORAL FUNCTION THERAPY: CPT

## 2022-09-09 NOTE — PROGRESS NOTES
[address] [place] [address]  Outpatient Speech Therapy  Phone: [phone] Fax: [phone]        PATIENT NAME:  Dilia Feng  (male)                MRN:  [id number]                       :  1950    STATUS:  Outpatient clinic       REFERRING PROVIDER:   Dr. Christine Waters          NPI: [id number]  REFERRING/TREATMENT DIAGNOSIS: Other dysphagia [R13.19]   SPECIFIC PROVIDER ORDER: [facility]-Speech Therapy (For dysphagia and Vital Stimulation therapy) Date of order:  49  CERTIFICATION/RECERTIFICATION PERIOD: 2022 to 22  INSURANCE PROVIDER:  Payor: Bluford Lesches / Plan: Johnna Hurt ESSENTIAL/PLUS / Product Type: *No Product type* /    INSURANCE ID:  [id number] - (Medicare Managed)              SECONDARY INSURANCE (if applicable):       SPEECH LANGUAGE PATHOLOGY  DAILY PROGRESS NOTE        SUBJECTIVE:  Patient was seen this date for 60 minute speech therapy session to target dysphagia. He was pleasant and cooperative throughout session. OBJECTIVE:    Per patient, he was a no show for his last appointment d/t illness. Patient reported shortness of breath and a fever to this SLP. Patient followed-up w/ PCP and received a CXR. Per patient, no changes were noted from previous CXR. SLP provided patient w/ education regarding his high risk of aspiration w/ PO intake per most recent MBSS results. SLP educated patient on continuing  w/ thorough oral care prior to and after PO intake to reduce oral bacteria and risk of aspiration pneumonia. Patient verbalized understanding and agreement. Patient had made the decision to forego NPO recommendations and continue consuming a diet of regular solids and thin consistency liquids with use of compensatory strategies (small bites/sips, alternate consistencies, pace during meals, sit upright, proactively cough/throat clear and re-swallow).   He is aware of the associated risks with continuing an oral diet. Patient reports some coughing and throat clearing with liquids,  but he continues to state he does not want to use thicken or change his food textures. Reviewed the purpose and procedure of Vital Stimulation. Patient provided verbal consent to treat. Vital Stim placement 2B used this date to  increase function to the laryngeal extrinsics and suprahyoid muscles to improve hyolaryngeal excursion. Top channel initially set at 3.5 mA and bottom channel initially set at 3.5mA to evoke a transition from a tingling sensation to a pulling sensation. An effortful swallow noted. During the session, top channel increased to 7.0 mA and bottom channel increased to 7.0 mA to maintain a pulling sensation and audible swallow. Patient alerted SLP when he did not feel the machine as strong. While Vital Stim unit was in place, patient completed effortful swallows w/ use of lollipop to maintain continuous swallowing during a functionally based exercise. SLP cued an immediate effortful swallow to continue swallowing with the Vital Stim unit in place. He presented with  intermittent throat clearing during this task. SLP reviewed his tongue base and laryngeal/pharyngeal strengthening exercises. Encouraged daily completion. No signs of respiratory distress or mucosal irritation present following completion of this intervention. No discomfort observed or reported following removal of Vital Stim unit. ASSESSMENT:  Making progress toward meeting therapy goals. PLAN:    Will continue speech pathology intervention as per established Plan of Care. Short Term Goals:     1. Patient will complete 10 of each BOTR strength/ ROM exercises in order to reduce pharyngeal residuals and improve epiglottic inversion during swallowing with minimal cues (less than 25% of the time).      2. Patient will complete 10 of each laryngeal strength/ ROM therapeutic exercises to improve airway protection during swallowing for the least restrictive PO diet with minimal cues (less than 25% of the time). 3. Patient will complete Shaker and/or Chin Tuck Against Resistance (CTAR) exercises in order to increase UES relaxation diameter and increase anterior laryngeal excursion to reduce pharyngeal residuals and reduce risk of pen/asp with minimal cues(less than 25% of the time) . Patient will complete 3 isometric exercises for 60 seconds each and 30 isokinetic exercises per session. 4. Patient will complete 3 Effortful Swallow exercises therapeutically to target increased oral and base of tongue pressure, increased pharyngeal constrictor contractions, and increased UES relaxation duration to reduce pharyngeal residue by 50%This will determined through repeat MBSS when appropriate. 5. Patient will complete 3 Ailyn Maneuver exercises therapeutically to target increased pharyngeal constrictor contractions to reduce pharyngeal residue by 50%. This will determined through repeat MBSS when appropriate. 6. Patient will demonstrate the ability to adequately self-monitor swallowing skills and perform appropriate compensatory techniques to reduce s/s of aspiration. 7. Vital Stimulation treatment to target the following placements/muscle groups/muscle response secondary to the patients symptoms of delayed swallow trigger, penetration/aspiration, pharyngeal weakness, and residuals     A. Placement 2B to increase function to the laryngeal extrinsics and suprahyoid muscles to improve hyolaryngeal excursion. B. Placement 3A to increase function to the digastric and thyrohyoid muscles to improve hyolaryngeal excursion. C. Placement 3B to increase function to the superior/middle/inferior pharyngeal constrictors and the pharyngeal shortening muscles to improve pharyngeal constriction.                                  8. Provide Deep Pharyngeal Neuromuscular Stimulation (DPNS) with focus on the following goals:                              A. Provide increased sensory input to light pressure receptors and increase cutaneous pressure to posterior tongue                for timely initiation of pharyngeal swallow (CN V, VII, IX)                              B. Elicit reflexive motor response to improve integrity of velar elevation, tongue base retraction, hyolaryngeal                       excursion, and pharyngeal constrictor movement (CN V, VII, X, XII)           Long Term Goals:     1. Patient will maintain adequate hydration/nutrition with optimum safety and efficiency of swallowing function on P.O. intake without overt signs and symptoms of aspiration for the highest appropriate diet level.      2. Patient will utilize compensatory strategies with optimum safety and efficiency of swallowing function on P.O. intake without overt signs and symptoms of aspiration for the highest appropriate diet level       CASPER PalumboS., [address] Pathologist  [facility] Hospital[phone]

## 2022-09-12 ENCOUNTER — HOSPITAL ENCOUNTER (OUTPATIENT)
Dept: SPEECH THERAPY | Age: 72
Setting detail: THERAPIES SERIES
Discharge: HOME | End: 2022-09-12
Payer: MEDICARE

## 2022-09-12 PROCEDURE — 92526 ORAL FUNCTION THERAPY: CPT

## 2022-09-12 NOTE — PROGRESS NOTES
[address] [facility]  Outpatient Speech Therapy  Phone: [phone] Fax: [phone]        PATIENT NAME:  Monique Strauss  (male)                MRN:  [id number]                       :  1950    STATUS:  Outpatient clinic       REFERRING PROVIDER:   Dr. Ariana Gregg          NPI: [id number]  REFERRING/TREATMENT DIAGNOSIS: Other dysphagia [R13.19]   SPECIFIC PROVIDER ORDER: [facility]-Speech Therapy (For dysphagia and Vital Stimulation therapy) Date of order:  3-0-47  CERTIFICATION/RECERTIFICATION PERIOD: 2022 to 22  INSURANCE PROVIDER:  Payor: Yasmin Mota / Plan: Sherif Puff ESSENTIAL/PLUS / Product Type: *No Product type* /    INSURANCE ID:  [id number] - (Medicare Managed)              SECONDARY INSURANCE (if applicable):       SPEECH LANGUAGE PATHOLOGY  DAILY PROGRESS NOTE        SUBJECTIVE:  Patient was seen this date for 60 minute speech therapy session to target dysphagia. He was pleasant and cooperative throughout session. OBJECTIVE:    : Per patient, he was a no show for his last appointment d/t illness. Patient reported shortness of breath and a fever to this SLP. Patient followed-up w/ PCP and received a CXR. Per patient, no changes were noted from previous CXR. SLP provided patient w/ education regarding his high risk of aspiration w/ PO intake per most recent MBSS results. SLP educated patient on continuing  w/ thorough oral care prior to and after PO intake to reduce oral bacteria and risk of aspiration pneumonia. Patient verbalized understanding and agreement. Patient had made the decision to forego NPO recommendations and continue consuming a diet of regular solids and thin consistency liquids with use of compensatory strategies (small bites/sips, alternate consistencies, pace during meals, sit upright, proactively cough/throat clear and re-swallow).   He is aware of the associated risks with continuing an oral diet. Patient reports some coughing and throat clearing with liquids,  but he continues to state he does not want to use thicken or change his food textures. Reviewed the purpose and procedure of Vital Stimulation. Patient provided verbal consent to treat. Vital Stim placement 3A used this date to increase function to the digastric and thyrohyoid muscles to improve hyolaryngeal excursion. R side channel initially set at 6.0 mA and left channel initially set at 6.0mA to evoke a transition from a tingling sensation to a pulling sensation. An effortful swallow noted. During the session, right side channel increased to 7.0 mA and left side channel increased to 7.0 mA to maintain a pulling sensation and audible swallow. Patient alerted SLP when he did not feel the machine as strong. While Vital Stim unit was in place, patient completed effortful swallows w/ use of lollipop to maintain continuous swallowing during a functionally based exercise. SLP cued an immediate effortful swallow to continue swallowing with the Vital Stim unit in place. He presented with  intermittent throat clearing during this task. SLP reviewed his tongue base and laryngeal/pharyngeal strengthening exercises. Patient completed Anh Burly in 3/5 attempts w/ fair follow through noted. Patient completed isometric chin tuck against resistance exercises x2 for 60 seconds each, w/ good follow through noted. No signs of respiratory distress or mucosal irritation present following completion of this intervention. No discomfort observed or reported following removal of Vital Stim unit. ASSESSMENT:  Making progress toward meeting therapy goals. PLAN:    Will continue speech pathology intervention as per established Plan of Care. Short Term Goals:     1.  Patient will complete 10 of each BOTR strength/ ROM exercises in order to reduce pharyngeal residuals and improve and the pharyngeal shortening muscles to improve pharyngeal constriction. 8. Provide Deep Pharyngeal Neuromuscular Stimulation (DPNS) with focus on the following goals:                              A. Provide increased sensory input to light pressure receptors and increase cutaneous pressure to posterior tongue                for timely initiation of pharyngeal swallow (CN V, VII, IX)                              B. Elicit reflexive motor response to improve integrity of velar elevation, tongue base retraction, hyolaryngeal                       excursion, and pharyngeal constrictor movement (CN V, VII, X, XII)           Long Term Goals:     1. Patient will maintain adequate hydration/nutrition with optimum safety and efficiency of swallowing function on P.O. intake without overt signs and symptoms of aspiration for the highest appropriate diet level.      2. Patient will utilize compensatory strategies with optimum safety and efficiency of swallowing function on P.O. intake without overt signs and symptoms of aspiration for the highest appropriate diet level       CASPER BrannonS., [address] [facility] Pathologist  [phone]

## 2022-09-13 NOTE — PROGRESS NOTES
[address] [place] [address]  Outpatient Speech Therapy  Phone: [phone] Fax: [phone]        SPEECH THERAPY   RE-EVALUATION / Fern Anaya [facility]        PATIENT NAME:  Monique Strauss  (male)     MRN:  [id number]    :  1950  (74 y.o.)  STATUS:  Outpatient clinic   TODAY'S DATE:  2022  REFERRING PROVIDER:    MD [facility]-Speech Therapy (For dysphagia and Vital Stimulation therapy) Date of order:  22  EVALUATING THERAPIST: ZULY Dia    CERTIFICATION/RECERTIFICATION PERIOD: 2022 to 23  INSURANCE PROVIDER:  Payor: Yasmin Weinerr / Plan: Sherif Puff ESSENTIAL/PLUS / Product Type: *No Product type* /    INSURANCE ID:  [id number] - (Medicare Managed)   SECONDARY INSURANCE (if applicable):        CPT Codes  EVALUATION: 59616 Bedside swallow eval    TREATMENT:  Requesting treatment authorization for  1-2 visits over 12 weeks focusing on the following CPT codes:      65720 dysphagia tx    REFERRING/TREATMENT  DIAGNOSIS: 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 Focus of current treatment sessions has been on dysphagia mgmt w/ therapeutic exercises and vital stimulation therapy. .    OBJECTIVE / GOAL STATUS   (Status Key: GM = Goal Met, MP = Making Progress, BP = Beginning Progress, NI = Not Introduced, D/C = Discontinue Goal, NM = Not Met)      ASSESSMENT / STANDARDIZED TEST RESULTS  Patient has made good progress over the past 12 weeks    Rehab Potential: [] Excellent [] Good [x] Fair  [] Poor    PLAN OF CARE:   Continue to follow goals above utilizing the following interventions:   Short Term Goals:     1.  Patient will complete 10 of each BOTR strength/ ROM exercises in order to reduce pharyngeal residuals and improve epiglottic inversion during swallowing with minimal cues (less than 25% of the time). - MP     2. Patient will complete 10 of each laryngeal strength/ ROM therapeutic exercises to improve airway protection during swallowing for the least restrictive PO diet with minimal cues (less than 25% of the time). -MP     3. Patient will complete Shaker and/or Chin Tuck Against Resistance (CTAR) exercises in order to increase UES relaxation diameter and increase anterior laryngeal excursion to reduce pharyngeal residuals and reduce risk of pen/asp with minimal cues(less than 25% of the time) . Patient will complete 3 isometric exercises for 60 seconds each and 30 isokinetic exercises per session. -MP     4. Patient will complete 3 Effortful Swallow exercises therapeutically to target increased oral and base of tongue pressure, increased pharyngeal constrictor contractions, and increased UES relaxation duration to reduce pharyngeal residue by 50%This will determined through repeat MBSS when appropriate. -MP     5. Patient will complete 3 Ailyn Maneuver exercises therapeutically to target increased pharyngeal constrictor contractions to reduce pharyngeal residue by 50%. This will determined through repeat MBSS when appropriate. -MP     6. Patient will demonstrate the ability to adequately self-monitor swallowing skills and perform appropriate compensatory techniques to reduce s/s of aspiration. -MP     7. Vital Stimulation treatment to target the following placements/muscle groups/muscle response secondary to the patients symptoms of delayed swallow trigger, penetration/aspiration, pharyngeal weakness, and residuals-MP     A. Placement 2B to increase function to the laryngeal extrinsics and suprahyoid muscles to improve hyolaryngeal excursion. B. Placement 3A to increase function to the digastric and thyrohyoid muscles to improve hyolaryngeal excursion.      C. Placement 3B to increase function to the superior/middle/inferior pharyngeal constrictors and the pharyngeal shortening muscles to improve pharyngeal constriction. 8. Provide Deep Pharyngeal Neuromuscular Stimulation (DPNS) with focus on the following goals:-NI at this time d/t this SLP not being certified in [facility], patient is aware and agreeable to move forward with tx without use of DPNS. A. Provide increased sensory input to light pressure receptors and increase cutaneous pressure to posterior tongue                for timely initiation of pharyngeal swallow (CN V, VII, IX)                              B. Elicit reflexive motor response to improve integrity of velar elevation, tongue base retraction, hyolaryngeal                       excursion, and pharyngeal constrictor movement (CN V, VII, X, XII)           Long Term Goals:     1. Patient will maintain adequate hydration/nutrition with optimum safety and efficiency of swallowing function on P.O. intake without overt signs and symptoms of aspiration for the highest appropriate diet level. 2. Patient will utilize compensatory strategies with optimum safety and efficiency of swallowing function on P.O. intake without overt signs and symptoms of aspiration for the highest appropriate diet level     [] Teletherapy  [x] Direct Therapy  [] Family Education                     [] Home Exercise Program (HEP)          NEW TREATMENT GOALS: None at this time. Patient and/or caregiver aware of diagnosis? yes   Patient and/or caregiver agree with POC? yes       Thank you for this referral. Please send a new script for continued therapy services including a diagnosis and code.     Cydney San M.S., [address] [place] [address]  Speech-Language Pathologist  [phone]      9/13/2022        I have read the completed speech therapy re-evaluation / updated POC and deem the plan appropriate and medically necessary for my patient.     _____________________________________________  Physician Signature    Date  _____________________________________________  Physician Name Printed

## 2022-09-16 ENCOUNTER — HOSPITAL ENCOUNTER (OUTPATIENT)
Dept: SPEECH THERAPY | Age: 72
Setting detail: THERAPIES SERIES
Discharge: HOME | End: 2022-09-16
Payer: MEDICARE

## 2022-09-16 PROCEDURE — 92526 ORAL FUNCTION THERAPY: CPT

## 2022-09-16 NOTE — PROGRESS NOTES
[address] [facility]  Outpatient Speech Therapy  Phone: [phone] Fax: [phone]        PATIENT NAME:  Hermilo Macdonald  (male)                MRN:  [id number]                       :  1950    STATUS:  Outpatient clinic       REFERRING PROVIDER:   Dr. Chuy Webb          NPI: [id number]  REFERRING/TREATMENT DIAGNOSIS: Other dysphagia [R13.19]   SPECIFIC PROVIDER ORDER: [facility]-Speech Therapy (For dysphagia and Vital Stimulation therapy) Date of order:  1-3-56  CERTIFICATION/RECERTIFICATION PERIOD: 2022 to 22  INSURANCE PROVIDER:  Payor: Fuad Henry / Plan: Marita Yi ESSENTIAL/PLUS / Product Type: *No Product type* /    INSURANCE ID:  [id number] - (Medicare Managed)              SECONDARY INSURANCE (if applicable):       SPEECH LANGUAGE PATHOLOGY  DAILY PROGRESS NOTE        SUBJECTIVE:  Patient was seen this date for 60 minute speech therapy session to target dysphagia. He was pleasant and cooperative throughout session. OBJECTIVE:    : Per patient, he was a no show for his last appointment d/t illness. Patient reported shortness of breath and a fever to this SLP. Patient followed-up w/ PCP and received a CXR. Per patient, no changes were noted from previous CXR. SLP provided patient w/ education regarding his high risk of aspiration w/ PO intake per most recent MBSS results. SLP educated patient on continuing  w/ thorough oral care prior to and after PO intake to reduce oral bacteria and risk of aspiration pneumonia. Patient verbalized understanding and agreement. Patient had made the decision to forego NPO recommendations and continue consuming a diet of regular solids and thin consistency liquids with use of compensatory strategies (small bites/sips, alternate consistencies, pace during meals, sit upright, proactively cough/throat clear and re-swallow).   He is aware of the associated risks with continuing an oral diet. Patient reports some coughing and throat clearing with liquids,  but he continues to state he does not want to use thicken or change his food textures. Reviewed the purpose and procedure of Vital Stimulation. Patient provided verbal consent to treat. Vital Stim placement 3B used this date to increase function to the superior/middle/inferior pharyngeal constrictors and the pharyngeal shortening muscles to improve pharyngeal constriction. Top channel initially set at 5.0 mA and bottom channel initially set at 5.0mA to evoke a transition from a tingling sensation to a pulling sensation. An effortful swallow noted. During the session, top channel increased to10.0 mA and bottom channel increased to 10.0 mA to maintain a pulling sensation and audible swallow. Patient alerted SLP when he did not feel the machine as strong. While Vital Stim unit was in place, patient completed effortful swallows w/ use of lollipop to maintain continuous swallowing during a functionally based exercise in 30/30 attempts. Patient able to complete Ailyn maneuver to increase pharyngeal constrictor contractions in 14/15 attempts. Patient demonstrated intermittent throat clearing and coughing w/ use of lollipop. Patient consumed thin water x1 w/ immediate coughing noted. No signs of respiratory distress or mucosal irritation present following completion of this intervention. No discomfort observed or reported following removal of Vital Stim unit. ASSESSMENT:  Making progress toward meeting therapy goals. PLAN:    Will continue speech pathology intervention as per established Plan of Care. Short Term Goals:     1. Patient will complete 10 of each BOTR strength/ ROM exercises in order to reduce pharyngeal residuals and improve epiglottic inversion during swallowing with minimal cues (less than 25% of the time).      2. Patient will complete 10 of each laryngeal strength/ ROM

## 2022-09-19 ENCOUNTER — HOSPITAL ENCOUNTER (OUTPATIENT)
Dept: SPEECH THERAPY | Age: 72
Setting detail: THERAPIES SERIES
Discharge: HOME | End: 2022-09-19
Payer: MEDICARE

## 2022-09-19 PROCEDURE — 92526 ORAL FUNCTION THERAPY: CPT

## 2022-09-26 NOTE — PROGRESS NOTES
[address] [facility]  Outpatient Speech Therapy  Phone: [phone] Fax: [phone]      SPEECH THERAPY   DISCHARGE SUMMARY    Date of Report: 2022    Patient Mary Em  : 1950  MRN: [id number]    Diagnosis: Other dysphagia [R13.19]   Referring Physician: Dr. Anika Sharp          NPI: [id number]    SUBJECTIVE  Patient attended 16 speech therapy sessions from 22 to 22 , with 1  cancellations and 0 refusals. Focus of current treatment sessions has been on dysphagia mgmt w/ therapeutic exercises and vital stimulation therapy. OBJECTIVE / GOAL STATUS   (Status Key: GM = Goal Met, MP = Making Progress, BP = Beginning Progress, NI = Not Introduced, D/C = Discontinue Goal, NM = Not Met)    Short Term Goals:     1. Patient will complete 10 of each BOTR strength/ ROM exercises in order to reduce pharyngeal residuals and improve epiglottic inversion during swallowing with minimal cues (less than 25% of the time). -D/C     2. Patient will complete 10 of each laryngeal strength/ ROM therapeutic exercises to improve airway protection during swallowing for the least restrictive PO diet with minimal cues (less than 25% of the time). D/C     3. Patient will complete Shaker and/or Chin Tuck Against Resistance (CTAR) exercises in order to increase UES relaxation diameter and increase anterior laryngeal excursion to reduce pharyngeal residuals and reduce risk of pen/asp with minimal cues(less than 25% of the time) . Patient will complete 3 isometric exercises for 60 seconds each and 30 isokinetic exercises per session.- D/C     4. Patient will complete  Effortful Swallow exercises therapeutically to target increased oral and base of tongue pressure, increased pharyngeal constrictor contractions, and increased UES relaxation duration to reduce pharyngeal residue by 50%This will determined through repeat MBSS when appropriate. -D/C     5. Patient will complete 3 Ailyn Maneuver exercises therapeutically to target increased pharyngeal constrictor contractions to reduce pharyngeal residue by 50%. This will determined through repeat MBSS when appropriate. -D/C     6. Patient will demonstrate the ability to adequately self-monitor swallowing skills and perform appropriate compensatory techniques to reduce s/s of aspiration.- D/C     7. Vital Stimulation treatment to target the following placements/muscle groups/muscle response secondary to the patients symptoms of delayed swallow trigger, penetration/aspiration, pharyngeal weakness, and residuals- D/C     A. Placement 2B to increase function to the laryngeal extrinsics and suprahyoid muscles to improve hyolaryngeal excursion. B. Placement 3A to increase function to the digastric and thyrohyoid muscles to improve hyolaryngeal excursion. C. Placement 3B to increase function to the superior/middle/inferior pharyngeal constrictors and the pharyngeal shortening muscles to improve pharyngeal constriction. 8. Provide Deep Pharyngeal Neuromuscular Stimulation (DPNS) with focus on the following goals: NI                              A. Provide increased sensory input to light pressure receptors and increase cutaneous pressure to posterior tongue                for timely initiation of pharyngeal swallow (CN V, VII, IX)                              B. Elicit reflexive motor response to improve integrity of velar elevation, tongue base retraction, hyolaryngeal                       excursion, and pharyngeal constrictor movement (CN V, VII, X, XII)           Long Term Goals:     1. Patient will maintain adequate hydration/nutrition with optimum safety and efficiency of swallowing function on P.O. intake without overt signs and symptoms of aspiration for the highest appropriate diet level. -D/C     2.  Patient will utilize compensatory strategies with optimum safety and efficiency of swallowing function on P.O. intake without overt signs and symptoms of aspiration for the highest appropriate diet level -D/C      ASSESSMENT / STANDARDIZED TEST RESULTS  Patient has made fair progress over the past 14 weeks. Patient had made the decision to forego NPO recommendations and continue consuming a diet of regular solids and thin consistency liquids with use of compensatory strategies (small bites/sips, alternate consistencies, pace during meals, sit upright, proactively cough/throat clear and re-swallow). He is aware of the associated risks with continuing an oral diet. Patient continues to reports some coughing and throat clearing with liquids,  but he continues to state he does not want to use thickener or change his food textures. SLP recommended a repeat MBSS to further assess oropharyngeal function, toleration of PO intake, and overall progress with current POC. Patient declined repeat MBSS at this time. Patient educated on need to submit to insurance for more therapy visits. Patient declined further therapy at this time. SLP remains available for consult should needs arise in the future. RECOMMENDATIONS  Patient is discharged at this time per his request.     Patient and/or caregiver aware of diagnosis? yes   Patient and/or caregiver agree with POC?  yes       Thank you for this referral.

## 2022-12-17 ENCOUNTER — APPOINTMENT (OUTPATIENT)
Dept: GENERAL RADIOLOGY | Age: 72
DRG: 193 | End: 2022-12-17
Payer: MEDICARE

## 2022-12-17 ENCOUNTER — HOSPITAL ENCOUNTER (INPATIENT)
Age: 72
LOS: 4 days | Discharge: HOME | DRG: 193 | End: 2022-12-21
Attending: EMERGENCY MEDICINE | Admitting: INTERNAL MEDICINE
Payer: MEDICARE

## 2022-12-17 DIAGNOSIS — J10.1 INFLUENZA A: ICD-10-CM

## 2022-12-17 DIAGNOSIS — J44.1 COPD EXACERBATION (HCC): ICD-10-CM

## 2022-12-17 DIAGNOSIS — J96.01 ACUTE RESPIRATORY FAILURE WITH HYPOXIA (HCC): Primary | ICD-10-CM

## 2022-12-17 PROBLEM — J96.21 ACUTE ON CHRONIC RESPIRATORY FAILURE WITH HYPOXIA (HCC): Status: ACTIVE | Noted: 2022-12-17

## 2022-12-17 LAB
ANION GAP SERPL CALCULATED.3IONS-SCNC: 6 MMOL/L (ref 7–16)
ANISOCYTOSIS: ABNORMAL
ATYPICAL LYMPHOCYTE RELATIVE PERCENT: 3 % (ref 0–4)
BASOPHILS ABSOLUTE: 0 E9/L (ref 0–0.2)
BASOPHILS RELATIVE PERCENT: 0 % (ref 0–2)
BUN BLDV-MCNC: 26 MG/DL (ref 6–23)
CALCIUM SERPL-MCNC: 9.3 MG/DL (ref 8.6–10.2)
CHLORIDE BLD-SCNC: 100 MMOL/L (ref 98–107)
CO2: 35 MMOL/L (ref 22–29)
CREAT SERPL-MCNC: 1 MG/DL (ref 0.7–1.2)
EOSINOPHILS ABSOLUTE: 0 E9/L (ref 0.05–0.5)
EOSINOPHILS RELATIVE PERCENT: 0 % (ref 0–6)
GFR SERPL CREATININE-BSD FRML MDRD: >60 ML/MIN/1.73
GLUCOSE BLD-MCNC: 126 MG/DL (ref 74–99)
HCT VFR BLD CALC: 39.7 % (ref 37–54)
HEMOGLOBIN: 12.1 G/DL (ref 12.5–16.5)
INFLUENZA A BY PCR: DETECTED
INFLUENZA B BY PCR: NOT DETECTED
LYMPHOCYTES ABSOLUTE: 0.52 E9/L (ref 1.5–4)
LYMPHOCYTES RELATIVE PERCENT: 9 % (ref 20–42)
MCH RBC QN AUTO: 28.9 PG (ref 26–35)
MCHC RBC AUTO-ENTMCNC: 30.5 % (ref 32–34.5)
MCV RBC AUTO: 94.7 FL (ref 80–99.9)
MONOCYTES ABSOLUTE: 0.17 E9/L (ref 0.1–0.95)
MONOCYTES RELATIVE PERCENT: 4 % (ref 2–12)
NEUTROPHILS ABSOLUTE: 3.61 E9/L (ref 1.8–7.3)
NEUTROPHILS RELATIVE PERCENT: 84 % (ref 43–80)
OVALOCYTES: ABNORMAL
PDW BLD-RTO: 15.8 FL (ref 11.5–15)
PLATELET # BLD: 124 E9/L (ref 130–450)
PMV BLD AUTO: 10 FL (ref 7–12)
POIKILOCYTES: ABNORMAL
POTASSIUM SERPL-SCNC: 4.5 MMOL/L (ref 3.5–5)
RBC # BLD: 4.19 E12/L (ref 3.8–5.8)
SARS-COV-2, NAAT: NOT DETECTED
SODIUM BLD-SCNC: 141 MMOL/L (ref 132–146)
TROPONIN, HIGH SENSITIVITY: 17 NG/L (ref 0–11)
TROPONIN, HIGH SENSITIVITY: 18 NG/L (ref 0–11)
WBC # BLD: 4.3 E9/L (ref 4.5–11.5)

## 2022-12-17 PROCEDURE — 6370000000 HC RX 637 (ALT 250 FOR IP): Performed by: EMERGENCY MEDICINE

## 2022-12-17 PROCEDURE — 94664 DEMO&/EVAL PT USE INHALER: CPT

## 2022-12-17 PROCEDURE — 94640 AIRWAY INHALATION TREATMENT: CPT

## 2022-12-17 PROCEDURE — 87635 SARS-COV-2 COVID-19 AMP PRB: CPT

## 2022-12-17 PROCEDURE — 87502 INFLUENZA DNA AMP PROBE: CPT

## 2022-12-17 PROCEDURE — 96374 THER/PROPH/DIAG INJ IV PUSH: CPT

## 2022-12-17 PROCEDURE — 93005 ELECTROCARDIOGRAM TRACING: CPT

## 2022-12-17 PROCEDURE — 99285 EMERGENCY DEPT VISIT HI MDM: CPT

## 2022-12-17 PROCEDURE — 80048 BASIC METABOLIC PNL TOTAL CA: CPT

## 2022-12-17 PROCEDURE — 85025 COMPLETE CBC W/AUTO DIFF WBC: CPT

## 2022-12-17 PROCEDURE — 71045 X-RAY EXAM CHEST 1 VIEW: CPT

## 2022-12-17 PROCEDURE — 6370000000 HC RX 637 (ALT 250 FOR IP): Performed by: NURSE PRACTITIONER

## 2022-12-17 PROCEDURE — 2700000000 HC OXYGEN THERAPY PER DAY

## 2022-12-17 PROCEDURE — 84484 ASSAY OF TROPONIN QUANT: CPT

## 2022-12-17 PROCEDURE — 6360000002 HC RX W HCPCS: Performed by: NURSE PRACTITIONER

## 2022-12-17 PROCEDURE — 2580000003 HC RX 258: Performed by: NURSE PRACTITIONER

## 2022-12-17 PROCEDURE — 36415 COLL VENOUS BLD VENIPUNCTURE: CPT

## 2022-12-17 PROCEDURE — 6360000002 HC RX W HCPCS

## 2022-12-17 PROCEDURE — 1200000000 HC SEMI PRIVATE

## 2022-12-17 PROCEDURE — 6370000000 HC RX 637 (ALT 250 FOR IP)

## 2022-12-17 RX ORDER — IPRATROPIUM BROMIDE AND ALBUTEROL SULFATE 2.5; .5 MG/3ML; MG/3ML
1 SOLUTION RESPIRATORY (INHALATION)
Status: COMPLETED | OUTPATIENT
Start: 2022-12-17 | End: 2022-12-17

## 2022-12-17 RX ORDER — FEXOFENADINE HCL 180 MG/1
180 TABLET ORAL DAILY
Status: DISCONTINUED | OUTPATIENT
Start: 2022-12-17 | End: 2022-12-17 | Stop reason: CLARIF

## 2022-12-17 RX ORDER — IPRATROPIUM BROMIDE AND ALBUTEROL SULFATE 2.5; .5 MG/3ML; MG/3ML
1 SOLUTION RESPIRATORY (INHALATION)
Status: DISCONTINUED | OUTPATIENT
Start: 2022-12-17 | End: 2022-12-19

## 2022-12-17 RX ORDER — FERROUS SULFATE 325(65) MG
325 TABLET ORAL 2 TIMES DAILY WITH MEALS
Status: DISCONTINUED | OUTPATIENT
Start: 2022-12-17 | End: 2022-12-21 | Stop reason: HOSPADM

## 2022-12-17 RX ORDER — GUAIFENESIN 100 MG/5ML
200 SYRUP ORAL EVERY 6 HOURS PRN
Status: DISCONTINUED | OUTPATIENT
Start: 2022-12-17 | End: 2022-12-21 | Stop reason: HOSPADM

## 2022-12-17 RX ORDER — METOPROLOL SUCCINATE 25 MG/1
25 TABLET, EXTENDED RELEASE ORAL DAILY
Status: DISCONTINUED | OUTPATIENT
Start: 2022-12-17 | End: 2022-12-21 | Stop reason: HOSPADM

## 2022-12-17 RX ORDER — SODIUM CHLORIDE 9 MG/ML
INJECTION, SOLUTION INTRAVENOUS PRN
Status: DISCONTINUED | OUTPATIENT
Start: 2022-12-17 | End: 2022-12-21 | Stop reason: HOSPADM

## 2022-12-17 RX ORDER — CETIRIZINE HYDROCHLORIDE 10 MG/1
10 TABLET ORAL DAILY
Status: DISCONTINUED | OUTPATIENT
Start: 2022-12-17 | End: 2022-12-17

## 2022-12-17 RX ORDER — ALBUTEROL SULFATE 90 UG/1
2 AEROSOL, METERED RESPIRATORY (INHALATION) EVERY 6 HOURS PRN
Status: DISCONTINUED | OUTPATIENT
Start: 2022-12-17 | End: 2022-12-17 | Stop reason: CLARIF

## 2022-12-17 RX ORDER — SODIUM CHLORIDE 0.9 % (FLUSH) 0.9 %
5-40 SYRINGE (ML) INJECTION EVERY 12 HOURS SCHEDULED
Status: DISCONTINUED | OUTPATIENT
Start: 2022-12-17 | End: 2022-12-21 | Stop reason: HOSPADM

## 2022-12-17 RX ORDER — VITS A,C,E/LUTEIN/MINERALS 300MCG-200
1 TABLET ORAL DAILY
Status: DISCONTINUED | OUTPATIENT
Start: 2022-12-17 | End: 2022-12-21 | Stop reason: HOSPADM

## 2022-12-17 RX ORDER — ALBUTEROL SULFATE 2.5 MG/3ML
2.5 SOLUTION RESPIRATORY (INHALATION) EVERY 6 HOURS PRN
Status: DISCONTINUED | OUTPATIENT
Start: 2022-12-17 | End: 2022-12-21 | Stop reason: HOSPADM

## 2022-12-17 RX ORDER — ARFORMOTEROL TARTRATE 15 UG/2ML
15 SOLUTION RESPIRATORY (INHALATION) 2 TIMES DAILY
Status: DISCONTINUED | OUTPATIENT
Start: 2022-12-17 | End: 2022-12-21 | Stop reason: HOSPADM

## 2022-12-17 RX ORDER — VITAMIN C
1 TAB ORAL DAILY
Status: DISCONTINUED | OUTPATIENT
Start: 2022-12-17 | End: 2022-12-21 | Stop reason: HOSPADM

## 2022-12-17 RX ORDER — SODIUM CHLORIDE 0.9 % (FLUSH) 0.9 %
5-40 SYRINGE (ML) INJECTION PRN
Status: DISCONTINUED | OUTPATIENT
Start: 2022-12-17 | End: 2022-12-21 | Stop reason: HOSPADM

## 2022-12-17 RX ORDER — POLYETHYLENE GLYCOL 3350 17 G/17G
17 POWDER, FOR SOLUTION ORAL DAILY PRN
Status: DISCONTINUED | OUTPATIENT
Start: 2022-12-17 | End: 2022-12-21 | Stop reason: HOSPADM

## 2022-12-17 RX ORDER — DILTIAZEM HYDROCHLORIDE 120 MG/1
120 CAPSULE, COATED, EXTENDED RELEASE ORAL DAILY
Status: DISCONTINUED | OUTPATIENT
Start: 2022-12-17 | End: 2022-12-21 | Stop reason: HOSPADM

## 2022-12-17 RX ORDER — M-VIT,TX,IRON,MINS/CALC/FOLIC 27MG-0.4MG
1 TABLET ORAL DAILY
Status: DISCONTINUED | OUTPATIENT
Start: 2022-12-17 | End: 2022-12-21 | Stop reason: HOSPADM

## 2022-12-17 RX ORDER — ANTIOX #8/OM3/DHA/EPA/LUT/ZEAX 250-2.5 MG
1 CAPSULE ORAL 2 TIMES DAILY
Status: DISCONTINUED | OUTPATIENT
Start: 2022-12-17 | End: 2022-12-17 | Stop reason: CLARIF

## 2022-12-17 RX ORDER — METHYLPREDNISOLONE SODIUM SUCCINATE 125 MG/2ML
60 INJECTION, POWDER, LYOPHILIZED, FOR SOLUTION INTRAMUSCULAR; INTRAVENOUS DAILY
Status: DISCONTINUED | OUTPATIENT
Start: 2022-12-17 | End: 2022-12-18

## 2022-12-17 RX ORDER — TAMSULOSIN HYDROCHLORIDE 0.4 MG/1
0.4 CAPSULE ORAL DAILY
Status: DISCONTINUED | OUTPATIENT
Start: 2022-12-17 | End: 2022-12-21 | Stop reason: HOSPADM

## 2022-12-17 RX ORDER — OSELTAMIVIR PHOSPHATE 75 MG/1
75 CAPSULE ORAL 2 TIMES DAILY
Status: DISCONTINUED | OUTPATIENT
Start: 2022-12-17 | End: 2022-12-21 | Stop reason: HOSPADM

## 2022-12-17 RX ORDER — UBIDECARENONE 100 MG
100 CAPSULE ORAL DAILY
Status: DISCONTINUED | OUTPATIENT
Start: 2022-12-17 | End: 2022-12-17 | Stop reason: CLARIF

## 2022-12-17 RX ORDER — VITAMIN B COMPLEX
1 TABLET ORAL DAILY
Status: DISCONTINUED | OUTPATIENT
Start: 2022-12-17 | End: 2022-12-17 | Stop reason: CLARIF

## 2022-12-17 RX ORDER — BUDESONIDE 0.25 MG/2ML
250 INHALANT ORAL 2 TIMES DAILY
Status: DISCONTINUED | OUTPATIENT
Start: 2022-12-17 | End: 2022-12-21 | Stop reason: HOSPADM

## 2022-12-17 RX ORDER — OSELTAMIVIR PHOSPHATE 75 MG/1
75 CAPSULE ORAL ONCE
Status: COMPLETED | OUTPATIENT
Start: 2022-12-17 | End: 2022-12-17

## 2022-12-17 RX ADMIN — ARFORMOTEROL TARTRATE 15 MCG: 15 SOLUTION RESPIRATORY (INHALATION) at 20:47

## 2022-12-17 RX ADMIN — IPRATROPIUM BROMIDE AND ALBUTEROL SULFATE 1 AMPULE: .5; 2.5 SOLUTION RESPIRATORY (INHALATION) at 08:11

## 2022-12-17 RX ADMIN — METHYLPREDNISOLONE SODIUM SUCCINATE 60 MG: 125 INJECTION, POWDER, LYOPHILIZED, FOR SOLUTION INTRAMUSCULAR; INTRAVENOUS at 08:07

## 2022-12-17 RX ADMIN — IPRATROPIUM BROMIDE AND ALBUTEROL SULFATE 1 AMPULE: .5; 2.5 SOLUTION RESPIRATORY (INHALATION) at 08:12

## 2022-12-17 RX ADMIN — TAMSULOSIN HYDROCHLORIDE 0.4 MG: 0.4 CAPSULE ORAL at 16:07

## 2022-12-17 RX ADMIN — IPRATROPIUM BROMIDE AND ALBUTEROL SULFATE 1 AMPULE: 2.5; .5 SOLUTION RESPIRATORY (INHALATION) at 13:21

## 2022-12-17 RX ADMIN — BUDESONIDE 250 MCG: 0.25 SUSPENSION RESPIRATORY (INHALATION) at 20:47

## 2022-12-17 RX ADMIN — METOPROLOL SUCCINATE 25 MG: 25 TABLET, EXTENDED RELEASE ORAL at 16:07

## 2022-12-17 RX ADMIN — IPRATROPIUM BROMIDE AND ALBUTEROL SULFATE 1 AMPULE: 2.5; .5 SOLUTION RESPIRATORY (INHALATION) at 20:47

## 2022-12-17 RX ADMIN — DILTIAZEM HYDROCHLORIDE 120 MG: 120 CAPSULE, COATED, EXTENDED RELEASE ORAL at 16:07

## 2022-12-17 RX ADMIN — OSELTAMIVIR PHOSPHATE 75 MG: 75 CAPSULE ORAL at 20:34

## 2022-12-17 RX ADMIN — Medication 10 ML: at 20:34

## 2022-12-17 RX ADMIN — IPRATROPIUM BROMIDE AND ALBUTEROL SULFATE 1 AMPULE: .5; 2.5 SOLUTION RESPIRATORY (INHALATION) at 08:10

## 2022-12-17 RX ADMIN — APIXABAN 5 MG: 5 TABLET, FILM COATED ORAL at 20:34

## 2022-12-17 RX ADMIN — OSELTAMIVIR PHOSPHATE 75 MG: 75 CAPSULE ORAL at 10:14

## 2022-12-17 ASSESSMENT — ENCOUNTER SYMPTOMS
ABDOMINAL PAIN: 0
VOMITING: 0
CONSTIPATION: 0
NAUSEA: 0
WHEEZING: 1
DIARRHEA: 0
COUGH: 1
SHORTNESS OF BREATH: 1
SORE THROAT: 0

## 2022-12-17 ASSESSMENT — PAIN SCALES - GENERAL
PAINLEVEL_OUTOF10: 0
PAINLEVEL_OUTOF10: 0

## 2022-12-17 NOTE — PROGRESS NOTES
Pharmacy Note    Shirleylacey Bustillo was ordered coenzyme Q10. As per the [facility], herbals and certain dietary supplements will be discontinued. The herbal or dietary supplement may be continued after discharge from the hospital.  Thank You, [address] [place] [address] Street. D 12/17/2022 1:11 PM

## 2022-12-17 NOTE — ED NOTES
[facility] faxed to floor. Spoke to nurse on 5 s who stated they received it. Pt ready.       Greg Soto, RN  12/17/22 2470

## 2022-12-17 NOTE — ED NOTES
Patient ambulated on Room Air per Dr. Nena Dejesus dropped to 78%, patient sat in chair and was put on 4L nasal cannula.  Now resting at 96% on 4L     AP Ye  12/17/22 5367

## 2022-12-17 NOTE — ED PROVIDER NOTES
Department of Emergency Medicine   ED  Provider Note    Pt Name: Terry Yan         MRN: [id number]         [place] 1950    Admit Date/RoomTime: 12/17/2022  6:59 AM  ED Room: 16/16      Chief Complaint   Patient presents with    Shortness of Breath        Terry Yan is a 74 y.o. male with PMHx of COPD on 3L nightly at home, paroxysmal atrial fibrillation on Eliquis, ISSAC on CPAP, AAA repair, laryngeal carcinoma s/p tx 2019, hypothyroidism, HLD presenting to the ED for shortness of breath. Patient reports worsening SOB and increased sputum production starting several days ago significantly worsened yesterday and today. Patient reports gasping for breath moving from bedroom to bathroom this morning. Home pulse ox showed saturations in low 90's while resting with 3L on and low 80's with ambulation. Now on 4L in room satting 97%. Has albuterol nebulizer and Trelegy at home, last used Trelegy yesterday morning and albuterol nebulizer 1:00 am and 5:00 am with minimal relief. Denies sick contacts. Past hospitalization for pneumonia, no prior hospitalizations for COPD exacerbation. On day 3 of steroid taper from August refill. Follows with Dr. Alva Bynum pulmonology. Cardiac stress test 9/2021: abnormal perfusion suggestive of prior MI with normal LV EF deemed intermediate risk test.    The history is provided by the patient and the spouse. Suspect COPD exacerbation due to increased sputum production, decreased saturations. Review of Systems   Constitutional:  Positive for fatigue. Negative for chills and fever. HENT:  Positive for congestion. Negative for sore throat. Respiratory:  Positive for cough, shortness of breath and wheezing. Cardiovascular:  Negative for chest pain, palpitations and leg swelling. Gastrointestinal:  Negative for abdominal pain, constipation, diarrhea, nausea and vomiting. Genitourinary:  Negative for dysuria and hematuria.    Neurological:  Negative for dizziness and light-headedness. Physical Exam  Constitutional:       General: He is not in acute distress. HENT:      Head: Normocephalic and atraumatic. Eyes:      Extraocular Movements: Extraocular movements intact. Cardiovascular:      Rate and Rhythm: Regular rhythm. Tachycardia present. Heart sounds: No murmur heard. No friction rub. No gallop. Pulmonary:      Effort: No tachypnea or respiratory distress. Breath sounds: Decreased breath sounds and rhonchi present. No wheezing or rales. Abdominal:      Tenderness: There is no abdominal tenderness. There is no guarding. Musculoskeletal:      Right lower leg: No edema. Left lower leg: No edema. Skin:     Coloration: Skin is not cyanotic. Neurological:      Mental Status: He is oriented to person, place, and time. Psychiatric:         Mood and Affect: Mood is not anxious.        --------------------------------------------- PAST HISTORY ---------------------------------------------  Past Medical History:  has a past medical history of AAA (abdominal aortic aneurysm), Arthritis, Atrial fibrillation ([facility] [address] Cancer ([facility] [address] COPD (chronic obstructive pulmonary disease) ([facility]), CPAP (continuous positive airway pressure) dependence, Hearing loss, Hyperlipidemia, and Traumatic leg ulcer ([facility] [address] Past Surgical History:  has a past surgical history that includes AAA repair, endovascular (11/17/08); ECHO Transesophageal (7/2013); ECHO Transesophageal (3/18/2013); ECHO Transesophageal (9/3/2013); Cardioversion ([id number]); bronchoscopy (9/9/2015); Colonoscopy; Tracheotomy (N/A, 7/8/2019); laryngoscopy (N/A, 7/8/2019); and Gastrostomy tube placement (N/A, 7/9/2019). Social History:  reports that he quit smoking about 13 years ago. His smoking use included cigarettes. He started smoking about 53 years ago. He has a 80.00 pack-year smoking history.  He has never used smokeless tobacco. He reports that he does not drink alcohol and does not use drugs. Family History: family history includes Diabetes in his mother; Emphysema in his father. The patients home medications have been reviewed. Allergies: Patient has no known allergies. -------------------------------------------------- RESULTS -------------------------------------------------    Lab  Results for orders placed or performed during the hospital encounter of 12/17/22   COVID-19, Rapid    Specimen: Nasopharyngeal Swab   Result Value Ref Range    SARS-CoV-2, NAAT Not Detected Not Detected   RAPID INFLUENZA A/B ANTIGENS    Specimen: Nasopharyngeal   Result Value Ref Range    Influenza A by PCR DETECTED (A) Not Detected    Influenza B by PCR Not Detected Not Detected   Troponin   Result Value Ref Range    Troponin, High Sensitivity 17 (H) 0 - 11 ng/L   Basic Metabolic Panel   Result Value Ref Range    Sodium 141 132 - 146 mmol/L    Potassium 4.5 3.5 - 5.0 mmol/L    Chloride 100 98 - 107 mmol/L    CO2 35 (H) 22 - 29 mmol/L    Anion Gap 6 (L) 7 - 16 mmol/L    Glucose 126 (H) 74 - 99 mg/dL    BUN 26 (H) 6 - 23 mg/dL    Creatinine 1.0 0.7 - 1.2 mg/dL    Est, Glom Filt Rate >60 >=60 mL/min/1.73    Calcium 9.3 8.6 - 10.2 mg/dL   CBC with Auto Differential   Result Value Ref Range    WBC 4.3 (L) 4.5 - 11.5 E9/L    RBC 4.19 3.80 - 5.80 E12/L    Hemoglobin 12.1 (L) 12.5 - 16.5 g/dL    Hematocrit 39.7 37.0 - 54.0 %    MCV 94.7 80.0 - 99.9 fL    MCH 28.9 26.0 - 35.0 pg    MCHC 30.5 (L) 32.0 - 34.5 %    RDW 15.8 (H) 11.5 - 15.0 fL    Platelets 703 (L) 581 - 450 E9/L    MPV 10.0 7.0 - 12.0 fL       Radiology  XR CHEST PORTABLE   Final Result   COPD. There is no evidence of failure or pneumonia.           ------------------------- NURSING NOTES AND VITALS REVIEWED ---------------------------  Date / Time Roomed:  12/17/2022  6:59 AM  ED Bed Assignment:  16/16    The nursing notes within the ED encounter and vital signs as below have been reviewed.    Patient Vitals for the past 24 hrs:   BP Temp Temp src Pulse Resp SpO2 Height Weight   12/17/22 0945 -- -- -- 92 -- 91 % -- --   12/17/22 0812 -- -- -- 82 17 97 % -- --   12/17/22 0811 -- -- -- 96 20 96 % -- --   12/17/22 0810 -- -- -- 82 18 96 % -- --   12/17/22 0704 (!) 150/96 98.7 °F (37.1 °C) Oral 89 22 95 % 6' 1\" (1.854 m) 165 lb (74.8 kg)       Oxygen Saturation Interpretation: Normal      --------------------------------------- ED Clinical Course/MDM --------------------------------------    Chelsi Luu presents to the ED for shortness of breath. Workup in the ED revealed positive for influenza A, leukopenia, thrombocytopenia, hypoxia on oxygen. Patient was given breathing treatment, tamiflu, and solu-medrol for their symptoms with minimal improvement. Patient requires continued workup and management of their symptoms and will be admitted to the hospital for further evaluation and treatment.       ------------------------- NURSING NOTES AND VITALS REVIEWED ---------------------------  Date / Time Roomed:  12/17/2022  6:59 AM  ED Bed Assignment:  16/16    The nursing notes within the ED encounter and vital signs as below have been reviewed. Patient Vitals for the past 24 hrs:   BP Temp Temp src Pulse Resp SpO2 Height Weight   12/17/22 0945 -- -- -- 92 -- 91 % -- --   12/17/22 0812 -- -- -- 82 17 97 % -- --   12/17/22 0811 -- -- -- 96 20 96 % -- --   12/17/22 0810 -- -- -- 82 18 96 % -- --   12/17/22 0704 (!) 150/96 98.7 °F (37.1 °C) Oral 89 22 95 % 6' 1\" (1.854 m) 165 lb (74.8 kg)       Oxygen Saturation Interpretation: Abnormal    ------------------------------------------ PROGRESS NOTES ------------------------------------------  Re-evaluation(s):  Patients symptoms show no change  Repeat physical examination is not changed    Counseling:  I have spoken with the patient and spouse and discussed todays results, in addition to providing specific details for the plan of care and counseling regarding the diagnosis and prognosis.   Their questions are answered at this time and they are agreeable with the plan of admission.    --------------------------------- ADDITIONAL PROVIDER NOTES ---------------------------------  Consultations:  Spoke with advanced practitioner for Dr. Mata Pulliam. Discussed case. They will admit the patient. This patient's ED course included: a personal history and physicial examination, re-evaluation prior to disposition, multiple bedside re-evaluations, IV medications, cardiac monitoring, and continuous pulse oximetry    This patient has remained hemodynamically stable during their ED course. Diagnosis:  1. Acute respiratory failure with hypoxia ([facility] [address]    2. COPD exacerbation ([facility] [address]    3. Influenza A        Disposition:  Patient's disposition: Admit to med/surg floor with telemetry. Patient's condition is Stable       ED Course as of 12/17/22 1046   Sat Dec 17, 2022   0719 EKG: This EKG is signed and interpreted by me. Rate: 120  Rhythm: Atrial flutter  Interpretation: atrial flutter (chronic)  Comparison: stable as compared to patient's most recent EKG   [TG]   0843 ATTENDING PROVIDER ATTESTATION:     I have personally performed and/or participated in the history, exam, medical decision making, and procedures and agree with all pertinent clinical information unless otherwise noted. I have also reviewed and agree with the past medical, family and social history unless otherwise noted. I have discussed this patient in detail with the resident and provided the instruction and education regarding the evidence-based evaluation and treatment of shortness of breath. Any EKG that may have been performed has been personally reviewed by me and I agree with the documentation as noted by the resident. History: Patient reports shortness of breath and cough beginning yesterday. Worse this morning. He does have oxygen for at bedtime use. He has been using that with little improvement. Follows with Dr. Sarath Kramer for pulmonology.     My findings: Kate Salvador is a 74 y.o. male whom is in no distress. Physical exam reveals he is alert and oriented. Heart is regular, lungs are decreased. Abdomen soft nontender extremities are intact idea. Skin is warm and dry. My plan: Symptomatic and supportive care. Viral testing, labs. X-ray.     Electronically signed by Moises Abreu DO on 12/17/22 at 8:44 AM EST       [TG]      ED Course User Index  [TG] Moises Abreu DO              Lennox Sleek, [address] [place] [address]  Resident  12/17/22 7464

## 2022-12-18 LAB
ALBUMIN SERPL-MCNC: 3.2 G/DL (ref 3.5–5.2)
ALP BLD-CCNC: 73 U/L (ref 40–129)
ALT SERPL-CCNC: 28 U/L (ref 0–40)
ANION GAP SERPL CALCULATED.3IONS-SCNC: 9 MMOL/L (ref 7–16)
AST SERPL-CCNC: 26 U/L (ref 0–39)
BASOPHILS ABSOLUTE: 0.01 E9/L (ref 0–0.2)
BASOPHILS RELATIVE PERCENT: 0.2 % (ref 0–2)
BILIRUB SERPL-MCNC: 0.3 MG/DL (ref 0–1.2)
BUN BLDV-MCNC: 28 MG/DL (ref 6–23)
CALCIUM SERPL-MCNC: 9.1 MG/DL (ref 8.6–10.2)
CHLORIDE BLD-SCNC: 102 MMOL/L (ref 98–107)
CO2: 31 MMOL/L (ref 22–29)
CREAT SERPL-MCNC: 1 MG/DL (ref 0.7–1.2)
EKG ATRIAL RATE: 141 BPM
EKG Q-T INTERVAL: 294 MS
EKG QRS DURATION: 88 MS
EKG QTC CALCULATION (BAZETT): 415 MS
EKG R AXIS: 84 DEGREES
EKG T AXIS: 76 DEGREES
EKG VENTRICULAR RATE: 120 BPM
EOSINOPHILS ABSOLUTE: 0 E9/L (ref 0.05–0.5)
EOSINOPHILS RELATIVE PERCENT: 0 % (ref 0–6)
GFR SERPL CREATININE-BSD FRML MDRD: >60 ML/MIN/1.73
GLUCOSE BLD-MCNC: 129 MG/DL (ref 74–99)
HCT VFR BLD CALC: 35.1 % (ref 37–54)
HEMOGLOBIN: 10.7 G/DL (ref 12.5–16.5)
IMMATURE GRANULOCYTES #: 0.19 E9/L
IMMATURE GRANULOCYTES %: 3.2 % (ref 0–5)
LYMPHOCYTES ABSOLUTE: 0.69 E9/L (ref 1.5–4)
LYMPHOCYTES RELATIVE PERCENT: 11.8 % (ref 20–42)
MCH RBC QN AUTO: 28.8 PG (ref 26–35)
MCHC RBC AUTO-ENTMCNC: 30.5 % (ref 32–34.5)
MCV RBC AUTO: 94.6 FL (ref 80–99.9)
MONOCYTES ABSOLUTE: 0.46 E9/L (ref 0.1–0.95)
MONOCYTES RELATIVE PERCENT: 7.9 % (ref 2–12)
NEUTROPHILS ABSOLUTE: 4.5 E9/L (ref 1.8–7.3)
NEUTROPHILS RELATIVE PERCENT: 76.9 % (ref 43–80)
PDW BLD-RTO: 15.9 FL (ref 11.5–15)
PLATELET # BLD: 142 E9/L (ref 130–450)
PMV BLD AUTO: 10.7 FL (ref 7–12)
POTASSIUM REFLEX MAGNESIUM: 4.2 MMOL/L (ref 3.5–5)
RBC # BLD: 3.71 E12/L (ref 3.8–5.8)
SODIUM BLD-SCNC: 142 MMOL/L (ref 132–146)
TOTAL PROTEIN: 6.7 G/DL (ref 6.4–8.3)
WBC # BLD: 5.9 E9/L (ref 4.5–11.5)

## 2022-12-18 PROCEDURE — 36415 COLL VENOUS BLD VENIPUNCTURE: CPT

## 2022-12-18 PROCEDURE — 6370000000 HC RX 637 (ALT 250 FOR IP): Performed by: NURSE PRACTITIONER

## 2022-12-18 PROCEDURE — 1200000000 HC SEMI PRIVATE

## 2022-12-18 PROCEDURE — 2700000000 HC OXYGEN THERAPY PER DAY

## 2022-12-18 PROCEDURE — 2580000003 HC RX 258: Performed by: NURSE PRACTITIONER

## 2022-12-18 PROCEDURE — 80053 COMPREHEN METABOLIC PANEL: CPT

## 2022-12-18 PROCEDURE — 6360000002 HC RX W HCPCS

## 2022-12-18 PROCEDURE — 94640 AIRWAY INHALATION TREATMENT: CPT

## 2022-12-18 PROCEDURE — 93010 ELECTROCARDIOGRAM REPORT: CPT | Performed by: INTERNAL MEDICINE

## 2022-12-18 PROCEDURE — 85025 COMPLETE CBC W/AUTO DIFF WBC: CPT

## 2022-12-18 PROCEDURE — 6360000002 HC RX W HCPCS: Performed by: NURSE PRACTITIONER

## 2022-12-18 RX ORDER — METHYLPREDNISOLONE SODIUM SUCCINATE 40 MG/ML
40 INJECTION, POWDER, LYOPHILIZED, FOR SOLUTION INTRAMUSCULAR; INTRAVENOUS EVERY 12 HOURS
Status: COMPLETED | OUTPATIENT
Start: 2022-12-18 | End: 2022-12-19

## 2022-12-18 RX ORDER — LEVOTHYROXINE SODIUM 25 UG/1
25 CAPSULE ORAL DAILY
Status: DISCONTINUED | OUTPATIENT
Start: 2022-12-18 | End: 2022-12-21 | Stop reason: HOSPADM

## 2022-12-18 RX ADMIN — FERROUS SULFATE TAB 325 MG (65 MG ELEMENTAL FE) 325 MG: 325 (65 FE) TAB at 17:32

## 2022-12-18 RX ADMIN — IPRATROPIUM BROMIDE AND ALBUTEROL SULFATE 1 AMPULE: 2.5; .5 SOLUTION RESPIRATORY (INHALATION) at 13:53

## 2022-12-18 RX ADMIN — Medication 1 TABLET: at 08:50

## 2022-12-18 RX ADMIN — FERROUS SULFATE TAB 325 MG (65 MG ELEMENTAL FE) 325 MG: 325 (65 FE) TAB at 08:50

## 2022-12-18 RX ADMIN — Medication 10 ML: at 20:20

## 2022-12-18 RX ADMIN — OSELTAMIVIR PHOSPHATE 75 MG: 75 CAPSULE ORAL at 08:50

## 2022-12-18 RX ADMIN — METHYLPREDNISOLONE SODIUM SUCCINATE 60 MG: 125 INJECTION, POWDER, LYOPHILIZED, FOR SOLUTION INTRAMUSCULAR; INTRAVENOUS at 08:52

## 2022-12-18 RX ADMIN — DILTIAZEM HYDROCHLORIDE 120 MG: 120 CAPSULE, COATED, EXTENDED RELEASE ORAL at 08:50

## 2022-12-18 RX ADMIN — Medication 10 ML: at 08:56

## 2022-12-18 RX ADMIN — TAMSULOSIN HYDROCHLORIDE 0.4 MG: 0.4 CAPSULE ORAL at 08:50

## 2022-12-18 RX ADMIN — BUDESONIDE 250 MCG: 0.25 SUSPENSION RESPIRATORY (INHALATION) at 21:14

## 2022-12-18 RX ADMIN — APIXABAN 5 MG: 5 TABLET, FILM COATED ORAL at 08:52

## 2022-12-18 RX ADMIN — ALBUTEROL SULFATE 2.5 MG: 2.5 SOLUTION RESPIRATORY (INHALATION) at 02:22

## 2022-12-18 RX ADMIN — OSELTAMIVIR PHOSPHATE 75 MG: 75 CAPSULE ORAL at 20:20

## 2022-12-18 RX ADMIN — IPRATROPIUM BROMIDE AND ALBUTEROL SULFATE 1 AMPULE: 2.5; .5 SOLUTION RESPIRATORY (INHALATION) at 21:14

## 2022-12-18 RX ADMIN — APIXABAN 5 MG: 5 TABLET, FILM COATED ORAL at 20:21

## 2022-12-18 RX ADMIN — METHYLPREDNISOLONE SODIUM SUCCINATE 40 MG: 40 INJECTION, POWDER, LYOPHILIZED, FOR SOLUTION INTRAMUSCULAR; INTRAVENOUS at 20:20

## 2022-12-18 RX ADMIN — ARFORMOTEROL TARTRATE 15 MCG: 15 SOLUTION RESPIRATORY (INHALATION) at 08:08

## 2022-12-18 RX ADMIN — METOPROLOL SUCCINATE 25 MG: 25 TABLET, EXTENDED RELEASE ORAL at 08:52

## 2022-12-18 RX ADMIN — Medication 1 TABLET: at 08:51

## 2022-12-18 RX ADMIN — LEVOTHYROXINE SODIUM 25 MCG: 25 CAPSULE ORAL at 10:20

## 2022-12-18 RX ADMIN — IPRATROPIUM BROMIDE AND ALBUTEROL SULFATE 1 AMPULE: 2.5; .5 SOLUTION RESPIRATORY (INHALATION) at 08:08

## 2022-12-18 RX ADMIN — BUDESONIDE 250 MCG: 0.25 SUSPENSION RESPIRATORY (INHALATION) at 08:08

## 2022-12-18 RX ADMIN — MULTIPLE VITAMINS W/ MINERALS TAB 1 TABLET: TAB at 08:55

## 2022-12-18 RX ADMIN — ARFORMOTEROL TARTRATE 15 MCG: 15 SOLUTION RESPIRATORY (INHALATION) at 21:14

## 2022-12-18 ASSESSMENT — PAIN SCALES - GENERAL
PAINLEVEL_OUTOF10: 0

## 2022-12-18 NOTE — H&P
[facility]  History and Physical      CHIEF COMPLAINT:    Chief Complaint   Patient presents with    Shortness of Breath        Patient of Miriam Avina MD presents with:  Acute on chronic respiratory failure with hypoxia ([address]    History of Present Illness: Catherine Horn is a 41-year-old male with a past medical history of COPD chronically wears 3 L nasal cannula nightly at home, history of atrial fibrillation on Eliquis, obstructive sleep apnea wears CPAP at night, AAA status post repair. He presents to the ED with reports of shortness of breath sputum production and cough for several days worsening today. Dyspnea on exertion oxygen saturations dropping to the low 80s with ambulation. Chest x-ray negative for pneumonia or heart failure. Respiratory panel positive for influenza A/B. Patient currently requiring 4 L nasal cannula 94% saturation. Patient follows with Dr. Maryellen Patterson pulmonology and [place] [facility]. On evaluation, he indicates he feels better than on admission. Still requiring fair amount of oxygen now in the daytime as well with oxygen saturations in the upper 80s. Wife is at bedside and assists with history         REVIEW OF SYSTEMS:  Pertinent negatives are above in HPI. 10 point ROS otherwise negative.       Past Medical History:   Diagnosis Date    AAA (abdominal aortic aneurysm) 2007    repair with stent    Arthritis     Atrial fibrillation ([address]     Cancer (HCC)     throat    COPD (chronic obstructive pulmonary disease) ([facility])     gurjit with Dr. Alfreida Meigs every 6 months     CPAP (continuous positive airway pressure) dependence     Hearing loss     Wears bilateral hearing aids     Hyperlipidemia     Traumatic leg ulcer ([address] 07/08/2013         Past Surgical History:   Procedure Laterality Date    ABDOMINAL AORTIC ANEURYSM REPAIR, ENDOVASCULAR  11/17/08    Excluder - Delatore    BRONCHOSCOPY  9/9/2015    CARDIOVERSION  9-2013    COLONOSCOPY      ECHOCARDIOGRAM TRANSESOPHAGEAL  7/2013         ECHOCARDIOGRAM TRANSESOPHAGEAL  3/18/2013         ECHOCARDIOGRAM TRANSESOPHAGEAL  9/3/2013         GASTROSTOMY TUBE PLACEMENT N/A 7/9/2019    EGD PEG TUBE PLACEMENT performed by Anna Forte MD at [address] [place] [address] N/A 7/8/2019    DIRECT LARYNGOSCOPY WITH BIOPSY, ESOPHAGOSCOPY performed by Nazanin Roth MD at [facility] N/A 7/8/2019    AWAKE TRACHEOSTOMY performed by Nazanin Roth MD at [facility] OR       Medications Prior to Admission:    Medications Prior to Admission: [DISCONTINUED] predniSONE (DELTASONE) 10 MG tablet, TAKE 4 TABLETS BY MOUTH DAILY FOR 4 DAYS  THEN 3 TABLETS DAILY FOR 4 DAYS  THEN 2 TABLETS DAILY FOR 4 DAYS  THEN 1 TABLET DAILY FOR 4 DAYS (Patient not taking: Reported on 12/17/2022)  [DISCONTINUED] azithromycin (ZITHROMAX) 250 MG tablet, Take 250 mg by mouth daily 1 day left as of 9/7/22 (Patient not taking: Reported on 12/17/2022)  [DISCONTINUED] fexofenadine (ALLEGRA ALLERGY) 180 MG tablet, Take 180 mg by mouth daily  TRELEGY ELLIPTA 100-62.5-25 MCG/INH AEPB, USE 1 INHALATION ORALLY    DAILY  tamsulosin (FLOMAX) 0.4 MG capsule, Take 0.4 mg by mouth daily  coenzyme Q10 100 MG CAPS capsule, Take 100 mg by mouth daily  Flaxseed, Linseed, (FLAX SEED OIL) 1000 MG CAPS, Take 1,000 mg by mouth daily  Multiple Vitamins-Minerals (THERAPEUTIC MULTIVITAMIN-MINERALS) tablet, Take 1 tablet by mouth daily  B Complex Vitamins (VITAMIN B-COMPLEX PO), Take by mouth daily  Multiple Vitamins-Minerals (PRESERVISION AREDS 2) CAPS, Take by mouth 2 times daily   [DISCONTINUED] Magnesium Oxide (MAG-CAPS PO), Take 240 mg by mouth daily (Patient not taking: Reported on 9/7/2022)  dilTIAZem (CARDIZEM LA) 120 MG TB24 extended release tablet, Take 120 mg by mouth daily   ferrous sulfate 325 (65 Fe) MG tablet, TAKE ONE TABLET BY MOUTH TWO TIMES A DAY. TAKE WITH VITAMIN C FRUIT JUICE.   ELIQUIS 5 MG TABS tablet, 2 times daily   metoprolol succinate (TOPROL XL) 25 MG extended release tablet, Take 25 mg by mouth daily  albuterol (PROVENTIL) (2.5 MG/3ML) 0.083% nebulizer solution, Take 2.5 mg by nebulization every 6 hours as needed for Wheezing   [DISCONTINUED] albuterol (PROVENTIL HFA;VENTOLIN HFA) 108 (90 BASE) MCG/ACT inhaler, Inhale 2 puffs into the lungs every 6 hours as needed     Note that the patient's home medications were reviewed and the above list is accurate to the best of my knowledge at the time of the exam.    Allergies:    Patient has no known allergies. Social History:    reports that he quit smoking about 13 years ago. His smoking use included cigarettes. He started smoking about 53 years ago. He has a 80.00 pack-year smoking history. He has never used smokeless tobacco. He reports that he does not drink alcohol and does not use drugs. Family History:   family history includes Diabetes in his mother; Emphysema in his father. PHYSICAL EXAM:    Vitals:  BP (!) 145/75   Pulse 79   Temp 97.9 °F (36.6 °C) (Oral)   Resp 20   Ht 6' 1\" (1.854 m)   Wt 156 lb 6.4 oz (70.9 kg)   SpO2 (!) 85%   BMI 20.63 kg/m²       General appearance: NAD, conversant  Eyes: Sclerae anicteric, PERRLA  HEENT: AT/NC, MMM  Neck: FROM, supple, no thyromegaly  Lymph: No cervical / supraclavicular lymphadenopathy  Lungs: Diminished breath sounds but no coarse rhonchi  CV: RRR, no MRGs, no lower extremity edema  Abdomen: Soft, non-tender; no masses or HSM, +BS  Extremities: FROM without synovitis. No clubbing or cyanosis of the hands. Skin: no rash, induration, lesions, or ulcers  Psych: Calm and cooperative. Normal judgement and insight. Normal mood and affect. Neuro: Alert and interactive, face symmetric, speech fluent. LABS:  All labs reviewed.   Of note:  CBC:   Lab Results   Component Value Date/Time    WBC 5.9 12/18/2022 02:05 AM    RBC 3.71 12/18/2022 02:05 AM    HGB 10.7 12/18/2022 02:05 AM    HCT 35.1 12/18/2022 02:05 AM    MCV 94.6 12/18/2022 02:05 AM    MCH 28.8 12/18/2022 02:05 AM    MCHC 30.5 12/18/2022 02:05 AM    RDW 15.9 12/18/2022 02:05 AM     12/18/2022 02:05 AM    MPV 10.7 12/18/2022 02:05 AM     CMP:    Lab Results   Component Value Date/Time     12/18/2022 02:05 AM    K 4.2 12/18/2022 02:05 AM     12/18/2022 02:05 AM    CO2 31 12/18/2022 02:05 AM    BUN 28 12/18/2022 02:05 AM    CREATININE 1.0 12/18/2022 02:05 AM    GFRAA >60 06/25/2020 03:40 PM    LABGLOM >60 12/18/2022 02:05 AM    GLUCOSE 129 12/18/2022 02:05 AM    GLUCOSE 116 01/20/2011 10:15 AM    PROT 6.7 12/18/2022 02:05 AM    LABALBU 3.2 12/18/2022 02:05 AM    CALCIUM 9.1 12/18/2022 02:05 AM    BILITOT 0.3 12/18/2022 02:05 AM    ALKPHOS 73 12/18/2022 02:05 AM    AST 26 12/18/2022 02:05 AM    ALT 28 12/18/2022 02:05 AM       Imaging:  I've personally reviewed the patient's   CXR:no evidence of failure or pneumonia    EKG:  I've personally reviewed the patient's EKG:  Atrial fibrillation with rapid ventricular response    Telemetry:  I've personally reviewed the patient's telemetry:      ASSESSMENT/PLAN:  Principal Problem:    Acute on chronic respiratory failure with hypoxia ([address]  Active Problems:    COPD exacerbation ([facility]    Influenza A  Resolved Problems:    * No resolved hospital problems.  *    68-year-old  male with known history of COPD, on nocturnal home O2, presents with complaints of worsening shortness of breath and congestion    Positive for influenza A  Tamiflu 73 p.o. twice daily or renal adjustment as needed x5 days  Continue supportive care with home COPD regimen  Wean oxygen as able-currently requiring 4 L with sats in low 90s  Family request pulmonology evaluation due to his longstanding lung history  Resume home medications  Increase oxygen therapy as needed-currently saturating low to mid 90s on 4 L  Mild thrombocytopenia-continue to monitor       Code status:FULL  Requires Med/Surg level of care    Jo-Ann Aguirre MD  9:08 AM  12/18/2022

## 2022-12-18 NOTE — CONSULTS
Cait Baig M.D.,[facility]  Loree Soulier, D.O., FEMMANUEL., Arnold Connolly M.D. Elina Ace M.D. Bettie Chua D.O. Patient:  Mykel Valero 74 y.o. male MRN: [id number]     Date of Service: 12/18/2022      PULMONARY CONSULTATION    Reason for Consultation: COPD/respiratory failure-patient known  Referring Physician: Reg Howell M.D. Communication with the referring physician will be sent via the electronic medical record. Chief Complaint: Shortness of breath    CODE STATUS: Full code    SUBJECTIVE:  HPI:  Mykel Valero is a 74 y.o. male known to us past medical history of COPD, squamous cell cancer of hypopharynx, PAF, pulmonary nodule, tracheostomy-decannulated 3 years ago. He was last seen in our office in June of this year for a COPD follow-up. His pulmonary regimen at that time was 3 L nasal cannula at night and Trelegy inhaler. Tory Race reports he started feeling sick on Friday with shortness of breath and very little wheezing. He denies any fever, chills, cough. Once in the emergency room it was discovered he was positive for influenza A and was given a breathing treatment, Tamiflu and Solu-Medrol. He had very little improvement and the decision to admit was made. He is currently sitting in bed with 3 L via simple mask. He states he prefers to wear a simple facemask when he is sleeping because he is a mouth breather. Lab review-WBCs 5.9    Radiology review-he had a chest x-ray done that was essentially unremarkable, just showing COPD.       Past Medical History:   Diagnosis Date    AAA (abdominal aortic aneurysm) 2007    repair with stent    Arthritis     Atrial fibrillation ([facility]     Cancer (HCC)     throat    COPD (chronic obstructive pulmonary disease) ([facility])     gurjit with Dr. Liberty Grier every 6 months     CPAP (continuous positive airway pressure) dependence     Hearing loss     Wears bilateral hearing aids     Hyperlipidemia     Traumatic leg ulcer ([address] 2013       Past Surgical History:   Procedure Laterality Date    ABDOMINAL AORTIC ANEURYSM REPAIR, ENDOVASCULAR  08    Excluder - Delatore    BRONCHOSCOPY  2015    CARDIOVERSION      COLONOSCOPY      ECHOCARDIOGRAM TRANSESOPHAGEAL  2013         ECHOCARDIOGRAM TRANSESOPHAGEAL  3/18/2013         ECHOCARDIOGRAM TRANSESOPHAGEAL  9/3/2013         GASTROSTOMY TUBE PLACEMENT N/A 2019    EGD PEG TUBE PLACEMENT performed by Isamar Hernandez MD at [address] [place] [address] N/A 2019    DIRECT LARYNGOSCOPY WITH BIOPSY, ESOPHAGOSCOPY performed by Fracisco Steel MD at [facility] N/A 2019    AWAKE TRACHEOSTOMY performed by Fracisco Steel MD at [facility] OR       Family History   Problem Relation Age of Onset    Diabetes Mother     Emphysema Father        Social History:   Social History     Socioeconomic History    Marital status:      Spouse name: Not on file    Number of children: Not on file    Years of education: Not on file    Highest education level: Not on file   Occupational History    Occupation: retired-   Tobacco Use    Smoking status: Former     Packs/day: 2.00     Years: 40.00     Pack years: 80.00     Types: Cigarettes     Start date: 1969     Quit date: 1/15/2009     Years since quittin.9    Smokeless tobacco: Never    Tobacco comments:     Quit 2009   Vaping Use    Vaping Use: Never used    Passive vaping exposure: Yes   Substance and Sexual Activity    Alcohol use: No     Comment:      Drug use: Never    Sexual activity: Not Currently     Partners: Female   Other Topics Concern    Not on file   Social History Narrative    3 cups green tea daily; 2 cups coffee daily     Social Determinants of Health     Financial Resource Strain: Not on file   Food Insecurity: Not on file   Transportation Needs: Not on file   Physical Activity: Not on file   Stress: Not on file   Social Connections: Not on file   Intimate Partner Violence: Not on file Housing Stability: Not on file     Smoking history: The patient is a former 80-pack-year smoker, currently in remission    ETOH:   reports no history of alcohol use. Exposures:  There is no known exposure to asbestos or TB    Vaccines:      Immunization History   Administered Date(s) Administered    COVID-19, PFIZER Bivalent BOOSTER, DO NOT Dilute, (age 12y+), IM, 30 mcg/0.3 mL 09/14/2022    COVID-19, PFIZER PURPLE top, DILUTE for use, (age 15 y+), 30mcg/0.3mL 02/01/2021, 02/22/2021, 08/16/2021    Influenza, FLUAD, (age 72 y+), Adjuvanted, 0.5mL 09/23/2021    Influenza, FLUCELVAX, (age 10 mo+), MDCK, MDV, 0.5mL 07/16/2018    Influenza, FLUCELVAX, (age 10 mo+), MDCK, PF, 0.5mL 10/14/2019    Influenza, FLUZONE (age 72 y+), High Dose, 0.7mL 09/10/2020    Influenza, High Dose (Fluzone 65 yrs and older) 09/14/2017, 09/20/2018    Influenza, Triv, inactivated, subunit, adjuvanted, IM (Fluad 65 yrs and older) 10/01/2019    Pneumococcal Polysaccharide ([id number]) 10/06/2015    Zoster Live (Zostavax) 11/19/2012, 04/04/2018, 07/04/2018    Zoster Recombinant (Shingrix) 04/24/2018, 07/16/2018        Home Meds: Medications Prior to Admission: [DISCONTINUED] predniSONE (DELTASONE) 10 MG tablet, TAKE 4 TABLETS BY MOUTH DAILY FOR 4 DAYS  THEN 3 TABLETS DAILY FOR 4 DAYS  THEN 2 TABLETS DAILY FOR 4 DAYS  THEN 1 TABLET DAILY FOR 4 DAYS (Patient not taking: Reported on 12/17/2022)  [DISCONTINUED] azithromycin (ZITHROMAX) 250 MG tablet, Take 250 mg by mouth daily 1 day left as of 9/7/22 (Patient not taking: Reported on 12/17/2022)  [DISCONTINUED] fexofenadine (ALLEGRA ALLERGY) 180 MG tablet, Take 180 mg by mouth daily  TRELEGY ELLIPTA 100-62.5-25 MCG/INH AEPB, USE 1 INHALATION ORALLY    DAILY  tamsulosin (FLOMAX) 0.4 MG capsule, Take 0.4 mg by mouth daily  coenzyme Q10 100 MG CAPS capsule, Take 100 mg by mouth daily  Flaxseed, Linseed, (FLAX SEED OIL) 1000 MG CAPS, Take 1,000 mg by mouth daily  Multiple Vitamins-Minerals (THERAPEUTIC MULTIVITAMIN-MINERALS) tablet, Take 1 tablet by mouth daily  B Complex Vitamins (VITAMIN B-COMPLEX PO), Take by mouth daily  Multiple Vitamins-Minerals (PRESERVISION AREDS 2) CAPS, Take by mouth 2 times daily   [DISCONTINUED] Magnesium Oxide (MAG-CAPS PO), Take 240 mg by mouth daily (Patient not taking: Reported on 9/7/2022)  dilTIAZem (CARDIZEM LA) 120 MG TB24 extended release tablet, Take 120 mg by mouth daily   ferrous sulfate 325 (65 Fe) MG tablet, TAKE ONE TABLET BY MOUTH TWO TIMES A DAY. TAKE WITH VITAMIN C FRUIT JUICE. ELIQUIS 5 MG TABS tablet, 2 times daily   metoprolol succinate (TOPROL XL) 25 MG extended release tablet, Take 25 mg by mouth daily  albuterol (PROVENTIL) (2.5 MG/3ML) 0.083% nebulizer solution, Take 2.5 mg by nebulization every 6 hours as needed for Wheezing   [DISCONTINUED] albuterol (PROVENTIL HFA;VENTOLIN HFA) 108 (90 BASE) MCG/ACT inhaler, Inhale 2 puffs into the lungs every 6 hours as needed     CURRENT MEDS :  Scheduled Meds:   Levothyroxine Sodium  25 mcg Oral Daily    methylPREDNISolone  60 mg IntraVENous Daily    sodium chloride flush  5-40 mL IntraVENous 2 times per day    apixaban  5 mg Oral BID    ferrous sulfate  325 mg Oral BID WC    metoprolol succinate  25 mg Oral Daily    therapeutic multivitamin-minerals  1 tablet Oral Daily    tamsulosin  0.4 mg Oral Daily    ipratropium-albuterol  1 ampule Inhalation Q6H WA    oseltamivir  75 mg Oral BID    budesonide  250 mcg Nebulization BID    Arformoterol Tartrate  15 mcg Nebulization BID    dilTIAZem  120 mg Oral Daily    ocuvite-lutein  1 tablet Oral Daily    vitamin B and C  1 tablet Oral Daily       Continuous Infusions:   sodium chloride         No Known Allergies    REVIEW OF SYSTEMS:  Constitutional: Denies fever, weight loss, night sweats, and fatigue  Skin: Denies pigmentation, dark lesions, and rashes   HEENT: Denies hearing loss, tinnitus, ear drainage, epistaxis, sore throat, and hoarseness.   Cardiovascular: Denies palpitations, chest pain, and chest pressure. Respiratory: Dyspnea on exertion  Gastrointestinal: Denies nausea, vomiting, poor appetite, diarrhea, heartburn or reflux  Genitourinary: Denies dysuria, frequency, urgency or hematuria  Musculoskeletal: Denies myalgias, muscle weakness, and bone pain  Neurological: Denies dizziness, vertigo, headache, and focal weakness  Psychological: Denies anxiety and depression  Endocrine: Denies heat intolerance and cold intolerance  Hematopoietic/Lymphatic: Denies bleeding problems and blood transfusions    OBJECTIVE:   BP (!) 145/75   Pulse 79   Temp 97.9 °F (36.6 °C) (Oral)   Resp 20   Ht 6' 1\" (1.854 m)   Wt 156 lb 6.4 oz (70.9 kg)   SpO2 (!) 85%   BMI 20.63 kg/m²   SpO2 Readings from Last 1 Encounters:   12/18/22 (!) 85%        I/O:    Intake/Output Summary (Last 24 hours) at 12/18/2022 1254  Last data filed at 12/18/2022 1024  Gross per 24 hour   Intake 250 ml   Output --   Net 250 ml                      Physical Exam:  General: The patient is lying in bed comfortably without any distress. Breathing is slightly labored. On 3 L oxygen via simple facemask  HEENT: Pupils are equal round and reactive to light, there are no oral lesions and no post-nasal drip   Neck: supple without adenopathy  Cardiovascular: regular rate and rhythm without murmur or gallop  Respiratory: Wheezes bilaterally to auscultation .   Air entry is symmetric  Abdomen: soft, non-tender, non-distended, normal bowel sounds  Extremities: warm, no edema, no clubbing  Skin: no rash or lesion  Neurologic: Alert and oriented x3    Pulmonary Function Testing 2/16/2018:   2/16/18 00:00   DLCO %Pred 34%   DLCO PRE 13.18   FEF 25-75%-Post 0.63 21%   FEF 25-75%-Pre 0.58 19%   FEV1/FVC-Post 39%   FEV1/FVC-Pre 37%   FEV1-Post 1.97 51%   FEV1-Pre 1.90 49%   FVC-Pre 5.14 99%   MVV %Pred-Pre 49%   MVV-Pre 71   PVC-Post 5.06 98%   TLC %Pred 130%   TLC PRE 10.24     IMPRESSION   This pulmonary function test is consistent with severe obstructive airways disease. Although there is not a significant response acutely in flow rates following bronchodilator administration, chronic use of these agents may be of some benefit. Maximum inspiratory and expiratory pressures are measures of respiratory muscle strength. Maximum inspiratory and expiratory muscle pressures are Normal. There is not restrictive lung dysfunction present. The increase in residual volume and functional residual capacity is consistent with air trapping. Diffusing capacity is  reduced which is most consistent with parenchymal lung disease such as emphysema. Airway resistance is increased. Imaging personally reviewed:  Chest x-ray 12/17/22:    FINDINGS:   There is hyperinflation of the lungs and flattening of diaphragms consistent   with COPD. There is no pulmonary infiltrate, mass or nodule. There is no   pleural effusion. There are stable calcific densities versus metallic density seen overlying   the right lung base. These findings are chronic. The cardiac silhouette is within normal limits. Impression   COPD. There is no evidence of failure or pneumonia. Echo 9/3/2013 JORGE ALBERTO:   Summary   Normal LV segmental wall motion. Ejection fraction is visually estimated at 65%. Mildly dilated left atrium. There was no evidence of spontaneous echo contrast in the left atrium. No   JAILENE thrombus. No hemodynamically significant valve disease.     Labs:  Lab Results   Component Value Date/Time    WBC 5.9 12/18/2022 02:05 AM    HGB 10.7 12/18/2022 02:05 AM    HCT 35.1 12/18/2022 02:05 AM    MCV 94.6 12/18/2022 02:05 AM    MCH 28.8 12/18/2022 02:05 AM    MCHC 30.5 12/18/2022 02:05 AM    RDW 15.9 12/18/2022 02:05 AM     12/18/2022 02:05 AM    MPV 10.7 12/18/2022 02:05 AM     Lab Results   Component Value Date/Time     12/18/2022 02:05 AM    K 4.2 12/18/2022 02:05 AM     12/18/2022 02:05 AM    CO2 31 12/18/2022 02:05 AM    BUN 28 12/18/2022 02:05 AM    CREATININE 1.0 12/18/2022 02:05 AM    LABALBU 3.2 12/18/2022 02:05 AM    CALCIUM 9.1 12/18/2022 02:05 AM    GFRAA >60 06/25/2020 03:40 PM    LABGLOM >60 12/18/2022 02:05 AM     Lab Results   Component Value Date/Time    PROTIME 14.3 07/09/2019 05:50 AM    PROTIME 11.3 01/20/2011 10:15 AM    INR 1.2 07/09/2019 05:50 AM     No results for input(s): PROBNP in the last 72 hours. No results for input(s): TROPONINI in the last 72 hours. No results for input(s): PROCAL in the last 72 hours. This SmartLink has not been configured with any valid records. Micro:  No results for input(s): CULTRESP in the last 72 hours. No results for input(s): LABGRAM in the last 72 hours. No results for input(s): LEGUR in the last 72 hours. No results for input(s): STREPNEUMAGU in the last 72 hours. No results for input(s): LP1UAG in the last 72 hours. 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 Please feel free to call with questions. This plan of care was reviewed in collaboration with Dr. Jazmin Marroquin    Electronically signed by ANGELI Escobedo CNP on 12/18/2022 at 12:54 PM      Note: This report was completed utilizing computer voice recognition software.  Every effort has been made to ensure accuracy, however; inadvertent computerized transcription errors may be present    I personally saw, examined, and cared for the patient. Labs, medications, radiographs reviewed. I agree with history exam and plans detailed in NP note with the following additions:    Mr. Hugo Osorio is a 74year old male known to me with a history of emphysema who is here for a COPD exacerbation secondary to influenza A tracheobronchitis. He has wheezing on exam.   Plan for steroids, bronchodilators, tamiflu. He chronically wears 3L supplemental oxygen nocturnally. Will need ambulatory pulse ox testing prior to discharge.       Electronically signed by Mar Mcbride MD on 12/18/2022 at 1:15 PM

## 2022-12-18 NOTE — PROGRESS NOTES
Consult completed to pulmonology     Electronically signed by Derrick Jain RN on 12/18/2022 at 12:05 PM

## 2022-12-19 PROCEDURE — 1200000000 HC SEMI PRIVATE

## 2022-12-19 PROCEDURE — 6360000002 HC RX W HCPCS: Performed by: NURSE PRACTITIONER

## 2022-12-19 PROCEDURE — 6360000002 HC RX W HCPCS

## 2022-12-19 PROCEDURE — 6370000000 HC RX 637 (ALT 250 FOR IP): Performed by: NURSE PRACTITIONER

## 2022-12-19 PROCEDURE — 94640 AIRWAY INHALATION TREATMENT: CPT

## 2022-12-19 PROCEDURE — 2580000003 HC RX 258: Performed by: NURSE PRACTITIONER

## 2022-12-19 PROCEDURE — 2700000000 HC OXYGEN THERAPY PER DAY

## 2022-12-19 RX ORDER — OSELTAMIVIR PHOSPHATE 75 MG/1
75 CAPSULE ORAL 2 TIMES DAILY
Qty: 5 CAPSULE | Refills: 0 | Status: SHIPPED | OUTPATIENT
Start: 2022-12-19 | End: 2022-12-22

## 2022-12-19 RX ORDER — PREDNISONE 10 MG/1
TABLET ORAL
Qty: 30 TABLET | Refills: 0 | Status: SHIPPED | OUTPATIENT
Start: 2022-12-19

## 2022-12-19 RX ORDER — GUAIFENESIN 600 MG/1
600 TABLET, EXTENDED RELEASE ORAL 2 TIMES DAILY
Qty: 30 TABLET | Refills: 0 | Status: SHIPPED | OUTPATIENT
Start: 2022-12-19 | End: 2023-01-03

## 2022-12-19 RX ORDER — ALBUTEROL SULFATE 90 UG/1
2 AEROSOL, METERED RESPIRATORY (INHALATION) EVERY 6 HOURS PRN
Qty: 18 G | Refills: 3 | Status: SHIPPED | OUTPATIENT
Start: 2022-12-19

## 2022-12-19 RX ORDER — IPRATROPIUM BROMIDE AND ALBUTEROL SULFATE 2.5; .5 MG/3ML; MG/3ML
1 SOLUTION RESPIRATORY (INHALATION) 4 TIMES DAILY
Status: DISCONTINUED | OUTPATIENT
Start: 2022-12-19 | End: 2022-12-19

## 2022-12-19 RX ORDER — PREDNISONE 20 MG/1
40 TABLET ORAL DAILY
Status: DISCONTINUED | OUTPATIENT
Start: 2022-12-20 | End: 2022-12-21 | Stop reason: HOSPADM

## 2022-12-19 RX ORDER — IPRATROPIUM BROMIDE AND ALBUTEROL SULFATE 2.5; .5 MG/3ML; MG/3ML
1 SOLUTION RESPIRATORY (INHALATION)
Status: DISCONTINUED | OUTPATIENT
Start: 2022-12-19 | End: 2022-12-21 | Stop reason: HOSPADM

## 2022-12-19 RX ADMIN — METHYLPREDNISOLONE SODIUM SUCCINATE 40 MG: 40 INJECTION, POWDER, LYOPHILIZED, FOR SOLUTION INTRAMUSCULAR; INTRAVENOUS at 20:24

## 2022-12-19 RX ADMIN — OSELTAMIVIR PHOSPHATE 75 MG: 75 CAPSULE ORAL at 20:24

## 2022-12-19 RX ADMIN — APIXABAN 5 MG: 5 TABLET, FILM COATED ORAL at 09:17

## 2022-12-19 RX ADMIN — IPRATROPIUM BROMIDE AND ALBUTEROL SULFATE 1 AMPULE: 2.5; .5 SOLUTION RESPIRATORY (INHALATION) at 10:07

## 2022-12-19 RX ADMIN — IPRATROPIUM BROMIDE AND ALBUTEROL SULFATE 1 AMPULE: .5; 2.5 SOLUTION RESPIRATORY (INHALATION) at 20:01

## 2022-12-19 RX ADMIN — METHYLPREDNISOLONE SODIUM SUCCINATE 40 MG: 40 INJECTION, POWDER, LYOPHILIZED, FOR SOLUTION INTRAMUSCULAR; INTRAVENOUS at 09:18

## 2022-12-19 RX ADMIN — LEVOTHYROXINE SODIUM 25 MCG: 25 CAPSULE ORAL at 05:54

## 2022-12-19 RX ADMIN — BUDESONIDE 250 MCG: 0.25 SUSPENSION RESPIRATORY (INHALATION) at 20:01

## 2022-12-19 RX ADMIN — DILTIAZEM HYDROCHLORIDE 120 MG: 120 CAPSULE, COATED, EXTENDED RELEASE ORAL at 09:17

## 2022-12-19 RX ADMIN — IPRATROPIUM BROMIDE AND ALBUTEROL SULFATE 1 AMPULE: .5; 2.5 SOLUTION RESPIRATORY (INHALATION) at 15:39

## 2022-12-19 RX ADMIN — ARFORMOTEROL TARTRATE 15 MCG: 15 SOLUTION RESPIRATORY (INHALATION) at 20:01

## 2022-12-19 RX ADMIN — MULTIPLE VITAMINS W/ MINERALS TAB 1 TABLET: TAB at 09:22

## 2022-12-19 RX ADMIN — Medication 1 TABLET: at 09:18

## 2022-12-19 RX ADMIN — APIXABAN 5 MG: 5 TABLET, FILM COATED ORAL at 20:24

## 2022-12-19 RX ADMIN — FERROUS SULFATE TAB 325 MG (65 MG ELEMENTAL FE) 325 MG: 325 (65 FE) TAB at 16:35

## 2022-12-19 RX ADMIN — FERROUS SULFATE TAB 325 MG (65 MG ELEMENTAL FE) 325 MG: 325 (65 FE) TAB at 09:17

## 2022-12-19 RX ADMIN — BUDESONIDE 250 MCG: 0.25 SUSPENSION RESPIRATORY (INHALATION) at 10:08

## 2022-12-19 RX ADMIN — Medication 10 ML: at 09:22

## 2022-12-19 RX ADMIN — METOPROLOL SUCCINATE 25 MG: 25 TABLET, EXTENDED RELEASE ORAL at 09:17

## 2022-12-19 RX ADMIN — ARFORMOTEROL TARTRATE 15 MCG: 15 SOLUTION RESPIRATORY (INHALATION) at 10:07

## 2022-12-19 RX ADMIN — TAMSULOSIN HYDROCHLORIDE 0.4 MG: 0.4 CAPSULE ORAL at 09:17

## 2022-12-19 RX ADMIN — OSELTAMIVIR PHOSPHATE 75 MG: 75 CAPSULE ORAL at 09:17

## 2022-12-19 ASSESSMENT — PAIN SCALES - GENERAL
PAINLEVEL_OUTOF10: 0
PAINLEVEL_OUTOF10: 0

## 2022-12-19 NOTE — PROGRESS NOTES
Pulse ox was 86 on room air at rest. Oxygen reapplied at 6 liters and SPO2 increased to 91%  at rest.

## 2022-12-19 NOTE — H&P
[facility]  History and Physical      CHIEF COMPLAINT:    Chief Complaint   Patient presents with    Shortness of Breath        Patient of Dilia Gan MD presents with:  Acute on chronic respiratory failure with hypoxia ([address]    History of Present Illness: Uziel Vizcaino is a 24-year-old male with a past medical history of COPD chronically wears 3 L nasal cannula nightly at home, history of atrial fibrillation on Eliquis, obstructive sleep apnea wears CPAP at night, AAA status post repair. He presents to the ED with reports of shortness of breath sputum production and cough for several days worsening today. Dyspnea on exertion oxygen saturations dropping to the low 80s with ambulation. Chest x-ray negative for pneumonia or heart failure. Respiratory panel positive for influenza A/B. Patient currently requiring 4 L nasal cannula 94% saturation. Patient follows with Dr. Alonso Branch pulmonology and [address] [place] [address] cardiology. On evaluation, he indicates he feels better than on admission. Still requiring fair amount of oxygen now in the daytime as well with oxygen saturations in the upper 80s. Wife is at bedside and assists with history         REVIEW OF SYSTEMS:  Pertinent negatives are above in HPI. 10 point ROS otherwise negative.       Past Medical History:   Diagnosis Date    AAA (abdominal aortic aneurysm) 2007    repair with stent    Arthritis     Atrial fibrillation ([address]     Cancer (HCC)     throat    COPD (chronic obstructive pulmonary disease) (HCC)     gurjit with Dr. Gloria Mack every 6 months     CPAP (continuous positive airway pressure) dependence     Hearing loss     Wears bilateral hearing aids     Hyperlipidemia     Traumatic leg ulcer ([address] 07/08/2013         Past Surgical History:   Procedure Laterality Date    ABDOMINAL AORTIC ANEURYSM REPAIR, ENDOVASCULAR  11/17/08    Excluder - Delatore    BRONCHOSCOPY  9/9/2015    CARDIOVERSION  9-2013    COLONOSCOPY      ECHOCARDIOGRAM TRANSESOPHAGEAL  7/2013         ECHOCARDIOGRAM TRANSESOPHAGEAL  3/18/2013         ECHOCARDIOGRAM TRANSESOPHAGEAL  9/3/2013         GASTROSTOMY TUBE PLACEMENT N/A 7/9/2019    EGD PEG TUBE PLACEMENT performed by Gilda Kulkarni MD at [address] [place] [address] N/A 7/8/2019    DIRECT LARYNGOSCOPY WITH BIOPSY, ESOPHAGOSCOPY performed by Tejal Moeller MD at [facility] N/A 7/8/2019    AWAKE TRACHEOSTOMY performed by Tejal Moeller MD at [facility] OR       Medications Prior to Admission:    Medications Prior to Admission: [DISCONTINUED] predniSONE (DELTASONE) 10 MG tablet, TAKE 4 TABLETS BY MOUTH DAILY FOR 4 DAYS  THEN 3 TABLETS DAILY FOR 4 DAYS  THEN 2 TABLETS DAILY FOR 4 DAYS  THEN 1 TABLET DAILY FOR 4 DAYS (Patient not taking: Reported on 12/17/2022)  [DISCONTINUED] azithromycin (ZITHROMAX) 250 MG tablet, Take 250 mg by mouth daily 1 day left as of 9/7/22 (Patient not taking: Reported on 12/17/2022)  [DISCONTINUED] fexofenadine (ALLEGRA ALLERGY) 180 MG tablet, Take 180 mg by mouth daily  TRELEGY ELLIPTA 100-62.5-25 MCG/INH AEPB, USE 1 INHALATION ORALLY    DAILY  tamsulosin (FLOMAX) 0.4 MG capsule, Take 0.4 mg by mouth daily  coenzyme Q10 100 MG CAPS capsule, Take 100 mg by mouth daily  Flaxseed, Linseed, (FLAX SEED OIL) 1000 MG CAPS, Take 1,000 mg by mouth daily  Multiple Vitamins-Minerals (THERAPEUTIC MULTIVITAMIN-MINERALS) tablet, Take 1 tablet by mouth daily  B Complex Vitamins (VITAMIN B-COMPLEX PO), Take by mouth daily  Multiple Vitamins-Minerals (PRESERVISION AREDS 2) CAPS, Take by mouth 2 times daily   [DISCONTINUED] Magnesium Oxide (MAG-CAPS PO), Take 240 mg by mouth daily (Patient not taking: Reported on 9/7/2022)  dilTIAZem (CARDIZEM LA) 120 MG TB24 extended release tablet, Take 120 mg by mouth daily   ferrous sulfate 325 (65 Fe) MG tablet, TAKE ONE TABLET BY MOUTH TWO TIMES A DAY. TAKE WITH VITAMIN C FRUIT JUICE.   ELIQUIS 5 MG TABS tablet, 2 times daily   metoprolol succinate (TOPROL XL) 25 MG extended release tablet, Take 25 mg by mouth daily  albuterol (PROVENTIL) (2.5 MG/3ML) 0.083% nebulizer solution, Take 2.5 mg by nebulization every 6 hours as needed for Wheezing   [DISCONTINUED] albuterol (PROVENTIL HFA;VENTOLIN HFA) 108 (90 BASE) MCG/ACT inhaler, Inhale 2 puffs into the lungs every 6 hours as needed     Note that the patient's home medications were reviewed and the above list is accurate to the best of my knowledge at the time of the exam.    Allergies:    Patient has no known allergies. Social History:    reports that he quit smoking about 13 years ago. His smoking use included cigarettes. He started smoking about 53 years ago. He has a 80.00 pack-year smoking history. He has never used smokeless tobacco. He reports that he does not drink alcohol and does not use drugs. Family History:   family history includes Diabetes in his mother; Emphysema in his father. PHYSICAL EXAM:    Vitals:  /64   Pulse 81   Temp 97.9 °F (36.6 °C) (Oral)   Resp 16   Ht 6' 1\" (1.854 m)   Wt 156 lb 4.8 oz (70.9 kg)   SpO2 91%   BMI 20.62 kg/m²       General appearance: NAD, conversant  Eyes: Sclerae anicteric, PERRLA  HEENT: AT/NC, MMM  Neck: FROM, supple, no thyromegaly  Lymph: No cervical / supraclavicular lymphadenopathy  Lungs: Diminished breath sounds but no coarse rhonchi  CV: RRR, no MRGs, no lower extremity edema  Abdomen: Soft, non-tender; no masses or HSM, +BS  Extremities: FROM without synovitis. No clubbing or cyanosis of the hands. Skin: no rash, induration, lesions, or ulcers  Psych: Calm and cooperative. Normal judgement and insight. Normal mood and affect. Neuro: Alert and interactive, face symmetric, speech fluent. LABS:  All labs reviewed.   Of note:  CBC:   Lab Results   Component Value Date/Time    WBC 5.9 12/18/2022 02:05 AM    RBC 3.71 12/18/2022 02:05 AM    HGB 10.7 12/18/2022 02:05 AM    HCT 35.1 12/18/2022 02:05 AM    MCV 94.6 12/18/2022 02:05 AM    [facility] 28.8 12/18/2022 02:05 AM    MCHC 30.5 12/18/2022 02:05 AM    RDW 15.9 12/18/2022 02:05 AM     12/18/2022 02:05 AM    MPV 10.7 12/18/2022 02:05 AM     CMP:    Lab Results   Component Value Date/Time     12/18/2022 02:05 AM    K 4.2 12/18/2022 02:05 AM     12/18/2022 02:05 AM    CO2 31 12/18/2022 02:05 AM    BUN 28 12/18/2022 02:05 AM    CREATININE 1.0 12/18/2022 02:05 AM    GFRAA >60 06/25/2020 03:40 PM    LABGLOM >60 12/18/2022 02:05 AM    GLUCOSE 129 12/18/2022 02:05 AM    GLUCOSE 116 01/20/2011 10:15 AM    PROT 6.7 12/18/2022 02:05 AM    LABALBU 3.2 12/18/2022 02:05 AM    CALCIUM 9.1 12/18/2022 02:05 AM    BILITOT 0.3 12/18/2022 02:05 AM    ALKPHOS 73 12/18/2022 02:05 AM    AST 26 12/18/2022 02:05 AM    ALT 28 12/18/2022 02:05 AM       Imaging:  I've personally reviewed the patient's   CXR:no evidence of failure or pneumonia    EKG:  I've personally reviewed the patient's EKG:  Atrial fibrillation with rapid ventricular response    Telemetry:  I've personally reviewed the patient's telemetry:      ASSESSMENT/PLAN:  Principal Problem:    Acute on chronic respiratory failure with hypoxia ([address]  Active Problems:    COPD exacerbation ([facility]    Influenza A  Resolved Problems:    * No resolved hospital problems.  *    75-year-old  male with known history of COPD, on nocturnal home O2, presents with complaints of worsening shortness of breath and congestion    Positive for influenza A  Tamiflu 75 p.o. twice daily or renal adjustment as needed x5 days  Continue supportive care with home COPD regimen  Wean oxygen as able-currently requiring 4 L with sats in low 90s  Family request pulmonology evaluation due to his longstanding lung history  Resume home medications  Increase oxygen therapy as needed-currently saturating low to mid 90s on 4 L  Mild thrombocytopenia-continue to monitor    12/19/22:  -Currently on 2L NC  -Pulm following  -Continue tamiflu and IV solumedrol, switch IV solumedrol to PO prednisone tomorrow  -Ambulating pulse ox     Code status:FULL  Consultants: Pulm  DVT proph: Eliquis   PT/OT  Discharge planning: home       Electronically signed by ANGELI Pinedo CNP on 12/19/2022 at 2:04 PM    Above note edited to reflect my thoughts   Okay for home from medicine standpoint if further plans from pulmonology  He is anxious to go home  Change diet, check ambulating pulse ox    I personally saw, examined and provided care for the patient. Radiographs, labs and medication list were reviewed by me independently. The case was discussed in detail and plans for care were established. Review of FRANCISCO Pinedo   , documentation was conducted and revisions were made as appropriate directly by me. I agree with the above documented exam, problem list, and plan of care.      Gely Sandoval MD  4:27 PM  12/19/2022

## 2022-12-19 NOTE — CARE COORDINATION
Social Work:    Reviewed chart notes. Mr. Teresa Collado was admitted from home due to increasing shortness of breath & tested positive for influenza A. Mr. Teresa Collado has a history of COPD, A/B. Fib on Eliquis, AAA repair. Mr. Teresa Collado is presently on 6 liters. Social service met with Mr. Teresa Collado Jay Valdivia, advised him about social service &  roles, as well as discussed discharge planning. Yinka Schmitz is a patient of Dr. Mandie Almazan and he uses [address] in [facility]. He resides independently with his wife in a ranch home and uses 3 liters of 02 at night only. Yinka Schmitz also has a nebulizer, walker, and has used [facility] in the past.  Yinka Schmitz expects to return home and does not feel he will need home care but will consider it if the physician requires it.      Electronically signed by DUSTIN Ortiz on 12/19/2022 at 8:40 AM

## 2022-12-19 NOTE — PROGRESS NOTES
Manning Opitz, M.D.,[facility]  Jacky Johnson D.O., FGIANFRANCO, Gail Allen M.D. Benton Davidson M.D. Denice Scruggs D.O. Daily Pulmonary Progress Note    Patient:  Britt Bhatt 74 y.o. male MRN: [id number]     Date of Service: 12/19/2022      Synopsis     We are following patient for : COPD/respiratory failure-patient known    \"CC\" Shortness of breath    Code status[de-identified] Full code      Subjective      Patient was seen and examined. Continues with hacking cough expectorating small amounts of mucus. Voice remains hoarse. Prior tracheostomy and throat cancer. Feeling better with breathing. Oxygen remains at 2 L nasal cannula. Will need oxygen testing prior to discharge. Review of Systems:  Constitutional: Denies fever, weight loss, night sweats, and fatigue  Skin: Denies pigmentation, dark lesions, and rashes   HEENT: Denies hearing loss, tinnitus, ear drainage, epistaxis, sore throat, history of throat cancer, prior tracheostomy 3-1/2 years ago, voice hoarseness  Cardiovascular: Denies palpitations, chest pain, and chest pressure.   Respiratory: Hacking cough, dyspnea with exertion  Gastrointestinal: Denies nausea, vomiting, poor appetite, diarrhea, heartburn or reflux  Genitourinary: Denies dysuria, frequency, urgency or hematuria  Musculoskeletal: Denies myalgias, muscle weakness, and bone pain  Neurological: Denies dizziness, vertigo, headache, and focal weakness  Psychological: Denies anxiety and depression  Endocrine: Denies heat intolerance and cold intolerance  Hematopoietic/Lymphatic: Denies bleeding problems and blood transfusions    24-hour events:  None    Objective   Vitals: /64   Pulse 81   Temp 97.9 °F (36.6 °C) (Oral)   Resp 16   Ht 6' 1\" (1.854 m)   Wt 156 lb 4.8 oz (70.9 kg)   SpO2 91%   BMI 20.62 kg/m²     I/O:    Intake/Output Summary (Last 24 hours) at 12/19/2022 1524  Last data filed at 12/19/2022 1327  Gross per 24 hour   Intake 500 ml   Output -- Net 500 ml                        CURRENT MEDS :  Scheduled Meds:   [START ON 12/20/2022] predniSONE  40 mg Oral Daily    ipratropium-albuterol  1 ampule Inhalation Q4H WA    Levothyroxine Sodium  25 mcg Oral Daily    methylPREDNISolone  40 mg IntraVENous Q12H    sodium chloride flush  5-40 mL IntraVENous 2 times per day    apixaban  5 mg Oral BID    ferrous sulfate  325 mg Oral BID WC    metoprolol succinate  25 mg Oral Daily    therapeutic multivitamin-minerals  1 tablet Oral Daily    tamsulosin  0.4 mg Oral Daily    oseltamivir  75 mg Oral BID    budesonide  250 mcg Nebulization BID    Arformoterol Tartrate  15 mcg Nebulization BID    dilTIAZem  120 mg Oral Daily    ocuvite-lutein  1 tablet Oral Daily    vitamin B and C  1 tablet Oral Daily       Physical Exam:  General: The patient is lying in bed comfortably without any distress. Breathing is non labored. On 2 L oxygen via simple facemask  HEENT: Pupils are equal round and reactive to light, there are no oral lesions and no post-nasal drip   Neck: supple without adenopathy  Cardiovascular: regular rate and rhythm without murmur or gallop  Respiratory: Improved expiratory wheeze to auscultation . Air entry is symmetric  Abdomen: soft, non-tender, non-distended, normal bowel sounds  Extremities: warm, no edema, no clubbing  Skin: no rash or lesion  Neurologic: Alert and oriented x3    Pertinent/ New Labs and Imaging Studies     Imaging Personally Reviewed:        Chest x-ray 12/17/22:     FINDINGS:   There is hyperinflation of the lungs and flattening of diaphragms consistent   with COPD. There is no pulmonary infiltrate, mass or nodule. There is no   pleural effusion. There are stable calcific densities versus metallic density seen overlying   the right lung base. These findings are chronic. The cardiac silhouette is within normal limits. Impression   COPD. There is no evidence of failure or pneumonia.          Echo 9/3/2013 JORGE ALBERTO: Summary   Normal LV segmental wall motion. Ejection fraction is visually estimated at 65%. Mildly dilated left atrium. There was no evidence of spontaneous echo contrast in the left atrium. No   JAILENE thrombus. No hemodynamically significant valve disease. Labs:  Lab Results   Component Value Date/Time    WBC 5.9 12/18/2022 02:05 AM    HGB 10.7 12/18/2022 02:05 AM    HCT 35.1 12/18/2022 02:05 AM    MCV 94.6 12/18/2022 02:05 AM    MCH 28.8 12/18/2022 02:05 AM    MCHC 30.5 12/18/2022 02:05 AM    RDW 15.9 12/18/2022 02:05 AM     12/18/2022 02:05 AM    MPV 10.7 12/18/2022 02:05 AM     Lab Results   Component Value Date/Time     12/18/2022 02:05 AM    K 4.2 12/18/2022 02:05 AM     12/18/2022 02:05 AM    CO2 31 12/18/2022 02:05 AM    BUN 28 12/18/2022 02:05 AM    CREATININE 1.0 12/18/2022 02:05 AM    LABALBU 3.2 12/18/2022 02:05 AM    CALCIUM 9.1 12/18/2022 02:05 AM    GFRAA >60 06/25/2020 03:40 PM    LABGLOM >60 12/18/2022 02:05 AM     Lab Results   Component Value Date/Time    PROTIME 14.3 07/09/2019 05:50 AM    PROTIME 11.3 01/20/2011 10:15 AM    INR 1.2 07/09/2019 05:50 AM     No results for input(s): PROBNP in the last 72 hours. No results for input(s): PROCAL in the last 72 hours. This SmartLink has not been configured with any valid records. Micro:  No results for input(s): CULTRESP in the last 72 hours. No results for input(s): LABGRAM in the last 72 hours. No results for input(s): LEGUR in the last 72 hours. No results for input(s): STREPNEUMAGU in the last 72 hours. No results for input(s): LP1UAG in the last 72 hours.        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 Can resume Trelegy Ellipta upon discharge. Solu-Medrol 40 mg every 12 hours tapered to oral prednisone in a.m. Tamiflu twice daily for 5 total days  Chronic anticoagulation with Eliquis for PAF  Coordinate outpatient PFTs, CT chest noncontrast as outpatient once fully recovered. Message routed for follow-up with Dr. Sixto Esqueda    This plan of care was reviewed in collaboration with Dr. Rony De Leon  Electronically signed by [employer], ANGELI Romano CNP on 12/19/2022 at 3:25 PM        I personally saw, examined and provided care for the patient. Radiographs, labs and medication list were reviewed by me independently. I spoke with bedside nursing, therapists and consultants. The case was discussed in detail and plans for care were established. Review of CNP documentation was conducted and revisions were made as appropriate. I agree with the above documented exam, problem list and plan of care.    Jimmy Badillo MD

## 2022-12-20 ENCOUNTER — APPOINTMENT (OUTPATIENT)
Dept: GENERAL RADIOLOGY | Age: 72
DRG: 193 | End: 2022-12-20
Payer: MEDICARE

## 2022-12-20 LAB
ANION GAP SERPL CALCULATED.3IONS-SCNC: 11 MMOL/L (ref 7–16)
BUN BLDV-MCNC: 33 MG/DL (ref 6–23)
CALCIUM SERPL-MCNC: 9 MG/DL (ref 8.6–10.2)
CHLORIDE BLD-SCNC: 99 MMOL/L (ref 98–107)
CO2: 30 MMOL/L (ref 22–29)
CREAT SERPL-MCNC: 1 MG/DL (ref 0.7–1.2)
GFR SERPL CREATININE-BSD FRML MDRD: >60 ML/MIN/1.73
GLUCOSE BLD-MCNC: 152 MG/DL (ref 74–99)
HCT VFR BLD CALC: 38.3 % (ref 37–54)
HEMOGLOBIN: 11.5 G/DL (ref 12.5–16.5)
MCH RBC QN AUTO: 28.3 PG (ref 26–35)
MCHC RBC AUTO-ENTMCNC: 30 % (ref 32–34.5)
MCV RBC AUTO: 94.3 FL (ref 80–99.9)
PDW BLD-RTO: 16.2 FL (ref 11.5–15)
PLATELET # BLD: 182 E9/L (ref 130–450)
PMV BLD AUTO: 10.4 FL (ref 7–12)
POTASSIUM SERPL-SCNC: 3.8 MMOL/L (ref 3.5–5)
RBC # BLD: 4.06 E12/L (ref 3.8–5.8)
SODIUM BLD-SCNC: 140 MMOL/L (ref 132–146)
WBC # BLD: 9.5 E9/L (ref 4.5–11.5)

## 2022-12-20 PROCEDURE — 2580000003 HC RX 258: Performed by: NURSE PRACTITIONER

## 2022-12-20 PROCEDURE — 36415 COLL VENOUS BLD VENIPUNCTURE: CPT

## 2022-12-20 PROCEDURE — 6360000002 HC RX W HCPCS: Performed by: NURSE PRACTITIONER

## 2022-12-20 PROCEDURE — 80048 BASIC METABOLIC PNL TOTAL CA: CPT

## 2022-12-20 PROCEDURE — 85027 COMPLETE CBC AUTOMATED: CPT

## 2022-12-20 PROCEDURE — 2700000000 HC OXYGEN THERAPY PER DAY

## 2022-12-20 PROCEDURE — 6370000000 HC RX 637 (ALT 250 FOR IP): Performed by: NURSE PRACTITIONER

## 2022-12-20 PROCEDURE — 1200000000 HC SEMI PRIVATE

## 2022-12-20 PROCEDURE — 71045 X-RAY EXAM CHEST 1 VIEW: CPT

## 2022-12-20 PROCEDURE — 94640 AIRWAY INHALATION TREATMENT: CPT

## 2022-12-20 RX ORDER — BENZONATATE 100 MG/1
200 CAPSULE ORAL 3 TIMES DAILY
Status: DISCONTINUED | OUTPATIENT
Start: 2022-12-20 | End: 2022-12-21 | Stop reason: HOSPADM

## 2022-12-20 RX ORDER — FLUTICASONE PROPIONATE 50 MCG
2 SPRAY, SUSPENSION (ML) NASAL DAILY
Qty: 16 G | Refills: 3 | Status: SHIPPED | OUTPATIENT
Start: 2022-12-21

## 2022-12-20 RX ORDER — FLUTICASONE PROPIONATE 50 MCG
2 SPRAY, SUSPENSION (ML) NASAL DAILY
Status: DISCONTINUED | OUTPATIENT
Start: 2022-12-20 | End: 2022-12-21 | Stop reason: HOSPADM

## 2022-12-20 RX ORDER — BENZONATATE 200 MG/1
200 CAPSULE ORAL 3 TIMES DAILY
Qty: 21 CAPSULE | Refills: 0 | Status: SHIPPED | OUTPATIENT
Start: 2022-12-20 | End: 2022-12-27

## 2022-12-20 RX ADMIN — OSELTAMIVIR PHOSPHATE 75 MG: 75 CAPSULE ORAL at 20:27

## 2022-12-20 RX ADMIN — FERROUS SULFATE TAB 325 MG (65 MG ELEMENTAL FE) 325 MG: 325 (65 FE) TAB at 08:37

## 2022-12-20 RX ADMIN — ARFORMOTEROL TARTRATE 15 MCG: 15 SOLUTION RESPIRATORY (INHALATION) at 08:46

## 2022-12-20 RX ADMIN — APIXABAN 5 MG: 5 TABLET, FILM COATED ORAL at 20:24

## 2022-12-20 RX ADMIN — ALBUTEROL SULFATE 2.5 MG: 2.5 SOLUTION RESPIRATORY (INHALATION) at 03:03

## 2022-12-20 RX ADMIN — Medication 10 ML: at 08:37

## 2022-12-20 RX ADMIN — BENZONATATE 200 MG: 100 CAPSULE ORAL at 13:03

## 2022-12-20 RX ADMIN — FLUTICASONE PROPIONATE 2 SPRAY: 50 SPRAY, METERED NASAL at 10:31

## 2022-12-20 RX ADMIN — METOPROLOL SUCCINATE 25 MG: 25 TABLET, EXTENDED RELEASE ORAL at 08:36

## 2022-12-20 RX ADMIN — TAMSULOSIN HYDROCHLORIDE 0.4 MG: 0.4 CAPSULE ORAL at 08:36

## 2022-12-20 RX ADMIN — BENZONATATE 200 MG: 100 CAPSULE ORAL at 10:31

## 2022-12-20 RX ADMIN — BUDESONIDE 250 MCG: 0.25 SUSPENSION RESPIRATORY (INHALATION) at 21:25

## 2022-12-20 RX ADMIN — BENZONATATE 200 MG: 100 CAPSULE ORAL at 20:24

## 2022-12-20 RX ADMIN — OSELTAMIVIR PHOSPHATE 75 MG: 75 CAPSULE ORAL at 08:36

## 2022-12-20 RX ADMIN — Medication 1 TABLET: at 08:36

## 2022-12-20 RX ADMIN — FERROUS SULFATE TAB 325 MG (65 MG ELEMENTAL FE) 325 MG: 325 (65 FE) TAB at 17:33

## 2022-12-20 RX ADMIN — IPRATROPIUM BROMIDE AND ALBUTEROL SULFATE 1 AMPULE: .5; 2.5 SOLUTION RESPIRATORY (INHALATION) at 08:46

## 2022-12-20 RX ADMIN — IPRATROPIUM BROMIDE AND ALBUTEROL SULFATE 1 AMPULE: .5; 2.5 SOLUTION RESPIRATORY (INHALATION) at 21:25

## 2022-12-20 RX ADMIN — ARFORMOTEROL TARTRATE 15 MCG: 15 SOLUTION RESPIRATORY (INHALATION) at 21:25

## 2022-12-20 RX ADMIN — Medication 1 TABLET: at 08:37

## 2022-12-20 RX ADMIN — IPRATROPIUM BROMIDE AND ALBUTEROL SULFATE 1 AMPULE: .5; 2.5 SOLUTION RESPIRATORY (INHALATION) at 13:05

## 2022-12-20 RX ADMIN — MULTIPLE VITAMINS W/ MINERALS TAB 1 TABLET: TAB at 08:37

## 2022-12-20 RX ADMIN — Medication 10 ML: at 20:25

## 2022-12-20 RX ADMIN — DILTIAZEM HYDROCHLORIDE 120 MG: 120 CAPSULE, COATED, EXTENDED RELEASE ORAL at 08:37

## 2022-12-20 RX ADMIN — LEVOTHYROXINE SODIUM 25 MCG: 25 CAPSULE ORAL at 05:37

## 2022-12-20 RX ADMIN — BUDESONIDE 250 MCG: 0.25 SUSPENSION RESPIRATORY (INHALATION) at 08:46

## 2022-12-20 RX ADMIN — IPRATROPIUM BROMIDE AND ALBUTEROL SULFATE 1 AMPULE: .5; 2.5 SOLUTION RESPIRATORY (INHALATION) at 17:01

## 2022-12-20 RX ADMIN — APIXABAN 5 MG: 5 TABLET, FILM COATED ORAL at 08:37

## 2022-12-20 RX ADMIN — PREDNISONE 40 MG: 20 TABLET ORAL at 08:36

## 2022-12-20 NOTE — PLAN OF CARE
Problem: ABCDS Injury Assessment  Goal: Absence of physical injury  12/20/2022 1050 by Chula Gonzalez RN  Outcome: Progressing  12/20/2022 0547 by Rena Mcgrath RN  Outcome: Progressing     Problem: Safety - Adult  Goal: Free from fall injury  12/20/2022 1050 by Chula Gonzalez RN  Outcome: Progressing  12/20/2022 0547 by Rena Mcgrath RN  Outcome: Progressing

## 2022-12-20 NOTE — PROGRESS NOTES
Patient was 90% on 3L NC at rest.   Ambulated patient on 3L NC. Oxygen dropped to 83% on 3L while ambulating. Patient returned to bed on 6L with long recovery period to reach 90%. Patient complains of SOB at this time. Will reattempt as patient tolerates this afternoon. Pulm updated.

## 2022-12-20 NOTE — PROGRESS NOTES
Pulse ox 90% on room air at rest.  Pulse ox 87% on room air from sitting to standing. 3L NC applied, pulse ox dropped to 84% on 3L while ambulating. Bumped up to 6L, pulse ox only 86%. Patient SOB and sat back in bed. Pulse ox recovered to 91% on 3L NC. Dr Lima Haas updated.

## 2022-12-20 NOTE — PROGRESS NOTES
[facility]                                Progress note    Subjective: The patient is awake and alert. Now on 6L NC   He feel he would like to discharge today  Objective:    BP (!) 150/70   Pulse 100   Temp 98.3 °F (36.8 °C) (Oral)   Resp 18   Ht 6' 1\" (1.854 m)   Wt 156 lb 4.8 oz (70.9 kg)   SpO2 91%   BMI 20.62 kg/m²     In: 750 [P.O.:740; I.V.:10]  Out: -   In: 750   Out: -     General appearance: NAD, conversant  HEENT: AT/NC, MMM  Neck: FROM, supple  Lungs: 6L NC, improved breath sounds, moves air adequately  CV: RRR, no MRGs  Vasc: Radial pulses 2+  Abdomen: Soft, non-tender; no masses or HSM  Extremities: No peripheral edema or digital cyanosis  Skin: no rash, lesions or ulcers  Psych: Alert and oriented to person, place and time  Neuro: Alert and interactive     Recent Labs     12/18/22  0205 12/20/22  0844   WBC 5.9 9.5   HGB 10.7* 11.5*   HCT 35.1* 38.3    182       Recent Labs     12/18/22  0205 12/20/22  0448    140   K 4.2 3.8    99   CO2 31* 30*   BUN 28* 33*   CREATININE 1.0 1.0   CALCIUM 9.1 9.0       Assessment:    Principal Problem:    Acute on chronic respiratory failure with hypoxia (HCC)  Active Problems:    COPD exacerbation (HCC)    Influenza A  Resolved Problems:    * No resolved hospital problems.  *      Plan:  77-year-old  male with known history of COPD, on nocturnal home O2, presents with complaints of worsening shortness of breath and congestion     Positive for influenza A  Tamiflu 75 p.o. twice daily or renal adjustment as needed x5 days  Continue supportive care with home COPD regimen  Wean oxygen as able-currently requiring 4 L with sats in low 90s  Family request pulmonology evaluation due to his longstanding lung history  Resume home medications  Increase oxygen therapy as needed-currently saturating low to mid 90s on 4 L  Mild thrombocytopenia-continue to monitor     12/19/22:  -Currently on 2L NC  -Pulm following  -Continue tamiflu and IV solumedrol, switch IV solumedrol to PO prednisone tomorrow  -Ambulating pulse ox      12/20/22:  -Worsening hypoxia, now requiring 6L NC-though clinically improved  -Pulm following  -IV solumedrol was switch to PO and he appears to be tolerating well  -continue with scheduled breathing treatments   -Okay for discharge from medicine standpoint-continue home oxygen (3 L at home at night, will require daytime oxygen as well)  -It appears that arrangements have been made for pulmonology follow-up by pulmonary team    Code status: Full  Consultants: Pulm     DVT Prophylaxis Eliquis   PT/OT  Discharge planning     Omar Parnell MD

## 2022-12-20 NOTE — PROGRESS NOTES
Updated [facility] regarding pt intolerance of ambulation with O2 and Dr. Trenton Merida recommendations to hold discharge. Discharge discontinued.

## 2022-12-20 NOTE — PROGRESS NOTES
Milli Hendrickson M.D.,[facility]  Lalitha Demarco D.O., F.A.C.O.I., Marisela Wilkins M.D. Carol Dalton M.D. Juan Daniel Cano D.O. Daily Pulmonary Progress Note    Patient:  Kate Salvador 74 y.o. male MRN: [id number]     Date of Service: 12/20/2022      Synopsis     We are following patient for : COPD/respiratory failure-patient known    \"CC\" Shortness of breath    Code status[de-identified] Full code      Subjective      Patient was seen and examined. Continues with hacking cough expectorating small amounts of mucus. To add flutter valve. Voice remains hoarse. Prior tracheostomy and throat cancer. Feeling better with breathing. Oxygen  6 liters  nasal cannula. 3 liters is baseline. Will need oxygen testing prior to discharge. Per nursing he had episode of desaturation into 80's with exertion. Complaint of nasal stuffiness. Review of Systems:  Constitutional: Denies fever, weight loss, night sweats, and fatigue  Skin: Denies pigmentation, dark lesions, and rashes   HEENT: Denies hearing loss, tinnitus, ear drainage, epistaxis, sore  +nasal stuffiness mild sore throat   history of throat cancer, prior tracheostomy 3-1/2 years ago, voice hoarseness  Cardiovascular: Denies palpitations, chest pain, and chest pressure.   Respiratory: Hacking cough, dyspnea with exertion  Gastrointestinal: Denies nausea, vomiting, poor appetite, diarrhea, heartburn or reflux  Genitourinary: Denies dysuria, frequency, urgency or hematuria  Musculoskeletal: Denies myalgias, muscle weakness, and bone pain  Neurological: Denies dizziness, vertigo, headache, and focal weakness  Psychological: Denies anxiety and depression  Endocrine: Denies heat intolerance and cold intolerance  Hematopoietic/Lymphatic: Denies bleeding problems and blood transfusions    24-hour events:  None    Objective   Vitals: BP (!) 150/70   Pulse 100   Temp 98.3 °F (36.8 °C) (Oral)   Resp 18   Ht 6' 1\" (1.854 m)   Wt 156 lb 4.8 oz (70.9 kg)   SpO2 91%   BMI 20.62 kg/m²     I/O:    Intake/Output Summary (Last 24 hours) at 12/20/2022 1110  Last data filed at 12/19/2022 1814  Gross per 24 hour   Intake 500 ml   Output --   Net 500 ml                          CURRENT MEDS :  Scheduled Meds:   fluticasone  2 spray Each Nostril Daily    benzonatate  200 mg Oral TID    predniSONE  40 mg Oral Daily    ipratropium-albuterol  1 ampule Inhalation Q4H WA    Levothyroxine Sodium  25 mcg Oral Daily    sodium chloride flush  5-40 mL IntraVENous 2 times per day    apixaban  5 mg Oral BID    ferrous sulfate  325 mg Oral BID WC    metoprolol succinate  25 mg Oral Daily    therapeutic multivitamin-minerals  1 tablet Oral Daily    tamsulosin  0.4 mg Oral Daily    oseltamivir  75 mg Oral BID    budesonide  250 mcg Nebulization BID    Arformoterol Tartrate  15 mcg Nebulization BID    dilTIAZem  120 mg Oral Daily    ocuvite-lutein  1 tablet Oral Daily    vitamin B and C  1 tablet Oral Daily       Physical Exam:  General: The patient is lying in bed comfortably without any distress. Breathing is non labored. On 2 L oxygen via simple facemask  HEENT: Pupils are equal round and reactive to light, there are no oral lesions and no post-nasal drip   Neck: supple without adenopathy  Cardiovascular: regular rate and rhythm without murmur or gallop  Respiratory: Improved expiratory wheeze to auscultation . Air entry is symmetric  Abdomen: soft, non-tender, non-distended, normal bowel sounds  Extremities: warm, no edema, no clubbing  Skin: no rash or lesion  Neurologic: Alert and oriented x3    Pertinent/ New Labs and Imaging Studies     Imaging Personally Reviewed:        Chest x-ray 12/17/22:     FINDINGS:   There is hyperinflation of the lungs and flattening of diaphragms consistent   with COPD. There is no pulmonary infiltrate, mass or nodule. There is no   pleural effusion. There are stable calcific densities versus metallic density seen overlying   the right lung base. These findings are chronic. The cardiac silhouette is within normal limits. Impression   COPD. There is no evidence of failure or pneumonia. Echo 9/3/2013 JORGE ALBERTO:   Summary   Normal LV segmental wall motion. Ejection fraction is visually estimated at 65%. Mildly dilated left atrium. There was no evidence of spontaneous echo contrast in the left atrium. No   JAILENE thrombus. No hemodynamically significant valve disease. Labs:  Lab Results   Component Value Date/Time    WBC 9.5 12/20/2022 08:44 AM    HGB 11.5 12/20/2022 08:44 AM    HCT 38.3 12/20/2022 08:44 AM    MCV 94.3 12/20/2022 08:44 AM    MCH 28.3 12/20/2022 08:44 AM    MCHC 30.0 12/20/2022 08:44 AM    RDW 16.2 12/20/2022 08:44 AM     12/20/2022 08:44 AM    MPV 10.4 12/20/2022 08:44 AM     Lab Results   Component Value Date/Time     12/20/2022 04:48 AM    K 3.8 12/20/2022 04:48 AM    K 4.2 12/18/2022 02:05 AM    CL 99 12/20/2022 04:48 AM    CO2 30 12/20/2022 04:48 AM    BUN 33 12/20/2022 04:48 AM    CREATININE 1.0 12/20/2022 04:48 AM    LABALBU 3.2 12/18/2022 02:05 AM    CALCIUM 9.0 12/20/2022 04:48 AM    GFRAA >60 06/25/2020 03:40 PM    LABGLOM >60 12/20/2022 04:48 AM     Lab Results   Component Value Date/Time    PROTIME 14.3 07/09/2019 05:50 AM    PROTIME 11.3 01/20/2011 10:15 AM    INR 1.2 07/09/2019 05:50 AM     No results for input(s): PROBNP in the last 72 hours. No results for input(s): PROCAL in the last 72 hours. This SmartLink has not been configured with any valid records. Micro:  No results for input(s): CULTRESP in the last 72 hours. No results for input(s): LABGRAM in the last 72 hours. No results for input(s): LEGUR in the last 72 hours. No results for input(s): STREPNEUMAGU in the last 72 hours. No results for input(s): LP1UAG in the last 72 hours.        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 Can resume Trelegy Ellipta upon discharge. Oral prednisone with taper written for dc  Add flonase, saline nasal spray for stuffiness  Tessalon for cough . Add flutter valve  Tamiflu twice daily for 5 total days  Cxr 12/17 no acute process  Chronic anticoagulation with Eliquis for PAF  Coordinate outpatient PFTs, CT chest noncontrast as outpatient once fully recovered. Message routed for follow-up with Dr. Tyler Stratton    This plan of care was reviewed in collaboration with Dr. Rock Gillespie  Electronically signed by ANGELI Lawrence CNP on 12/20/2022 at 11:10 AM         I personally saw, examined and provided care for the patient. Radiographs, labs and medication list were reviewed by me independently. I spoke with bedside nursing, therapists and consultants. The case was discussed in detail and plans for care were established. Review of CNP documentation was conducted and revisions were made as appropriate. I agree with the above documented exam, problem list and plan of care.    Randy Rios MD

## 2022-12-21 VITALS
DIASTOLIC BLOOD PRESSURE: 61 MMHG | WEIGHT: 156.3 LBS | BODY MASS INDEX: 20.72 KG/M2 | TEMPERATURE: 98 F | HEART RATE: 76 BPM | OXYGEN SATURATION: 95 % | SYSTOLIC BLOOD PRESSURE: 136 MMHG | HEIGHT: 73 IN | RESPIRATION RATE: 18 BRPM

## 2022-12-21 LAB
ANION GAP SERPL CALCULATED.3IONS-SCNC: 8 MMOL/L (ref 7–16)
BUN BLDV-MCNC: 38 MG/DL (ref 6–23)
CALCIUM SERPL-MCNC: 8.4 MG/DL (ref 8.6–10.2)
CHLORIDE BLD-SCNC: 101 MMOL/L (ref 98–107)
CO2: 33 MMOL/L (ref 22–29)
CREAT SERPL-MCNC: 1.4 MG/DL (ref 0.7–1.2)
GFR SERPL CREATININE-BSD FRML MDRD: 53 ML/MIN/1.73
GLUCOSE BLD-MCNC: 128 MG/DL (ref 74–99)
HCT VFR BLD CALC: 35.4 % (ref 37–54)
HEMOGLOBIN: 10.9 G/DL (ref 12.5–16.5)
MCH RBC QN AUTO: 29 PG (ref 26–35)
MCHC RBC AUTO-ENTMCNC: 30.8 % (ref 32–34.5)
MCV RBC AUTO: 94.1 FL (ref 80–99.9)
PDW BLD-RTO: 16.4 FL (ref 11.5–15)
PLATELET # BLD: 150 E9/L (ref 130–450)
PMV BLD AUTO: 10.7 FL (ref 7–12)
POTASSIUM SERPL-SCNC: 3.9 MMOL/L (ref 3.5–5)
RBC # BLD: 3.76 E12/L (ref 3.8–5.8)
SODIUM BLD-SCNC: 142 MMOL/L (ref 132–146)
WBC # BLD: 7.3 E9/L (ref 4.5–11.5)

## 2022-12-21 PROCEDURE — 6370000000 HC RX 637 (ALT 250 FOR IP): Performed by: NURSE PRACTITIONER

## 2022-12-21 PROCEDURE — 6360000002 HC RX W HCPCS: Performed by: NURSE PRACTITIONER

## 2022-12-21 PROCEDURE — 80048 BASIC METABOLIC PNL TOTAL CA: CPT

## 2022-12-21 PROCEDURE — 94640 AIRWAY INHALATION TREATMENT: CPT

## 2022-12-21 PROCEDURE — 85027 COMPLETE CBC AUTOMATED: CPT

## 2022-12-21 PROCEDURE — 2700000000 HC OXYGEN THERAPY PER DAY

## 2022-12-21 PROCEDURE — 2580000003 HC RX 258: Performed by: NURSE PRACTITIONER

## 2022-12-21 PROCEDURE — 36415 COLL VENOUS BLD VENIPUNCTURE: CPT

## 2022-12-21 PROCEDURE — 2500000003 HC RX 250 WO HCPCS: Performed by: NURSE PRACTITIONER

## 2022-12-21 RX ADMIN — MULTIPLE VITAMINS W/ MINERALS TAB 1 TABLET: TAB at 08:48

## 2022-12-21 RX ADMIN — FLUTICASONE PROPIONATE 2 SPRAY: 50 SPRAY, METERED NASAL at 08:49

## 2022-12-21 RX ADMIN — Medication 1 TABLET: at 08:49

## 2022-12-21 RX ADMIN — GUAIFENESIN 200 MG: 200 SOLUTION ORAL at 06:07

## 2022-12-21 RX ADMIN — Medication 1 TABLET: at 08:50

## 2022-12-21 RX ADMIN — TAMSULOSIN HYDROCHLORIDE 0.4 MG: 0.4 CAPSULE ORAL at 08:49

## 2022-12-21 RX ADMIN — PREDNISONE 40 MG: 20 TABLET ORAL at 08:50

## 2022-12-21 RX ADMIN — DILTIAZEM HYDROCHLORIDE 120 MG: 120 CAPSULE, COATED, EXTENDED RELEASE ORAL at 08:48

## 2022-12-21 RX ADMIN — IPRATROPIUM BROMIDE AND ALBUTEROL SULFATE 1 AMPULE: .5; 2.5 SOLUTION RESPIRATORY (INHALATION) at 13:15

## 2022-12-21 RX ADMIN — IPRATROPIUM BROMIDE AND ALBUTEROL SULFATE 1 AMPULE: .5; 2.5 SOLUTION RESPIRATORY (INHALATION) at 09:09

## 2022-12-21 RX ADMIN — Medication 10 ML: at 08:50

## 2022-12-21 RX ADMIN — ARFORMOTEROL TARTRATE 15 MCG: 15 SOLUTION RESPIRATORY (INHALATION) at 09:09

## 2022-12-21 RX ADMIN — BENZONATATE 200 MG: 100 CAPSULE ORAL at 08:48

## 2022-12-21 RX ADMIN — OSELTAMIVIR PHOSPHATE 75 MG: 75 CAPSULE ORAL at 08:49

## 2022-12-21 RX ADMIN — BENZONATATE 200 MG: 100 CAPSULE ORAL at 13:04

## 2022-12-21 RX ADMIN — APIXABAN 5 MG: 5 TABLET, FILM COATED ORAL at 08:48

## 2022-12-21 RX ADMIN — METOPROLOL SUCCINATE 25 MG: 25 TABLET, EXTENDED RELEASE ORAL at 08:49

## 2022-12-21 RX ADMIN — BUDESONIDE 250 MCG: 0.25 SUSPENSION RESPIRATORY (INHALATION) at 09:09

## 2022-12-21 RX ADMIN — FERROUS SULFATE TAB 325 MG (65 MG ELEMENTAL FE) 325 MG: 325 (65 FE) TAB at 08:49

## 2022-12-21 RX ADMIN — LEVOTHYROXINE SODIUM 25 MCG: 25 CAPSULE ORAL at 05:49

## 2022-12-21 NOTE — CARE COORDINATION
Spoke to Rodger Storey at [facility]; they will be delivering portable 02 tank to pt's room prior to discharge. Mónica Dumont.

## 2022-12-21 NOTE — PROGRESS NOTES
Talked to patient and his wife, wife states it would be hard for her to go get their oxygen tank from home since they [place] and stated it was about 1years old.

## 2022-12-21 NOTE — PLAN OF CARE
Problem: ABCDS Injury Assessment  Goal: Absence of physical injury  12/20/2022 2121 by Fátima Hussein RN  Outcome: Progressing  12/20/2022 1050 by Courtney Nelson RN  Outcome: Progressing     Problem: Safety - Adult  Goal: Free from fall injury  12/20/2022 2121 by Fátima Hussein RN  Outcome: Progressing  12/20/2022 1050 by Courtney Nelson RN  Outcome: Progressing

## 2022-12-21 NOTE — PROGRESS NOTES
[facility]                                Progress note    Subjective: The patient is awake and alert. On 4L NC for ambulatory testing     Objective:    /61   Pulse 76   Temp 98 °F (36.7 °C) (Oral)   Resp 18   Ht 6' 1\" (1.854 m)   Wt 156 lb 4.8 oz (70.9 kg)   SpO2 95%   BMI 20.62 kg/m²     In: 360 [P.O.:360]  Out: -   In: 360   Out: -     General appearance: NAD, conversant  HEENT: AT/NC, MMM  Neck: FROM, supple  Lungs: 4L NC, improved breath sounds, moves air adequately  CV: RRR, no MRGs  Vasc: Radial pulses 2+  Abdomen: Soft, non-tender; no masses or HSM  Extremities: No peripheral edema or digital cyanosis  Skin: no rash, lesions or ulcers  Psych: Alert and oriented to person, place and time  Neuro: Alert and interactive     Recent Labs     12/20/22  0844 12/21/22  0102   WBC 9.5 7.3   HGB 11.5* 10.9*   HCT 38.3 35.4*    150         Recent Labs     12/20/22  0448 12/21/22  0102    142   K 3.8 3.9   CL 99 101   CO2 30* 33*   BUN 33* 38*   CREATININE 1.0 1.4*   CALCIUM 9.0 8.4*         Assessment:    Principal Problem:    Acute on chronic respiratory failure with hypoxia (HCC)  Active Problems:    COPD exacerbation (HCC)    Influenza A  Resolved Problems:    * No resolved hospital problems.  *      Plan:  77-year-old  male with known history of COPD, on nocturnal home O2, presents with complaints of worsening shortness of breath and congestion     Positive for influenza A  Tamiflu 75 p.o. twice daily or renal adjustment as needed x5 days  Continue supportive care with home COPD regimen  Wean oxygen as able-currently requiring 4 L with sats in low 90s  Family request pulmonology evaluation due to his longstanding lung history  Resume home medications  Increase oxygen therapy as needed-currently saturating low to mid 90s on 4 L  Mild thrombocytopenia-continue to monitor     12/19/22:  -Currently on 2L NC  -Pulm following  -Continue tamiflu and IV solumedrol, switch IV solumedrol to PO prednisone tomorrow  -Ambulating pulse ox      12/20/22:  -Worsening hypoxia, now requiring 6L NC-though clinically improved  -Pulm following  -IV solumedrol was switch to PO and he appears to be tolerating well  -continue with scheduled breathing treatments   -Okay for discharge from medicine standpoint-continue home oxygen (3 L at home at night, will require daytime oxygen as well)  -It appears that arrangements have been made for pulmonology follow-up by pulmonary team    12/21/22:  -Likely can be discharged home today if ok with pulm. Adjusted concentrator or equipment needed per pulm  -Finished tamiflu treatment     Code status: Full  Consultants: Pulm     DVT Prophylaxis Eliquis   PT/OT  Discharge planning       Electronically signed by ANGELI Wade CNP on 12/21/2022 at 1:29 PM    Above note edited to reflect my thoughts     I personally saw, examined and provided care for the patient. Radiographs, labs and medication list were reviewed by me independently. The case was discussed in detail and plans for care were established. Review of FRANCISCO Wade   , documentation was conducted and revisions were made as appropriate directly by me. I agree with the above documented exam, problem list, and plan of care.      Javy Zimmerman MD  7:54 PM  12/21/2022

## 2022-12-21 NOTE — PROGRESS NOTES
Walked patient on room air, resting oxygen saturation was 94%, while ambulating he was 91-92%, getting back into bed his oxygen was 83%. Recovery on 4L was 94%.

## 2022-12-21 NOTE — CARE COORDINATION
Social Work:    Notified Emily Tere at [employer] of discharge home today with updated 4 liter 02 and delivery of 02 tank delivery to hospital.     Electronically signed by DUSTIN Ochoa on 12/21/2022 at 2:00 PM

## 2022-12-21 NOTE — CARE COORDINATION
+ flu a currently on home 02 at [facility] only 3lnc through [facility] DME. Will need pox testing at d/c. May need new home 02 orders at discharge. Jimmy Eduardo.

## 2022-12-21 NOTE — CARE COORDINATION
Social Work:    Alvin Peterson at [facility] [employer] of possible updated 02 need at home. RN will need to check pulse oxygen closer to discharge to confirm. Updated orders will are required.     Electronically signed by DUSTIN Chen on 12/21/2022 at 1:18 PM

## 2022-12-21 NOTE — PROGRESS NOTES
On 4L NC patient oxygen was 94% at rest, while ambulating he dropped to 91%.  Recovering pulse ox was 92% while on 4L NC.

## 2022-12-21 NOTE — PROGRESS NOTES
Ricardo Vargas M.D.,[facility]  Bennie Crow D.O., F.A.C.O.I., Dharmesh Norton M.D. Williams Serrano M.D. Venkatesh Parada D.O. Daily Pulmonary Progress Note    Patient:  Neville Rodriguez 74 y.o. male MRN: [id number]     Date of Service: 12/21/2022      Synopsis     We are following patient for : COPD/respiratory failure-patient known    \"CC\" Shortness of breath    Code status[de-identified] Full code      Subjective      Patient was seen and examined. Continues with hacking cough expectorating small amounts of mucus. Using flutter valve. Voice remains hoarse. Prior tracheostomy and throat cancer. Feeling better with breathing. Oxygen  6 liters  nasal cannula needed yesterday with ambulation. 3 liters is baseline at rest.  Discussed with nursing to recheck levels today. [address] is [employer]. Pt wants to go home soon. Review of Systems:  Constitutional: Denies fever, weight loss, night sweats, and fatigue  Skin: Denies pigmentation, dark lesions, and rashes   HEENT: Denies hearing loss, tinnitus, ear drainage, epistaxis, sore  +nasal stuffiness mild sore throat   history of throat cancer, prior tracheostomy 3-1/2 years ago, voice hoarseness  Cardiovascular: Denies palpitations, chest pain, and chest pressure.   Respiratory: Hacking cough, dyspnea with exertion  Gastrointestinal: Denies nausea, vomiting, poor appetite, diarrhea, heartburn or reflux  Genitourinary: Denies dysuria, frequency, urgency or hematuria  Musculoskeletal: Denies myalgias, muscle weakness, and bone pain  Neurological: Denies dizziness, vertigo, headache, and focal weakness  Psychological: Denies anxiety and depression  Endocrine: Denies heat intolerance and cold intolerance  Hematopoietic/Lymphatic: Denies bleeding problems and blood transfusions    24-hour events:  None    Objective   Vitals: /61   Pulse 76   Temp 98 °F (36.7 °C) (Oral)   Resp 18   Ht 6' 1\" (1.854 m)   Wt 156 lb 4.8 oz (70.9 kg)   SpO2 95% BMI 20.62 kg/m²     I/O:    Intake/Output Summary (Last 24 hours) at 12/21/2022 1205  Last data filed at 12/20/2022 1459  Gross per 24 hour   Intake 180 ml   Output --   Net 180 ml                          CURRENT MEDS :  Scheduled Meds:   fluticasone  2 spray Each Nostril Daily    benzonatate  200 mg Oral TID    predniSONE  40 mg Oral Daily    ipratropium-albuterol  1 ampule Inhalation Q4H WA    Levothyroxine Sodium  25 mcg Oral Daily    sodium chloride flush  5-40 mL IntraVENous 2 times per day    apixaban  5 mg Oral BID    ferrous sulfate  325 mg Oral BID WC    metoprolol succinate  25 mg Oral Daily    therapeutic multivitamin-minerals  1 tablet Oral Daily    tamsulosin  0.4 mg Oral Daily    oseltamivir  75 mg Oral BID    budesonide  250 mcg Nebulization BID    Arformoterol Tartrate  15 mcg Nebulization BID    dilTIAZem  120 mg Oral Daily    ocuvite-lutein  1 tablet Oral Daily    vitamin B and C  1 tablet Oral Daily       Physical Exam:  General: The patient is lying in bed comfortably without any distress. Breathing is non labored. HEENT: Pupils are equal round and reactive to light, there are no oral lesions and no post-nasal drip   Neck: supple without adenopathy  Cardiovascular: regular rate and rhythm without murmur or gallop  Respiratory: No active wheezing to auscultation . Air entry is symmetric  Abdomen: soft, non-tender, non-distended, normal bowel sounds  Extremities: warm, no edema, no clubbing  Skin: no rash or lesion  Neurologic: Alert and oriented x3    Pertinent/ New Labs and Imaging Studies     Imaging Personally Reviewed:        Chest x-ray 12/20/22:     FINDINGS:   The lungs are hyperinflated. There is attenuation of the peripheral   pulmonary vasculature. There is several punctate densities overlying the   right lung base unchanged. These may be due to remote lead aspirated   material.  Cardiac size is normal.  There is atherosclerotic calcification of   the thoracic aorta.   There are multiple remote healed left rib fractures. No   pneumothorax. Impression   No radiographic evidence of acute cardiopulmonary disease. COPD and emphysema. Echo 9/3/2013 JORGE ALBERTO:   Summary   Normal LV segmental wall motion. Ejection fraction is visually estimated at 65%. Mildly dilated left atrium. There was no evidence of spontaneous echo contrast in the left atrium. No   JAILENE thrombus. No hemodynamically significant valve disease. Labs:  Lab Results   Component Value Date/Time    WBC 7.3 12/21/2022 01:02 AM    HGB 10.9 12/21/2022 01:02 AM    HCT 35.4 12/21/2022 01:02 AM    MCV 94.1 12/21/2022 01:02 AM    MCH 29.0 12/21/2022 01:02 AM    MCHC 30.8 12/21/2022 01:02 AM    RDW 16.4 12/21/2022 01:02 AM     12/21/2022 01:02 AM    MPV 10.7 12/21/2022 01:02 AM     Lab Results   Component Value Date/Time     12/21/2022 01:02 AM    K 3.9 12/21/2022 01:02 AM    K 4.2 12/18/2022 02:05 AM     12/21/2022 01:02 AM    CO2 33 12/21/2022 01:02 AM    BUN 38 12/21/2022 01:02 AM    CREATININE 1.4 12/21/2022 01:02 AM    LABALBU 3.2 12/18/2022 02:05 AM    CALCIUM 8.4 12/21/2022 01:02 AM    GFRAA >60 06/25/2020 03:40 PM    LABGLOM 53 12/21/2022 01:02 AM     Lab Results   Component Value Date/Time    PROTIME 14.3 07/09/2019 05:50 AM    PROTIME 11.3 01/20/2011 10:15 AM    INR 1.2 07/09/2019 05:50 AM     No results for input(s): PROBNP in the last 72 hours. No results for input(s): PROCAL in the last 72 hours. This SmartLink has not been configured with any valid records. Micro:  No results for input(s): CULTRESP in the last 72 hours. No results for input(s): LABGRAM in the last 72 hours. No results for input(s): LEGUR in the last 72 hours. No results for input(s): STREPNEUMAGU in the last 72 hours. No results for input(s): LP1UAG in the last 72 hours.        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 Can resume Trelegy Ellipta upon discharge. Oral prednisone with taper written for dc  Add flonase, saline nasal spray for stuffiness  Tessalon for cough . Add flutter valve  Tamiflu twice daily for 5 total days  Cxr 12/20 no acute process  Chronic anticoagulation with Eliquis for PAF  Coordinate outpatient PFTs, CT chest noncontrast as outpatient once fully recovered. Message routed for follow-up with Dr. Veena Taylor  Can likely dc once oxygen requirements determined. This plan of care was reviewed in collaboration with Dr. Crys Hansen  Electronically signed by ANGELI Patel CNP on 12/21/2022 at 12:05 PM      I personally saw, examined, and cared for the patient. Labs, medications, radiographs reviewed. I agree with history exam and plans detailed in NP note.    Saintclair Allan, MD

## 2022-12-22 NOTE — DISCHARGE SUMMARY
[facility]   Discharge summary   Patient ID:  Eric Chin  [id number]  77 y.o.  1950    Admit date: 12/17/2022    Discharge date and time: 12/21/22    Admission Diagnoses:   Patient Active Problem List   Diagnosis    PVD (peripheral vascular disease) ([facility]    S/P AAA repair using bifurcation graft    COPD (chronic obstructive pulmonary disease) (HCC)    Squamous cell carcinoma of hypopharynx (HCC)    Severe protein-calorie malnutrition (HCC)    Paroxysmal atrial fibrillation (HCC)    Acute respiratory failure with hypoxemia (HCC)    Dysphagia    Benign essential hypertension    Osteoarthrosis    COPD exacerbation (HCC)    Influenza A    Acute on chronic respiratory failure with hypoxia ([facility]       Discharge Diagnoses: Acute on chronic respiratory failure with hypoxia due to influenza A infection    Consults: pulmonary/intensive care    Procedures: none    Hospital Course:   78-year-old  male with known history of COPD, on nocturnal home O2, presents with complaints of worsening shortness of breath and congestion     Positive for influenza A  Tamiflu 73 p.o. twice daily or renal adjustment as needed x5 days  Continue supportive care with home COPD regimen  Wean oxygen as able-currently requiring 4 L with sats in low 90s  Family request pulmonology evaluation due to his longstanding lung history  Resume home medications  Increase oxygen therapy as needed-currently saturating low to mid 90s on 4 L  Mild thrombocytopenia-continue to monitor     12/19/22:  -Currently on 2L NC  -Pulm following  -Continue tamiflu and IV solumedrol, switch IV solumedrol to PO prednisone tomorrow  -Ambulating pulse ox      12/20/22:  -Worsening hypoxia, now requiring 6L NC-though clinically improved  -Pulm following  -IV solumedrol was switch to PO and he appears to be tolerating well  -continue with scheduled breathing treatments   -Okay for discharge from medicine standpoint-continue home oxygen (3 L at home at night, will require daytime oxygen as well)  -It appears that arrangements have been made for pulmonology follow-up by pulmonary team     12/21/22:  -Likely can be discharged home today if ok with pulm. Adjusted concentrator or equipment needed per pulm  -Finished tamiflu treatment      Recent Labs     12/20/22  0844 12/21/22  0102   WBC 9.5 7.3   HGB 11.5* 10.9*   HCT 38.3 35.4*    150       Recent Labs     12/20/22  0448 12/21/22  0102    142   K 3.8 3.9   CL 99 101   CO2 30* 33*   BUN 33* 38*   CREATININE 1.0 1.4*   CALCIUM 9.0 8.4*       XR CHEST PORTABLE    Result Date: 12/17/2022  EXAMINATION: ONE XRAY VIEW OF THE CHEST 12/17/2022 7:54 am COMPARISON: None. HISTORY: ORDERING SYSTEM PROVIDED HISTORY: shortness of breath TECHNOLOGIST PROVIDED HISTORY: Reason for exam:->shortness of breath FINDINGS: There is hyperinflation of the lungs and flattening of diaphragms consistent with COPD. There is no pulmonary infiltrate, mass or nodule. There is no pleural effusion. There are stable calcific densities versus metallic density seen overlying the right lung base. These findings are chronic. The cardiac silhouette is within normal limits. COPD. There is no evidence of failure or pneumonia. Discharge Exam:    HEENT: NCAT,  PERRLA, No JVD  Heart:  RRR, no murmurs, gallops, or rubs.   Lungs:  CTA bilaterally, no wheeze, rales or rhonchi  Abd: bowel sounds present, nontender, nondistended, no masses  Extrem:  No clubbing, cyanosis, or edema    Disposition: home     Patient Condition at Discharge: Stable    Patient Instructions:      Medication List        START taking these medications      benzonatate 200 MG capsule  Commonly known as: TESSALON  Take 1 capsule by mouth 3 times daily for 7 days     fluticasone 50 MCG/ACT nasal spray  Commonly known as: FLONASE  2 sprays by Each Nostril route daily     guaiFENesin 600 MG extended release tablet  Commonly known as: MUCINEX  Take 1 tablet by mouth 2 times daily for 15 days     oseltamivir 75 MG capsule  Commonly known as: TAMIFLU  Take 1 capsule by mouth 2 times daily for 5 doses     predniSONE 10 MG tablet  Commonly known as: DELTASONE  Take 4 tabs daily x 3 days then 3 tabs daily x 3 days then 2 tabs daily x 3 days then 1 tab daily x 3 days then stop            CHANGE how you take these medications      * albuterol (2.5 MG/3ML) 0.083% nebulizer solution  Commonly known as: PROVENTIL  What changed: Another medication with the same name was changed. Make sure you understand how and when to take each. * albuterol sulfate  (90 Base) MCG/ACT inhaler  Commonly known as: PROVENTIL;VENTOLIN;PROAIR  Inhale 2 puffs into the lungs every 6 hours as needed for Shortness of Breath or Wheezing  What changed: reasons to take this           * This list has 2 medication(s) that are the same as other medications prescribed for you. Read the directions carefully, and ask your doctor or other care provider to review them with you. CONTINUE taking these medications      coenzyme Q10 100 MG Caps capsule     dilTIAZem 120 MG Tb24 extended release tablet  Commonly known as: CARDIZEM LA     Eliquis 5 MG Tabs tablet  Generic drug: apixaban     ferrous sulfate 325 (65 Fe) MG tablet  Commonly known as: IRON 325     Flax Seed Oil 1000 MG Caps     metoprolol succinate 25 MG extended release tablet  Commonly known as: TOPROL XL     * PreserVision AREDS 2 Caps     * therapeutic multivitamin-minerals tablet     tamsulosin 0.4 MG capsule  Commonly known as: FLOMAX     Trelegy Ellipta 100-62.5-25 MCG/ACT Aepb inhaler  Generic drug: fluticasone-umeclidin-vilant  USE 1 INHALATION ORALLY    DAILY     VITAMIN B-COMPLEX PO           * This list has 2 medication(s) that are the same as other medications prescribed for you. Read the directions carefully, and ask your doctor or other care provider to review them with you.                 STOP taking these medications levothyroxine 25 MCG tablet  Commonly known as: SYNTHROID               Where to Get Your Medications        These medications were sent to [address], [address] [place] [address] Jimmy Yi [phone]  [facility] [address], [facility]      Phone: [phone]   albuterol sulfate  (90 Base) MCG/ACT inhaler  benzonatate 200 MG capsule  fluticasone 50 MCG/ACT nasal spray  guaiFENesin 600 MG extended release tablet  oseltamivir 75 MG capsule  predniSONE 10 MG tablet       Activity: activity as tolerated  Diet: regular diet    Pt has been advised to: Follow-up with Patria Menendez MD in 1 week. Follow-up with consultants as recommended by them    Note that over 30 minutes was spent in preparing discharge papers, discussing discharge with patient, medication review, etc.    Signed:  ANGELI Wade CNP  12/21/22  3:03 PM     Above note edited to reflect my thoughts     I personally saw, examined and provided care for the patient. Radiographs, labs and medication list were reviewed by me independently. The case was discussed in detail and plans for care were established. Review of FRANCISCO Wade   , documentation was conducted and revisions were made as appropriate directly by me. I agree with the above documented exam, problem list, and plan of care.      Javy Zimmerman MD  12/21/2022

## 2023-01-16 PROBLEM — J10.1 INFLUENZA A: Status: RESOLVED | Noted: 2022-12-17 | Resolved: 2023-01-16

## 2023-02-14 ENCOUNTER — HOSPITAL ENCOUNTER (OUTPATIENT)
Age: 73
Discharge: HOME | End: 2023-02-16
Payer: MEDICARE

## 2023-02-14 ENCOUNTER — HOSPITAL ENCOUNTER (OUTPATIENT)
Dept: GENERAL RADIOLOGY | Age: 73
Discharge: HOME | End: 2023-02-16
Payer: MEDICARE

## 2023-02-14 DIAGNOSIS — J44.9 STAGE 3 SEVERE COPD BY GOLD CLASSIFICATION (HCC): ICD-10-CM

## 2023-02-14 PROCEDURE — 71046 X-RAY EXAM CHEST 2 VIEWS: CPT

## 2023-02-16 ENCOUNTER — TELEPHONE (OUTPATIENT)
Dept: CARDIOLOGY CLINIC | Age: 73
End: 2023-02-16

## 2023-02-16 NOTE — TELEPHONE ENCOUNTER
Patient called stating his HR has been running about 90 bpm.  His HR at his last office visit in September 2022 was 67 bpm.  He is asking if this is a concern for him. Please advise.

## 2023-02-20 ENCOUNTER — TELEPHONE (OUTPATIENT)
Dept: CARDIOLOGY CLINIC | Age: 73
End: 2023-02-20

## 2023-02-20 ENCOUNTER — NURSE ONLY (OUTPATIENT)
Dept: CARDIOLOGY CLINIC | Age: 73
End: 2023-02-20
Payer: MEDICARE

## 2023-02-20 DIAGNOSIS — I48.0 PAROXYSMAL ATRIAL FIBRILLATION (HCC): Primary | ICD-10-CM

## 2023-02-20 PROCEDURE — 93000 ELECTROCARDIOGRAM COMPLETE: CPT | Performed by: INTERNAL MEDICINE

## 2023-02-20 NOTE — TELEPHONE ENCOUNTER
Reviewed 2/20/23 EKG with Dr. Annie Jacinto, normal sinus rhythm, continue to monitor.   Patient notified by Dayna Quan MA.

## 2023-03-21 ENCOUNTER — HOSPITAL ENCOUNTER (OUTPATIENT)
Dept: CARDIOLOGY | Age: 73
Discharge: HOME | End: 2023-03-21
Payer: MEDICARE

## 2023-03-21 ENCOUNTER — OFFICE VISIT (OUTPATIENT)
Dept: VASCULAR SURGERY | Age: 73
End: 2023-03-21
Payer: MEDICARE

## 2023-03-21 VITALS — WEIGHT: 153 LBS | BODY MASS INDEX: 20.72 KG/M2 | HEIGHT: 72 IN

## 2023-03-21 DIAGNOSIS — Z86.79 S/P AAA REPAIR USING BIFURCATION GRAFT: ICD-10-CM

## 2023-03-21 DIAGNOSIS — Z95.828 S/P AAA REPAIR USING BIFURCATION GRAFT: Primary | ICD-10-CM

## 2023-03-21 DIAGNOSIS — I71.43 INFRARENAL ABDOMINAL AORTIC ANEURYSM (AAA) WITHOUT RUPTURE (HCC): ICD-10-CM

## 2023-03-21 DIAGNOSIS — Z95.828 S/P AAA REPAIR USING BIFURCATION GRAFT: ICD-10-CM

## 2023-03-21 DIAGNOSIS — Z86.79 S/P AAA REPAIR USING BIFURCATION GRAFT: Primary | ICD-10-CM

## 2023-03-21 DIAGNOSIS — I71.40 AAA (ABDOMINAL AORTIC ANEURYSM) WITHOUT RUPTURE (HCC): ICD-10-CM

## 2023-03-21 PROCEDURE — 93978 VASCULAR STUDY: CPT

## 2023-03-21 PROCEDURE — 99213 OFFICE O/P EST LOW 20 MIN: CPT | Performed by: PHYSICIAN ASSISTANT

## 2023-03-21 PROCEDURE — 1123F ACP DISCUSS/DSCN MKR DOCD: CPT | Performed by: PHYSICIAN ASSISTANT

## 2023-03-21 NOTE — PROGRESS NOTES
1/11/23  Yes Historical Provider, MD   clobetasol (TEMOVATE) 0.05 % cream APPLY TOPICALLY TWICE DAILY AS NEEDED FOR FLARES 11/27/22  Yes Historical Provider, MD   hydrOXYzine HCl (ATARAX) 10 MG tablet  12/10/22  Yes Historical Provider, MD   TIROSINT 25 MCG CAPS TAKE ONE CAPSULE BY MOUTH DAILY 11/21/22  Yes Historical Provider, MD   methylPREDNISolone (MEDROL DOSEPACK) 4 MG tablet  1/11/23  Yes Historical Provider, MD   triamcinolone (KENALOG) 0.1 % cream APPLY TWICE DAILY AS NEEDED FOR FLARES. 11/27/22  Yes Historical Provider, MD   dilTIAZem (CARDIZEM CD) 120 MG extended release capsule  1/31/23  Yes Historical Provider, MD   megestrol (MEGACE) 20 MG tablet Take 1 tablet by mouth daily 2/15/23  Yes ANGELI Wade CNP   fluticasone (FLONASE) 50 MCG/ACT nasal spray 2 sprays by Each Nostril route daily 12/21/22  Yes Lise Macdonald MD   albuterol sulfate HFA (PROVENTIL;VENTOLIN;PROAIR) 108 (90 Base) MCG/ACT inhaler Inhale 2 puffs into the lungs every 6 hours as needed for Shortness of Breath or Wheezing 12/19/22  Yes ANGELI Herrera CNP   Marliss Amparo 100-62.5-25 MCG/INH AEPB USE 1 INHALATION ORALLY    DAILY 5/19/22  Yes Chyna Cardoza MD   tamsulosin (FLOMAX) 0.4 MG capsule Take 0.4 mg by mouth daily 5/9/21  Yes Historical Provider, MD   coenzyme Q10 100 MG CAPS capsule Take 100 mg by mouth daily   Yes Historical Provider, MD   Flaxseed, Linseed, (FLAX SEED OIL) 1000 MG CAPS Take 1,000 mg by mouth daily   Yes Historical Provider, MD   Multiple Vitamins-Minerals (THERAPEUTIC MULTIVITAMIN-MINERALS) tablet Take 1 tablet by mouth daily   Yes Historical Provider, MD   B Complex Vitamins (VITAMIN B-COMPLEX PO) Take by mouth daily   Yes Historical Provider, MD   Multiple Vitamins-Minerals (PRESERVISION AREDS 2) CAPS Take by mouth 2 times daily    Yes Historical Provider, MD   ferrous sulfate 325 (65 Fe) MG tablet TAKE ONE TABLET BY MOUTH TWO TIMES A DAY.  TAKE WITH VITAMIN C FRUIT JUICE. 12/30/19  Yes

## 2023-03-21 NOTE — PROGRESS NOTES
[facility] - [facility]    This is a pre read worksheet - prior to official physician interpretation    Tanner Aguilar  1950  Date of study: 3/21/23    Indication for study:  AAA  Study : Aortic Ultrasound    Diameter Aorta:     Proximal:   cm     Mid:   cm     Distal:  5.7 x 5.7 cm     Right iliac: 1.9 x 1.8 cm     Left iliac: 1.7 x 1.7 cm    Additional comments: limited d/t bowel gas. No obvious leaks visualized.       LAST STUDY   3/17/2020  5.5 cm  Rt 1.9  Lt 2.0

## 2023-04-26 ENCOUNTER — HOSPITAL ENCOUNTER (OUTPATIENT)
Age: 73
Discharge: HOME | End: 2023-04-28

## 2023-04-26 PROCEDURE — 87081 CULTURE SCREEN ONLY: CPT

## 2023-04-27 LAB — UREASE TISS QL: NORMAL

## 2023-05-08 ENCOUNTER — APPOINTMENT (OUTPATIENT)
Dept: GENERAL RADIOLOGY | Age: 73
DRG: 004 | End: 2023-05-08
Payer: MEDICARE

## 2023-05-08 ENCOUNTER — HOSPITAL ENCOUNTER (OUTPATIENT)
Age: 73
Setting detail: OBSERVATION
Discharge: HOME | DRG: 004 | End: 2023-05-09
Attending: EMERGENCY MEDICINE
Payer: MEDICARE

## 2023-05-08 ENCOUNTER — APPOINTMENT (OUTPATIENT)
Dept: CT IMAGING | Age: 73
DRG: 004 | End: 2023-05-08
Payer: MEDICARE

## 2023-05-08 DIAGNOSIS — G45.9 TIA (TRANSIENT ISCHEMIC ATTACK): Primary | ICD-10-CM

## 2023-05-08 LAB
ANION GAP SERPL CALCULATED.3IONS-SCNC: 8 MMOL/L (ref 7–16)
BASOPHILS # BLD: 0.06 E9/L (ref 0–0.2)
BASOPHILS NFR BLD: 1.3 % (ref 0–2)
BUN SERPL-MCNC: 24 MG/DL (ref 6–23)
CALCIUM SERPL-MCNC: 8.9 MG/DL (ref 8.6–10.2)
CHLORIDE SERPL-SCNC: 102 MMOL/L (ref 98–107)
CO2 SERPL-SCNC: 32 MMOL/L (ref 22–29)
CREAT SERPL-MCNC: 1.3 MG/DL (ref 0.7–1.2)
EKG ATRIAL RATE: 82 BPM
EKG P AXIS: 42 DEGREES
EKG P-R INTERVAL: 148 MS
EKG Q-T INTERVAL: 386 MS
EKG QRS DURATION: 96 MS
EKG QTC CALCULATION (BAZETT): 450 MS
EKG R AXIS: 80 DEGREES
EKG T AXIS: 75 DEGREES
EKG VENTRICULAR RATE: 82 BPM
EOSINOPHIL # BLD: 0.25 E9/L (ref 0.05–0.5)
EOSINOPHIL NFR BLD: 5.5 % (ref 0–6)
ERYTHROCYTE [DISTWIDTH] IN BLOOD BY AUTOMATED COUNT: 17.1 FL (ref 11.5–15)
GLUCOSE SERPL-MCNC: 121 MG/DL (ref 74–99)
HCT VFR BLD AUTO: 36.1 % (ref 37–54)
HGB BLD-MCNC: 11.4 G/DL (ref 12.5–16.5)
IMM GRANULOCYTES # BLD: 0.04 E9/L
IMM GRANULOCYTES NFR BLD: 0.9 % (ref 0–5)
LYMPHOCYTES # BLD: 0.9 E9/L (ref 1.5–4)
LYMPHOCYTES NFR BLD: 19.8 % (ref 20–42)
MCH RBC QN AUTO: 28.8 PG (ref 26–35)
MCHC RBC AUTO-ENTMCNC: 31.6 % (ref 32–34.5)
MCV RBC AUTO: 91.2 FL (ref 80–99.9)
MONOCYTES # BLD: 0.5 E9/L (ref 0.1–0.95)
MONOCYTES NFR BLD: 11 % (ref 2–12)
NEUTROPHILS # BLD: 2.79 E9/L (ref 1.8–7.3)
NEUTS SEG NFR BLD: 61.5 % (ref 43–80)
PLATELET # BLD AUTO: 120 E9/L (ref 130–450)
PMV BLD AUTO: 10.5 FL (ref 7–12)
POTASSIUM SERPL-SCNC: 4.9 MMOL/L (ref 3.5–5)
RBC # BLD AUTO: 3.96 E12/L (ref 3.8–5.8)
SODIUM SERPL-SCNC: 142 MMOL/L (ref 132–146)
TROPONIN, HIGH SENSITIVITY: 44 NG/L (ref 0–11)
TROPONIN, HIGH SENSITIVITY: 49 NG/L (ref 0–11)
WBC # BLD: 4.5 E9/L (ref 4.5–11.5)

## 2023-05-08 PROCEDURE — 94640 AIRWAY INHALATION TREATMENT: CPT

## 2023-05-08 PROCEDURE — 84484 ASSAY OF TROPONIN QUANT: CPT

## 2023-05-08 PROCEDURE — G0378 HOSPITAL OBSERVATION PER HR: HCPCS

## 2023-05-08 PROCEDURE — 71045 X-RAY EXAM CHEST 1 VIEW: CPT

## 2023-05-08 PROCEDURE — 6360000002 HC RX W HCPCS: Performed by: NURSE PRACTITIONER

## 2023-05-08 PROCEDURE — 96374 THER/PROPH/DIAG INJ IV PUSH: CPT

## 2023-05-08 PROCEDURE — 6370000000 HC RX 637 (ALT 250 FOR IP): Performed by: NURSE PRACTITIONER

## 2023-05-08 PROCEDURE — 93010 ELECTROCARDIOGRAM REPORT: CPT | Performed by: INTERNAL MEDICINE

## 2023-05-08 PROCEDURE — 36415 COLL VENOUS BLD VENIPUNCTURE: CPT

## 2023-05-08 PROCEDURE — 80048 BASIC METABOLIC PNL TOTAL CA: CPT

## 2023-05-08 PROCEDURE — 93005 ELECTROCARDIOGRAM TRACING: CPT

## 2023-05-08 PROCEDURE — 94664 DEMO&/EVAL PT USE INHALER: CPT

## 2023-05-08 PROCEDURE — 70450 CT HEAD/BRAIN W/O DYE: CPT

## 2023-05-08 PROCEDURE — 85025 COMPLETE CBC W/AUTO DIFF WBC: CPT

## 2023-05-08 PROCEDURE — 99285 EMERGENCY DEPT VISIT HI MDM: CPT

## 2023-05-08 RX ORDER — LORAZEPAM 2 MG/ML
0.5 INJECTION INTRAMUSCULAR
Status: COMPLETED | OUTPATIENT
Start: 2023-05-08 | End: 2023-05-09

## 2023-05-08 RX ORDER — ALBUTEROL SULFATE 2.5 MG/3ML
2.5 SOLUTION RESPIRATORY (INHALATION) EVERY 6 HOURS PRN
Status: DISCONTINUED | OUTPATIENT
Start: 2023-05-08 | End: 2023-05-09 | Stop reason: HOSPADM

## 2023-05-08 RX ORDER — POLYETHYLENE GLYCOL 3350 17 G/17G
17 POWDER, FOR SOLUTION ORAL DAILY PRN
Status: DISCONTINUED | OUTPATIENT
Start: 2023-05-08 | End: 2023-05-09 | Stop reason: HOSPADM

## 2023-05-08 RX ORDER — FLUTICASONE FUROATE, UMECLIDINIUM BROMIDE AND VILANTEROL TRIFENATATE 100; 62.5; 25 UG/1; UG/1; UG/1
1 POWDER RESPIRATORY (INHALATION) EVERY MORNING
Status: ON HOLD | COMMUNITY
End: 2023-05-20 | Stop reason: HOSPADM

## 2023-05-08 RX ORDER — ALBUTEROL SULFATE 90 UG/1
2 AEROSOL, METERED RESPIRATORY (INHALATION) EVERY 6 HOURS PRN
Status: DISCONTINUED | OUTPATIENT
Start: 2023-05-08 | End: 2023-05-08

## 2023-05-08 RX ORDER — METOPROLOL SUCCINATE 25 MG/1
25 TABLET, EXTENDED RELEASE ORAL DAILY
Status: DISCONTINUED | OUTPATIENT
Start: 2023-05-09 | End: 2023-05-09 | Stop reason: HOSPADM

## 2023-05-08 RX ORDER — ONDANSETRON 2 MG/ML
4 INJECTION INTRAMUSCULAR; INTRAVENOUS EVERY 6 HOURS PRN
Status: DISCONTINUED | OUTPATIENT
Start: 2023-05-08 | End: 2023-05-09 | Stop reason: HOSPADM

## 2023-05-08 RX ORDER — ATORVASTATIN CALCIUM 40 MG/1
40 TABLET, FILM COATED ORAL NIGHTLY
Status: DISCONTINUED | OUTPATIENT
Start: 2023-05-08 | End: 2023-05-09 | Stop reason: HOSPADM

## 2023-05-08 RX ORDER — GUAIFENESIN 600 MG/1
600 TABLET, EXTENDED RELEASE ORAL 2 TIMES DAILY
Status: DISCONTINUED | OUTPATIENT
Start: 2023-05-08 | End: 2023-05-08

## 2023-05-08 RX ORDER — FERROUS SULFATE 325(65) MG
325 TABLET ORAL 2 TIMES DAILY WITH MEALS
Status: DISCONTINUED | OUTPATIENT
Start: 2023-05-08 | End: 2023-05-09 | Stop reason: HOSPADM

## 2023-05-08 RX ORDER — ASPIRIN 300 MG/1
300 SUPPOSITORY RECTAL DAILY
Status: DISCONTINUED | OUTPATIENT
Start: 2023-05-08 | End: 2023-05-09 | Stop reason: HOSPADM

## 2023-05-08 RX ORDER — BUDESONIDE 0.5 MG/2ML
0.5 INHALANT ORAL 2 TIMES DAILY
Status: DISCONTINUED | OUTPATIENT
Start: 2023-05-08 | End: 2023-05-09 | Stop reason: HOSPADM

## 2023-05-08 RX ORDER — ONDANSETRON 4 MG/1
4 TABLET, ORALLY DISINTEGRATING ORAL EVERY 8 HOURS PRN
Status: DISCONTINUED | OUTPATIENT
Start: 2023-05-08 | End: 2023-05-09 | Stop reason: HOSPADM

## 2023-05-08 RX ORDER — GUAIFENESIN 400 MG/1
400 TABLET ORAL 3 TIMES DAILY
Status: DISCONTINUED | OUTPATIENT
Start: 2023-05-08 | End: 2023-05-09 | Stop reason: HOSPADM

## 2023-05-08 RX ORDER — MEGESTROL ACETATE 20 MG/1
20 TABLET ORAL EVERY MORNING
Status: ON HOLD | COMMUNITY
End: 2023-05-20 | Stop reason: HOSPADM

## 2023-05-08 RX ORDER — ASPIRIN 81 MG/1
81 TABLET ORAL DAILY
Status: DISCONTINUED | OUTPATIENT
Start: 2023-05-08 | End: 2023-05-09 | Stop reason: HOSPADM

## 2023-05-08 RX ORDER — TAMSULOSIN HYDROCHLORIDE 0.4 MG/1
0.4 CAPSULE ORAL DAILY
Status: DISCONTINUED | OUTPATIENT
Start: 2023-05-09 | End: 2023-05-09 | Stop reason: HOSPADM

## 2023-05-08 RX ORDER — DILTIAZEM HYDROCHLORIDE 120 MG/1
120 CAPSULE, COATED, EXTENDED RELEASE ORAL DAILY
Status: DISCONTINUED | OUTPATIENT
Start: 2023-05-09 | End: 2023-05-09 | Stop reason: HOSPADM

## 2023-05-08 RX ORDER — MEGESTROL ACETATE 20 MG/1
20 TABLET ORAL DAILY
Status: DISCONTINUED | OUTPATIENT
Start: 2023-05-09 | End: 2023-05-09 | Stop reason: HOSPADM

## 2023-05-08 RX ORDER — ARFORMOTEROL TARTRATE 15 UG/2ML
15 SOLUTION RESPIRATORY (INHALATION) 2 TIMES DAILY
Status: DISCONTINUED | OUTPATIENT
Start: 2023-05-08 | End: 2023-05-09 | Stop reason: HOSPADM

## 2023-05-08 RX ADMIN — FERROUS SULFATE TAB 325 MG (65 MG ELEMENTAL FE) 325 MG: 325 (65 FE) TAB at 18:01

## 2023-05-08 RX ADMIN — ATORVASTATIN CALCIUM 40 MG: 40 TABLET, FILM COATED ORAL at 20:59

## 2023-05-08 RX ADMIN — IPRATROPIUM BROMIDE 0.5 MG: 0.5 SOLUTION RESPIRATORY (INHALATION) at 16:33

## 2023-05-08 RX ADMIN — ASPIRIN 81 MG: 81 TABLET, COATED ORAL at 14:06

## 2023-05-08 RX ADMIN — GUAIFENESIN 400 MG: 400 TABLET ORAL at 20:59

## 2023-05-08 RX ADMIN — GUAIFENESIN 400 MG: 400 TABLET ORAL at 14:06

## 2023-05-08 RX ADMIN — APIXABAN 5 MG: 5 TABLET, FILM COATED ORAL at 20:59

## 2023-05-08 RX ADMIN — BUDESONIDE 500 MCG: 0.5 SUSPENSION RESPIRATORY (INHALATION) at 20:07

## 2023-05-08 RX ADMIN — IPRATROPIUM BROMIDE 0.5 MG: 0.5 SOLUTION RESPIRATORY (INHALATION) at 20:07

## 2023-05-08 RX ADMIN — ARFORMOTEROL TARTRATE 15 MCG: 15 SOLUTION RESPIRATORY (INHALATION) at 20:07

## 2023-05-08 NOTE — ED PROVIDER NOTES
[facility]        Pt Name: Harley Lubin  MRN: [id number]  [place] 1950  Date of evaluation: 5/8/2023  Provider: Megha Rios DO  PCP: Shayne Aceves MD  Note Started: 11:38 PM EDT 5/8/23    CHIEF COMPLAINT       Chief Complaint   Patient presents with    Aphasia     slurred speech, L arm numbness, yesterday 1700, resolved. Headache       HISTORY OF PRESENT ILLNESS: 1 or more Elements   History From: patient and spouse        Harley Lubin is a 74 y.o. male who presents for concerns of aphasia and headache that has resolved. Patient states that at approximately 5 PM 2 days ago he had sudden onset confusion and difficulty speaking. Patient denies any falls with this. Patient states that he was making a sandwich with this occurred. Patient decided to go to bed and when he woke up yesterday morning the symptoms had resolved. Patient has a relevant past medical history of aortic aneurysm repair. Patient states he has not had episode like this before. Patient does have a history of atrial fibrillation as well as hyperlipidemia. Patient denies any chest pain, fever, chills, nausea, vomiting associated with this. Patient has no complaints at time of evaluation. Nursing Notes were all reviewed and agreed with or any disagreements were addressed in the HPI. REVIEW OF SYSTEMS :      Positives and Pertinent negatives as per HPI.      SURGICAL HISTORY     Past Surgical History:   Procedure Laterality Date    ABDOMINAL AORTIC ANEURYSM REPAIR, ENDOVASCULAR  11/17/08    Excluder - Delatore    BRONCHOSCOPY  9/9/2015    CARDIOVERSION  9-2013    COLONOSCOPY      ECHOCARDIOGRAM TRANSESOPHAGEAL  7/2013         ECHOCARDIOGRAM TRANSESOPHAGEAL  3/18/2013         ECHOCARDIOGRAM TRANSESOPHAGEAL  9/3/2013         GASTROSTOMY TUBE PLACEMENT N/A 7/9/2019    EGD PEG TUBE PLACEMENT performed by Yuko Bernal MD at [address] [place] [address] N/A 7/8/2019    DIRECT

## 2023-05-08 NOTE — PROGRESS NOTES
Called and spoke with pts AP Catherine regarding possible meds for [place] for MRI. RN said patient told her\" he would need a LOT of meds or to be \"put out\" for it\". Dorene said MRI on hold for now until they can speak with the Dr concerning this.

## 2023-05-08 NOTE — PROGRESS NOTES
Database initiated. Patient is A&O independent from home with wife. States he uses no assistive devices and is RA at baseline.

## 2023-05-08 NOTE — ED NOTES
ED to Inpatient Handoff Report    Notified Kamari Loser that electronic handoff available and patient ready for transport to room 626. Safety Risks: Risk of falls    Patient in Restraints: no    Constant Observer or Patient : no    Telemetry Monitoring Ordered :Yes      Cardiac Rhythm: Sinus rhythm    Order to transfer to unit without monitor:YES    Last MEWS: 2 Time completed: 1254    Vitals:    05/08/23 0837 05/08/23 1154 05/08/23 1254   BP: (!) 141/73 (!) 161/81 (!) 165/75   Pulse: 88 90 82   Resp: 14 22 21   Temp: 99 °F (37.2 °C)  98 °F (36.7 °C)   TempSrc: Oral  Oral   SpO2: 96% 91% 96%   Weight: 155 lb (70.3 kg)     Height: 6' (1.829 m)         Opportunity for questions and clarification was provided.         Jamal Snellen, RN  05/08/23 1300

## 2023-05-08 NOTE — PLAN OF CARE
Problem: ABCDS Injury Assessment  Goal: Absence of physical injury  Outcome: Progressing     Problem: Discharge Planning  Goal: Discharge to home or other facility with appropriate resources  Outcome: Progressing     Problem: Safety - Adult  Goal: Free from fall injury  Outcome: Progressing

## 2023-05-08 NOTE — PROGRESS NOTES
MRI called to inquire pre meds for MRI. Spoke with pt, he either requires \"to be put out\" or have a lot of medications to be able to tolerate the procedure. Will address in the am with rounding physician.

## 2023-05-09 ENCOUNTER — APPOINTMENT (OUTPATIENT)
Dept: MRI IMAGING | Age: 73
DRG: 004 | End: 2023-05-09
Payer: MEDICARE

## 2023-05-09 VITALS
RESPIRATION RATE: 18 BRPM | DIASTOLIC BLOOD PRESSURE: 81 MMHG | TEMPERATURE: 97.7 F | HEIGHT: 72 IN | HEART RATE: 94 BPM | WEIGHT: 155 LBS | OXYGEN SATURATION: 97 % | SYSTOLIC BLOOD PRESSURE: 174 MMHG | BODY MASS INDEX: 20.99 KG/M2

## 2023-05-09 LAB
CHOLESTEROL, TOTAL: 174 MG/DL (ref 0–199)
HBA1C MFR BLD: 5 % (ref 4–5.6)
HDLC SERPL-MCNC: 42 MG/DL
LDLC SERPL CALC-MCNC: 120 MG/DL (ref 0–99)
TRIGL SERPL-MCNC: 59 MG/DL (ref 0–149)
VLDLC SERPL CALC-MCNC: 12 MG/DL

## 2023-05-09 PROCEDURE — 70551 MRI BRAIN STEM W/O DYE: CPT

## 2023-05-09 PROCEDURE — 36415 COLL VENOUS BLD VENIPUNCTURE: CPT

## 2023-05-09 PROCEDURE — 96374 THER/PROPH/DIAG INJ IV PUSH: CPT

## 2023-05-09 PROCEDURE — 6360000002 HC RX W HCPCS: Performed by: NURSE PRACTITIONER

## 2023-05-09 PROCEDURE — G0378 HOSPITAL OBSERVATION PER HR: HCPCS

## 2023-05-09 PROCEDURE — 80061 LIPID PANEL: CPT

## 2023-05-09 PROCEDURE — 6370000000 HC RX 637 (ALT 250 FOR IP): Performed by: NURSE PRACTITIONER

## 2023-05-09 PROCEDURE — 94640 AIRWAY INHALATION TREATMENT: CPT

## 2023-05-09 PROCEDURE — 83036 HEMOGLOBIN GLYCOSYLATED A1C: CPT

## 2023-05-09 RX ORDER — CLOPIDOGREL BISULFATE 75 MG/1
75 TABLET ORAL DAILY
Qty: 30 TABLET | Refills: 3 | Status: SHIPPED | OUTPATIENT
Start: 2023-05-09 | End: 2023-05-11 | Stop reason: ALTCHOICE

## 2023-05-09 RX ORDER — ATORVASTATIN CALCIUM 40 MG/1
40 TABLET, FILM COATED ORAL NIGHTLY
Qty: 30 TABLET | Refills: 3 | Status: ON HOLD | OUTPATIENT
Start: 2023-05-09 | End: 2023-05-20 | Stop reason: HOSPADM

## 2023-05-09 RX ADMIN — METOPROLOL SUCCINATE 25 MG: 25 TABLET, FILM COATED, EXTENDED RELEASE ORAL at 09:08

## 2023-05-09 RX ADMIN — DILTIAZEM HYDROCHLORIDE 120 MG: 120 CAPSULE, COATED, EXTENDED RELEASE ORAL at 09:10

## 2023-05-09 RX ADMIN — IPRATROPIUM BROMIDE 0.5 MG: 0.5 SOLUTION RESPIRATORY (INHALATION) at 09:38

## 2023-05-09 RX ADMIN — BUDESONIDE 500 MCG: 0.5 SUSPENSION RESPIRATORY (INHALATION) at 09:38

## 2023-05-09 RX ADMIN — MEGESTROL ACETATE 20 MG: 20 TABLET ORAL at 09:09

## 2023-05-09 RX ADMIN — GUAIFENESIN 400 MG: 400 TABLET ORAL at 09:09

## 2023-05-09 RX ADMIN — LORAZEPAM 0.5 MG: 2 INJECTION INTRAMUSCULAR; INTRAVENOUS at 10:01

## 2023-05-09 RX ADMIN — TAMSULOSIN HYDROCHLORIDE 0.4 MG: 0.4 CAPSULE ORAL at 09:10

## 2023-05-09 RX ADMIN — APIXABAN 5 MG: 5 TABLET, FILM COATED ORAL at 09:08

## 2023-05-09 RX ADMIN — ARFORMOTEROL TARTRATE 15 MCG: 15 SOLUTION RESPIRATORY (INHALATION) at 09:38

## 2023-05-09 RX ADMIN — IPRATROPIUM BROMIDE 0.5 MG: 0.5 SOLUTION RESPIRATORY (INHALATION) at 12:28

## 2023-05-09 RX ADMIN — FERROUS SULFATE TAB 325 MG (65 MG ELEMENTAL FE) 325 MG: 325 (65 FE) TAB at 09:09

## 2023-05-09 RX ADMIN — ASPIRIN 81 MG: 81 TABLET, COATED ORAL at 09:09

## 2023-05-09 NOTE — PROGRESS NOTES
Message sent to Joelle, April  NP to verify if pt is okay for eliquis as he has no notes in at this time.  Pt okay with eliquis as this is a home med per NP.

## 2023-05-09 NOTE — PROGRESS NOTES
Occupational Therapy    Occupational Therapy referral received. Patient reports up walking in room, able to manage own self care.  No acute OT needs at this time  OT order discontinued

## 2023-05-09 NOTE — PROGRESS NOTES
P Quality Flow/Interdisciplinary Rounds Progress Note        Quality Flow Rounds held on May 9, 2023    Disciplines Attending:  Bedside Nurse, , , and Nursing Unit Leadership    Evangelista Rudd was admitted on 5/8/2023  8:38 AM    Anticipated Discharge Date:       Disposition:    Antonio Score:  Antonio Scale Score: 21    Readmission Risk              Risk of Unplanned Readmission:  0           Discussed patient goal for the day, patient clinical progression, and barriers to discharge.   The following Goal(s) of the Day/Commitment(s) have been identified:   mri today, check d/c after MRI      Everardo Collins RN  May 9, 2023

## 2023-05-09 NOTE — ACP (ADVANCE CARE PLANNING)
Advance Care Planning   Healthcare Decision Maker:    Primary Decision Maker: Ashish Kovacs [facility] - [phone]

## 2023-05-09 NOTE — PROGRESS NOTES
Physical Therapy  Facility/Department: [address] INTERMEDIATE      Name: Michael Rangel  : 1950  MRN: [id number]    Order received, chart reviewed and discussed with the pt. Pt reported being independent with functional mobility while in the hospital and has been ambulating in the hallways. Will discontinue PT order at this time.       Bettie Anaya, [phone]

## 2023-05-09 NOTE — CARE COORDINATION
CM made in room visit to pt and explained role of CM. Pt states he resides w/spouse in a one story home, four steps to enter and is independent w/the occasional use of a cane. He does have access to a walker if needed. He still drives and is established w/Dr. Kris Marino and uses [facility] in [facility]. He has home O2 via [facility] for 3l HS only per pt, [facility] states order is for 4l continuous. Pt does not have a cpap but does have a nebulizer. No hx of HHC or SNF stay. Pt denies any needs and states his spouse will transport home. Will follow. Case Management Assessment  Initial Evaluation    Date/Time of Evaluation: 5/9/2023 10:49 AM  Assessment Completed by: Marvin Hamilton RN    If patient is discharged prior to next notation, then this note serves as note for discharge by case management. Patient Name: Bharati Reece                   YOB: 1950  Diagnosis: TIA (transient ischemic attack) [G45.9]                   Date / Time: 5/8/2023  8:38 AM    Patient Admission Status: Observation   Readmission Risk (Low < 19, Mod (19-27), High > 27): Readmission Risk Score: 13.6    Current PCP: Dixie Manuel MD  PCP verified by CM? Yes    Chart Reviewed: Yes      History Provided by: Patient  Patient Orientation: Alert and Oriented    Patient Cognition: Alert    Hospitalization in the last 30 days (Readmission):  No    If yes, Readmission Assessment in CM Navigator will be completed.     Advance Directives:      Code Status: Full Code   Patient's Primary Decision Maker is: Legal Next of Kin    Primary Decision Maker: EMILIO [place] 11 - [phone]    Discharge Planning:    Patient lives with: Spouse/Significant Other Type of Home: House  Primary Care Giver: Self  Patient Support Systems include: Spouse/Significant Other   Current Financial resources:    Current community resources:    Current services prior to admission: Oxygen Therapy (3 liters at night only)            Current DME:              Type of Home

## 2023-05-09 NOTE — H&P
LABGLOM 57 05/08/2023 09:22 AM    GLUCOSE 121 05/08/2023 09:22 AM    GLUCOSE 116 01/20/2011 10:15 AM    PROT 6.7 12/18/2022 02:05 AM    LABALBU 3.2 12/18/2022 02:05 AM    CALCIUM 8.9 05/08/2023 09:22 AM    BILITOT 0.3 12/18/2022 02:05 AM    ALKPHOS 73 12/18/2022 02:05 AM    AST 26 12/18/2022 02:05 AM    ALT 28 12/18/2022 02:05 AM       Imaging:  CT head: Mild atrophy and chronic changes with no acute intercranial abnormality. EKG:  Normal sinus rhythm    Telemetry:  Sinus rhythm    ASSESSMENT/PLAN:  Principal Problem:    TIA (transient ischemic attack)  Resolved Problems:    * No resolved hospital problems. *    Patient is a 66-year-old male admitted to [facility] with telemetry for  TIA  -Monitor labs  -Check lipid panel,   -Check A1c  -MRI pending  -PT OT  -Continue ASA and Lipitor  -Monitor neuros     Atrial fibrillation  -Continue home Eliquis, Cardizem and metoprolol  -Monitor rate and rhythm      BPH  -Continue home Flomax  -Monitor I's and O's       Medication for other comorbidities continue as appropriate dose adjustment as necessary.      DVT prophylaxis Eliquis   PT OT  Discharge planning    Code status: Full  Requires Inpatient level of care    Electronically signed by Richy Blanc DO on 5/9/2023 at 2:07 PM

## 2023-05-09 NOTE — DISCHARGE SUMMARY
solution  Commonly known as: PROVENTIL     * albuterol sulfate  (90 Base) MCG/ACT inhaler  Commonly known as: PROVENTIL;VENTOLIN;PROAIR  Inhale 2 puffs into the lungs every 6 hours as needed for Shortness of Breath or Wheezing     dilTIAZem 120 MG extended release capsule  Commonly known as: CARDIZEM CD     Eliquis 5 MG Tabs tablet  Generic drug: apixaban     ferrous sulfate 325 (65 Fe) MG tablet  Commonly known as: IRON 325     guaiFENesin 600 MG extended release tablet  Commonly known as: MUCINEX  Take 1 tablet by mouth 2 times daily     hydrOXYzine HCl 10 MG tablet  Commonly known as: ATARAX     megestrol 20 MG tablet  Commonly known as: MEGACE     metoprolol succinate 25 MG extended release tablet  Commonly known as: TOPROL XL     OXYGEN     tamsulosin 0.4 MG capsule  Commonly known as: FLOMAX     Trelegy Ellipta 100-62.5-25 MCG/ACT Aepb inhaler  Generic drug: fluticasone-umeclidin-vilant     triamcinolone 0.1 % cream  Commonly known as: KENALOG           * This list has 2 medication(s) that are the same as other medications prescribed for you. Read the directions carefully, and ask your doctor or other care provider to review them with you. Where to Get Your Medications        These medications were sent to [address], [address]  [address], [address] [place] [address]      Phone: [phone]   atorvastatin 40 MG tablet       Activity: activity as tolerated  Diet: regular diet    Pt has been advised to: Follow-up with Anette Gamez MD in 1 week.   Follow-up with consultants as recommended by them    Note that over 30 minutes was spent in preparing discharge papers, discussing discharge with patient, medication review, etc.    Signed:  Estella Morales DO  5/9/2023  2:06 PM

## 2023-05-09 NOTE — PLAN OF CARE
Problem: ABCDS Injury Assessment  Goal: Absence of physical injury  5/8/2023 2359 by Ivania Valadez RN  Outcome: Progressing  5/8/2023 1857 by Sharda Rowe RN  Outcome: Progressing     Problem: Discharge Planning  Goal: Discharge to home or other facility with appropriate resources  5/8/2023 2359 by Ivania Valadez RN  Outcome: Progressing  Flowsheets (Taken 5/8/2023 2005)  Discharge to home or other facility with appropriate resources: Identify barriers to discharge with patient and caregiver  5/8/2023 1857 by Sharda Rowe RN  Outcome: Progressing     Problem: Safety - Adult  Goal: Free from fall injury  5/8/2023 2359 by Ivania Valadez RN  Outcome: Progressing  5/8/2023 1857 by Sharda Rowe RN  Outcome: Progressing

## 2023-05-11 ENCOUNTER — TELEPHONE (OUTPATIENT)
Dept: OTHER | Facility: CLINIC | Age: 73
End: 2023-05-11

## 2023-05-11 ENCOUNTER — APPOINTMENT (OUTPATIENT)
Dept: CT IMAGING | Age: 73
DRG: 004 | End: 2023-05-11
Payer: MEDICARE

## 2023-05-11 ENCOUNTER — HOSPITAL ENCOUNTER (INPATIENT)
Age: 73
LOS: 9 days | Discharge: HOSPICE | DRG: 004 | End: 2023-05-20
Attending: STUDENT IN AN ORGANIZED HEALTH CARE EDUCATION/TRAINING PROGRAM | Admitting: FAMILY MEDICINE
Payer: MEDICARE

## 2023-05-11 ENCOUNTER — APPOINTMENT (OUTPATIENT)
Dept: GENERAL RADIOLOGY | Age: 73
DRG: 004 | End: 2023-05-11
Payer: MEDICARE

## 2023-05-11 DIAGNOSIS — T17.408A: Primary | ICD-10-CM

## 2023-05-11 LAB
ALBUMIN SERPL-MCNC: 3.9 G/DL (ref 3.5–5.2)
ALP SERPL-CCNC: 84 U/L (ref 40–129)
ALT SERPL-CCNC: 8 U/L (ref 0–40)
ANION GAP SERPL CALCULATED.3IONS-SCNC: 10 MMOL/L (ref 7–16)
AST SERPL-CCNC: 13 U/L (ref 0–39)
BASOPHILS # BLD: 0.06 E9/L (ref 0–0.2)
BASOPHILS NFR BLD: 0.9 % (ref 0–2)
BILIRUB SERPL-MCNC: 0.9 MG/DL (ref 0–1.2)
BUN SERPL-MCNC: 24 MG/DL (ref 6–23)
CALCIUM SERPL-MCNC: 9.5 MG/DL (ref 8.6–10.2)
CHLORIDE SERPL-SCNC: 101 MMOL/L (ref 98–107)
CO2 SERPL-SCNC: 29 MMOL/L (ref 22–29)
CREAT SERPL-MCNC: 1.2 MG/DL (ref 0.7–1.2)
D DIMER: 568 NG/ML DDU
EKG ATRIAL RATE: 100 BPM
EKG P AXIS: 68 DEGREES
EKG P-R INTERVAL: 168 MS
EKG Q-T INTERVAL: 364 MS
EKG QRS DURATION: 86 MS
EKG QTC CALCULATION (BAZETT): 469 MS
EKG R AXIS: 89 DEGREES
EKG T AXIS: 86 DEGREES
EKG VENTRICULAR RATE: 100 BPM
EOSINOPHIL # BLD: 0.19 E9/L (ref 0.05–0.5)
EOSINOPHIL NFR BLD: 2.7 % (ref 0–6)
ERYTHROCYTE [DISTWIDTH] IN BLOOD BY AUTOMATED COUNT: 16.9 FL (ref 11.5–15)
GLUCOSE SERPL-MCNC: 116 MG/DL (ref 74–99)
HCT VFR BLD AUTO: 39.4 % (ref 37–54)
HGB BLD-MCNC: 12.1 G/DL (ref 12.5–16.5)
IMM GRANULOCYTES # BLD: 0.12 E9/L
IMM GRANULOCYTES NFR BLD: 1.7 % (ref 0–5)
LACTATE BLDV-SCNC: 1.3 MMOL/L (ref 0.5–2.2)
LIPASE: 15 U/L (ref 13–60)
LYMPHOCYTES # BLD: 0.94 E9/L (ref 1.5–4)
LYMPHOCYTES NFR BLD: 13.5 % (ref 20–42)
MCH RBC QN AUTO: 27.7 PG (ref 26–35)
MCHC RBC AUTO-ENTMCNC: 30.7 % (ref 32–34.5)
MCV RBC AUTO: 90.2 FL (ref 80–99.9)
MONOCYTES # BLD: 0.93 E9/L (ref 0.1–0.95)
MONOCYTES NFR BLD: 13.3 % (ref 2–12)
NEUTROPHILS # BLD: 4.74 E9/L (ref 1.8–7.3)
NEUTS SEG NFR BLD: 67.9 % (ref 43–80)
PLATELET # BLD AUTO: 135 E9/L (ref 130–450)
PMV BLD AUTO: 10.2 FL (ref 7–12)
POTASSIUM SERPL-SCNC: 5.1 MMOL/L (ref 3.5–5)
PROT SERPL-MCNC: 7.4 G/DL (ref 6.4–8.3)
RBC # BLD AUTO: 4.37 E12/L (ref 3.8–5.8)
SODIUM SERPL-SCNC: 140 MMOL/L (ref 132–146)
TROPONIN, HIGH SENSITIVITY: 36 NG/L (ref 0–11)
TROPONIN, HIGH SENSITIVITY: 48 NG/L (ref 0–11)
TSH SERPL-MCNC: 4.15 UIU/ML (ref 0.27–4.2)
WBC # BLD: 7 E9/L (ref 4.5–11.5)

## 2023-05-11 PROCEDURE — 83690 ASSAY OF LIPASE: CPT

## 2023-05-11 PROCEDURE — 84484 ASSAY OF TROPONIN QUANT: CPT

## 2023-05-11 PROCEDURE — 93010 ELECTROCARDIOGRAM REPORT: CPT | Performed by: INTERNAL MEDICINE

## 2023-05-11 PROCEDURE — 70491 CT SOFT TISSUE NECK W/DYE: CPT

## 2023-05-11 PROCEDURE — 71045 X-RAY EXAM CHEST 1 VIEW: CPT

## 2023-05-11 PROCEDURE — 2500000003 HC RX 250 WO HCPCS: Performed by: STUDENT IN AN ORGANIZED HEALTH CARE EDUCATION/TRAINING PROGRAM

## 2023-05-11 PROCEDURE — 96361 HYDRATE IV INFUSION ADD-ON: CPT

## 2023-05-11 PROCEDURE — 2580000003 HC RX 258: Performed by: STUDENT IN AN ORGANIZED HEALTH CARE EDUCATION/TRAINING PROGRAM

## 2023-05-11 PROCEDURE — 96375 TX/PRO/DX INJ NEW DRUG ADDON: CPT

## 2023-05-11 PROCEDURE — 83605 ASSAY OF LACTIC ACID: CPT

## 2023-05-11 PROCEDURE — 96376 TX/PRO/DX INJ SAME DRUG ADON: CPT

## 2023-05-11 PROCEDURE — 93005 ELECTROCARDIOGRAM TRACING: CPT | Performed by: STUDENT IN AN ORGANIZED HEALTH CARE EDUCATION/TRAINING PROGRAM

## 2023-05-11 PROCEDURE — 2580000003 HC RX 258

## 2023-05-11 PROCEDURE — 99285 EMERGENCY DEPT VISIT HI MDM: CPT

## 2023-05-11 PROCEDURE — A4216 STERILE WATER/SALINE, 10 ML: HCPCS | Performed by: STUDENT IN AN ORGANIZED HEALTH CARE EDUCATION/TRAINING PROGRAM

## 2023-05-11 PROCEDURE — 6360000002 HC RX W HCPCS: Performed by: STUDENT IN AN ORGANIZED HEALTH CARE EDUCATION/TRAINING PROGRAM

## 2023-05-11 PROCEDURE — 71275 CT ANGIOGRAPHY CHEST: CPT

## 2023-05-11 PROCEDURE — 6360000002 HC RX W HCPCS

## 2023-05-11 PROCEDURE — 85378 FIBRIN DEGRADE SEMIQUANT: CPT

## 2023-05-11 PROCEDURE — 84443 ASSAY THYROID STIM HORMONE: CPT

## 2023-05-11 PROCEDURE — 80053 COMPREHEN METABOLIC PANEL: CPT

## 2023-05-11 PROCEDURE — 85025 COMPLETE CBC W/AUTO DIFF WBC: CPT

## 2023-05-11 PROCEDURE — 2700000000 HC OXYGEN THERAPY PER DAY

## 2023-05-11 PROCEDURE — 6360000004 HC RX CONTRAST MEDICATION: Performed by: RADIOLOGY

## 2023-05-11 PROCEDURE — 2060000000 HC ICU INTERMEDIATE R&B

## 2023-05-11 PROCEDURE — 96374 THER/PROPH/DIAG INJ IV PUSH: CPT

## 2023-05-11 RX ORDER — DEXAMETHASONE SODIUM PHOSPHATE 10 MG/ML
10 INJECTION INTRAMUSCULAR; INTRAVENOUS ONCE
Status: COMPLETED | OUTPATIENT
Start: 2023-05-11 | End: 2023-05-11

## 2023-05-11 RX ORDER — GUAIFENESIN 600 MG/1
600 TABLET, EXTENDED RELEASE ORAL 2 TIMES DAILY
Status: DISCONTINUED | OUTPATIENT
Start: 2023-05-11 | End: 2023-05-11 | Stop reason: CLARIF

## 2023-05-11 RX ORDER — ONDANSETRON 2 MG/ML
4 INJECTION INTRAMUSCULAR; INTRAVENOUS EVERY 6 HOURS PRN
Status: DISCONTINUED | OUTPATIENT
Start: 2023-05-11 | End: 2023-05-20 | Stop reason: HOSPADM

## 2023-05-11 RX ORDER — BUDESONIDE 0.25 MG/2ML
0.25 INHALANT ORAL 2 TIMES DAILY
Status: DISCONTINUED | OUTPATIENT
Start: 2023-05-12 | End: 2023-05-19

## 2023-05-11 RX ORDER — CLOPIDOGREL BISULFATE 75 MG/1
75 TABLET ORAL EVERY MORNING
Status: ON HOLD | COMMUNITY
End: 2023-05-20 | Stop reason: HOSPADM

## 2023-05-11 RX ORDER — DIPHENHYDRAMINE HYDROCHLORIDE 50 MG/ML
25 INJECTION INTRAMUSCULAR; INTRAVENOUS ONCE
Status: COMPLETED | OUTPATIENT
Start: 2023-05-11 | End: 2023-05-11

## 2023-05-11 RX ORDER — DEXAMETHASONE SODIUM PHOSPHATE 10 MG/ML
6 INJECTION INTRAMUSCULAR; INTRAVENOUS EVERY 6 HOURS
Status: DISCONTINUED | OUTPATIENT
Start: 2023-05-11 | End: 2023-05-12

## 2023-05-11 RX ORDER — SODIUM CHLORIDE 0.9 % (FLUSH) 0.9 %
5-40 SYRINGE (ML) INJECTION EVERY 12 HOURS SCHEDULED
Status: DISCONTINUED | OUTPATIENT
Start: 2023-05-11 | End: 2023-05-20 | Stop reason: HOSPADM

## 2023-05-11 RX ORDER — HYDROXYZINE HYDROCHLORIDE 10 MG/1
10 TABLET, FILM COATED ORAL NIGHTLY PRN
Status: DISCONTINUED | OUTPATIENT
Start: 2023-05-11 | End: 2023-05-19

## 2023-05-11 RX ORDER — HALOPERIDOL 5 MG/ML
1 INJECTION INTRAMUSCULAR EVERY 6 HOURS PRN
Status: DISCONTINUED | OUTPATIENT
Start: 2023-05-11 | End: 2023-05-20 | Stop reason: HOSPADM

## 2023-05-11 RX ORDER — MEGESTROL ACETATE 20 MG/1
20 TABLET ORAL EVERY MORNING
Status: DISCONTINUED | OUTPATIENT
Start: 2023-05-12 | End: 2023-05-20 | Stop reason: HOSPADM

## 2023-05-11 RX ORDER — POLYETHYLENE GLYCOL 3350 17 G/17G
17 POWDER, FOR SOLUTION ORAL DAILY PRN
Status: DISCONTINUED | OUTPATIENT
Start: 2023-05-11 | End: 2023-05-19

## 2023-05-11 RX ORDER — ARFORMOTEROL TARTRATE 15 UG/2ML
15 SOLUTION RESPIRATORY (INHALATION) 2 TIMES DAILY
Status: DISCONTINUED | OUTPATIENT
Start: 2023-05-12 | End: 2023-05-19

## 2023-05-11 RX ORDER — ONDANSETRON 4 MG/1
4 TABLET, ORALLY DISINTEGRATING ORAL EVERY 8 HOURS PRN
Status: DISCONTINUED | OUTPATIENT
Start: 2023-05-11 | End: 2023-05-20 | Stop reason: HOSPADM

## 2023-05-11 RX ORDER — METOPROLOL SUCCINATE 25 MG/1
25 TABLET, EXTENDED RELEASE ORAL EVERY MORNING
Status: DISCONTINUED | OUTPATIENT
Start: 2023-05-12 | End: 2023-05-20 | Stop reason: HOSPADM

## 2023-05-11 RX ORDER — HALOPERIDOL 5 MG/ML
2 INJECTION INTRAMUSCULAR ONCE
Status: COMPLETED | OUTPATIENT
Start: 2023-05-12 | End: 2023-05-11

## 2023-05-11 RX ORDER — FLUTICASONE PROPIONATE 50 MCG
2 SPRAY, SUSPENSION (ML) NASAL EVERY MORNING
Status: ON HOLD | COMMUNITY
End: 2023-05-20 | Stop reason: HOSPADM

## 2023-05-11 RX ORDER — CLOPIDOGREL BISULFATE 75 MG/1
75 TABLET ORAL EVERY MORNING
Status: DISCONTINUED | OUTPATIENT
Start: 2023-05-12 | End: 2023-05-19

## 2023-05-11 RX ORDER — GUAIFENESIN 400 MG/1
400 TABLET ORAL 3 TIMES DAILY
Status: DISCONTINUED | OUTPATIENT
Start: 2023-05-11 | End: 2023-05-19

## 2023-05-11 RX ORDER — FERROUS SULFATE 325(65) MG
325 TABLET ORAL 2 TIMES DAILY
Status: DISCONTINUED | OUTPATIENT
Start: 2023-05-11 | End: 2023-05-19

## 2023-05-11 RX ORDER — TAMSULOSIN HYDROCHLORIDE 0.4 MG/1
0.4 CAPSULE ORAL EVERY MORNING
Status: DISCONTINUED | OUTPATIENT
Start: 2023-05-12 | End: 2023-05-19

## 2023-05-11 RX ORDER — SODIUM CHLORIDE 9 MG/ML
INJECTION, SOLUTION INTRAVENOUS CONTINUOUS
Status: DISCONTINUED | OUTPATIENT
Start: 2023-05-11 | End: 2023-05-16

## 2023-05-11 RX ORDER — ALBUTEROL SULFATE 90 UG/1
2 AEROSOL, METERED RESPIRATORY (INHALATION) EVERY 6 HOURS PRN
Status: DISCONTINUED | OUTPATIENT
Start: 2023-05-11 | End: 2023-05-11 | Stop reason: SDUPTHER

## 2023-05-11 RX ORDER — 0.9 % SODIUM CHLORIDE 0.9 %
1000 INTRAVENOUS SOLUTION INTRAVENOUS ONCE
Status: COMPLETED | OUTPATIENT
Start: 2023-05-11 | End: 2023-05-11

## 2023-05-11 RX ORDER — DILTIAZEM HYDROCHLORIDE 120 MG/1
120 CAPSULE, COATED, EXTENDED RELEASE ORAL EVERY MORNING
Status: DISCONTINUED | OUTPATIENT
Start: 2023-05-12 | End: 2023-05-14

## 2023-05-11 RX ORDER — SODIUM CHLORIDE 9 MG/ML
INJECTION, SOLUTION INTRAVENOUS PRN
Status: DISCONTINUED | OUTPATIENT
Start: 2023-05-11 | End: 2023-05-18 | Stop reason: SDUPTHER

## 2023-05-11 RX ORDER — ATORVASTATIN CALCIUM 40 MG/1
40 TABLET, FILM COATED ORAL NIGHTLY
Status: DISCONTINUED | OUTPATIENT
Start: 2023-05-11 | End: 2023-05-19

## 2023-05-11 RX ORDER — FLUTICASONE PROPIONATE 50 MCG
2 SPRAY, SUSPENSION (ML) NASAL EVERY MORNING
Status: DISCONTINUED | OUTPATIENT
Start: 2023-05-12 | End: 2023-05-19

## 2023-05-11 RX ORDER — ALBUTEROL SULFATE 2.5 MG/3ML
2.5 SOLUTION RESPIRATORY (INHALATION) EVERY 6 HOURS PRN
Status: DISCONTINUED | OUTPATIENT
Start: 2023-05-11 | End: 2023-05-19

## 2023-05-11 RX ORDER — ACETAMINOPHEN 325 MG/1
650 TABLET ORAL EVERY 6 HOURS PRN
Status: DISCONTINUED | OUTPATIENT
Start: 2023-05-11 | End: 2023-05-19

## 2023-05-11 RX ORDER — ACETAMINOPHEN 650 MG/1
650 SUPPOSITORY RECTAL EVERY 6 HOURS PRN
Status: DISCONTINUED | OUTPATIENT
Start: 2023-05-11 | End: 2023-05-20 | Stop reason: HOSPADM

## 2023-05-11 RX ORDER — SODIUM CHLORIDE 0.9 % (FLUSH) 0.9 %
10 SYRINGE (ML) INJECTION PRN
Status: DISCONTINUED | OUTPATIENT
Start: 2023-05-11 | End: 2023-05-20 | Stop reason: HOSPADM

## 2023-05-11 RX ADMIN — FAMOTIDINE 20 MG: 10 INJECTION, SOLUTION INTRAVENOUS at 12:32

## 2023-05-11 RX ADMIN — DEXAMETHASONE SODIUM PHOSPHATE 10 MG: 10 INJECTION INTRAMUSCULAR; INTRAVENOUS at 12:32

## 2023-05-11 RX ADMIN — HALOPERIDOL LACTATE 1 MG: 5 INJECTION, SOLUTION INTRAMUSCULAR at 23:13

## 2023-05-11 RX ADMIN — SODIUM CHLORIDE 1000 ML: 9 INJECTION, SOLUTION INTRAVENOUS at 09:44

## 2023-05-11 RX ADMIN — SODIUM CHLORIDE: 9 INJECTION, SOLUTION INTRAVENOUS at 20:49

## 2023-05-11 RX ADMIN — HALOPERIDOL LACTATE 2 MG: 5 INJECTION, SOLUTION INTRAMUSCULAR at 23:42

## 2023-05-11 RX ADMIN — DEXAMETHASONE SODIUM PHOSPHATE 10 MG: 10 INJECTION INTRAMUSCULAR; INTRAVENOUS at 11:19

## 2023-05-11 RX ADMIN — DEXAMETHASONE SODIUM PHOSPHATE 6 MG: 10 INJECTION INTRAMUSCULAR; INTRAVENOUS at 18:26

## 2023-05-11 RX ADMIN — IOPAMIDOL 95 ML: 755 INJECTION, SOLUTION INTRAVENOUS at 09:55

## 2023-05-11 RX ADMIN — DIPHENHYDRAMINE HYDROCHLORIDE 25 MG: 50 INJECTION, SOLUTION INTRAMUSCULAR; INTRAVENOUS at 12:32

## 2023-05-11 RX ADMIN — DEXAMETHASONE SODIUM PHOSPHATE 6 MG: 10 INJECTION INTRAMUSCULAR; INTRAVENOUS at 23:24

## 2023-05-11 ASSESSMENT — PAIN SCALES - GENERAL
PAINLEVEL_OUTOF10: 0
PAINLEVEL_OUTOF10: 0

## 2023-05-11 ASSESSMENT — ENCOUNTER SYMPTOMS
SINUS PAIN: 0
WHEEZING: 0
SINUS PRESSURE: 0
STRIDOR: 0
TROUBLE SWALLOWING: 0
COLOR CHANGE: 0
SORE THROAT: 1
GASTROINTESTINAL NEGATIVE: 1
VOICE CHANGE: 1
CHOKING: 0
COUGH: 0
RHINORRHEA: 0

## 2023-05-11 NOTE — TELEPHONE ENCOUNTER
Writer contacted ED provider to inform of 30 day readmission risk. Writer's attempt to contact ED provider was unsuccessful.      Call Back: If you need to call back to inform of disposition you can contact me at [phone]

## 2023-05-12 ENCOUNTER — ANESTHESIA EVENT (OUTPATIENT)
Dept: OPERATING ROOM | Age: 73
End: 2023-05-12
Payer: MEDICARE

## 2023-05-12 ENCOUNTER — APPOINTMENT (OUTPATIENT)
Dept: GENERAL RADIOLOGY | Age: 73
DRG: 004 | End: 2023-05-12
Payer: MEDICARE

## 2023-05-12 ENCOUNTER — ANESTHESIA (OUTPATIENT)
Dept: OPERATING ROOM | Age: 73
End: 2023-05-12
Payer: MEDICARE

## 2023-05-12 LAB
ALBUMIN SERPL-MCNC: 3.8 G/DL (ref 3.5–5.2)
ALP SERPL-CCNC: 77 U/L (ref 40–129)
ALT SERPL-CCNC: 8 U/L (ref 0–40)
ANION GAP SERPL CALCULATED.3IONS-SCNC: 10 MMOL/L (ref 7–16)
ANION GAP SERPL CALCULATED.3IONS-SCNC: 13 MMOL/L (ref 7–16)
ANISOCYTOSIS: ABNORMAL
ANISOCYTOSIS: ABNORMAL
AST SERPL-CCNC: 11 U/L (ref 0–39)
B.E.: -3.2 MMOL/L (ref -3–3)
BASOPHILS # BLD: 0 E9/L (ref 0–0.2)
BASOPHILS # BLD: 0.03 E9/L (ref 0–0.2)
BASOPHILS NFR BLD: 0 % (ref 0–2)
BASOPHILS NFR BLD: 0.4 % (ref 0–2)
BILIRUB SERPL-MCNC: 0.4 MG/DL (ref 0–1.2)
BUN SERPL-MCNC: 27 MG/DL (ref 6–23)
BUN SERPL-MCNC: 33 MG/DL (ref 6–23)
CALCIUM SERPL-MCNC: 8.6 MG/DL (ref 8.6–10.2)
CALCIUM SERPL-MCNC: 9.4 MG/DL (ref 8.6–10.2)
CHLORIDE SERPL-SCNC: 107 MMOL/L (ref 98–107)
CHLORIDE SERPL-SCNC: 99 MMOL/L (ref 98–107)
CO2 SERPL-SCNC: 25 MMOL/L (ref 22–29)
CO2 SERPL-SCNC: 28 MMOL/L (ref 22–29)
COHB: 0.5 % (ref 0–1.5)
CREAT SERPL-MCNC: 1.1 MG/DL (ref 0.7–1.2)
CREAT SERPL-MCNC: 1.2 MG/DL (ref 0.7–1.2)
CRITICAL: ABNORMAL
DATE ANALYZED: ABNORMAL
DATE OF COLLECTION: ABNORMAL
EOSINOPHIL # BLD: 0 E9/L (ref 0.05–0.5)
EOSINOPHIL # BLD: 0 E9/L (ref 0.05–0.5)
EOSINOPHIL NFR BLD: 0 % (ref 0–6)
EOSINOPHIL NFR BLD: 0 % (ref 0–6)
ERYTHROCYTE [DISTWIDTH] IN BLOOD BY AUTOMATED COUNT: 16.9 FL (ref 11.5–15)
ERYTHROCYTE [DISTWIDTH] IN BLOOD BY AUTOMATED COUNT: 17 FL (ref 11.5–15)
FIO2: 100 %
GLUCOSE SERPL-MCNC: 152 MG/DL (ref 74–99)
GLUCOSE SERPL-MCNC: 157 MG/DL (ref 74–99)
HCO3: 22.6 MMOL/L (ref 22–26)
HCT VFR BLD AUTO: 31.8 % (ref 37–54)
HCT VFR BLD AUTO: 38.2 % (ref 37–54)
HGB BLD-MCNC: 11.7 G/DL (ref 12.5–16.5)
HGB BLD-MCNC: 9.7 G/DL (ref 12.5–16.5)
HHB: 0.4 % (ref 0–5)
IMM GRANULOCYTES # BLD: 0.13 E9/L
IMM GRANULOCYTES NFR BLD: 1.9 % (ref 0–5)
INR BLD: 1.4
LAB: ABNORMAL
LACTATE BLDV-SCNC: 1 MMOL/L (ref 0.5–2.2)
LYMPHOCYTES # BLD: 0.07 E9/L (ref 1.5–4)
LYMPHOCYTES # BLD: 0.5 E9/L (ref 1.5–4)
LYMPHOCYTES NFR BLD: 0.9 % (ref 20–42)
LYMPHOCYTES NFR BLD: 7.2 % (ref 20–42)
Lab: ABNORMAL
MAGNESIUM SERPL-MCNC: 1.9 MG/DL (ref 1.6–2.6)
MCH RBC QN AUTO: 27.6 PG (ref 26–35)
MCH RBC QN AUTO: 27.9 PG (ref 26–35)
MCHC RBC AUTO-ENTMCNC: 30.5 % (ref 32–34.5)
MCHC RBC AUTO-ENTMCNC: 30.6 % (ref 32–34.5)
MCV RBC AUTO: 90.1 FL (ref 80–99.9)
MCV RBC AUTO: 91.4 FL (ref 80–99.9)
METHB: 0.3 % (ref 0–1.5)
MODE: AC
MONOCYTES # BLD: 0.29 E9/L (ref 0.1–0.95)
MONOCYTES # BLD: 0.33 E9/L (ref 0.1–0.95)
MONOCYTES NFR BLD: 4.3 % (ref 2–12)
MONOCYTES NFR BLD: 4.7 % (ref 2–12)
NEUTROPHILS # BLD: 6 E9/L (ref 1.8–7.3)
NEUTROPHILS # BLD: 6.84 E9/L (ref 1.8–7.3)
NEUTS SEG NFR BLD: 85.8 % (ref 43–80)
NEUTS SEG NFR BLD: 94.8 % (ref 43–80)
NRBC BLD-RTO: 0 /100 WBC
O2 CONTENT: 15.8 ML/DL
O2 SATURATION: 99.6 % (ref 92–98.5)
O2HB: 98.8 % (ref 94–97)
OPERATOR ID: 913
OVALOCYTES: ABNORMAL
OVALOCYTES: ABNORMAL
PATIENT TEMP: 37 C
PCO2: 43.6 MMHG (ref 35–45)
PEEP/CPAP: 6 CMH2O
PFO2: 3.49 MMHG/%
PH BLOOD GAS: 7.33 (ref 7.35–7.45)
PHOSPHATE SERPL-MCNC: 4 MG/DL (ref 2.5–4.5)
PLATELET # BLD AUTO: 141 E9/L (ref 130–450)
PLATELET # BLD AUTO: 145 E9/L (ref 130–450)
PMV BLD AUTO: 10.5 FL (ref 7–12)
PMV BLD AUTO: 10.8 FL (ref 7–12)
PO2: 348.6 MMHG (ref 75–100)
POIKILOCYTES: ABNORMAL
POIKILOCYTES: ABNORMAL
POLYCHROMASIA: ABNORMAL
POTASSIUM SERPL-SCNC: 4.5 MMOL/L (ref 3.5–5)
POTASSIUM SERPL-SCNC: 4.8 MMOL/L (ref 3.5–5)
PROT SERPL-MCNC: 7.3 G/DL (ref 6.4–8.3)
PROTHROMBIN TIME: 16 SEC (ref 9.3–12.4)
RBC # BLD AUTO: 3.48 E12/L (ref 3.8–5.8)
RBC # BLD AUTO: 4.24 E12/L (ref 3.8–5.8)
RI(T): 0.92
RR MECHANICAL: 20 B/MIN
SODIUM SERPL-SCNC: 140 MMOL/L (ref 132–146)
SODIUM SERPL-SCNC: 142 MMOL/L (ref 132–146)
SOURCE, BLOOD GAS: ABNORMAL
THB: 10.7 G/DL (ref 11.5–16.5)
TIME ANALYZED: 2007
VT MECHANICAL: 450 ML
WBC # BLD: 7 E9/L (ref 4.5–11.5)
WBC # BLD: 7.2 E9/L (ref 4.5–11.5)

## 2023-05-12 PROCEDURE — 6360000002 HC RX W HCPCS

## 2023-05-12 PROCEDURE — 7100000000 HC PACU RECOVERY - FIRST 15 MIN: Performed by: SURGERY

## 2023-05-12 PROCEDURE — 3600007511: Performed by: SURGERY

## 2023-05-12 PROCEDURE — 51702 INSERT TEMP BLADDER CATH: CPT

## 2023-05-12 PROCEDURE — 82805 BLOOD GASES W/O2 SATURATION: CPT

## 2023-05-12 PROCEDURE — 2500000003 HC RX 250 WO HCPCS: Performed by: INTERNAL MEDICINE

## 2023-05-12 PROCEDURE — 99222 1ST HOSP IP/OBS MODERATE 55: CPT | Performed by: SURGERY

## 2023-05-12 PROCEDURE — 99223 1ST HOSP IP/OBS HIGH 75: CPT | Performed by: INTERNAL MEDICINE

## 2023-05-12 PROCEDURE — 2500000003 HC RX 250 WO HCPCS: Performed by: FAMILY MEDICINE

## 2023-05-12 PROCEDURE — 5A1955Z RESPIRATORY VENTILATION, GREATER THAN 96 CONSECUTIVE HOURS: ICD-10-PCS | Performed by: OTOLARYNGOLOGY

## 2023-05-12 PROCEDURE — 2580000003 HC RX 258: Performed by: NURSE ANESTHETIST, CERTIFIED REGISTERED

## 2023-05-12 PROCEDURE — 2500000003 HC RX 250 WO HCPCS: Performed by: SURGERY

## 2023-05-12 PROCEDURE — 94640 AIRWAY INHALATION TREATMENT: CPT

## 2023-05-12 PROCEDURE — 6360000002 HC RX W HCPCS: Performed by: FAMILY MEDICINE

## 2023-05-12 PROCEDURE — 94002 VENT MGMT INPAT INIT DAY: CPT

## 2023-05-12 PROCEDURE — 36415 COLL VENOUS BLD VENIPUNCTURE: CPT

## 2023-05-12 PROCEDURE — 84100 ASSAY OF PHOSPHORUS: CPT

## 2023-05-12 PROCEDURE — APPSS60 APP SPLIT SHARED TIME 46-60 MINUTES

## 2023-05-12 PROCEDURE — 0DJ08ZZ INSPECTION OF UPPER INTESTINAL TRACT, VIA NATURAL OR ARTIFICIAL OPENING ENDOSCOPIC: ICD-10-PCS | Performed by: SURGERY

## 2023-05-12 PROCEDURE — 2000000000 HC ICU R&B

## 2023-05-12 PROCEDURE — 83735 ASSAY OF MAGNESIUM: CPT

## 2023-05-12 PROCEDURE — 7100000001 HC PACU RECOVERY - ADDTL 15 MIN: Performed by: SURGERY

## 2023-05-12 PROCEDURE — 80048 BASIC METABOLIC PNL TOTAL CA: CPT

## 2023-05-12 PROCEDURE — 80053 COMPREHEN METABOLIC PANEL: CPT

## 2023-05-12 PROCEDURE — 2700000000 HC OXYGEN THERAPY PER DAY

## 2023-05-12 PROCEDURE — 3700000000 HC ANESTHESIA ATTENDED CARE: Performed by: OTOLARYNGOLOGY

## 2023-05-12 PROCEDURE — 83605 ASSAY OF LACTIC ACID: CPT

## 2023-05-12 PROCEDURE — 36600 WITHDRAWAL OF ARTERIAL BLOOD: CPT

## 2023-05-12 PROCEDURE — 6360000002 HC RX W HCPCS: Performed by: NURSE ANESTHETIST, CERTIFIED REGISTERED

## 2023-05-12 PROCEDURE — 85610 PROTHROMBIN TIME: CPT

## 2023-05-12 PROCEDURE — 36592 COLLECT BLOOD FROM PICC: CPT

## 2023-05-12 PROCEDURE — 3600000012 HC SURGERY LEVEL 2 ADDTL 15MIN: Performed by: OTOLARYNGOLOGY

## 2023-05-12 PROCEDURE — 06HY33Z INSERTION OF INFUSION DEVICE INTO LOWER VEIN, PERCUTANEOUS APPROACH: ICD-10-PCS | Performed by: INTERNAL MEDICINE

## 2023-05-12 PROCEDURE — 2580000003 HC RX 258: Performed by: SURGERY

## 2023-05-12 PROCEDURE — 6360000002 HC RX W HCPCS: Performed by: SURGERY

## 2023-05-12 PROCEDURE — 43235 EGD DIAGNOSTIC BRUSH WASH: CPT | Performed by: SURGERY

## 2023-05-12 PROCEDURE — 71045 X-RAY EXAM CHEST 1 VIEW: CPT

## 2023-05-12 PROCEDURE — 36556 INSERT NON-TUNNEL CV CATH: CPT

## 2023-05-12 PROCEDURE — 0BH17EZ INSERTION OF ENDOTRACHEAL AIRWAY INTO TRACHEA, VIA NATURAL OR ARTIFICIAL OPENING: ICD-10-PCS | Performed by: OTOLARYNGOLOGY

## 2023-05-12 PROCEDURE — 6360000002 HC RX W HCPCS: Performed by: NURSE PRACTITIONER

## 2023-05-12 PROCEDURE — 3600000002 HC SURGERY LEVEL 2 BASE: Performed by: OTOLARYNGOLOGY

## 2023-05-12 PROCEDURE — 0B110Z4 BYPASS TRACHEA TO CUTANEOUS, OPEN APPROACH: ICD-10-PCS | Performed by: OTOLARYNGOLOGY

## 2023-05-12 PROCEDURE — 2500000003 HC RX 250 WO HCPCS: Performed by: NURSE ANESTHETIST, CERTIFIED REGISTERED

## 2023-05-12 PROCEDURE — 6360000002 HC RX W HCPCS: Performed by: INTERNAL MEDICINE

## 2023-05-12 PROCEDURE — C1751 CATH, INF, PER/CENT/MIDLINE: HCPCS

## 2023-05-12 PROCEDURE — 2709999900 HC NON-CHARGEABLE SUPPLY: Performed by: OTOLARYNGOLOGY

## 2023-05-12 PROCEDURE — 2500000003 HC RX 250 WO HCPCS: Performed by: OTOLARYNGOLOGY

## 2023-05-12 PROCEDURE — 2709999900 HC NON-CHARGEABLE SUPPLY: Performed by: SURGERY

## 2023-05-12 PROCEDURE — 2580000003 HC RX 258

## 2023-05-12 PROCEDURE — 85025 COMPLETE CBC W/AUTO DIFF WBC: CPT

## 2023-05-12 PROCEDURE — 3700000001 HC ADD 15 MINUTES (ANESTHESIA): Performed by: OTOLARYNGOLOGY

## 2023-05-12 PROCEDURE — 3600007501: Performed by: SURGERY

## 2023-05-12 RX ORDER — MIDAZOLAM HYDROCHLORIDE 1 MG/ML
INJECTION INTRAMUSCULAR; INTRAVENOUS PRN
Status: DISCONTINUED | OUTPATIENT
Start: 2023-05-12 | End: 2023-05-12 | Stop reason: SDUPTHER

## 2023-05-12 RX ORDER — GLYCOPYRROLATE 1 MG/5 ML
SYRINGE (ML) INTRAVENOUS PRN
Status: DISCONTINUED | OUTPATIENT
Start: 2023-05-12 | End: 2023-05-12 | Stop reason: SDUPTHER

## 2023-05-12 RX ORDER — DEXAMETHASONE SODIUM PHOSPHATE 4 MG/ML
INJECTION, SOLUTION INTRA-ARTICULAR; INTRALESIONAL; INTRAMUSCULAR; INTRAVENOUS; SOFT TISSUE PRN
Status: DISCONTINUED | OUTPATIENT
Start: 2023-05-12 | End: 2023-05-12 | Stop reason: SDUPTHER

## 2023-05-12 RX ORDER — SODIUM CHLORIDE 9 MG/ML
INJECTION, SOLUTION INTRAVENOUS CONTINUOUS PRN
Status: DISCONTINUED | OUTPATIENT
Start: 2023-05-12 | End: 2023-05-12 | Stop reason: SDUPTHER

## 2023-05-12 RX ORDER — MIDAZOLAM HYDROCHLORIDE 1 MG/ML
INJECTION INTRAMUSCULAR; INTRAVENOUS
Status: COMPLETED
Start: 2023-05-12 | End: 2023-05-12

## 2023-05-12 RX ORDER — LORAZEPAM 2 MG/ML
1 INJECTION INTRAMUSCULAR EVERY 4 HOURS PRN
Status: DISCONTINUED | OUTPATIENT
Start: 2023-05-12 | End: 2023-05-20 | Stop reason: HOSPADM

## 2023-05-12 RX ORDER — ENOXAPARIN SODIUM 100 MG/ML
1 INJECTION SUBCUTANEOUS 2 TIMES DAILY
Status: DISCONTINUED | OUTPATIENT
Start: 2023-05-12 | End: 2023-05-19

## 2023-05-12 RX ORDER — METHYLPREDNISOLONE SODIUM SUCCINATE 125 MG/2ML
125 INJECTION, POWDER, LYOPHILIZED, FOR SOLUTION INTRAMUSCULAR; INTRAVENOUS
Status: COMPLETED | OUTPATIENT
Start: 2023-05-12 | End: 2023-05-12

## 2023-05-12 RX ORDER — KETAMINE HYDROCHLORIDE 10 MG/ML
INJECTION INTRAMUSCULAR; INTRAVENOUS PRN
Status: DISCONTINUED | OUTPATIENT
Start: 2023-05-12 | End: 2023-05-12 | Stop reason: SDUPTHER

## 2023-05-12 RX ORDER — METHYLPREDNISOLONE SODIUM SUCCINATE 40 MG/ML
40 INJECTION, POWDER, LYOPHILIZED, FOR SOLUTION INTRAMUSCULAR; INTRAVENOUS EVERY 12 HOURS
Status: DISCONTINUED | OUTPATIENT
Start: 2023-05-13 | End: 2023-05-16

## 2023-05-12 RX ORDER — PROPOFOL 10 MG/ML
INJECTION, EMULSION INTRAVENOUS PRN
Status: DISCONTINUED | OUTPATIENT
Start: 2023-05-12 | End: 2023-05-12 | Stop reason: SDUPTHER

## 2023-05-12 RX ORDER — METOPROLOL TARTRATE 5 MG/5ML
5 INJECTION INTRAVENOUS EVERY 6 HOURS
Status: DISCONTINUED | OUTPATIENT
Start: 2023-05-12 | End: 2023-05-19

## 2023-05-12 RX ORDER — MIDAZOLAM HYDROCHLORIDE 1 MG/ML
1-10 INJECTION, SOLUTION INTRAVENOUS CONTINUOUS
Status: DISCONTINUED | OUTPATIENT
Start: 2023-05-12 | End: 2023-05-17

## 2023-05-12 RX ORDER — LIDOCAINE HYDROCHLORIDE AND EPINEPHRINE 10; 10 MG/ML; UG/ML
INJECTION, SOLUTION INFILTRATION; PERINEURAL PRN
Status: DISCONTINUED | OUTPATIENT
Start: 2023-05-12 | End: 2023-05-12 | Stop reason: HOSPADM

## 2023-05-12 RX ADMIN — METHYLPREDNISOLONE SODIUM SUCCINATE 125 MG: 125 INJECTION, POWDER, LYOPHILIZED, FOR SOLUTION INTRAMUSCULAR; INTRAVENOUS at 12:21

## 2023-05-12 RX ADMIN — SODIUM CHLORIDE, PRESERVATIVE FREE 10 ML: 5 INJECTION INTRAVENOUS at 10:56

## 2023-05-12 RX ADMIN — LORAZEPAM 1 MG: 2 INJECTION INTRAMUSCULAR; INTRAVENOUS at 10:34

## 2023-05-12 RX ADMIN — MIDAZOLAM 2 MG: 1 INJECTION INTRAMUSCULAR; INTRAVENOUS at 14:55

## 2023-05-12 RX ADMIN — ARFORMOTEROL TARTRATE 15 MCG: 15 SOLUTION RESPIRATORY (INHALATION) at 18:19

## 2023-05-12 RX ADMIN — KETAMINE HYDROCHLORIDE 40 MG: 10 INJECTION INTRAMUSCULAR; INTRAVENOUS at 14:54

## 2023-05-12 RX ADMIN — IPRATROPIUM BROMIDE 0.5 MG: 0.5 SOLUTION RESPIRATORY (INHALATION) at 10:32

## 2023-05-12 RX ADMIN — SODIUM CHLORIDE: 9 INJECTION, SOLUTION INTRAVENOUS at 13:08

## 2023-05-12 RX ADMIN — SODIUM CHLORIDE, PRESERVATIVE FREE 10 ML: 5 INJECTION INTRAVENOUS at 21:19

## 2023-05-12 RX ADMIN — MIDAZOLAM HYDROCHLORIDE 2 MG: 1 INJECTION, SOLUTION INTRAMUSCULAR; INTRAVENOUS at 16:14

## 2023-05-12 RX ADMIN — Medication 0.1 MG: at 14:54

## 2023-05-12 RX ADMIN — DEXAMETHASONE SODIUM PHOSPHATE 6 MG: 10 INJECTION INTRAMUSCULAR; INTRAVENOUS at 06:15

## 2023-05-12 RX ADMIN — ARFORMOTEROL TARTRATE 15 MCG: 15 SOLUTION RESPIRATORY (INHALATION) at 10:31

## 2023-05-12 RX ADMIN — IPRATROPIUM BROMIDE 0.5 MG: 0.5 SOLUTION RESPIRATORY (INHALATION) at 18:20

## 2023-05-12 RX ADMIN — METOPROLOL TARTRATE 5 MG: 5 INJECTION INTRAVENOUS at 21:19

## 2023-05-12 RX ADMIN — SODIUM CHLORIDE: 9 INJECTION, SOLUTION INTRAVENOUS at 14:45

## 2023-05-12 RX ADMIN — Medication 75 MCG/HR: at 16:10

## 2023-05-12 RX ADMIN — PROPOFOL 50 MG: 10 INJECTION, EMULSION INTRAVENOUS at 13:45

## 2023-05-12 RX ADMIN — METOPROLOL TARTRATE 5 MG: 5 INJECTION INTRAVENOUS at 10:33

## 2023-05-12 RX ADMIN — DEXAMETHASONE SODIUM PHOSPHATE 10 MG: 4 INJECTION, SOLUTION INTRAMUSCULAR; INTRAVENOUS at 15:00

## 2023-05-12 RX ADMIN — Medication 2 MG/HR: at 16:17

## 2023-05-12 RX ADMIN — KETAMINE HYDROCHLORIDE 10 MG: 10 INJECTION INTRAMUSCULAR; INTRAVENOUS at 15:10

## 2023-05-12 RX ADMIN — BUDESONIDE 250 MCG: 0.25 SUSPENSION RESPIRATORY (INHALATION) at 10:31

## 2023-05-12 RX ADMIN — BUDESONIDE 250 MCG: 0.25 SUSPENSION RESPIRATORY (INHALATION) at 18:19

## 2023-05-12 ASSESSMENT — PULMONARY FUNCTION TESTS
PIF_VALUE: 21
PIF_VALUE: 21
PIF_VALUE: 8
PIF_VALUE: 19

## 2023-05-12 ASSESSMENT — PAIN SCALES - GENERAL: PAINLEVEL_OUTOF10: 0

## 2023-05-12 NOTE — ANESTHESIA PRE PROCEDURE
Department of Anesthesiology  Preprocedure Note       Name:  Kayla Jaimes   Age:  74 y.o.  :  1950                                          MRN:  [id number]         Date:  2023      Surgeon: Leonela Shipley):  MD Lou Ann MD    Procedure: Procedure(s):  TRACHEOTOMY WITH BRONCHOSCOPY AND ESOPHAGOGASTRODUODENOSCOPY    Medications prior to admission:   Prior to Admission medications    Medication Sig Start Date End Date Taking?  Authorizing Provider   clopidogrel (PLAVIX) 75 MG tablet Take 1 tablet by mouth every morning    Historical Provider, MD   fluticasone (FLONASE) 50 MCG/ACT nasal spray 2 sprays by Each Nostril route every morning    Historical Provider, MD   atorvastatin (LIPITOR) 40 MG tablet Take 1 tablet by mouth nightly 23   Rajeev Chen DO   fluticasone-umeclidin-vilant (TRELEGY ELLIPTA) 455-02.1-89 MCG/ACT AEPB inhaler Inhale 1 puff into the lungs every morning    Historical Provider, MD   megestrol (MEGACE) 20 MG tablet Take 1 tablet by mouth every morning    Historical Provider, MD   OXYGEN Inhale 4 L/min into the lungs continuous    Historical Provider, MD   guaiFENesin (MUCINEX) 600 MG extended release tablet Take 1 tablet by mouth 2 times daily 23  SensipassANGELI CNP   hydrOXYzine HCl (ATARAX) 10 MG tablet Take 1-3 tablets by mouth nightly as needed for Itching 12/10/22   Historical Provider, MD   triamcinolone (KENALOG) 0.1 % cream Apply topically 2 times daily as needed (FLARE UPS ON CHEST & BACK) 22   Historical Provider, MD   dilTIAZem (CARDIZEM CD) 120 MG extended release capsule Take 1 capsule by mouth every morning 23   Historical Provider, MD   albuterol sulfate HFA (PROVENTIL;VENTOLIN;PROAIR) 108 (90 Base) MCG/ACT inhaler Inhale 2 puffs into the lungs every 6 hours as needed for Shortness of Breath or Wheezing 22   ANGELI Palafox CNP   tamsulosin (FLOMAX) 0.4 MG capsule Take 1 capsule by mouth every

## 2023-05-12 NOTE — ANESTHESIA PRE PROCEDURE
ferrous sulfate 325 (65 Fe) MG tablet Take 1 tablet by mouth in the morning and 1 tablet in the evening. -TAKE WITH VITAMIN C FRUIT JUICE-.  12/30/19   Historical Provider, MD   ELIQUIS 5 MG TABS tablet Take 1 tablet by mouth 2 times daily 8/11/18   Historical Provider, MD   metoprolol succinate (TOPROL XL) 25 MG extended release tablet Take 1 tablet by mouth every morning 9/22/17   Historical Provider, MD   albuterol (PROVENTIL) (2.5 MG/3ML) 0.083% nebulizer solution Take 3 mLs by nebulization every 6 hours as needed for Wheezing    Historical Provider, MD       Current medications:    Current Facility-Administered Medications   Medication Dose Route Frequency Provider Last Rate Last Admin    LORazepam (ATIVAN) injection 1 mg  1 mg IntraVENous Q4H PRN Ya Sutton DO   1 mg at 05/12/23 1034    metoprolol (LOPRESSOR) injection 5 mg  5 mg IntraVENous Q6H Julieta Chen DO   5 mg at 05/12/23 1033    enoxaparin (LOVENOX) injection 70 mg  1 mg/kg SubCUTAneous BID Julieta Chen DO        sodium chloride (OCEAN, BABY AYR) 0.65 % nasal spray 1 spray  1 spray Each Nostril Q2H PRN Ya Sutton DO        [START ON 5/13/2023] methylPREDNISolone sodium succ (SOLU-MEDROL) injection 40 mg  40 mg IntraVENous Q12H ANGELI Frazier CNP        perflutren lipid microspheres (DEFINITY) injection 1.5 mL  1.5 mL IntraVENous ONCE PRN Renny Chen DO        albuterol (PROVENTIL) (2.5 MG/3ML) 0.083% nebulizer solution 2.5 mg  2.5 mg Nebulization Q6H PRN ANGELI Garcia CNP        atorvastatin (LIPITOR) tablet 40 mg  40 mg Oral Nightly ANGELI Garcia CNP        clopidogrel (PLAVIX) tablet 75 mg  75 mg Oral QAM ANGELI Garcia CNP        dilTIAZem (CARDIZEM CD) extended release capsule 120 mg  120 mg Oral QAM ANGELI Garcia CNP        [Held by provider] apixaban (ELIQUIS) tablet 5 mg  5 mg Oral BID Rishabh Ely, APRN - CNP        ferrous sulfate (IRON 325) tablet 325 mg

## 2023-05-12 NOTE — ANESTHESIA POSTPROCEDURE EVALUATION
Department of Anesthesiology  Postprocedure Note    Patient: Alma Castro  MRN: [id number]  YOB: 1950  Date of evaluation: 5/12/2023      Procedure Summary     Date: 05/12/23 Room / Location: [place] [address] / [facility]    Anesthesia Start: 5234 Anesthesia Stop: 3121    Procedure: EGD ESOPHAGOGASTRODUODENOSCOPY Diagnosis:       Abdominal pain, unspecified abdominal location      (Abdominal pain, unspecified abdominal location [R10.9])    Surgeons: Nohemy Lynn MD Responsible Provider: Quinton Moreno MD    Anesthesia Type: general ASA Status: 4          Anesthesia Type: No value filed. Mario Phase I: Mario Score: 9    Mario Phase II:        Anesthesia Post Evaluation    Patient location during evaluation: PACU  Level of consciousness: awake  Airway patency: patent  Nausea & Vomiting: no nausea and no vomiting  Complications: no  Cardiovascular status: hemodynamically stable  Respiratory status: acceptable  Comments: Initial procedure stopped. Patient taken to PACU. Need for Tracheostomy prior to the EGD.

## 2023-05-13 ENCOUNTER — APPOINTMENT (OUTPATIENT)
Dept: CT IMAGING | Age: 73
DRG: 004 | End: 2023-05-13
Payer: MEDICARE

## 2023-05-13 ENCOUNTER — APPOINTMENT (OUTPATIENT)
Dept: GENERAL RADIOLOGY | Age: 73
DRG: 004 | End: 2023-05-13
Payer: MEDICARE

## 2023-05-13 PROBLEM — E44.0 MODERATE PROTEIN-CALORIE MALNUTRITION (HCC): Chronic | Status: ACTIVE | Noted: 2023-05-13

## 2023-05-13 PROBLEM — E44.0 MODERATE PROTEIN-CALORIE MALNUTRITION (HCC): Status: ACTIVE | Noted: 2019-07-10

## 2023-05-13 LAB
ANION GAP SERPL CALCULATED.3IONS-SCNC: 13 MMOL/L (ref 7–16)
ANISOCYTOSIS: ABNORMAL
B.E.: -2.7 MMOL/L (ref -3–3)
BASOPHILS # BLD: 0 E9/L (ref 0–0.2)
BASOPHILS NFR BLD: 0 % (ref 0–2)
BUN SERPL-MCNC: 38 MG/DL (ref 6–23)
BURR CELLS: ABNORMAL
CALCIUM SERPL-MCNC: 9 MG/DL (ref 8.6–10.2)
CHLORIDE SERPL-SCNC: 109 MMOL/L (ref 98–107)
CO2 SERPL-SCNC: 22 MMOL/L (ref 22–29)
COHB: 0.2 % (ref 0–1.5)
CREAT SERPL-MCNC: 1.2 MG/DL (ref 0.7–1.2)
CRITICAL: ABNORMAL
DATE ANALYZED: ABNORMAL
DATE OF COLLECTION: ABNORMAL
EOSINOPHIL # BLD: 0 E9/L (ref 0.05–0.5)
EOSINOPHIL NFR BLD: 0 % (ref 0–6)
ERYTHROCYTE [DISTWIDTH] IN BLOOD BY AUTOMATED COUNT: 17.2 FL (ref 11.5–15)
FIO2: 95 %
GLUCOSE SERPL-MCNC: 141 MG/DL (ref 74–99)
HCO3: 22.6 MMOL/L (ref 22–26)
HCT VFR BLD AUTO: 30.4 % (ref 37–54)
HGB BLD-MCNC: 9.3 G/DL (ref 12.5–16.5)
HHB: 0.2 % (ref 0–5)
LAB: ABNORMAL
LV EF: 58 %
LVEF MODALITY: NORMAL
LYMPHOCYTES # BLD: 0.35 E9/L (ref 1.5–4)
LYMPHOCYTES NFR BLD: 5.2 % (ref 20–42)
Lab: ABNORMAL
MAGNESIUM SERPL-MCNC: 2.1 MG/DL (ref 1.6–2.6)
MCH RBC QN AUTO: 28.3 PG (ref 26–35)
MCHC RBC AUTO-ENTMCNC: 30.6 % (ref 32–34.5)
MCV RBC AUTO: 92.4 FL (ref 80–99.9)
METHB: 0.3 % (ref 0–1.5)
MODE: AC
MONOCYTES # BLD: 0.69 E9/L (ref 0.1–0.95)
MONOCYTES NFR BLD: 9.6 % (ref 2–12)
NEUTROPHILS # BLD: 5.87 E9/L (ref 1.8–7.3)
NEUTS SEG NFR BLD: 85.2 % (ref 43–80)
NRBC BLD-RTO: 0 /100 WBC
O2 CONTENT: 15.4 ML/DL
O2 SATURATION: 99.8 % (ref 92–98.5)
O2HB: 99.3 % (ref 94–97)
OPERATOR ID: 2863
PATIENT TEMP: 37 C
PCO2: 40.9 MMHG (ref 35–45)
PEEP/CPAP: 6 CMH2O
PFO2: 3.84 MMHG/%
PH BLOOD GAS: 7.36 (ref 7.35–7.45)
PHOSPHATE SERPL-MCNC: 4.2 MG/DL (ref 2.5–4.5)
PLATELET # BLD AUTO: 139 E9/L (ref 130–450)
PMV BLD AUTO: 10.5 FL (ref 7–12)
PO2: 364.7 MMHG (ref 75–100)
POIKILOCYTES: ABNORMAL
POLYCHROMASIA: ABNORMAL
POTASSIUM SERPL-SCNC: 4.6 MMOL/L (ref 3.5–5)
RBC # BLD AUTO: 3.29 E12/L (ref 3.8–5.8)
RI(T): 0.74
RR MECHANICAL: 20 B/MIN
SODIUM SERPL-SCNC: 144 MMOL/L (ref 132–146)
SOURCE, BLOOD GAS: ABNORMAL
THB: 10.3 G/DL (ref 11.5–16.5)
TIME ANALYZED: 448
VT MECHANICAL: 450 ML
WBC # BLD: 6.9 E9/L (ref 4.5–11.5)

## 2023-05-13 PROCEDURE — 2580000003 HC RX 258: Performed by: SURGERY

## 2023-05-13 PROCEDURE — 6360000002 HC RX W HCPCS: Performed by: SURGERY

## 2023-05-13 PROCEDURE — 93306 TTE W/DOPPLER COMPLETE: CPT

## 2023-05-13 PROCEDURE — 82805 BLOOD GASES W/O2 SATURATION: CPT

## 2023-05-13 PROCEDURE — 2500000003 HC RX 250 WO HCPCS: Performed by: INTERNAL MEDICINE

## 2023-05-13 PROCEDURE — 83735 ASSAY OF MAGNESIUM: CPT

## 2023-05-13 PROCEDURE — 80048 BASIC METABOLIC PNL TOTAL CA: CPT

## 2023-05-13 PROCEDURE — 85025 COMPLETE CBC W/AUTO DIFF WBC: CPT

## 2023-05-13 PROCEDURE — 2500000003 HC RX 250 WO HCPCS: Performed by: SURGERY

## 2023-05-13 PROCEDURE — 94640 AIRWAY INHALATION TREATMENT: CPT

## 2023-05-13 PROCEDURE — 94003 VENT MGMT INPAT SUBQ DAY: CPT

## 2023-05-13 PROCEDURE — 36600 WITHDRAWAL OF ARTERIAL BLOOD: CPT

## 2023-05-13 PROCEDURE — 99233 SBSQ HOSP IP/OBS HIGH 50: CPT | Performed by: INTERNAL MEDICINE

## 2023-05-13 PROCEDURE — 6370000000 HC RX 637 (ALT 250 FOR IP): Performed by: SURGERY

## 2023-05-13 PROCEDURE — 2000000000 HC ICU R&B

## 2023-05-13 PROCEDURE — 84100 ASSAY OF PHOSPHORUS: CPT

## 2023-05-13 PROCEDURE — 36592 COLLECT BLOOD FROM PICC: CPT

## 2023-05-13 PROCEDURE — 36415 COLL VENOUS BLD VENIPUNCTURE: CPT

## 2023-05-13 PROCEDURE — 70450 CT HEAD/BRAIN W/O DYE: CPT

## 2023-05-13 PROCEDURE — 71045 X-RAY EXAM CHEST 1 VIEW: CPT

## 2023-05-13 RX ADMIN — Medication 75 MCG/HR: at 06:50

## 2023-05-13 RX ADMIN — IPRATROPIUM BROMIDE 0.5 MG: 0.5 SOLUTION RESPIRATORY (INHALATION) at 13:21

## 2023-05-13 RX ADMIN — ALBUTEROL SULFATE 2.5 MG: 2.5 SOLUTION RESPIRATORY (INHALATION) at 21:21

## 2023-05-13 RX ADMIN — METHYLPREDNISOLONE SODIUM SUCCINATE 40 MG: 40 INJECTION, POWDER, LYOPHILIZED, FOR SOLUTION INTRAMUSCULAR; INTRAVENOUS at 02:45

## 2023-05-13 RX ADMIN — IPRATROPIUM BROMIDE 0.5 MG: 0.5 SOLUTION RESPIRATORY (INHALATION) at 16:55

## 2023-05-13 RX ADMIN — BUDESONIDE 250 MCG: 0.25 SUSPENSION RESPIRATORY (INHALATION) at 21:21

## 2023-05-13 RX ADMIN — FLUTICASONE PROPIONATE 2 SPRAY: 50 SPRAY, METERED NASAL at 12:22

## 2023-05-13 RX ADMIN — SALINE NASAL SPRAY 1 SPRAY: 1.5 SOLUTION NASAL at 11:08

## 2023-05-13 RX ADMIN — IPRATROPIUM BROMIDE 0.5 MG: 0.5 SOLUTION RESPIRATORY (INHALATION) at 21:21

## 2023-05-13 RX ADMIN — METOPROLOL TARTRATE 5 MG: 5 INJECTION INTRAVENOUS at 03:28

## 2023-05-13 RX ADMIN — METOPROLOL TARTRATE 5 MG: 5 INJECTION INTRAVENOUS at 08:27

## 2023-05-13 RX ADMIN — SODIUM CHLORIDE, PRESERVATIVE FREE 10 ML: 5 INJECTION INTRAVENOUS at 21:12

## 2023-05-13 RX ADMIN — METOPROLOL TARTRATE 5 MG: 5 INJECTION INTRAVENOUS at 21:11

## 2023-05-13 RX ADMIN — METHYLPREDNISOLONE SODIUM SUCCINATE 40 MG: 40 INJECTION, POWDER, LYOPHILIZED, FOR SOLUTION INTRAMUSCULAR; INTRAVENOUS at 11:07

## 2023-05-13 RX ADMIN — SODIUM CHLORIDE, PRESERVATIVE FREE 10 ML: 5 INJECTION INTRAVENOUS at 09:12

## 2023-05-13 RX ADMIN — ARFORMOTEROL TARTRATE 15 MCG: 15 SOLUTION RESPIRATORY (INHALATION) at 09:04

## 2023-05-13 RX ADMIN — IPRATROPIUM BROMIDE 0.5 MG: 0.5 SOLUTION RESPIRATORY (INHALATION) at 09:04

## 2023-05-13 RX ADMIN — ARFORMOTEROL TARTRATE 15 MCG: 15 SOLUTION RESPIRATORY (INHALATION) at 21:21

## 2023-05-13 RX ADMIN — BUDESONIDE 250 MCG: 0.25 SUSPENSION RESPIRATORY (INHALATION) at 09:05

## 2023-05-13 RX ADMIN — METOPROLOL TARTRATE 5 MG: 5 INJECTION INTRAVENOUS at 13:57

## 2023-05-13 RX ADMIN — SODIUM CHLORIDE: 9 INJECTION, SOLUTION INTRAVENOUS at 15:34

## 2023-05-13 RX ADMIN — SODIUM CHLORIDE: 9 INJECTION, SOLUTION INTRAVENOUS at 03:28

## 2023-05-13 ASSESSMENT — PULMONARY FUNCTION TESTS
PIF_VALUE: 19
PIF_VALUE: 19
PIF_VALUE: 26
PIF_VALUE: 19
PIF_VALUE: 26
PIF_VALUE: 21
PIF_VALUE: 18
PIF_VALUE: 18
PIF_VALUE: 20
PIF_VALUE: 19
PIF_VALUE: 20
PIF_VALUE: 19
PIF_VALUE: 20
PIF_VALUE: 18
PIF_VALUE: 17
PIF_VALUE: 20
PIF_VALUE: 26
PIF_VALUE: 20

## 2023-05-13 ASSESSMENT — PAIN SCALES - GENERAL
PAINLEVEL_OUTOF10: 0

## 2023-05-13 NOTE — ANESTHESIA POSTPROCEDURE EVALUATION
Department of Anesthesiology  Postprocedure Note    Patient: Priya Mercado  MRN: [id number]  YOB: 1950  Date of evaluation: 5/12/2023      Procedure Summary     Date: 05/12/23 Room / Location: [facility] OR 01 / [facility]    Anesthesia Start: 1783 Anesthesia Stop: 1789    Procedure: TRACHEOTOMY WITH BRONCHOSCOPY AND ESOPHAGOGASTRODUODENOSCOPY (Throat) Diagnosis:       Respiratory distress      (Respiratory distress [R06.03])    Surgeons: Claire Campoverde MD Responsible Provider: Carin Sawant MD    Anesthesia Type: General ASA Status: 4 - Emergent          Anesthesia Type: General    Mario Phase I: Mario Score: 9    Mario Phase II:        Anesthesia Post Evaluation    Patient location during evaluation: ICU  Patient participation: complete - patient participated  Level of consciousness: awake  Airway patency: patent  Nausea & Vomiting: no nausea and no vomiting  Complications: no  Cardiovascular status: hemodynamically stable  Respiratory status: spontaneous ventilation (Tracheostomy)  Hydration status: stable

## 2023-05-14 ENCOUNTER — APPOINTMENT (OUTPATIENT)
Dept: GENERAL RADIOLOGY | Age: 73
DRG: 004 | End: 2023-05-14
Payer: MEDICARE

## 2023-05-14 ENCOUNTER — APPOINTMENT (OUTPATIENT)
Dept: MRI IMAGING | Age: 73
DRG: 004 | End: 2023-05-14
Payer: MEDICARE

## 2023-05-14 PROBLEM — Z71.89 GOALS OF CARE, COUNSELING/DISCUSSION: Status: ACTIVE | Noted: 2023-01-01

## 2023-05-14 PROBLEM — Z51.5 PALLIATIVE CARE BY SPECIALIST: Status: ACTIVE | Noted: 2023-01-01

## 2023-05-14 LAB
AMMONIA PLAS-SCNC: <10 UMOL/L (ref 16–60)
ANION GAP SERPL CALCULATED.3IONS-SCNC: 11 MMOL/L (ref 7–16)
ANISOCYTOSIS: ABNORMAL
B.E.: -0.8 MMOL/L (ref -3–3)
BASOPHILS # BLD: 0.01 E9/L (ref 0–0.2)
BASOPHILS NFR BLD: 0.1 % (ref 0–2)
BUN SERPL-MCNC: 39 MG/DL (ref 6–23)
CALCIUM SERPL-MCNC: 8.7 MG/DL (ref 8.6–10.2)
CHLORIDE SERPL-SCNC: 110 MMOL/L (ref 98–107)
CO2 SERPL-SCNC: 24 MMOL/L (ref 22–29)
COHB: 0.1 % (ref 0–1.5)
CREAT SERPL-MCNC: 1.1 MG/DL (ref 0.7–1.2)
CRITICAL: ABNORMAL
DATE ANALYZED: ABNORMAL
DATE OF COLLECTION: ABNORMAL
EOSINOPHIL # BLD: 0 E9/L (ref 0.05–0.5)
EOSINOPHIL NFR BLD: 0 % (ref 0–6)
ERYTHROCYTE [DISTWIDTH] IN BLOOD BY AUTOMATED COUNT: 16.9 FL (ref 11.5–15)
FIO2: 50 %
GLUCOSE SERPL-MCNC: 135 MG/DL (ref 74–99)
HCO3: 23.1 MMOL/L (ref 22–26)
HCT VFR BLD AUTO: 29.7 % (ref 37–54)
HGB BLD-MCNC: 9.1 G/DL (ref 12.5–16.5)
HHB: 1.9 % (ref 0–5)
HYPOCHROMIA: ABNORMAL
IMM GRANULOCYTES # BLD: 0.14 E9/L
IMM GRANULOCYTES NFR BLD: 1.9 % (ref 0–5)
LAB: ABNORMAL
LYMPHOCYTES # BLD: 0.26 E9/L (ref 1.5–4)
LYMPHOCYTES NFR BLD: 3.5 % (ref 20–42)
Lab: ABNORMAL
MAGNESIUM SERPL-MCNC: 2.2 MG/DL (ref 1.6–2.6)
MCH RBC QN AUTO: 28.3 PG (ref 26–35)
MCHC RBC AUTO-ENTMCNC: 30.6 % (ref 32–34.5)
MCV RBC AUTO: 92.2 FL (ref 80–99.9)
METHB: 0.3 % (ref 0–1.5)
MODE: AC
MONOCYTES # BLD: 0.82 E9/L (ref 0.1–0.95)
MONOCYTES NFR BLD: 11.1 % (ref 2–12)
NEUTROPHILS # BLD: 6.17 E9/L (ref 1.8–7.3)
NEUTS SEG NFR BLD: 83.4 % (ref 43–80)
O2 CONTENT: 14.1 ML/DL
O2 SATURATION: 98.1 % (ref 92–98.5)
O2HB: 97.7 % (ref 94–97)
OPERATOR ID: 7221
OVALOCYTES: ABNORMAL
PATIENT TEMP: 37 C
PCO2: 35.2 MMHG (ref 35–45)
PEEP/CPAP: 6 CMH2O
PFO2: 2.23 MMHG/%
PH BLOOD GAS: 7.43 (ref 7.35–7.45)
PHOSPHATE SERPL-MCNC: 2.3 MG/DL (ref 2.5–4.5)
PHOSPHATE SERPL-MCNC: 3.2 MG/DL (ref 2.5–4.5)
PLATELET # BLD AUTO: 124 E9/L (ref 130–450)
PMV BLD AUTO: 10.5 FL (ref 7–12)
PO2: 111.6 MMHG (ref 75–100)
POLYCHROMASIA: ABNORMAL
POTASSIUM SERPL-SCNC: 4.2 MMOL/L (ref 3.5–5)
RBC # BLD AUTO: 3.22 E12/L (ref 3.8–5.8)
RI(T): 1.84
RR MECHANICAL: 20 B/MIN
SODIUM SERPL-SCNC: 145 MMOL/L (ref 132–146)
SOURCE, BLOOD GAS: ABNORMAL
T4 FREE SERPL-MCNC: 1.48 NG/DL (ref 0.93–1.7)
THB: 10.1 G/DL (ref 11.5–16.5)
TIME ANALYZED: 501
VT MECHANICAL: 450 ML
WBC # BLD: 7.4 E9/L (ref 4.5–11.5)

## 2023-05-14 PROCEDURE — 85025 COMPLETE CBC W/AUTO DIFF WBC: CPT

## 2023-05-14 PROCEDURE — 36592 COLLECT BLOOD FROM PICC: CPT

## 2023-05-14 PROCEDURE — 2500000003 HC RX 250 WO HCPCS: Performed by: INTERNAL MEDICINE

## 2023-05-14 PROCEDURE — 2000000000 HC ICU R&B

## 2023-05-14 PROCEDURE — 84439 ASSAY OF FREE THYROXINE: CPT

## 2023-05-14 PROCEDURE — A9579 GAD-BASE MR CONTRAST NOS,1ML: HCPCS | Performed by: RADIOLOGY

## 2023-05-14 PROCEDURE — 6370000000 HC RX 637 (ALT 250 FOR IP): Performed by: SURGERY

## 2023-05-14 PROCEDURE — 6360000002 HC RX W HCPCS: Performed by: SURGERY

## 2023-05-14 PROCEDURE — 99233 SBSQ HOSP IP/OBS HIGH 50: CPT | Performed by: INTERNAL MEDICINE

## 2023-05-14 PROCEDURE — 70553 MRI BRAIN STEM W/O & W/DYE: CPT

## 2023-05-14 PROCEDURE — 99223 1ST HOSP IP/OBS HIGH 75: CPT | Performed by: NURSE PRACTITIONER

## 2023-05-14 PROCEDURE — 82805 BLOOD GASES W/O2 SATURATION: CPT

## 2023-05-14 PROCEDURE — 2500000003 HC RX 250 WO HCPCS

## 2023-05-14 PROCEDURE — 83735 ASSAY OF MAGNESIUM: CPT

## 2023-05-14 PROCEDURE — 82140 ASSAY OF AMMONIA: CPT

## 2023-05-14 PROCEDURE — 6370000000 HC RX 637 (ALT 250 FOR IP): Performed by: INTERNAL MEDICINE

## 2023-05-14 PROCEDURE — 84100 ASSAY OF PHOSPHORUS: CPT

## 2023-05-14 PROCEDURE — 74018 RADEX ABDOMEN 1 VIEW: CPT

## 2023-05-14 PROCEDURE — 36600 WITHDRAWAL OF ARTERIAL BLOOD: CPT

## 2023-05-14 PROCEDURE — 2580000003 HC RX 258: Performed by: SURGERY

## 2023-05-14 PROCEDURE — 94640 AIRWAY INHALATION TREATMENT: CPT

## 2023-05-14 PROCEDURE — 36415 COLL VENOUS BLD VENIPUNCTURE: CPT

## 2023-05-14 PROCEDURE — 80048 BASIC METABOLIC PNL TOTAL CA: CPT

## 2023-05-14 PROCEDURE — 2500000003 HC RX 250 WO HCPCS: Performed by: SURGERY

## 2023-05-14 PROCEDURE — 6360000004 HC RX CONTRAST MEDICATION: Performed by: RADIOLOGY

## 2023-05-14 PROCEDURE — 94003 VENT MGMT INPAT SUBQ DAY: CPT

## 2023-05-14 PROCEDURE — 6360000002 HC RX W HCPCS: Performed by: INTERNAL MEDICINE

## 2023-05-14 PROCEDURE — 2580000003 HC RX 258

## 2023-05-14 RX ORDER — QUETIAPINE FUMARATE 25 MG/1
25 TABLET, FILM COATED ORAL NIGHTLY
Status: DISCONTINUED | OUTPATIENT
Start: 2023-05-14 | End: 2023-05-15

## 2023-05-14 RX ADMIN — IPRATROPIUM BROMIDE 0.5 MG: 0.5 SOLUTION RESPIRATORY (INHALATION) at 16:16

## 2023-05-14 RX ADMIN — Medication 1 MG/HR: at 18:35

## 2023-05-14 RX ADMIN — IPRATROPIUM BROMIDE 0.5 MG: 0.5 SOLUTION RESPIRATORY (INHALATION) at 19:06

## 2023-05-14 RX ADMIN — METOPROLOL TARTRATE 5 MG: 5 INJECTION INTRAVENOUS at 15:55

## 2023-05-14 RX ADMIN — IPRATROPIUM BROMIDE 0.5 MG: 0.5 SOLUTION RESPIRATORY (INHALATION) at 13:08

## 2023-05-14 RX ADMIN — METOPROLOL TARTRATE 5 MG: 5 INJECTION INTRAVENOUS at 20:22

## 2023-05-14 RX ADMIN — GUAIFENESIN 400 MG: 400 TABLET ORAL at 20:32

## 2023-05-14 RX ADMIN — GADOTERIDOL 14 ML: 279.3 INJECTION, SOLUTION INTRAVENOUS at 12:24

## 2023-05-14 RX ADMIN — POTASSIUM PHOSPHATE, MONOBASIC AND POTASSIUM PHOSPHATE, DIBASIC 10 MMOL: 224; 236 INJECTION, SOLUTION, CONCENTRATE INTRAVENOUS at 06:47

## 2023-05-14 RX ADMIN — METHYLPREDNISOLONE SODIUM SUCCINATE 40 MG: 40 INJECTION, POWDER, LYOPHILIZED, FOR SOLUTION INTRAMUSCULAR; INTRAVENOUS at 10:39

## 2023-05-14 RX ADMIN — IPRATROPIUM BROMIDE 0.5 MG: 0.5 SOLUTION RESPIRATORY (INHALATION) at 09:30

## 2023-05-14 RX ADMIN — FERROUS SULFATE TAB 325 MG (65 MG ELEMENTAL FE) 325 MG: 325 (65 FE) TAB at 15:55

## 2023-05-14 RX ADMIN — DILTIAZEM HYDROCHLORIDE 30 MG: 30 TABLET, FILM COATED ORAL at 20:32

## 2023-05-14 RX ADMIN — METHYLPREDNISOLONE SODIUM SUCCINATE 40 MG: 40 INJECTION, POWDER, LYOPHILIZED, FOR SOLUTION INTRAMUSCULAR; INTRAVENOUS at 01:07

## 2023-05-14 RX ADMIN — BUDESONIDE 250 MCG: 0.25 SUSPENSION RESPIRATORY (INHALATION) at 09:30

## 2023-05-14 RX ADMIN — ARFORMOTEROL TARTRATE 15 MCG: 15 SOLUTION RESPIRATORY (INHALATION) at 19:06

## 2023-05-14 RX ADMIN — ARFORMOTEROL TARTRATE 15 MCG: 15 SOLUTION RESPIRATORY (INHALATION) at 09:30

## 2023-05-14 RX ADMIN — QUETIAPINE FUMARATE 25 MG: 25 TABLET ORAL at 20:32

## 2023-05-14 RX ADMIN — ATORVASTATIN CALCIUM 40 MG: 40 TABLET, FILM COATED ORAL at 20:32

## 2023-05-14 RX ADMIN — Medication 100 MCG/HR: at 07:51

## 2023-05-14 RX ADMIN — GUAIFENESIN 400 MG: 400 TABLET ORAL at 15:55

## 2023-05-14 RX ADMIN — SODIUM CHLORIDE, PRESERVATIVE FREE 10 ML: 5 INJECTION INTRAVENOUS at 20:30

## 2023-05-14 RX ADMIN — CLOPIDOGREL BISULFATE 75 MG: 75 TABLET ORAL at 15:58

## 2023-05-14 RX ADMIN — METOPROLOL TARTRATE 5 MG: 5 INJECTION INTRAVENOUS at 02:18

## 2023-05-14 RX ADMIN — Medication 10 ML: at 16:53

## 2023-05-14 RX ADMIN — ALBUTEROL SULFATE 2.5 MG: 2.5 SOLUTION RESPIRATORY (INHALATION) at 20:43

## 2023-05-14 RX ADMIN — METOPROLOL TARTRATE 5 MG: 5 INJECTION INTRAVENOUS at 08:38

## 2023-05-14 RX ADMIN — ENOXAPARIN SODIUM 70 MG: 100 INJECTION SUBCUTANEOUS at 10:44

## 2023-05-14 RX ADMIN — SODIUM CHLORIDE, PRESERVATIVE FREE 10 ML: 5 INJECTION INTRAVENOUS at 08:38

## 2023-05-14 RX ADMIN — BUDESONIDE 250 MCG: 0.25 SUSPENSION RESPIRATORY (INHALATION) at 19:06

## 2023-05-14 RX ADMIN — TAMSULOSIN HYDROCHLORIDE 0.4 MG: 0.4 CAPSULE ORAL at 15:58

## 2023-05-14 RX ADMIN — ENOXAPARIN SODIUM 70 MG: 100 INJECTION SUBCUTANEOUS at 20:30

## 2023-05-14 ASSESSMENT — PULMONARY FUNCTION TESTS
PIF_VALUE: 26
PIF_VALUE: 26
PIF_VALUE: 25
PIF_VALUE: 27
PIF_VALUE: 25
PIF_VALUE: 24
PIF_VALUE: 25
PIF_VALUE: 26
PIF_VALUE: 25
PIF_VALUE: 26
PIF_VALUE: 26
PIF_VALUE: 24
PIF_VALUE: 25
PIF_VALUE: 22
PIF_VALUE: 28
PIF_VALUE: 26
PIF_VALUE: 27
PIF_VALUE: 26
PIF_VALUE: 26
PIF_VALUE: 25
PIF_VALUE: 26
PIF_VALUE: 28
PIF_VALUE: 25
PIF_VALUE: 28
PIF_VALUE: 26
PIF_VALUE: 30
PIF_VALUE: 26
PIF_VALUE: 25

## 2023-05-14 ASSESSMENT — PAIN SCALES - GENERAL
PAINLEVEL_OUTOF10: 0

## 2023-05-15 ENCOUNTER — APPOINTMENT (OUTPATIENT)
Dept: GENERAL RADIOLOGY | Age: 73
DRG: 004 | End: 2023-05-15
Payer: MEDICARE

## 2023-05-15 LAB
ANION GAP SERPL CALCULATED.3IONS-SCNC: 9 MMOL/L (ref 7–16)
ANISOCYTOSIS: ABNORMAL
B.E.: 0.5 MMOL/L (ref -3–3)
BASOPHILS # BLD: 0.01 E9/L (ref 0–0.2)
BASOPHILS NFR BLD: 0.1 % (ref 0–2)
BUN SERPL-MCNC: 31 MG/DL (ref 6–23)
CALCIUM SERPL-MCNC: 8.6 MG/DL (ref 8.6–10.2)
CHLORIDE SERPL-SCNC: 112 MMOL/L (ref 98–107)
CO2 SERPL-SCNC: 23 MMOL/L (ref 22–29)
COHB: 0.5 % (ref 0–1.5)
CREAT SERPL-MCNC: 1.1 MG/DL (ref 0.7–1.2)
CRITICAL: ABNORMAL
DATE ANALYZED: ABNORMAL
DATE OF COLLECTION: ABNORMAL
EOSINOPHIL # BLD: 0.02 E9/L (ref 0.05–0.5)
EOSINOPHIL NFR BLD: 0.3 % (ref 0–6)
ERYTHROCYTE [DISTWIDTH] IN BLOOD BY AUTOMATED COUNT: 17.4 FL (ref 11.5–15)
FIO2: 65 %
GLUCOSE SERPL-MCNC: 131 MG/DL (ref 74–99)
HCO3: 24.2 MMOL/L (ref 22–26)
HCT VFR BLD AUTO: 32.4 % (ref 37–54)
HGB BLD-MCNC: 10 G/DL (ref 12.5–16.5)
HHB: 6.2 % (ref 0–5)
HYPOCHROMIA: ABNORMAL
IMM GRANULOCYTES # BLD: 0.09 E9/L
IMM GRANULOCYTES NFR BLD: 1.2 % (ref 0–5)
LAB: ABNORMAL
LYMPHOCYTES # BLD: 0.38 E9/L (ref 1.5–4)
LYMPHOCYTES NFR BLD: 5.2 % (ref 20–42)
Lab: ABNORMAL
MAGNESIUM SERPL-MCNC: 1.9 MG/DL (ref 1.6–2.6)
MCH RBC QN AUTO: 28.3 PG (ref 26–35)
MCHC RBC AUTO-ENTMCNC: 30.9 % (ref 32–34.5)
MCV RBC AUTO: 91.8 FL (ref 80–99.9)
METHB: 0.3 % (ref 0–1.5)
MODE: AC
MONOCYTES # BLD: 1.1 E9/L (ref 0.1–0.95)
MONOCYTES NFR BLD: 14.9 % (ref 2–12)
NEUTROPHILS # BLD: 5.77 E9/L (ref 1.8–7.3)
NEUTS SEG NFR BLD: 78.3 % (ref 43–80)
O2 CONTENT: 13.9 ML/DL
O2 SATURATION: 93.8 % (ref 92–98.5)
O2HB: 93 % (ref 94–97)
OPERATOR ID: 1687
OVALOCYTES: ABNORMAL
PATIENT TEMP: 37 C
PCO2: 35.5 MMHG (ref 35–45)
PEEP/CPAP: 6 CMH2O
PFO2: 1.04 MMHG/%
PH BLOOD GAS: 7.45 (ref 7.35–7.45)
PHOSPHATE SERPL-MCNC: 2 MG/DL (ref 2.5–4.5)
PHOSPHATE SERPL-MCNC: 2.2 MG/DL (ref 2.5–4.5)
PLATELET # BLD AUTO: 141 E9/L (ref 130–450)
PMV BLD AUTO: 10.8 FL (ref 7–12)
PO2: 67.5 MMHG (ref 75–100)
POIKILOCYTES: ABNORMAL
POLYCHROMASIA: ABNORMAL
POTASSIUM SERPL-SCNC: 3.9 MMOL/L (ref 3.5–5)
RBC # BLD AUTO: 3.53 E12/L (ref 3.8–5.8)
RI(T): 5.29
RR MECHANICAL: 20 B/MIN
SODIUM SERPL-SCNC: 144 MMOL/L (ref 132–146)
SOURCE, BLOOD GAS: ABNORMAL
THB: 10.6 G/DL (ref 11.5–16.5)
TIME ANALYZED: 534
VT MECHANICAL: 450 ML
WBC # BLD: 7.4 E9/L (ref 4.5–11.5)

## 2023-05-15 PROCEDURE — 2500000003 HC RX 250 WO HCPCS: Performed by: INTERNAL MEDICINE

## 2023-05-15 PROCEDURE — 2500000003 HC RX 250 WO HCPCS: Performed by: SURGERY

## 2023-05-15 PROCEDURE — 94003 VENT MGMT INPAT SUBQ DAY: CPT

## 2023-05-15 PROCEDURE — 80048 BASIC METABOLIC PNL TOTAL CA: CPT

## 2023-05-15 PROCEDURE — 6360000002 HC RX W HCPCS: Performed by: SURGERY

## 2023-05-15 PROCEDURE — 2500000003 HC RX 250 WO HCPCS

## 2023-05-15 PROCEDURE — 2580000003 HC RX 258: Performed by: INTERNAL MEDICINE

## 2023-05-15 PROCEDURE — 6360000002 HC RX W HCPCS: Performed by: INTERNAL MEDICINE

## 2023-05-15 PROCEDURE — 99233 SBSQ HOSP IP/OBS HIGH 50: CPT | Performed by: INTERNAL MEDICINE

## 2023-05-15 PROCEDURE — 6370000000 HC RX 637 (ALT 250 FOR IP): Performed by: INTERNAL MEDICINE

## 2023-05-15 PROCEDURE — 2580000003 HC RX 258

## 2023-05-15 PROCEDURE — C9113 INJ PANTOPRAZOLE SODIUM, VIA: HCPCS | Performed by: INTERNAL MEDICINE

## 2023-05-15 PROCEDURE — 36600 WITHDRAWAL OF ARTERIAL BLOOD: CPT

## 2023-05-15 PROCEDURE — 83735 ASSAY OF MAGNESIUM: CPT

## 2023-05-15 PROCEDURE — 94640 AIRWAY INHALATION TREATMENT: CPT

## 2023-05-15 PROCEDURE — 71045 X-RAY EXAM CHEST 1 VIEW: CPT

## 2023-05-15 PROCEDURE — 85025 COMPLETE CBC W/AUTO DIFF WBC: CPT

## 2023-05-15 PROCEDURE — 2580000003 HC RX 258: Performed by: SURGERY

## 2023-05-15 PROCEDURE — 36592 COLLECT BLOOD FROM PICC: CPT

## 2023-05-15 PROCEDURE — 36415 COLL VENOUS BLD VENIPUNCTURE: CPT

## 2023-05-15 PROCEDURE — 84100 ASSAY OF PHOSPHORUS: CPT

## 2023-05-15 PROCEDURE — 2000000000 HC ICU R&B

## 2023-05-15 PROCEDURE — 6370000000 HC RX 637 (ALT 250 FOR IP): Performed by: SURGERY

## 2023-05-15 PROCEDURE — 82805 BLOOD GASES W/O2 SATURATION: CPT

## 2023-05-15 RX ORDER — CHLORHEXIDINE GLUCONATE 0.12 MG/ML
15 RINSE ORAL 2 TIMES DAILY
Status: DISCONTINUED | OUTPATIENT
Start: 2023-05-15 | End: 2023-05-20 | Stop reason: HOSPADM

## 2023-05-15 RX ORDER — QUETIAPINE FUMARATE 25 MG/1
25 TABLET, FILM COATED ORAL DAILY
Status: DISCONTINUED | OUTPATIENT
Start: 2023-05-15 | End: 2023-05-19

## 2023-05-15 RX ORDER — QUETIAPINE FUMARATE 25 MG/1
50 TABLET, FILM COATED ORAL NIGHTLY
Status: DISCONTINUED | OUTPATIENT
Start: 2023-05-15 | End: 2023-05-19

## 2023-05-15 RX ADMIN — DEXMEDETOMIDINE 0.4 MCG/KG/HR: 100 INJECTION, SOLUTION INTRAVENOUS at 23:06

## 2023-05-15 RX ADMIN — BUDESONIDE 250 MCG: 0.25 SUSPENSION RESPIRATORY (INHALATION) at 08:21

## 2023-05-15 RX ADMIN — METOPROLOL TARTRATE 5 MG: 5 INJECTION INTRAVENOUS at 02:36

## 2023-05-15 RX ADMIN — ENOXAPARIN SODIUM 70 MG: 100 INJECTION SUBCUTANEOUS at 09:17

## 2023-05-15 RX ADMIN — SODIUM CHLORIDE: 9 INJECTION, SOLUTION INTRAVENOUS at 10:57

## 2023-05-15 RX ADMIN — TAMSULOSIN HYDROCHLORIDE 0.4 MG: 0.4 CAPSULE ORAL at 09:17

## 2023-05-15 RX ADMIN — METHYLPREDNISOLONE SODIUM SUCCINATE 40 MG: 40 INJECTION, POWDER, LYOPHILIZED, FOR SOLUTION INTRAMUSCULAR; INTRAVENOUS at 13:00

## 2023-05-15 RX ADMIN — ATORVASTATIN CALCIUM 40 MG: 40 TABLET, FILM COATED ORAL at 20:36

## 2023-05-15 RX ADMIN — SODIUM CHLORIDE, PRESERVATIVE FREE 10 ML: 5 INJECTION INTRAVENOUS at 20:35

## 2023-05-15 RX ADMIN — Medication 50 MCG/HR: at 00:39

## 2023-05-15 RX ADMIN — SALINE NASAL SPRAY 1 SPRAY: 1.5 SOLUTION NASAL at 09:17

## 2023-05-15 RX ADMIN — QUETIAPINE FUMARATE 25 MG: 25 TABLET ORAL at 13:00

## 2023-05-15 RX ADMIN — METHYLPREDNISOLONE SODIUM SUCCINATE 40 MG: 40 INJECTION, POWDER, LYOPHILIZED, FOR SOLUTION INTRAMUSCULAR; INTRAVENOUS at 00:27

## 2023-05-15 RX ADMIN — ARFORMOTEROL TARTRATE 15 MCG: 15 SOLUTION RESPIRATORY (INHALATION) at 20:30

## 2023-05-15 RX ADMIN — CLOPIDOGREL BISULFATE 75 MG: 75 TABLET ORAL at 09:16

## 2023-05-15 RX ADMIN — CHLORHEXIDINE GLUCONATE 15 ML: 1.2 RINSE ORAL at 20:37

## 2023-05-15 RX ADMIN — MEGESTROL ACETATE 20 MG: 20 TABLET ORAL at 09:16

## 2023-05-15 RX ADMIN — ACETAMINOPHEN 650 MG: 325 TABLET ORAL at 13:00

## 2023-05-15 RX ADMIN — ARFORMOTEROL TARTRATE 15 MCG: 15 SOLUTION RESPIRATORY (INHALATION) at 08:21

## 2023-05-15 RX ADMIN — CHLORHEXIDINE GLUCONATE 15 ML: 1.2 RINSE ORAL at 10:56

## 2023-05-15 RX ADMIN — METOPROLOL TARTRATE 5 MG: 5 INJECTION INTRAVENOUS at 16:47

## 2023-05-15 RX ADMIN — IPRATROPIUM BROMIDE 0.5 MG: 0.5 SOLUTION RESPIRATORY (INHALATION) at 16:45

## 2023-05-15 RX ADMIN — DILTIAZEM HYDROCHLORIDE 30 MG: 30 TABLET, FILM COATED ORAL at 17:54

## 2023-05-15 RX ADMIN — QUETIAPINE FUMARATE 50 MG: 25 TABLET ORAL at 22:27

## 2023-05-15 RX ADMIN — ENOXAPARIN SODIUM 70 MG: 100 INJECTION SUBCUTANEOUS at 20:35

## 2023-05-15 RX ADMIN — IPRATROPIUM BROMIDE 0.5 MG: 0.5 SOLUTION RESPIRATORY (INHALATION) at 20:30

## 2023-05-15 RX ADMIN — DILTIAZEM HYDROCHLORIDE 30 MG: 30 TABLET, FILM COATED ORAL at 05:31

## 2023-05-15 RX ADMIN — POTASSIUM PHOSPHATE, MONOBASIC AND POTASSIUM PHOSPHATE, DIBASIC 15 MMOL: 224; 236 INJECTION, SOLUTION, CONCENTRATE INTRAVENOUS at 06:49

## 2023-05-15 RX ADMIN — FERROUS SULFATE TAB 325 MG (65 MG ELEMENTAL FE) 325 MG: 325 (65 FE) TAB at 09:16

## 2023-05-15 RX ADMIN — IPRATROPIUM BROMIDE 0.5 MG: 0.5 SOLUTION RESPIRATORY (INHALATION) at 08:21

## 2023-05-15 RX ADMIN — DILTIAZEM HYDROCHLORIDE 30 MG: 30 TABLET, FILM COATED ORAL at 00:27

## 2023-05-15 RX ADMIN — ACETAMINOPHEN 650 MG: 325 TABLET ORAL at 03:56

## 2023-05-15 RX ADMIN — DILTIAZEM HYDROCHLORIDE 30 MG: 30 TABLET, FILM COATED ORAL at 12:59

## 2023-05-15 RX ADMIN — SODIUM CHLORIDE, PRESERVATIVE FREE 10 ML: 5 INJECTION INTRAVENOUS at 09:17

## 2023-05-15 RX ADMIN — GUAIFENESIN 400 MG: 400 TABLET ORAL at 13:00

## 2023-05-15 RX ADMIN — IPRATROPIUM BROMIDE 0.5 MG: 0.5 SOLUTION RESPIRATORY (INHALATION) at 11:13

## 2023-05-15 RX ADMIN — BUDESONIDE 250 MCG: 0.25 SUSPENSION RESPIRATORY (INHALATION) at 20:30

## 2023-05-15 RX ADMIN — SODIUM CHLORIDE, PRESERVATIVE FREE 40 MG: 5 INJECTION INTRAVENOUS at 13:10

## 2023-05-15 RX ADMIN — METOPROLOL TARTRATE 5 MG: 5 INJECTION INTRAVENOUS at 20:34

## 2023-05-15 RX ADMIN — FERROUS SULFATE TAB 325 MG (65 MG ELEMENTAL FE) 325 MG: 325 (65 FE) TAB at 17:54

## 2023-05-15 RX ADMIN — METOPROLOL TARTRATE 5 MG: 5 INJECTION INTRAVENOUS at 09:17

## 2023-05-15 RX ADMIN — SALINE NASAL SPRAY 1 SPRAY: 1.5 SOLUTION NASAL at 09:18

## 2023-05-15 RX ADMIN — GUAIFENESIN 400 MG: 400 TABLET ORAL at 20:36

## 2023-05-15 ASSESSMENT — PULMONARY FUNCTION TESTS
PIF_VALUE: 17
PIF_VALUE: 16
PIF_VALUE: 21
PIF_VALUE: 20
PIF_VALUE: 14
PIF_VALUE: 22
PIF_VALUE: 16
PIF_VALUE: 20
PIF_VALUE: 14
PIF_VALUE: 19
PIF_VALUE: 18
PIF_VALUE: 23
PIF_VALUE: 14
PIF_VALUE: 18
PIF_VALUE: 19
PIF_VALUE: 24
PIF_VALUE: 13
PIF_VALUE: 14
PIF_VALUE: 20
PIF_VALUE: 16
PIF_VALUE: 17
PIF_VALUE: 21
PIF_VALUE: 16
PIF_VALUE: 21
PIF_VALUE: 20
PIF_VALUE: 21
PIF_VALUE: 21

## 2023-05-15 ASSESSMENT — PAIN SCALES - GENERAL
PAINLEVEL_OUTOF10: 0

## 2023-05-15 NOTE — CONSULTS
Comprehensive Nutrition Assessment    Type and Reason for Visit:  Reassess, Consult (TF Ordering and Management)    Nutrition Recommendations/Plan: To more closely meet needs, recommend continue current TF order, as tolerated:    TF ORDER 5/15: Standard TF with Fiber (Jevity 1.5) to goal rate 50 ml/hr and Wound Healing Modular BID with 30 ml Water flushes Q 4 hr  This will provide: 1200 ml/d, 1980 total job, 82 g total pro, 1452 ml total free water  This regimen will meet 100% energy protein needs    2. Continue inpatient monitoring     Malnutrition Assessment:  Malnutrition Status: Moderate malnutrition (05/13/23 1054)    Context:  Chronic Illness     Findings of the 6 clinical characteristics of malnutrition:  Energy Intake:  75% or less estimated energy requirements for 1 month or longer  Weight Loss:  No significant weight loss     Body Fat Loss:  No significant body fat loss     Muscle Mass Loss:  Mild muscle mass loss Temples (temporalis)  Fluid Accumulation:  No significant fluid accumulation     Strength:  Not Performed    Nutrition Assessment:    Pt remains intubated and consult re: EN via NGT in place. Will order TF to meet needs and continue to monitor pt status changes and GOC.     Nutrition Related Findings:    no edema, intubated/trach, soft abd w/hypo BS, +I/O 3.4L Wound Type: Skin Tears, Partial Thickness (RUE per wound care)       Current Nutrition Intake & Therapies:    Average Meal Intake: NPO  Average Supplements Intake: NPO  Current Tube Feeding (TF) Orders:  Feeding Route: Nasogastric  Formula: Standard with Fiber  Schedule: Continuous  Feeding Regimen: goal = 40 ml/hr  Additives/Modulars: None  Water Flushes: 30 ml Q 4 hr = 180 ml/d  Current TF & Flush Orders Provides: 20 ml = 480 ml/d, 720 job, 31 g pro, 545 ml total free water  Goal TF & Flush Orders Provides: 960 ml/d, 1440 job, 61 g pro, 910 ml total free water    Anthropometric Measures:  Height: 6' (182.9 cm)  Ideal Body Weight

## 2023-05-15 NOTE — ACP (ADVANCE CARE PLANNING)
Advance Care Planning   Healthcare Decision Maker:    Primary Decision Maker: Tao Patel - [facility] - [phone]    .

## 2023-05-15 NOTE — PATIENT CARE CONFERENCE
Intensive Care Daily Quality Rounding Checklist        ICU Team Members:  Dr. Alberta Santos, Fareed Snider (residents), clinical pharmacist, charge nurse, bedside nurse, respiratory therapist     ICU Day #: NUMBER: 4     Intubation Date: Trach 5/12       Ventilator Day #: NUMBER: 4     Central Line Insertion Date: May 12                                                    Day #: NUMBER: 4                                                    Indication: CVCIndication: Limited/no vessels suitable for conventional peripheral access      Arterial Line Insertion Date: N/A                             Day #: N/A     Temporary Hemodialysis Catheter Insertion Date:  N/A                             Day # N/A     DVT Prophylaxis: Lovenox      GI Prophylaxis: Protonix                       Noel Catheter Insertion Date: May 12                                        Day #: 4                             Indications: Urinary retention, (acute or chronic)                             Continued need (if yes, reason documented and discussed with physician): yes, bladder scan over 500 cc     Skin Issues/ Wounds and ordered treatment discussed on rounds: yes, skin tear right arm     Goals/ Plans for the Day: Daily labs and replace/transfuse as needed. Wean vent as able. Wean sedation. Restart anticoagulation. Continue discussions with family regarding goals of care.  Restraints to prevent pulling tubes

## 2023-05-16 ENCOUNTER — APPOINTMENT (OUTPATIENT)
Dept: GENERAL RADIOLOGY | Age: 73
DRG: 004 | End: 2023-05-16
Payer: MEDICARE

## 2023-05-16 LAB
ANION GAP SERPL CALCULATED.3IONS-SCNC: 5 MMOL/L (ref 7–16)
ANISOCYTOSIS: ABNORMAL
B.E.: -0.2 MMOL/L (ref -3–3)
BASOPHILS # BLD: 0.07 E9/L (ref 0–0.2)
BASOPHILS NFR BLD: 1.7 % (ref 0–2)
BUN SERPL-MCNC: 31 MG/DL (ref 6–23)
CALCIUM SERPL-MCNC: 8.1 MG/DL (ref 8.6–10.2)
CHLORIDE SERPL-SCNC: 115 MMOL/L (ref 98–107)
CO2 SERPL-SCNC: 26 MMOL/L (ref 22–29)
COHB: 1 % (ref 0–1.5)
CREAT SERPL-MCNC: 1.1 MG/DL (ref 0.7–1.2)
CRITICAL: ABNORMAL
DATE ANALYZED: ABNORMAL
DATE OF COLLECTION: ABNORMAL
EOSINOPHIL # BLD: 0.04 E9/L (ref 0.05–0.5)
EOSINOPHIL NFR BLD: 0.9 % (ref 0–6)
ERYTHROCYTE [DISTWIDTH] IN BLOOD BY AUTOMATED COUNT: 17.2 FL (ref 11.5–15)
FIO2: 45 %
GLUCOSE SERPL-MCNC: 262 MG/DL (ref 74–99)
HCO3: 23.6 MMOL/L (ref 22–26)
HCT VFR BLD AUTO: 25.5 % (ref 37–54)
HGB BLD-MCNC: 7.8 G/DL (ref 12.5–16.5)
HHB: 5.3 % (ref 0–5)
HYPOCHROMIA: ABNORMAL
LAB: ABNORMAL
LYMPHOCYTES # BLD: 0.21 E9/L (ref 1.5–4)
LYMPHOCYTES NFR BLD: 5.3 % (ref 20–42)
Lab: ABNORMAL
MAGNESIUM SERPL-MCNC: 1.8 MG/DL (ref 1.6–2.6)
MAGNESIUM SERPL-MCNC: 2.5 MG/DL (ref 1.6–2.6)
MCH RBC QN AUTO: 28.1 PG (ref 26–35)
MCHC RBC AUTO-ENTMCNC: 30.6 % (ref 32–34.5)
MCV RBC AUTO: 91.7 FL (ref 80–99.9)
METAMYELOCYTES NFR BLD MANUAL: 0.9 % (ref 0–1)
METHB: 0.3 % (ref 0–1.5)
MODE: AC
MONOCYTES # BLD: 0.55 E9/L (ref 0.1–0.95)
MONOCYTES NFR BLD: 13.1 % (ref 2–12)
NEUTROPHILS # BLD: 3.32 E9/L (ref 1.8–7.3)
NEUTS SEG NFR BLD: 78.1 % (ref 43–80)
NRBC BLD-RTO: 0.9 /100 WBC
O2 CONTENT: 11.5 ML/DL
O2 SATURATION: 94.6 % (ref 92–98.5)
O2HB: 93.4 % (ref 94–97)
OPERATOR ID: 3342
OVALOCYTES: ABNORMAL
PATIENT TEMP: 37 C
PCO2: 34.8 MMHG (ref 35–45)
PEEP/CPAP: 6 CMH2O
PFO2: 1.62 MMHG/%
PH BLOOD GAS: 7.45 (ref 7.35–7.45)
PHOSPHATE SERPL-MCNC: 1.5 MG/DL (ref 2.5–4.5)
PHOSPHATE SERPL-MCNC: 2.2 MG/DL (ref 2.5–4.5)
PLATELET # BLD AUTO: 98 E9/L (ref 130–450)
PLATELET CONFIRMATION: NORMAL
PMV BLD AUTO: 11.5 FL (ref 7–12)
PO2: 72.8 MMHG (ref 75–100)
POLYCHROMASIA: ABNORMAL
POTASSIUM SERPL-SCNC: 3.8 MMOL/L (ref 3.5–5)
POTASSIUM SERPL-SCNC: 3.9 MMOL/L (ref 3.5–5)
RBC # BLD AUTO: 2.78 E12/L (ref 3.8–5.8)
RI(T): 2.86
RR MECHANICAL: 20 B/MIN
SODIUM SERPL-SCNC: 146 MMOL/L (ref 132–146)
SOURCE, BLOOD GAS: ABNORMAL
THB: 8.7 G/DL (ref 11.5–16.5)
TIME ANALYZED: 517
VT MECHANICAL: 450 ML
WBC # BLD: 4.2 E9/L (ref 4.5–11.5)

## 2023-05-16 PROCEDURE — 99233 SBSQ HOSP IP/OBS HIGH 50: CPT | Performed by: INTERNAL MEDICINE

## 2023-05-16 PROCEDURE — 2580000003 HC RX 258: Performed by: NURSE PRACTITIONER

## 2023-05-16 PROCEDURE — 6360000002 HC RX W HCPCS: Performed by: SURGERY

## 2023-05-16 PROCEDURE — 2500000003 HC RX 250 WO HCPCS: Performed by: SURGERY

## 2023-05-16 PROCEDURE — C9113 INJ PANTOPRAZOLE SODIUM, VIA: HCPCS | Performed by: INTERNAL MEDICINE

## 2023-05-16 PROCEDURE — 74018 RADEX ABDOMEN 1 VIEW: CPT

## 2023-05-16 PROCEDURE — 2000000000 HC ICU R&B

## 2023-05-16 PROCEDURE — A4216 STERILE WATER/SALINE, 10 ML: HCPCS | Performed by: INTERNAL MEDICINE

## 2023-05-16 PROCEDURE — 94640 AIRWAY INHALATION TREATMENT: CPT

## 2023-05-16 PROCEDURE — 2500000003 HC RX 250 WO HCPCS: Performed by: INTERNAL MEDICINE

## 2023-05-16 PROCEDURE — 84100 ASSAY OF PHOSPHORUS: CPT

## 2023-05-16 PROCEDURE — 80048 BASIC METABOLIC PNL TOTAL CA: CPT

## 2023-05-16 PROCEDURE — 82805 BLOOD GASES W/O2 SATURATION: CPT

## 2023-05-16 PROCEDURE — 84132 ASSAY OF SERUM POTASSIUM: CPT

## 2023-05-16 PROCEDURE — 6370000000 HC RX 637 (ALT 250 FOR IP): Performed by: SURGERY

## 2023-05-16 PROCEDURE — 36415 COLL VENOUS BLD VENIPUNCTURE: CPT

## 2023-05-16 PROCEDURE — 2580000003 HC RX 258: Performed by: SURGERY

## 2023-05-16 PROCEDURE — 36600 WITHDRAWAL OF ARTERIAL BLOOD: CPT

## 2023-05-16 PROCEDURE — 2580000003 HC RX 258: Performed by: INTERNAL MEDICINE

## 2023-05-16 PROCEDURE — 6370000000 HC RX 637 (ALT 250 FOR IP): Performed by: INTERNAL MEDICINE

## 2023-05-16 PROCEDURE — 85025 COMPLETE CBC W/AUTO DIFF WBC: CPT

## 2023-05-16 PROCEDURE — 94003 VENT MGMT INPAT SUBQ DAY: CPT

## 2023-05-16 PROCEDURE — 2500000003 HC RX 250 WO HCPCS: Performed by: NURSE PRACTITIONER

## 2023-05-16 PROCEDURE — 6360000002 HC RX W HCPCS: Performed by: INTERNAL MEDICINE

## 2023-05-16 PROCEDURE — 71045 X-RAY EXAM CHEST 1 VIEW: CPT

## 2023-05-16 PROCEDURE — 6360000002 HC RX W HCPCS: Performed by: NURSE PRACTITIONER

## 2023-05-16 PROCEDURE — 83735 ASSAY OF MAGNESIUM: CPT

## 2023-05-16 PROCEDURE — 36592 COLLECT BLOOD FROM PICC: CPT

## 2023-05-16 RX ORDER — METHYLPREDNISOLONE SODIUM SUCCINATE 40 MG/ML
40 INJECTION, POWDER, LYOPHILIZED, FOR SOLUTION INTRAMUSCULAR; INTRAVENOUS DAILY
Status: DISCONTINUED | OUTPATIENT
Start: 2023-05-17 | End: 2023-05-17

## 2023-05-16 RX ORDER — LANSOPRAZOLE 30 MG/1
30 TABLET, ORALLY DISINTEGRATING, DELAYED RELEASE ORAL
Status: DISCONTINUED | OUTPATIENT
Start: 2023-05-17 | End: 2023-05-19

## 2023-05-16 RX ORDER — MAGNESIUM SULFATE IN WATER 40 MG/ML
2000 INJECTION, SOLUTION INTRAVENOUS ONCE
Status: COMPLETED | OUTPATIENT
Start: 2023-05-16 | End: 2023-05-16

## 2023-05-16 RX ADMIN — POTASSIUM PHOSPHATE, MONOBASIC AND POTASSIUM PHOSPHATE, DIBASIC 20 MMOL: 224; 236 INJECTION, SOLUTION, CONCENTRATE INTRAVENOUS at 17:21

## 2023-05-16 RX ADMIN — ARFORMOTEROL TARTRATE 15 MCG: 15 SOLUTION RESPIRATORY (INHALATION) at 08:31

## 2023-05-16 RX ADMIN — ARFORMOTEROL TARTRATE 15 MCG: 15 SOLUTION RESPIRATORY (INHALATION) at 19:52

## 2023-05-16 RX ADMIN — MAGNESIUM SULFATE HEPTAHYDRATE 2000 MG: 40 INJECTION, SOLUTION INTRAVENOUS at 11:17

## 2023-05-16 RX ADMIN — DILTIAZEM HYDROCHLORIDE 30 MG: 30 TABLET, FILM COATED ORAL at 06:36

## 2023-05-16 RX ADMIN — Medication 150 MCG/HR: at 16:04

## 2023-05-16 RX ADMIN — DEXMEDETOMIDINE 0.8 MCG/KG/HR: 100 INJECTION, SOLUTION INTRAVENOUS at 06:42

## 2023-05-16 RX ADMIN — SODIUM CHLORIDE, PRESERVATIVE FREE 40 MG: 5 INJECTION INTRAVENOUS at 08:27

## 2023-05-16 RX ADMIN — DILTIAZEM HYDROCHLORIDE 30 MG: 30 TABLET, FILM COATED ORAL at 17:15

## 2023-05-16 RX ADMIN — IPRATROPIUM BROMIDE 0.5 MG: 0.5 SOLUTION RESPIRATORY (INHALATION) at 19:52

## 2023-05-16 RX ADMIN — ENOXAPARIN SODIUM 70 MG: 100 INJECTION SUBCUTANEOUS at 08:22

## 2023-05-16 RX ADMIN — METHYLPREDNISOLONE SODIUM SUCCINATE 40 MG: 40 INJECTION, POWDER, LYOPHILIZED, FOR SOLUTION INTRAMUSCULAR; INTRAVENOUS at 00:27

## 2023-05-16 RX ADMIN — CHLORHEXIDINE GLUCONATE 15 ML: 1.2 RINSE ORAL at 08:22

## 2023-05-16 RX ADMIN — MEGESTROL ACETATE 20 MG: 20 TABLET ORAL at 08:23

## 2023-05-16 RX ADMIN — IPRATROPIUM BROMIDE 0.5 MG: 0.5 SOLUTION RESPIRATORY (INHALATION) at 15:52

## 2023-05-16 RX ADMIN — QUETIAPINE FUMARATE 50 MG: 25 TABLET ORAL at 21:25

## 2023-05-16 RX ADMIN — FERROUS SULFATE TAB 325 MG (65 MG ELEMENTAL FE) 325 MG: 325 (65 FE) TAB at 17:18

## 2023-05-16 RX ADMIN — POTASSIUM PHOSPHATE, MONOBASIC AND POTASSIUM PHOSPHATE, DIBASIC 20 MMOL: 224; 236 INJECTION, SOLUTION, CONCENTRATE INTRAVENOUS at 06:35

## 2023-05-16 RX ADMIN — SENNOSIDES 10 ML: 8.8 SYRUP ORAL at 12:12

## 2023-05-16 RX ADMIN — ENOXAPARIN SODIUM 70 MG: 100 INJECTION SUBCUTANEOUS at 21:25

## 2023-05-16 RX ADMIN — DILTIAZEM HYDROCHLORIDE 30 MG: 30 TABLET, FILM COATED ORAL at 12:12

## 2023-05-16 RX ADMIN — DOCUSATE SODIUM LIQUID 100 MG: 100 LIQUID ORAL at 12:12

## 2023-05-16 RX ADMIN — GUAIFENESIN 400 MG: 400 TABLET ORAL at 17:15

## 2023-05-16 RX ADMIN — METOPROLOL TARTRATE 5 MG: 5 INJECTION INTRAVENOUS at 21:25

## 2023-05-16 RX ADMIN — GUAIFENESIN 400 MG: 400 TABLET ORAL at 21:25

## 2023-05-16 RX ADMIN — FERROUS SULFATE TAB 325 MG (65 MG ELEMENTAL FE) 325 MG: 325 (65 FE) TAB at 08:23

## 2023-05-16 RX ADMIN — DEXMEDETOMIDINE 1.1 MCG/KG/HR: 100 INJECTION, SOLUTION INTRAVENOUS at 12:54

## 2023-05-16 RX ADMIN — DEXMEDETOMIDINE 1 MCG/KG/HR: 100 INJECTION, SOLUTION INTRAVENOUS at 18:18

## 2023-05-16 RX ADMIN — Medication 50 MCG/HR: at 00:38

## 2023-05-16 RX ADMIN — DOCUSATE SODIUM LIQUID 100 MG: 100 LIQUID ORAL at 21:25

## 2023-05-16 RX ADMIN — GUAIFENESIN 400 MG: 400 TABLET ORAL at 08:27

## 2023-05-16 RX ADMIN — BUDESONIDE 250 MCG: 0.25 SUSPENSION RESPIRATORY (INHALATION) at 19:52

## 2023-05-16 RX ADMIN — IPRATROPIUM BROMIDE 0.5 MG: 0.5 SOLUTION RESPIRATORY (INHALATION) at 08:31

## 2023-05-16 RX ADMIN — METOPROLOL TARTRATE 5 MG: 5 INJECTION INTRAVENOUS at 08:23

## 2023-05-16 RX ADMIN — SODIUM CHLORIDE, PRESERVATIVE FREE 10 ML: 5 INJECTION INTRAVENOUS at 08:23

## 2023-05-16 RX ADMIN — CHLORHEXIDINE GLUCONATE 15 ML: 1.2 RINSE ORAL at 21:25

## 2023-05-16 RX ADMIN — SODIUM CHLORIDE, PRESERVATIVE FREE 10 ML: 5 INJECTION INTRAVENOUS at 21:25

## 2023-05-16 RX ADMIN — CLOPIDOGREL BISULFATE 75 MG: 75 TABLET ORAL at 08:22

## 2023-05-16 RX ADMIN — SENNOSIDES 10 ML: 8.8 SYRUP ORAL at 21:25

## 2023-05-16 RX ADMIN — IPRATROPIUM BROMIDE 0.5 MG: 0.5 SOLUTION RESPIRATORY (INHALATION) at 12:17

## 2023-05-16 RX ADMIN — BUDESONIDE 250 MCG: 0.25 SUSPENSION RESPIRATORY (INHALATION) at 08:31

## 2023-05-16 RX ADMIN — TAMSULOSIN HYDROCHLORIDE 0.4 MG: 0.4 CAPSULE ORAL at 08:23

## 2023-05-16 RX ADMIN — DILTIAZEM HYDROCHLORIDE 30 MG: 30 TABLET, FILM COATED ORAL at 00:28

## 2023-05-16 RX ADMIN — QUETIAPINE FUMARATE 25 MG: 25 TABLET ORAL at 08:27

## 2023-05-16 RX ADMIN — SODIUM CHLORIDE: 9 INJECTION, SOLUTION INTRAVENOUS at 00:35

## 2023-05-16 RX ADMIN — ATORVASTATIN CALCIUM 40 MG: 40 TABLET, FILM COATED ORAL at 21:25

## 2023-05-16 ASSESSMENT — PULMONARY FUNCTION TESTS
PIF_VALUE: 31
PIF_VALUE: 19
PIF_VALUE: 19
PIF_VALUE: 21
PIF_VALUE: 21
PIF_VALUE: 19
PIF_VALUE: 20
PIF_VALUE: 19
PIF_VALUE: 23
PIF_VALUE: 20
PIF_VALUE: 21
PIF_VALUE: 18
PIF_VALUE: 21
PIF_VALUE: 19
PIF_VALUE: 17
PIF_VALUE: 22
PIF_VALUE: 21
PIF_VALUE: 21

## 2023-05-16 ASSESSMENT — PAIN SCALES - GENERAL
PAINLEVEL_OUTOF10: 0

## 2023-05-16 NOTE — CARE COORDINATION
Readmission Assessment  Number of Days since last admission?: 1-7 days  Previous Disposition: Home with Family  Who is being Interviewed: Caregiver  What was the patient's/caregiver's perception as to why they think they needed to return back to the hospital?: Not enough help at home  Did you visit your Primary Care Physician after you left the hospital, before you returned this time?: No  Why weren't you able to visit your PCP?: Did not have an appointment  Did you see a specialist, such as Cardiac, Pulmonary, Orthopedic Physician, etc. after you left the hospital?: No  Who advised the patient to return to the hospital?: Caregiver, Self-referral  Does the patient report anything that got in the way of taking their medications?: No  In our efforts to provide the best possible care to you and others like you, can you think of anything that we could have done to help you after you left the hospital the first time, so that you might not have needed to return so soon?: Arrange for more help when leaving the hospital, Discharge instructions that are concise, clear, and non contradictory, Additional Community resources available for illness support

## 2023-05-16 NOTE — CARE COORDINATION
Transition of Care-Wife was discussing goals of care with Palliative Medicine, patient was combative with staff, initial assessment completed via medical record. Will leave [place] [address] brochure. PT/OT to see once medically able to participate. Patient is currently sedated, intubated on vent-45% FiO2. Weaning trials as tolerated. He lives with his wife in a one story ranch style home, four steps to gain entry. He has a cane and walker, although doesn't use often. He uses 3L of oxygen from [facility] at [facility] only. PCP is Dr. Sobeida Espino, preferred pharmacy is [facility] on [facility]. Cm following for potential placement vs. Home health care needs. Case Management Assessment  Initial Evaluation    Date/Time of Evaluation: 5/16/2023 10:15 AM  Assessment Completed by: Gordon Wilson RN    If patient is discharged prior to next notation, then this note serves as note for discharge by case management. Patient Name: Marcia Pedersen                   YOB: 1950  Diagnosis: Trachea, foreign body, initial encounter [E42.453L]  Foreign body in trachea, initial encounter [T17.408A]                   Date / Time: 5/11/2023  9:08 AM    Patient Admission Status: Inpatient   Readmission Risk (Low < 19, Mod (19-27), High > 27): Readmission Risk Score: 19.4    Current PCP: Patria Menendez MD  PCP verified by CM? Yes    Chart Reviewed: Yes      History Provided by: Medical Record  Patient Orientation: Other (see comment) (combative, confused)    Patient Cognition: Other (see comment) (weaning sedation)    Hospitalization in the last 30 days (Readmission):  Yes    If yes, Readmission Assessment in  Navigator will be completed.     Advance Directives:      Code Status: Limited   Patient's Primary Decision Maker is: Legal Next of Kin    Primary Decision Maker: EMILIO [place] 11 - [phone]    Discharge Planning:    Patient lives with: Spouse/Significant Other Type of Home:

## 2023-05-16 NOTE — PATIENT CARE CONFERENCE
Intensive Care Daily Quality Rounding Checklist        ICU Team Members:  Dr. Thu Mccarthy, Fareed Taylor (residents), clinical pharmacist, charge nurse, bedside nurse, respiratory therapist     ICU Day #: NUMBER: 5     Intubation Date: Trach May 12     Ventilator Day #: NUMBER: 5     Central Line Insertion Date: May 12                                                    Day #: NUMBER: 5                                                    Indication: CVCIndication: Limited/no vessels suitable for conventional peripheral access      Arterial Line Insertion Date: N/A                             Day #: N/A     Temporary Hemodialysis Catheter Insertion Date:  N/A                             Day # N/A     DVT Prophylaxis: Lovenox      GI Prophylaxis: Protonix                       Noel Catheter Insertion Date: May 12                                        Day #: 5                             Indications: Urinary retention, (acute or chronic)                             Continued need (if yes, reason documented and discussed with physician): yes, bladder scan over 500 cc     Skin Issues/ Wounds and ordered treatment discussed on rounds: yes, skin tear right arm     Goals/ Plans for the Day: Daily labs, wean vent as tolerated, soft wrist restraints to prevent pulling tubes, await family arrival tomorrow to further discuss goals of care

## 2023-05-17 ENCOUNTER — APPOINTMENT (OUTPATIENT)
Dept: GENERAL RADIOLOGY | Age: 73
DRG: 004 | End: 2023-05-17
Payer: MEDICARE

## 2023-05-17 LAB
ANION GAP SERPL CALCULATED.3IONS-SCNC: 9 MMOL/L (ref 7–16)
B.E.: 0.2 MMOL/L (ref -3–3)
BASOPHILS # BLD: 0 E9/L (ref 0–0.2)
BASOPHILS NFR BLD: 0 % (ref 0–2)
BUN SERPL-MCNC: 35 MG/DL (ref 6–23)
BURR CELLS: ABNORMAL
CALCIUM SERPL-MCNC: 8.3 MG/DL (ref 8.6–10.2)
CHLORIDE SERPL-SCNC: 114 MMOL/L (ref 98–107)
CO2 SERPL-SCNC: 25 MMOL/L (ref 22–29)
COHB: 1.3 % (ref 0–1.5)
CREAT SERPL-MCNC: 1 MG/DL (ref 0.7–1.2)
CRITICAL: ABNORMAL
DATE ANALYZED: ABNORMAL
DATE OF COLLECTION: ABNORMAL
EOSINOPHIL # BLD: 0.1 E9/L (ref 0.05–0.5)
EOSINOPHIL NFR BLD: 1.8 % (ref 0–6)
ERYTHROCYTE [DISTWIDTH] IN BLOOD BY AUTOMATED COUNT: 17.3 FL (ref 11.5–15)
FIO2: 40 %
GLUCOSE SERPL-MCNC: 294 MG/DL (ref 74–99)
HCO3: 24.4 MMOL/L (ref 22–26)
HCT VFR BLD AUTO: 25.1 % (ref 37–54)
HGB BLD-MCNC: 7.5 G/DL (ref 12.5–16.5)
HHB: 5 % (ref 0–5)
LAB: ABNORMAL
LYMPHOCYTES # BLD: 0.17 E9/L (ref 1.5–4)
LYMPHOCYTES NFR BLD: 2.6 % (ref 20–42)
Lab: ABNORMAL
MAGNESIUM SERPL-MCNC: 2.3 MG/DL (ref 1.6–2.6)
MCH RBC QN AUTO: 28 PG (ref 26–35)
MCHC RBC AUTO-ENTMCNC: 29.9 % (ref 32–34.5)
MCV RBC AUTO: 93.7 FL (ref 80–99.9)
METER GLUCOSE: 138 MG/DL (ref 74–99)
METER GLUCOSE: 291 MG/DL (ref 74–99)
METHB: 0.3 % (ref 0–1.5)
MODE: AC
MONOCYTES # BLD: 0.44 E9/L (ref 0.1–0.95)
MONOCYTES NFR BLD: 7.8 % (ref 2–12)
NEUTROPHILS # BLD: 4.84 E9/L (ref 1.8–7.3)
NEUTS SEG NFR BLD: 87.8 % (ref 43–80)
NRBC BLD-RTO: 0.9 /100 WBC
O2 CONTENT: 11.4 ML/DL
O2 SATURATION: 94.9 % (ref 92–98.5)
O2HB: 93.4 % (ref 94–97)
OPERATOR ID: 2485
PATIENT TEMP: 37 C
PCO2: 37.2 MMHG (ref 35–45)
PEEP/CPAP: 6 CMH2O
PFO2: 1.89 MMHG/%
PH BLOOD GAS: 7.43 (ref 7.35–7.45)
PHOSPHATE SERPL-MCNC: 2.3 MG/DL (ref 2.5–4.5)
PHOSPHATE SERPL-MCNC: 2.7 MG/DL (ref 2.5–4.5)
PLATELET # BLD AUTO: 98 E9/L (ref 130–450)
PLATELET CONFIRMATION: NORMAL
PMV BLD AUTO: 11.5 FL (ref 7–12)
PO2: 75.5 MMHG (ref 75–100)
POIKILOCYTES: ABNORMAL
POLYCHROMASIA: ABNORMAL
POTASSIUM SERPL-SCNC: 3.9 MMOL/L (ref 3.5–5)
RBC # BLD AUTO: 2.68 E12/L (ref 3.8–5.8)
RI(T): 2.21
RR MECHANICAL: 20 B/MIN
SODIUM SERPL-SCNC: 148 MMOL/L (ref 132–146)
SOURCE, BLOOD GAS: ABNORMAL
THB: 8.6 G/DL (ref 11.5–16.5)
TIME ANALYZED: 439
VT MECHANICAL: 450 ML
WBC # BLD: 5.5 E9/L (ref 4.5–11.5)

## 2023-05-17 PROCEDURE — 82962 GLUCOSE BLOOD TEST: CPT

## 2023-05-17 PROCEDURE — 82805 BLOOD GASES W/O2 SATURATION: CPT

## 2023-05-17 PROCEDURE — 99233 SBSQ HOSP IP/OBS HIGH 50: CPT | Performed by: INTERNAL MEDICINE

## 2023-05-17 PROCEDURE — 2580000003 HC RX 258: Performed by: INTERNAL MEDICINE

## 2023-05-17 PROCEDURE — 36415 COLL VENOUS BLD VENIPUNCTURE: CPT

## 2023-05-17 PROCEDURE — 85025 COMPLETE CBC W/AUTO DIFF WBC: CPT

## 2023-05-17 PROCEDURE — 2500000003 HC RX 250 WO HCPCS: Performed by: SURGERY

## 2023-05-17 PROCEDURE — 6370000000 HC RX 637 (ALT 250 FOR IP): Performed by: SURGERY

## 2023-05-17 PROCEDURE — 6360000002 HC RX W HCPCS: Performed by: SURGERY

## 2023-05-17 PROCEDURE — 36592 COLLECT BLOOD FROM PICC: CPT

## 2023-05-17 PROCEDURE — 71045 X-RAY EXAM CHEST 1 VIEW: CPT

## 2023-05-17 PROCEDURE — 2500000003 HC RX 250 WO HCPCS: Performed by: NURSE PRACTITIONER

## 2023-05-17 PROCEDURE — 2500000003 HC RX 250 WO HCPCS: Performed by: INTERNAL MEDICINE

## 2023-05-17 PROCEDURE — 6370000000 HC RX 637 (ALT 250 FOR IP): Performed by: INTERNAL MEDICINE

## 2023-05-17 PROCEDURE — 94003 VENT MGMT INPAT SUBQ DAY: CPT

## 2023-05-17 PROCEDURE — 84100 ASSAY OF PHOSPHORUS: CPT

## 2023-05-17 PROCEDURE — 2000000000 HC ICU R&B

## 2023-05-17 PROCEDURE — 6360000002 HC RX W HCPCS: Performed by: INTERNAL MEDICINE

## 2023-05-17 PROCEDURE — 83735 ASSAY OF MAGNESIUM: CPT

## 2023-05-17 PROCEDURE — 2580000003 HC RX 258: Performed by: SURGERY

## 2023-05-17 PROCEDURE — 2580000003 HC RX 258: Performed by: NURSE PRACTITIONER

## 2023-05-17 PROCEDURE — 80048 BASIC METABOLIC PNL TOTAL CA: CPT

## 2023-05-17 PROCEDURE — 94640 AIRWAY INHALATION TREATMENT: CPT

## 2023-05-17 PROCEDURE — 36600 WITHDRAWAL OF ARTERIAL BLOOD: CPT

## 2023-05-17 RX ORDER — INSULIN LISPRO 100 [IU]/ML
0-8 INJECTION, SOLUTION INTRAVENOUS; SUBCUTANEOUS EVERY 6 HOURS
Status: DISCONTINUED | OUTPATIENT
Start: 2023-05-17 | End: 2023-05-19

## 2023-05-17 RX ORDER — DIMETHICONE, OXYBENZONE, AND PADIMATE O 2; 2.5; 6.6 G/100G; G/100G; G/100G
STICK TOPICAL PRN
Status: DISCONTINUED | OUTPATIENT
Start: 2023-05-17 | End: 2023-05-20 | Stop reason: HOSPADM

## 2023-05-17 RX ORDER — DEXTROSE MONOHYDRATE 100 MG/ML
INJECTION, SOLUTION INTRAVENOUS CONTINUOUS PRN
Status: DISCONTINUED | OUTPATIENT
Start: 2023-05-17 | End: 2023-05-19

## 2023-05-17 RX ADMIN — GUAIFENESIN 400 MG: 400 TABLET ORAL at 20:25

## 2023-05-17 RX ADMIN — GUAIFENESIN 400 MG: 400 TABLET ORAL at 08:51

## 2023-05-17 RX ADMIN — FERROUS SULFATE TAB 325 MG (65 MG ELEMENTAL FE) 325 MG: 325 (65 FE) TAB at 08:52

## 2023-05-17 RX ADMIN — METOPROLOL TARTRATE 5 MG: 5 INJECTION INTRAVENOUS at 16:13

## 2023-05-17 RX ADMIN — IPRATROPIUM BROMIDE 0.5 MG: 0.5 SOLUTION RESPIRATORY (INHALATION) at 16:53

## 2023-05-17 RX ADMIN — DEXMEDETOMIDINE 1 MCG/KG/HR: 100 INJECTION, SOLUTION INTRAVENOUS at 00:26

## 2023-05-17 RX ADMIN — Medication 75 MCG/HR: at 13:11

## 2023-05-17 RX ADMIN — DILTIAZEM HYDROCHLORIDE 30 MG: 30 TABLET, FILM COATED ORAL at 06:22

## 2023-05-17 RX ADMIN — ENOXAPARIN SODIUM 70 MG: 100 INJECTION SUBCUTANEOUS at 20:25

## 2023-05-17 RX ADMIN — POTASSIUM PHOSPHATE, MONOBASIC AND POTASSIUM PHOSPHATE, DIBASIC 10 MMOL: 224; 236 INJECTION, SOLUTION, CONCENTRATE INTRAVENOUS at 06:24

## 2023-05-17 RX ADMIN — DILTIAZEM HYDROCHLORIDE 30 MG: 30 TABLET, FILM COATED ORAL at 12:19

## 2023-05-17 RX ADMIN — SENNOSIDES 10 ML: 8.8 SYRUP ORAL at 20:26

## 2023-05-17 RX ADMIN — Medication 125 MCG/HR: at 02:29

## 2023-05-17 RX ADMIN — CHLORHEXIDINE GLUCONATE 15 ML: 1.2 RINSE ORAL at 20:25

## 2023-05-17 RX ADMIN — Medication: at 09:25

## 2023-05-17 RX ADMIN — QUETIAPINE FUMARATE 50 MG: 25 TABLET ORAL at 20:25

## 2023-05-17 RX ADMIN — IPRATROPIUM BROMIDE 0.5 MG: 0.5 SOLUTION RESPIRATORY (INHALATION) at 08:40

## 2023-05-17 RX ADMIN — BUDESONIDE 250 MCG: 0.25 SUSPENSION RESPIRATORY (INHALATION) at 08:40

## 2023-05-17 RX ADMIN — SODIUM CHLORIDE, PRESERVATIVE FREE 10 ML: 5 INJECTION INTRAVENOUS at 09:08

## 2023-05-17 RX ADMIN — INSULIN LISPRO 4 UNITS: 100 INJECTION, SOLUTION INTRAVENOUS; SUBCUTANEOUS at 18:11

## 2023-05-17 RX ADMIN — METOPROLOL TARTRATE 5 MG: 5 INJECTION INTRAVENOUS at 20:26

## 2023-05-17 RX ADMIN — FERROUS SULFATE TAB 325 MG (65 MG ELEMENTAL FE) 325 MG: 325 (65 FE) TAB at 16:13

## 2023-05-17 RX ADMIN — IPRATROPIUM BROMIDE 0.5 MG: 0.5 SOLUTION RESPIRATORY (INHALATION) at 12:49

## 2023-05-17 RX ADMIN — DOCUSATE SODIUM LIQUID 100 MG: 100 LIQUID ORAL at 08:51

## 2023-05-17 RX ADMIN — QUETIAPINE FUMARATE 25 MG: 25 TABLET ORAL at 08:51

## 2023-05-17 RX ADMIN — ATORVASTATIN CALCIUM 40 MG: 40 TABLET, FILM COATED ORAL at 20:25

## 2023-05-17 RX ADMIN — DEXMEDETOMIDINE 1 MCG/KG/HR: 100 INJECTION, SOLUTION INTRAVENOUS at 19:46

## 2023-05-17 RX ADMIN — ARFORMOTEROL TARTRATE 15 MCG: 15 SOLUTION RESPIRATORY (INHALATION) at 20:27

## 2023-05-17 RX ADMIN — CHLORHEXIDINE GLUCONATE 15 ML: 1.2 RINSE ORAL at 08:53

## 2023-05-17 RX ADMIN — METHYLPREDNISOLONE SODIUM SUCCINATE 40 MG: 40 INJECTION INTRAMUSCULAR; INTRAVENOUS at 08:52

## 2023-05-17 RX ADMIN — CLOPIDOGREL BISULFATE 75 MG: 75 TABLET ORAL at 08:52

## 2023-05-17 RX ADMIN — DOCUSATE SODIUM LIQUID 100 MG: 100 LIQUID ORAL at 20:25

## 2023-05-17 RX ADMIN — SENNOSIDES 10 ML: 8.8 SYRUP ORAL at 08:54

## 2023-05-17 RX ADMIN — BUDESONIDE 250 MCG: 0.25 SUSPENSION RESPIRATORY (INHALATION) at 20:27

## 2023-05-17 RX ADMIN — METOPROLOL TARTRATE 5 MG: 5 INJECTION INTRAVENOUS at 08:56

## 2023-05-17 RX ADMIN — GUAIFENESIN 400 MG: 400 TABLET ORAL at 16:13

## 2023-05-17 RX ADMIN — DEXMEDETOMIDINE 1 MCG/KG/HR: 100 INJECTION, SOLUTION INTRAVENOUS at 06:24

## 2023-05-17 RX ADMIN — ARFORMOTEROL TARTRATE 15 MCG: 15 SOLUTION RESPIRATORY (INHALATION) at 08:40

## 2023-05-17 RX ADMIN — DILTIAZEM HYDROCHLORIDE 30 MG: 30 TABLET, FILM COATED ORAL at 00:39

## 2023-05-17 RX ADMIN — FLUTICASONE PROPIONATE 2 SPRAY: 50 SPRAY, METERED NASAL at 08:53

## 2023-05-17 RX ADMIN — IPRATROPIUM BROMIDE 0.5 MG: 0.5 SOLUTION RESPIRATORY (INHALATION) at 20:27

## 2023-05-17 RX ADMIN — ENOXAPARIN SODIUM 70 MG: 100 INJECTION SUBCUTANEOUS at 08:52

## 2023-05-17 RX ADMIN — DILTIAZEM HYDROCHLORIDE 30 MG: 30 TABLET, FILM COATED ORAL at 18:09

## 2023-05-17 RX ADMIN — SODIUM CHLORIDE, PRESERVATIVE FREE 10 ML: 5 INJECTION INTRAVENOUS at 20:26

## 2023-05-17 RX ADMIN — DEXMEDETOMIDINE 1 MCG/KG/HR: 100 INJECTION, SOLUTION INTRAVENOUS at 13:09

## 2023-05-17 RX ADMIN — TAMSULOSIN HYDROCHLORIDE 0.4 MG: 0.4 CAPSULE ORAL at 08:52

## 2023-05-17 RX ADMIN — LANSOPRAZOLE 30 MG: 30 TABLET, ORALLY DISINTEGRATING, DELAYED RELEASE ORAL at 06:22

## 2023-05-17 RX ADMIN — MEGESTROL ACETATE 20 MG: 20 TABLET ORAL at 08:51

## 2023-05-17 ASSESSMENT — PULMONARY FUNCTION TESTS
PIF_VALUE: 21
PIF_VALUE: 13
PIF_VALUE: 21
PIF_VALUE: 9
PIF_VALUE: 28
PIF_VALUE: 30
PIF_VALUE: 9
PIF_VALUE: 20
PIF_VALUE: 29
PIF_VALUE: 22
PIF_VALUE: 20
PIF_VALUE: 15
PIF_VALUE: 19
PIF_VALUE: 12
PIF_VALUE: 15
PIF_VALUE: 23
PIF_VALUE: 19

## 2023-05-17 NOTE — ACP (ADVANCE CARE PLANNING)
Palliative Care Department  ACP [facility] Wesly EdmondsANGELI - [place]  [phone]    Pertinent Diagnosis:  Respiratory failure    The patient and/or family consented to a voluntary Advance Care Planning conversation. Individuals present for the conversation:    Spouse, Isa Mendez  Daughter    Palliative Care ACP/Goals of Care Discussion:  Please see detailed goals of care discussion as below. At this time, Jass Garcia, Does Not have capacity for medical decision-making. Capacity is time limited and situation/question specific. During encounter, patient's spouse, was surrogate medical decision-maker  Code status: Limited : resuscitative medications only   I reviewed with the family that given current severe acute illness, in the event of cardiac arrest, the chances of surviving may likely be low. It was also reviewed that if they survived a cardiac arrest, a return to prior level of function also may be low. Advanced Directives: Unknown  Surrogate/Legal NOK:   Anabella Mondragon ([phone]) is the patient's wife    Summary of the Conversation: Met with family to discuss goals of care. We discussed continuation of medical management versus hospice care. Hospice philosophy explained. Patient's granddaughter graduates on Wednesday and family does express hesitancy regarding making a decision with this special time for their family in the near future. At this time, plan is to continue with current medical management and limited code. Support offered. Outcome of the conversation and documents completed:    continue current management and to be determined    I spent 30 minutes providing separately identifiable ACP services with the patient and/or surrogate decision maker in a voluntary, in-person conversation discussing the patient's wishes and goals as detailed in the above note.     Bennie Maxwell, ANGELI - CNP  Palliative Medicine    Note: This report was completed using computerize voiced recognition

## 2023-05-17 NOTE — PATIENT CARE CONFERENCE
Intensive Care Daily Quality Rounding Checklist        ICU Team Members:  charge nurse, bedside nurse, Elvis Faith, resident, clinical pharmacist     ICU Day #: NUMBER: 6     Intubation Date: Trach May 12     Ventilator Day #: NUMBER: 6     Central Line Insertion Date: May 12                                                    Day #: NUMBER: 6                                                    Indication: CVCIndication: Limited/no vessels suitable for conventional peripheral access      Arterial Line Insertion Date: N/A                             Day #: N/A     Temporary Hemodialysis Catheter Insertion Date:  N/A                             Day # N/A     DVT Prophylaxis: Lovenox      GI Prophylaxis: Protonix                       Noel Catheter Insertion Date: May 12                                       Day #: 6                             Indications: Urinary retention, (acute or chronic)                             Continued need (if yes, reason documented and discussed with physician): yes, bladder scan over 500 cc     Skin Issues/ Wounds and ordered treatment discussed on rounds: yes, skin tear right arm     Goals/ Plans for the Day: Daily labs, wean vent as tolerated, soft wrist restraints to prevent pulling tubes, family meeting today

## 2023-05-18 ENCOUNTER — APPOINTMENT (OUTPATIENT)
Dept: GENERAL RADIOLOGY | Age: 73
DRG: 004 | End: 2023-05-18
Payer: MEDICARE

## 2023-05-18 LAB
ANION GAP SERPL CALCULATED.3IONS-SCNC: 8 MMOL/L (ref 7–16)
ANISOCYTOSIS: ABNORMAL
B.E.: 1.7 MMOL/L (ref -3–3)
BASOPHILS # BLD: 0 E9/L (ref 0–0.2)
BASOPHILS NFR BLD: 0 % (ref 0–2)
BUN SERPL-MCNC: 36 MG/DL (ref 6–23)
BURR CELLS: ABNORMAL
CALCIUM SERPL-MCNC: 7.9 MG/DL (ref 8.6–10.2)
CHLORIDE SERPL-SCNC: 111 MMOL/L (ref 98–107)
CO2 SERPL-SCNC: 26 MMOL/L (ref 22–29)
COHB: 1.5 % (ref 0–1.5)
CREAT SERPL-MCNC: 1 MG/DL (ref 0.7–1.2)
CRITICAL: ABNORMAL
DATE ANALYZED: ABNORMAL
DATE OF COLLECTION: ABNORMAL
EOSINOPHIL # BLD: 0.12 E9/L (ref 0.05–0.5)
EOSINOPHIL NFR BLD: 1.7 % (ref 0–6)
ERYTHROCYTE [DISTWIDTH] IN BLOOD BY AUTOMATED COUNT: 17.9 FL (ref 11.5–15)
FIO2: 40 %
GLUCOSE SERPL-MCNC: 171 MG/DL (ref 74–99)
HCO3: 25 MMOL/L (ref 22–26)
HCT VFR BLD AUTO: 25.7 % (ref 37–54)
HGB BLD-MCNC: 7.8 G/DL (ref 12.5–16.5)
HHB: 1 % (ref 0–5)
HYPOCHROMIA: ABNORMAL
LAB: ABNORMAL
LYMPHOCYTES # BLD: 0.43 E9/L (ref 1.5–4)
LYMPHOCYTES NFR BLD: 4.4 % (ref 20–42)
Lab: ABNORMAL
MAGNESIUM SERPL-MCNC: 2 MG/DL (ref 1.6–2.6)
MCH RBC QN AUTO: 28 PG (ref 26–35)
MCHC RBC AUTO-ENTMCNC: 30.4 % (ref 32–34.5)
MCV RBC AUTO: 92.1 FL (ref 80–99.9)
METAMYELOCYTES NFR BLD MANUAL: 0.9 % (ref 0–1)
METER GLUCOSE: 131 MG/DL (ref 74–99)
METER GLUCOSE: 156 MG/DL (ref 74–99)
METER GLUCOSE: 179 MG/DL (ref 74–99)
METHB: 0.3 % (ref 0–1.5)
MODE: AC
MONOCYTES # BLD: 0.58 E9/L (ref 0.1–0.95)
MONOCYTES NFR BLD: 7.9 % (ref 2–12)
MYELOCYTES NFR BLD MANUAL: 0.9 % (ref 0–0)
NEUTROPHILS # BLD: 6.05 E9/L (ref 1.8–7.3)
NEUTS SEG NFR BLD: 82.5 % (ref 43–80)
NRBC BLD-RTO: 0 /100 WBC
O2 CONTENT: 11.6 ML/DL
O2 SATURATION: 99 % (ref 92–98.5)
O2HB: 97.2 % (ref 94–97)
OPERATOR ID: ABNORMAL
OVALOCYTES: ABNORMAL
PATIENT TEMP: 37 C
PCO2: 34 MMHG (ref 35–45)
PEEP/CPAP: 6 CMH2O
PFO2: 3.28 MMHG/%
PH BLOOD GAS: 7.49 (ref 7.35–7.45)
PHOSPHATE SERPL-MCNC: 2.2 MG/DL (ref 2.5–4.5)
PLATELET # BLD AUTO: 129 E9/L (ref 130–450)
PMV BLD AUTO: 11.8 FL (ref 7–12)
PO2: 131.4 MMHG (ref 75–100)
POIKILOCYTES: ABNORMAL
POLYCHROMASIA: ABNORMAL
POTASSIUM SERPL-SCNC: 4.3 MMOL/L (ref 3.5–5)
RBC # BLD AUTO: 2.79 E12/L (ref 3.8–5.8)
RI(T): 0.87
RR MECHANICAL: 20 B/MIN
SODIUM SERPL-SCNC: 145 MMOL/L (ref 132–146)
SOURCE, BLOOD GAS: ABNORMAL
THB: 8.3 G/DL (ref 11.5–16.5)
TIME ANALYZED: 508
TOXIC GRANULATION: ABNORMAL
VARIANT LYMPHS NFR BLD: 1.7 % (ref 0–4)
VT MECHANICAL: 450 ML
WBC # BLD: 7.2 E9/L (ref 4.5–11.5)

## 2023-05-18 PROCEDURE — 6370000000 HC RX 637 (ALT 250 FOR IP): Performed by: INTERNAL MEDICINE

## 2023-05-18 PROCEDURE — 02HV33Z INSERTION OF INFUSION DEVICE INTO SUPERIOR VENA CAVA, PERCUTANEOUS APPROACH: ICD-10-PCS | Performed by: FAMILY MEDICINE

## 2023-05-18 PROCEDURE — 82962 GLUCOSE BLOOD TEST: CPT

## 2023-05-18 PROCEDURE — 99233 SBSQ HOSP IP/OBS HIGH 50: CPT | Performed by: INTERNAL MEDICINE

## 2023-05-18 PROCEDURE — 6360000002 HC RX W HCPCS: Performed by: SURGERY

## 2023-05-18 PROCEDURE — 6370000000 HC RX 637 (ALT 250 FOR IP): Performed by: SURGERY

## 2023-05-18 PROCEDURE — 87077 CULTURE AEROBIC IDENTIFY: CPT

## 2023-05-18 PROCEDURE — 36415 COLL VENOUS BLD VENIPUNCTURE: CPT

## 2023-05-18 PROCEDURE — 36592 COLLECT BLOOD FROM PICC: CPT

## 2023-05-18 PROCEDURE — 71045 X-RAY EXAM CHEST 1 VIEW: CPT

## 2023-05-18 PROCEDURE — C1751 CATH, INF, PER/CENT/MIDLINE: HCPCS

## 2023-05-18 PROCEDURE — 80048 BASIC METABOLIC PNL TOTAL CA: CPT

## 2023-05-18 PROCEDURE — 36569 INSJ PICC 5 YR+ W/O IMAGING: CPT

## 2023-05-18 PROCEDURE — 85025 COMPLETE CBC W/AUTO DIFF WBC: CPT

## 2023-05-18 PROCEDURE — 94003 VENT MGMT INPAT SUBQ DAY: CPT

## 2023-05-18 PROCEDURE — 87040 BLOOD CULTURE FOR BACTERIA: CPT

## 2023-05-18 PROCEDURE — 2500000003 HC RX 250 WO HCPCS: Performed by: SURGERY

## 2023-05-18 PROCEDURE — 87206 SMEAR FLUORESCENT/ACID STAI: CPT

## 2023-05-18 PROCEDURE — 2580000003 HC RX 258: Performed by: NURSE PRACTITIONER

## 2023-05-18 PROCEDURE — 83735 ASSAY OF MAGNESIUM: CPT

## 2023-05-18 PROCEDURE — 87070 CULTURE OTHR SPECIMN AEROBIC: CPT

## 2023-05-18 PROCEDURE — 82805 BLOOD GASES W/O2 SATURATION: CPT

## 2023-05-18 PROCEDURE — 36600 WITHDRAWAL OF ARTERIAL BLOOD: CPT

## 2023-05-18 PROCEDURE — 2500000003 HC RX 250 WO HCPCS: Performed by: INTERNAL MEDICINE

## 2023-05-18 PROCEDURE — 94640 AIRWAY INHALATION TREATMENT: CPT

## 2023-05-18 PROCEDURE — 99233 SBSQ HOSP IP/OBS HIGH 50: CPT | Performed by: NURSE PRACTITIONER

## 2023-05-18 PROCEDURE — 2500000003 HC RX 250 WO HCPCS: Performed by: NURSE PRACTITIONER

## 2023-05-18 PROCEDURE — 2580000003 HC RX 258: Performed by: INTERNAL MEDICINE

## 2023-05-18 PROCEDURE — 87186 SC STD MICRODIL/AGAR DIL: CPT

## 2023-05-18 PROCEDURE — 76937 US GUIDE VASCULAR ACCESS: CPT

## 2023-05-18 PROCEDURE — 84100 ASSAY OF PHOSPHORUS: CPT

## 2023-05-18 PROCEDURE — 2000000000 HC ICU R&B

## 2023-05-18 PROCEDURE — 6360000002 HC RX W HCPCS: Performed by: INTERNAL MEDICINE

## 2023-05-18 PROCEDURE — 2580000003 HC RX 258: Performed by: SURGERY

## 2023-05-18 RX ORDER — SODIUM CHLORIDE 9 MG/ML
INJECTION, SOLUTION INTRAVENOUS PRN
Status: DISCONTINUED | OUTPATIENT
Start: 2023-05-18 | End: 2023-05-20 | Stop reason: HOSPADM

## 2023-05-18 RX ORDER — LIDOCAINE HYDROCHLORIDE 10 MG/ML
5 INJECTION, SOLUTION EPIDURAL; INFILTRATION; INTRACAUDAL; PERINEURAL ONCE
Status: COMPLETED | OUTPATIENT
Start: 2023-05-18 | End: 2023-05-18

## 2023-05-18 RX ORDER — SODIUM CHLORIDE 0.9 % (FLUSH) 0.9 %
5-40 SYRINGE (ML) INJECTION PRN
Status: DISCONTINUED | OUTPATIENT
Start: 2023-05-18 | End: 2023-05-20 | Stop reason: HOSPADM

## 2023-05-18 RX ORDER — SODIUM CHLORIDE 0.9 % (FLUSH) 0.9 %
5-40 SYRINGE (ML) INJECTION EVERY 12 HOURS SCHEDULED
Status: DISCONTINUED | OUTPATIENT
Start: 2023-05-18 | End: 2023-05-19

## 2023-05-18 RX ORDER — HEPARIN SODIUM (PORCINE) LOCK FLUSH IV SOLN 100 UNIT/ML 100 UNIT/ML
3 SOLUTION INTRAVENOUS EVERY 12 HOURS SCHEDULED
Status: DISCONTINUED | OUTPATIENT
Start: 2023-05-18 | End: 2023-05-20 | Stop reason: HOSPADM

## 2023-05-18 RX ORDER — HEPARIN SODIUM (PORCINE) LOCK FLUSH IV SOLN 100 UNIT/ML 100 UNIT/ML
3 SOLUTION INTRAVENOUS PRN
Status: DISCONTINUED | OUTPATIENT
Start: 2023-05-18 | End: 2023-05-19

## 2023-05-18 RX ADMIN — Medication 125 MCG/HR: at 19:58

## 2023-05-18 RX ADMIN — HEPARIN 300 UNITS: 100 SYRINGE at 20:09

## 2023-05-18 RX ADMIN — ARFORMOTEROL TARTRATE 15 MCG: 15 SOLUTION RESPIRATORY (INHALATION) at 20:48

## 2023-05-18 RX ADMIN — ENOXAPARIN SODIUM 70 MG: 100 INJECTION SUBCUTANEOUS at 20:08

## 2023-05-18 RX ADMIN — SENNOSIDES 10 ML: 8.8 SYRUP ORAL at 20:10

## 2023-05-18 RX ADMIN — FLUTICASONE PROPIONATE 2 SPRAY: 50 SPRAY, METERED NASAL at 08:56

## 2023-05-18 RX ADMIN — DEXMEDETOMIDINE 1.2 MCG/KG/HR: 100 INJECTION, SOLUTION INTRAVENOUS at 13:35

## 2023-05-18 RX ADMIN — LANSOPRAZOLE 30 MG: 30 TABLET, ORALLY DISINTEGRATING, DELAYED RELEASE ORAL at 06:19

## 2023-05-18 RX ADMIN — METOPROLOL TARTRATE 5 MG: 5 INJECTION INTRAVENOUS at 08:53

## 2023-05-18 RX ADMIN — FERROUS SULFATE TAB 325 MG (65 MG ELEMENTAL FE) 325 MG: 325 (65 FE) TAB at 08:55

## 2023-05-18 RX ADMIN — SODIUM CHLORIDE, PRESERVATIVE FREE 10 ML: 5 INJECTION INTRAVENOUS at 11:50

## 2023-05-18 RX ADMIN — DEXMEDETOMIDINE 1.2 MCG/KG/HR: 100 INJECTION, SOLUTION INTRAVENOUS at 01:02

## 2023-05-18 RX ADMIN — POTASSIUM PHOSPHATE, MONOBASIC AND POTASSIUM PHOSPHATE, DIBASIC 10 MMOL: 224; 236 INJECTION, SOLUTION, CONCENTRATE INTRAVENOUS at 06:36

## 2023-05-18 RX ADMIN — GUAIFENESIN 400 MG: 400 TABLET ORAL at 08:54

## 2023-05-18 RX ADMIN — DEXMEDETOMIDINE 0.8 MCG/KG/HR: 100 INJECTION, SOLUTION INTRAVENOUS at 06:26

## 2023-05-18 RX ADMIN — IPRATROPIUM BROMIDE 0.5 MG: 0.5 SOLUTION RESPIRATORY (INHALATION) at 16:18

## 2023-05-18 RX ADMIN — CHLORHEXIDINE GLUCONATE 15 ML: 1.2 RINSE ORAL at 08:53

## 2023-05-18 RX ADMIN — ENOXAPARIN SODIUM 70 MG: 100 INJECTION SUBCUTANEOUS at 08:53

## 2023-05-18 RX ADMIN — DILTIAZEM HYDROCHLORIDE 30 MG: 30 TABLET, FILM COATED ORAL at 06:19

## 2023-05-18 RX ADMIN — CHLORHEXIDINE GLUCONATE 15 ML: 1.2 RINSE ORAL at 20:09

## 2023-05-18 RX ADMIN — GUAIFENESIN 400 MG: 400 TABLET ORAL at 20:09

## 2023-05-18 RX ADMIN — QUETIAPINE FUMARATE 25 MG: 25 TABLET ORAL at 08:55

## 2023-05-18 RX ADMIN — TAMSULOSIN HYDROCHLORIDE 0.4 MG: 0.4 CAPSULE ORAL at 08:54

## 2023-05-18 RX ADMIN — METOPROLOL TARTRATE 5 MG: 5 INJECTION INTRAVENOUS at 03:15

## 2023-05-18 RX ADMIN — BUDESONIDE 250 MCG: 0.25 SUSPENSION RESPIRATORY (INHALATION) at 20:48

## 2023-05-18 RX ADMIN — BUDESONIDE 250 MCG: 0.25 SUSPENSION RESPIRATORY (INHALATION) at 08:02

## 2023-05-18 RX ADMIN — SALINE NASAL SPRAY 1 SPRAY: 1.5 SOLUTION NASAL at 08:56

## 2023-05-18 RX ADMIN — DEXMEDETOMIDINE 1.2 MCG/KG/HR: 100 INJECTION, SOLUTION INTRAVENOUS at 19:07

## 2023-05-18 RX ADMIN — IPRATROPIUM BROMIDE 0.5 MG: 0.5 SOLUTION RESPIRATORY (INHALATION) at 20:48

## 2023-05-18 RX ADMIN — SODIUM CHLORIDE, PRESERVATIVE FREE 10 ML: 5 INJECTION INTRAVENOUS at 08:53

## 2023-05-18 RX ADMIN — ACETAMINOPHEN 650 MG: 325 TABLET ORAL at 13:34

## 2023-05-18 RX ADMIN — LIDOCAINE HYDROCHLORIDE 2 ML: 10 SOLUTION INTRAVENOUS at 15:00

## 2023-05-18 RX ADMIN — DILTIAZEM HYDROCHLORIDE 30 MG: 30 TABLET, FILM COATED ORAL at 12:14

## 2023-05-18 RX ADMIN — ARFORMOTEROL TARTRATE 15 MCG: 15 SOLUTION RESPIRATORY (INHALATION) at 08:02

## 2023-05-18 RX ADMIN — IPRATROPIUM BROMIDE 0.5 MG: 0.5 SOLUTION RESPIRATORY (INHALATION) at 12:32

## 2023-05-18 RX ADMIN — DILTIAZEM HYDROCHLORIDE 30 MG: 30 TABLET, FILM COATED ORAL at 18:27

## 2023-05-18 RX ADMIN — ATORVASTATIN CALCIUM 40 MG: 40 TABLET, FILM COATED ORAL at 20:09

## 2023-05-18 RX ADMIN — Medication 25 MCG/HR: at 06:27

## 2023-05-18 RX ADMIN — SODIUM CHLORIDE, PRESERVATIVE FREE 10 ML: 5 INJECTION INTRAVENOUS at 20:09

## 2023-05-18 RX ADMIN — DOCUSATE SODIUM LIQUID 100 MG: 100 LIQUID ORAL at 20:08

## 2023-05-18 RX ADMIN — IPRATROPIUM BROMIDE 0.5 MG: 0.5 SOLUTION RESPIRATORY (INHALATION) at 08:02

## 2023-05-18 RX ADMIN — METOPROLOL TARTRATE 5 MG: 5 INJECTION INTRAVENOUS at 15:49

## 2023-05-18 RX ADMIN — GUAIFENESIN 400 MG: 400 TABLET ORAL at 15:48

## 2023-05-18 RX ADMIN — METOPROLOL TARTRATE 5 MG: 5 INJECTION INTRAVENOUS at 20:09

## 2023-05-18 RX ADMIN — DILTIAZEM HYDROCHLORIDE 30 MG: 30 TABLET, FILM COATED ORAL at 00:39

## 2023-05-18 RX ADMIN — QUETIAPINE FUMARATE 50 MG: 25 TABLET ORAL at 20:09

## 2023-05-18 RX ADMIN — SALINE NASAL SPRAY 1 SPRAY: 1.5 SOLUTION NASAL at 08:55

## 2023-05-18 RX ADMIN — FERROUS SULFATE TAB 325 MG (65 MG ELEMENTAL FE) 325 MG: 325 (65 FE) TAB at 15:49

## 2023-05-18 RX ADMIN — CLOPIDOGREL BISULFATE 75 MG: 75 TABLET ORAL at 08:54

## 2023-05-18 ASSESSMENT — PULMONARY FUNCTION TESTS
PIF_VALUE: 12
PIF_VALUE: 15
PIF_VALUE: 12
PIF_VALUE: 23
PIF_VALUE: 9
PIF_VALUE: 18
PIF_VALUE: 17
PIF_VALUE: 16
PIF_VALUE: 17
PIF_VALUE: 16
PIF_VALUE: 11
PIF_VALUE: 14
PIF_VALUE: 10
PIF_VALUE: 11
PIF_VALUE: 12
PIF_VALUE: 21
PIF_VALUE: 16
PIF_VALUE: 20
PIF_VALUE: 21
PIF_VALUE: 16
PIF_VALUE: 19
PIF_VALUE: 10
PIF_VALUE: 23
PIF_VALUE: 15

## 2023-05-18 ASSESSMENT — PAIN SCALES - GENERAL
PAINLEVEL_OUTOF10: 0

## 2023-05-18 NOTE — PATIENT CARE CONFERENCE
Intensive Care Daily Quality Rounding Checklist        ICU Team Members:  bedside nurse, charge nurse,RT,clinical pharmacist, Bradly Sever, resident     ICU Day #: NUMBER: 7     Intubation Date: Trach May 12     Ventilator Day #: NUMBER: 7     Central Line Insertion Date: May 12                                                   Day #: NUMBER: 7                                                    Indication: CVCIndication: Limited/no vessels suitable for conventional peripheral access      Arterial Line Insertion Date: N/A                             Day #: N/A     Temporary Hemodialysis Catheter Insertion Date:  N/A                             Day # N/A     DVT Prophylaxis: Lovenox      GI Prophylaxis: Protonix                       Noel Catheter Insertion Date: May 12                                       Day #: 7                             Indications: Urinary retention, (acute or chronic)                             Continued need (if yes, reason documented and discussed with physician): yes, bladder scan over 500 cc     Skin Issues/ Wounds and ordered treatment discussed on rounds: yes, skin tear right arm     Goals/ Plans for the Day: Daily labs, wean vent as tolerated, soft wrist restraints to prevent pulling tubes

## 2023-05-18 NOTE — PROCEDURES
PICC    Catheter insertion date: 5/18/2023     Product Number:  [id number]   Lot No: [id number]   Gauge: 5.5 fr   Lumen: dual   Left Brachial    Vein Diameter: 0.46 cm   Arm circumference at insertion site: 24 cm   Catheter Length: 47 cm(8 cm cut from a 55 cm line)   Internal Length: 47 cm   External Catheter Length: 0   Ultrasound Used: yes  VPS [facility] [place] confirms PICC tip is placed in the lower 1/3 of the SVC or at the Cavoatrial junction. Floor nurse notified PICC is okay to use.    : Mike Remy RN

## 2023-05-18 NOTE — CARE COORDINATION
Transition of Care-Palliative Medicine spoke with family again today-plan to have ENT physician to talk to patient tomorrow. Family leaning on compassionate extubation, will make final decision tomorrow.      Rosa Maria DEUTSCH, RN  [facility]

## 2023-05-19 ENCOUNTER — APPOINTMENT (OUTPATIENT)
Dept: GENERAL RADIOLOGY | Age: 73
DRG: 004 | End: 2023-05-19
Payer: MEDICARE

## 2023-05-19 LAB
ANION GAP SERPL CALCULATED.3IONS-SCNC: 10 MMOL/L (ref 7–16)
ANISOCYTOSIS: ABNORMAL
B.E.: -0.2 MMOL/L (ref -3–3)
BACTERIA BLD CULT ORG #2: NORMAL
BACTERIA BLD CULT: NORMAL
BASOPHILS # BLD: 0.01 E9/L (ref 0–0.2)
BASOPHILS NFR BLD: 0.1 % (ref 0–2)
BUN SERPL-MCNC: 41 MG/DL (ref 6–23)
CALCIUM SERPL-MCNC: 7.8 MG/DL (ref 8.6–10.2)
CHLORIDE SERPL-SCNC: 110 MMOL/L (ref 98–107)
CO2 SERPL-SCNC: 24 MMOL/L (ref 22–29)
COHB: 0.8 % (ref 0–1.5)
CREAT SERPL-MCNC: 1.2 MG/DL (ref 0.7–1.2)
CRITICAL: ABNORMAL
DATE ANALYZED: ABNORMAL
DATE OF COLLECTION: ABNORMAL
EOSINOPHIL # BLD: 0.21 E9/L (ref 0.05–0.5)
EOSINOPHIL NFR BLD: 2 % (ref 0–6)
ERYTHROCYTE [DISTWIDTH] IN BLOOD BY AUTOMATED COUNT: 18 FL (ref 11.5–15)
FIO2: 60 %
GLUCOSE SERPL-MCNC: 137 MG/DL (ref 74–99)
HCO3: 23.8 MMOL/L (ref 22–26)
HCT VFR BLD AUTO: 24.9 % (ref 37–54)
HGB BLD-MCNC: 7.6 G/DL (ref 12.5–16.5)
HHB: 0.8 % (ref 0–5)
HYPOCHROMIA: ABNORMAL
IMM GRANULOCYTES # BLD: 0.36 E9/L
IMM GRANULOCYTES NFR BLD: 3.4 % (ref 0–5)
LAB: ABNORMAL
LYMPHOCYTES # BLD: 0.69 E9/L (ref 1.5–4)
LYMPHOCYTES NFR BLD: 6.5 % (ref 20–42)
Lab: ABNORMAL
MAGNESIUM SERPL-MCNC: 1.9 MG/DL (ref 1.6–2.6)
MCH RBC QN AUTO: 28.3 PG (ref 26–35)
MCHC RBC AUTO-ENTMCNC: 30.5 % (ref 32–34.5)
MCV RBC AUTO: 92.6 FL (ref 80–99.9)
METER GLUCOSE: 124 MG/DL (ref 74–99)
METER GLUCOSE: 156 MG/DL (ref 74–99)
METHB: 0.3 % (ref 0–1.5)
MODE: AC
MONOCYTES # BLD: 1.73 E9/L (ref 0.1–0.95)
MONOCYTES NFR BLD: 16.3 % (ref 2–12)
NEUTROPHILS # BLD: 7.59 E9/L (ref 1.8–7.3)
NEUTS SEG NFR BLD: 71.7 % (ref 43–80)
O2 CONTENT: 11.5 ML/DL
O2 SATURATION: 99.2 % (ref 92–98.5)
O2HB: 98.1 % (ref 94–97)
OPERATOR ID: ABNORMAL
OVALOCYTES: ABNORMAL
PATIENT TEMP: 37 C
PCO2: 36 MMHG (ref 35–45)
PEEP/CPAP: 6 CMH2O
PFO2: 2.51 MMHG/%
PH BLOOD GAS: 7.44 (ref 7.35–7.45)
PHOSPHATE SERPL-MCNC: 2.6 MG/DL (ref 2.5–4.5)
PLATELET # BLD AUTO: 129 E9/L (ref 130–450)
PMV BLD AUTO: 12.3 FL (ref 7–12)
PO2: 150.9 MMHG (ref 75–100)
POIKILOCYTES: ABNORMAL
POLYCHROMASIA: ABNORMAL
POTASSIUM SERPL-SCNC: 4.3 MMOL/L (ref 3.5–5)
RBC # BLD AUTO: 2.69 E12/L (ref 3.8–5.8)
RI(T): 1.57
RR MECHANICAL: 20 B/MIN
SODIUM SERPL-SCNC: 144 MMOL/L (ref 132–146)
SOURCE, BLOOD GAS: ABNORMAL
THB: 8.1 G/DL (ref 11.5–16.5)
TIME ANALYZED: 556
VT MECHANICAL: 450 ML
WBC # BLD: 10.6 E9/L (ref 4.5–11.5)

## 2023-05-19 PROCEDURE — 6370000000 HC RX 637 (ALT 250 FOR IP): Performed by: SURGERY

## 2023-05-19 PROCEDURE — 2580000003 HC RX 258: Performed by: NURSE PRACTITIONER

## 2023-05-19 PROCEDURE — 2500000003 HC RX 250 WO HCPCS: Performed by: INTERNAL MEDICINE

## 2023-05-19 PROCEDURE — 36600 WITHDRAWAL OF ARTERIAL BLOOD: CPT

## 2023-05-19 PROCEDURE — 6360000002 HC RX W HCPCS: Performed by: NURSE PRACTITIONER

## 2023-05-19 PROCEDURE — 2500000003 HC RX 250 WO HCPCS: Performed by: SURGERY

## 2023-05-19 PROCEDURE — 87088 URINE BACTERIA CULTURE: CPT

## 2023-05-19 PROCEDURE — 99233 SBSQ HOSP IP/OBS HIGH 50: CPT | Performed by: NURSE PRACTITIONER

## 2023-05-19 PROCEDURE — 36415 COLL VENOUS BLD VENIPUNCTURE: CPT

## 2023-05-19 PROCEDURE — 2580000003 HC RX 258: Performed by: INTERNAL MEDICINE

## 2023-05-19 PROCEDURE — 2700000000 HC OXYGEN THERAPY PER DAY

## 2023-05-19 PROCEDURE — 2000000000 HC ICU R&B

## 2023-05-19 PROCEDURE — 2500000003 HC RX 250 WO HCPCS: Performed by: NURSE PRACTITIONER

## 2023-05-19 PROCEDURE — 83735 ASSAY OF MAGNESIUM: CPT

## 2023-05-19 PROCEDURE — 6360000002 HC RX W HCPCS: Performed by: SURGERY

## 2023-05-19 PROCEDURE — 80048 BASIC METABOLIC PNL TOTAL CA: CPT

## 2023-05-19 PROCEDURE — 82805 BLOOD GASES W/O2 SATURATION: CPT

## 2023-05-19 PROCEDURE — 6370000000 HC RX 637 (ALT 250 FOR IP): Performed by: INTERNAL MEDICINE

## 2023-05-19 PROCEDURE — 94003 VENT MGMT INPAT SUBQ DAY: CPT

## 2023-05-19 PROCEDURE — 6370000000 HC RX 637 (ALT 250 FOR IP): Performed by: NURSE PRACTITIONER

## 2023-05-19 PROCEDURE — 36592 COLLECT BLOOD FROM PICC: CPT

## 2023-05-19 PROCEDURE — 51702 INSERT TEMP BLADDER CATH: CPT

## 2023-05-19 PROCEDURE — 6360000002 HC RX W HCPCS: Performed by: INTERNAL MEDICINE

## 2023-05-19 PROCEDURE — 85025 COMPLETE CBC W/AUTO DIFF WBC: CPT

## 2023-05-19 PROCEDURE — 84100 ASSAY OF PHOSPHORUS: CPT

## 2023-05-19 PROCEDURE — 94640 AIRWAY INHALATION TREATMENT: CPT

## 2023-05-19 PROCEDURE — 82962 GLUCOSE BLOOD TEST: CPT

## 2023-05-19 PROCEDURE — 71045 X-RAY EXAM CHEST 1 VIEW: CPT

## 2023-05-19 RX ORDER — MAGNESIUM SULFATE IN WATER 40 MG/ML
2000 INJECTION, SOLUTION INTRAVENOUS ONCE
Status: COMPLETED | OUTPATIENT
Start: 2023-05-19 | End: 2023-05-19

## 2023-05-19 RX ORDER — MORPHINE SULFATE 4 MG/ML
4 INJECTION, SOLUTION INTRAMUSCULAR; INTRAVENOUS
Status: DISCONTINUED | OUTPATIENT
Start: 2023-05-19 | End: 2023-05-20 | Stop reason: HOSPADM

## 2023-05-19 RX ORDER — METOPROLOL TARTRATE 5 MG/5ML
5 INJECTION INTRAVENOUS ONCE
Status: COMPLETED | OUTPATIENT
Start: 2023-05-19 | End: 2023-05-19

## 2023-05-19 RX ORDER — GLYCOPYRROLATE 0.2 MG/ML
0.4 INJECTION INTRAMUSCULAR; INTRAVENOUS EVERY 4 HOURS PRN
Status: DISCONTINUED | OUTPATIENT
Start: 2023-05-19 | End: 2023-05-20 | Stop reason: HOSPADM

## 2023-05-19 RX ORDER — MORPHINE SULFATE 2 MG/ML
2 INJECTION, SOLUTION INTRAMUSCULAR; INTRAVENOUS
Status: DISCONTINUED | OUTPATIENT
Start: 2023-05-19 | End: 2023-05-20 | Stop reason: HOSPADM

## 2023-05-19 RX ORDER — 0.9 % SODIUM CHLORIDE 0.9 %
500 INTRAVENOUS SOLUTION INTRAVENOUS ONCE
Status: COMPLETED | OUTPATIENT
Start: 2023-05-19 | End: 2023-05-19

## 2023-05-19 RX ORDER — ACETAMINOPHEN 160 MG/5ML
650 SOLUTION ORAL ONCE
Status: COMPLETED | OUTPATIENT
Start: 2023-05-19 | End: 2023-05-19

## 2023-05-19 RX ORDER — LORAZEPAM 2 MG/ML
0.5 INJECTION INTRAMUSCULAR
Status: DISCONTINUED | OUTPATIENT
Start: 2023-05-19 | End: 2023-05-20 | Stop reason: HOSPADM

## 2023-05-19 RX ORDER — LORAZEPAM 2 MG/ML
1 INJECTION INTRAMUSCULAR
Status: DISCONTINUED | OUTPATIENT
Start: 2023-05-19 | End: 2023-05-20 | Stop reason: HOSPADM

## 2023-05-19 RX ADMIN — CHLORHEXIDINE GLUCONATE 15 ML: 1.2 RINSE ORAL at 12:18

## 2023-05-19 RX ADMIN — FERROUS SULFATE TAB 325 MG (65 MG ELEMENTAL FE) 325 MG: 325 (65 FE) TAB at 09:24

## 2023-05-19 RX ADMIN — GLYCOPYRROLATE 0.4 MG: 0.2 INJECTION INTRAMUSCULAR; INTRAVENOUS at 17:33

## 2023-05-19 RX ADMIN — DILTIAZEM HYDROCHLORIDE 30 MG: 30 TABLET, FILM COATED ORAL at 05:10

## 2023-05-19 RX ADMIN — METOPROLOL TARTRATE 5 MG: 5 INJECTION INTRAVENOUS at 02:11

## 2023-05-19 RX ADMIN — DEXMEDETOMIDINE 1.2 MCG/KG/HR: 100 INJECTION, SOLUTION INTRAVENOUS at 06:36

## 2023-05-19 RX ADMIN — CLOPIDOGREL BISULFATE 75 MG: 75 TABLET ORAL at 09:24

## 2023-05-19 RX ADMIN — MORPHINE SULFATE 4 MG: 4 INJECTION, SOLUTION INTRAMUSCULAR; INTRAVENOUS at 17:32

## 2023-05-19 RX ADMIN — IPRATROPIUM BROMIDE 0.5 MG: 0.5 SOLUTION RESPIRATORY (INHALATION) at 08:42

## 2023-05-19 RX ADMIN — LORAZEPAM 1 MG: 2 INJECTION INTRAMUSCULAR; INTRAVENOUS at 16:00

## 2023-05-19 RX ADMIN — SODIUM CHLORIDE, PRESERVATIVE FREE 10 ML: 5 INJECTION INTRAVENOUS at 09:25

## 2023-05-19 RX ADMIN — ACETAMINOPHEN 650 MG: 325 TABLET ORAL at 09:24

## 2023-05-19 RX ADMIN — SODIUM CHLORIDE 500 ML: 9 INJECTION, SOLUTION INTRAVENOUS at 05:09

## 2023-05-19 RX ADMIN — BUDESONIDE 250 MCG: 0.25 SUSPENSION RESPIRATORY (INHALATION) at 08:42

## 2023-05-19 RX ADMIN — DILTIAZEM HYDROCHLORIDE 30 MG: 30 TABLET, FILM COATED ORAL at 00:53

## 2023-05-19 RX ADMIN — DEXMEDETOMIDINE 0.8 MCG/KG/HR: 100 INJECTION, SOLUTION INTRAVENOUS at 00:14

## 2023-05-19 RX ADMIN — TAMSULOSIN HYDROCHLORIDE 0.4 MG: 0.4 CAPSULE ORAL at 09:24

## 2023-05-19 RX ADMIN — MORPHINE SULFATE 4 MG: 4 INJECTION, SOLUTION INTRAMUSCULAR; INTRAVENOUS at 16:00

## 2023-05-19 RX ADMIN — MORPHINE SULFATE 4 MG: 4 INJECTION, SOLUTION INTRAMUSCULAR; INTRAVENOUS at 20:12

## 2023-05-19 RX ADMIN — LORAZEPAM 1 MG: 2 INJECTION INTRAMUSCULAR; INTRAVENOUS at 17:33

## 2023-05-19 RX ADMIN — Medication 175 MCG/HR: at 07:16

## 2023-05-19 RX ADMIN — ENOXAPARIN SODIUM 70 MG: 100 INJECTION SUBCUTANEOUS at 09:23

## 2023-05-19 RX ADMIN — DILTIAZEM HYDROCHLORIDE 30 MG: 30 TABLET, FILM COATED ORAL at 12:22

## 2023-05-19 RX ADMIN — METOPROLOL TARTRATE 5 MG: 5 INJECTION INTRAVENOUS at 09:23

## 2023-05-19 RX ADMIN — METOPROLOL TARTRATE 5 MG: 5 INJECTION INTRAVENOUS at 05:11

## 2023-05-19 RX ADMIN — MORPHINE SULFATE 0.5 MG/HR: 500 INJECTION EPIDURAL; INTRATHECAL at 20:43

## 2023-05-19 RX ADMIN — QUETIAPINE FUMARATE 25 MG: 25 TABLET ORAL at 09:24

## 2023-05-19 RX ADMIN — MAGNESIUM SULFATE HEPTAHYDRATE 2000 MG: 40 INJECTION, SOLUTION INTRAVENOUS at 06:16

## 2023-05-19 RX ADMIN — DEXMEDETOMIDINE 1 MCG/KG/HR: 100 INJECTION, SOLUTION INTRAVENOUS at 12:26

## 2023-05-19 RX ADMIN — ARFORMOTEROL TARTRATE 15 MCG: 15 SOLUTION RESPIRATORY (INHALATION) at 08:43

## 2023-05-19 RX ADMIN — GUAIFENESIN 400 MG: 400 TABLET ORAL at 09:24

## 2023-05-19 RX ADMIN — IPRATROPIUM BROMIDE 0.5 MG: 0.5 SOLUTION RESPIRATORY (INHALATION) at 12:43

## 2023-05-19 RX ADMIN — GLYCOPYRROLATE 0.4 MG: 0.2 INJECTION INTRAMUSCULAR; INTRAVENOUS at 22:17

## 2023-05-19 RX ADMIN — ACETAMINOPHEN 650 MG: 650 SUPPOSITORY RECTAL at 21:41

## 2023-05-19 RX ADMIN — LANSOPRAZOLE 30 MG: 30 TABLET, ORALLY DISINTEGRATING, DELAYED RELEASE ORAL at 06:12

## 2023-05-19 RX ADMIN — ACETAMINOPHEN 650 MG: 650 SOLUTION ORAL at 05:10

## 2023-05-19 ASSESSMENT — PULMONARY FUNCTION TESTS
PIF_VALUE: 19
PIF_VALUE: 11
PIF_VALUE: 19
PIF_VALUE: 22
PIF_VALUE: 19
PIF_VALUE: 9
PIF_VALUE: 14
PIF_VALUE: 10
PIF_VALUE: 12
PIF_VALUE: 14

## 2023-05-19 ASSESSMENT — PAIN SCALES - GENERAL: PAINLEVEL_OUTOF10: 0

## 2023-05-19 NOTE — CARE COORDINATION
Transition of Care-Family coming in this afternoon, leaning towards hospice. Palliative working with family.     Rosa Maria CUETON, RN  [facility]

## 2023-05-19 NOTE — PLAN OF CARE
Problem: Discharge Planning  Goal: Discharge to home or other facility with appropriate resources  5/16/2023 0259 by Magaly Martin RN  Outcome: Progressing  5/15/2023 1858 by Donovan Jean RN  Outcome: Progressing     Problem: Pain  Goal: Verbalizes/displays adequate comfort level or baseline comfort level  5/16/2023 0259 by Magaly Martin RN  Outcome: Progressing  5/15/2023 1858 by Donovan Jean RN  Outcome: Progressing     Problem: Safety - Adult  Goal: Free from fall injury  5/16/2023 0259 by Magaly Martin RN  Outcome: Progressing  5/15/2023 1858 by Donovan Jean RN  Outcome: Progressing     Problem: Risk for Elopement  Goal: Patient will not exit the unit/facility without proper excort  Outcome: Progressing     Problem: Safety - Medical Restraint  Goal: Remains free of injury from restraints (Restraint for Interference with Medical Device)  Description: INTERVENTIONS:  1. Determine that other, less restrictive measures have been tried or would not be effective before applying the restraint  2. Evaluate the patient's condition at the time of restraint application  3. Inform patient/family regarding the reason for restraint  4.  Q2H: Monitor safety, psychosocial status, comfort, nutrition and hydration  Outcome: Progressing  Flowsheets  Taken 5/16/2023 0200 by Clotilde Hannon RN  Remains free of injury from restraints (restraint for interference with medical device): Every 2 hours: Monitor safety, psychosocial status, comfort, nutrition and hydration  Taken 5/16/2023 0000 by Clotilde Hannon RN  Remains free of injury from restraints (restraint for interference with medical device): Every 2 hours: Monitor safety, psychosocial status, comfort, nutrition and hydration  Taken 5/15/2023 2200 by Clotilde Hannon RN  Remains free of injury from restraints (restraint for interference with medical device): Every 2 hours: Monitor safety, psychosocial status, comfort, nutrition and hydration  Taken 5/15/2023 2000 by Indira Harper
Problem: Discharge Planning  Goal: Discharge to home or other facility with appropriate resources  Outcome: Progressing     Problem: Pain  Goal: Verbalizes/displays adequate comfort level or baseline comfort level  5/18/2023 0012 by Marianna De La Cruz RN  Outcome: Progressing  5/17/2023 1024 by Jax Batres RN  Outcome: Progressing     Problem: Safety - Adult  Goal: Free from fall injury  5/18/2023 0012 by Marianna De La Cruz RN  Outcome: Progressing  5/17/2023 1024 by Jax Batres RN  Outcome: Progressing  Flowsheets (Taken 5/17/2023 1023)  Free From Fall Injury: Instruct family/caregiver on patient safety     Problem: Risk for Elopement  Goal: Patient will not exit the unit/facility without proper excort  5/18/2023 0012 by Marianna De La Cruz RN  Outcome: Progressing  5/17/2023 1024 by Jax Batres RN  Outcome: Progressing     Problem: Safety - Medical Restraint  Goal: Remains free of injury from restraints (Restraint for Interference with Medical Device)  Description: INTERVENTIONS:  1. Determine that other, less restrictive measures have been tried or would not be effective before applying the restraint  2. Evaluate the patient's condition at the time of restraint application  3. Inform patient/family regarding the reason for restraint  4.  Q2H: Monitor safety, psychosocial status, comfort, nutrition and hydration  5/18/2023 0012 by Marianna De La Cruz RN  Outcome: Progressing  Flowsheets  Taken 5/18/2023 0000  Remains free of injury from restraints (restraint for interference with medical device): Every 2 hours: Monitor safety, psychosocial status, comfort, nutrition and hydration  Taken 5/17/2023 2200  Remains free of injury from restraints (restraint for interference with medical device): Every 2 hours: Monitor safety, psychosocial status, comfort, nutrition and hydration  Taken 5/17/2023 2000  Remains free of injury from restraints (restraint for interference with medical device): Every 2 hours: Monitor
Problem: Discharge Planning  Goal: Discharge to home or other facility with appropriate resources  Outcome: Progressing     Problem: Pain  Goal: Verbalizes/displays adequate comfort level or baseline comfort level  Outcome: Progressing     Problem: Safety - Adult  Goal: Free from fall injury  Outcome: Progressing     Problem: Risk for Elopement  Goal: Patient will not exit the unit/facility without proper excort  Outcome: Progressing     Problem: Safety - Medical Restraint  Goal: Remains free of injury from restraints (Restraint for Interference with Medical Device)  Description: INTERVENTIONS:  1. Determine that other, less restrictive measures have been tried or would not be effective before applying the restraint  2. Evaluate the patient's condition at the time of restraint application  3. Inform patient/family regarding the reason for restraint  4.  Q2H: Monitor safety, psychosocial status, comfort, nutrition and hydration  Outcome: Progressing  Flowsheets  Taken 5/17/2023 0400 by Kalina Leggett RN  Remains free of injury from restraints (restraint for interference with medical device): Every 2 hours: Monitor safety, psychosocial status, comfort, nutrition and hydration  Taken 5/17/2023 0200 by Gisel Haywood RN  Remains free of injury from restraints (restraint for interference with medical device): Every 2 hours: Monitor safety, psychosocial status, comfort, nutrition and hydration  Taken 5/17/2023 0000 by Kalina Leggett RN  Remains free of injury from restraints (restraint for interference with medical device): Every 2 hours: Monitor safety, psychosocial status, comfort, nutrition and hydration  Taken 5/16/2023 2200 by Gisel Haywood RN  Remains free of injury from restraints (restraint for interference with medical device): Every 2 hours: Monitor safety, psychosocial status, comfort, nutrition and hydration  Taken 5/16/2023 2000 by Kalina Leggett RN  Remains free of injury from restraints (restraint for
Problem: Discharge Planning  Goal: Discharge to home or other facility with appropriate resources  Outcome: Progressing     Problem: Pain  Goal: Verbalizes/displays adequate comfort level or baseline comfort level  Outcome: Progressing     Problem: Safety - Adult  Goal: Free from fall injury  Outcome: Progressing     Problem: Risk for Elopement  Goal: Patient will not exit the unit/facility without proper excort  Outcome: Progressing     Problem: Safety - Medical Restraint  Goal: Remains free of injury from restraints (Restraint for Interference with Medical Device)  Description: INTERVENTIONS:  1. Determine that other, less restrictive measures have been tried or would not be effective before applying the restraint  2. Evaluate the patient's condition at the time of restraint application  3. Inform patient/family regarding the reason for restraint  4. Q2H: Monitor safety, psychosocial status, comfort, nutrition and hydration  Outcome: Progressing  Flowsheets  Taken 5/19/2023 0000  Remains free of injury from restraints (restraint for interference with medical device): Every 2 hours: Monitor safety, psychosocial status, comfort, nutrition and hydration  Taken 5/18/2023 2200  Remains free of injury from restraints (restraint for interference with medical device): Every 2 hours: Monitor safety, psychosocial status, comfort, nutrition and hydration  Taken 5/18/2023 2000  Remains free of injury from restraints (restraint for interference with medical device): Every 2 hours: Monitor safety, psychosocial status, comfort, nutrition and hydration     Problem: Confusion  Goal: Confusion, delirium, dementia, or psychosis is improved or at baseline  Description: INTERVENTIONS:  1. Assess for possible contributors to thought disturbance, including medications, impaired vision or hearing, underlying metabolic abnormalities, dehydration, psychiatric diagnoses, and notify attending LIP  2.  [place] high risk fall precautions,
Problem: Respiratory - Adult  Goal: Achieves optimal ventilation and oxygenation  Outcome: Progressing
Problem: Safety - Medical Restraint  Goal: Remains free of injury from restraints (Restraint for Interference with Medical Device)  Description: INTERVENTIONS:  1. Determine that other, less restrictive measures have been tried or would not be effective before applying the restraint  2. Evaluate the patient's condition at the time of restraint application  3. Inform patient/family regarding the reason for restraint  4. Q2H: Monitor safety, psychosocial status, comfort, nutrition and hydration  Outcome: Not Progressing  Flowsheets (Taken 5/14/2023 2000)  Remains free of injury from restraints (restraint for interference with medical device): Every 2 hours: Monitor safety, psychosocial status, comfort, nutrition and hydration     Problem: Discharge Planning  Goal: Discharge to home or other facility with appropriate resources  Outcome: Progressing     Problem: Pain  Goal: Verbalizes/displays adequate comfort level or baseline comfort level  Outcome: Progressing     Problem: Safety - Adult  Goal: Free from fall injury  Outcome: Progressing     Problem: Risk for Elopement  Goal: Patient will not exit the unit/facility without proper excort  Outcome: Progressing  Flowsheets (Taken 5/14/2023 2015)  Nursing Interventions for Elopement Risk: Assist with personal care needs such as toileting, eating, dressing, as needed to reduce the risk of wandering     Problem: Confusion  Goal: Confusion, delirium, dementia, or psychosis is improved or at baseline  Description: INTERVENTIONS:  1. Assess for possible contributors to thought disturbance, including medications, impaired vision or hearing, underlying metabolic abnormalities, dehydration, psychiatric diagnoses, and notify attending LIP  2. [place] high risk fall precautions, as indicated  3. Provide frequent short contacts to provide reality reorientation, refocusing and direction  4. Decrease environmental stimuli, including noise as appropriate  5.  Monitor and intervene
2 hours: Monitor safety, psychosocial status, comfort, nutrition and hydration  Taken 5/17/2023 0000 by Timmy Joyner RN  Remains free of injury from restraints (restraint for interference with medical device): Every 2 hours: Monitor safety, psychosocial status, comfort, nutrition and hydration  Taken 5/16/2023 2200 by Jeovany Isaac RN  Remains free of injury from restraints (restraint for interference with medical device): Every 2 hours: Monitor safety, psychosocial status, comfort, nutrition and hydration  Taken 5/16/2023 2000 by Timmy Joyner RN  Remains free of injury from restraints (restraint for interference with medical device): Every 2 hours: Monitor safety, psychosocial status, comfort, nutrition and hydration  Taken 5/16/2023 1902 by Jeovany Isaac RN  Remains free of injury from restraints (restraint for interference with medical device): Every 2 hours: Monitor safety, psychosocial status, comfort, nutrition and hydration  Taken 5/16/2023 1600 by Stephan Olivera RN  Remains free of injury from restraints (restraint for interference with medical device): Every 2 hours: Monitor safety, psychosocial status, comfort, nutrition and hydration     Problem: Confusion  Goal: Confusion, delirium, dementia, or psychosis is improved or at baseline  Description: INTERVENTIONS:  1. Assess for possible contributors to thought disturbance, including medications, impaired vision or hearing, underlying metabolic abnormalities, dehydration, psychiatric diagnoses, and notify attending LIP  2. [place] high risk fall precautions, as indicated  3. Provide frequent short contacts to provide reality reorientation, refocusing and direction  4. Decrease environmental stimuli, including noise as appropriate  5. Monitor and intervene to maintain adequate nutrition, hydration, elimination, sleep and activity  6.  If unable to ensure safety without constant attention obtain sitter and review sitter guidelines with assigned
Device)  Description: INTERVENTIONS:  1. Determine that other, less restrictive measures have been tried or would not be effective before applying the restraint  2. Evaluate the patient's condition at the time of restraint application  3. Inform patient/family regarding the reason for restraint  4.  Q2H: Monitor safety, psychosocial status, comfort, nutrition and hydration  Recent Flowsheet Documentation  Taken 5/15/2023 1800 by Michell Ding RN  Remains free of injury from restraints (restraint for interference with medical device): Every 2 hours: Monitor safety, psychosocial status, comfort, nutrition and hydration  Taken 5/15/2023 1600 by Michell Ding RN  Remains free of injury from restraints (restraint for interference with medical device): Every 2 hours: Monitor safety, psychosocial status, comfort, nutrition and hydration  Taken 5/15/2023 1200 by Michell Ding RN  Remains free of injury from restraints (restraint for interference with medical device):   Determine that other, less restrictive measures have been tried or would not be effective before applying the restraint   Every 2 hours: Monitor safety, psychosocial status, comfort, nutrition and hydration  Taken 5/15/2023 1132 by Michell Ding RN  Remains free of injury from restraints (restraint for interference with medical device):   Determine that other, less restrictive measures have been tried or would not be effective before applying the restraint   Evaluate the patient's condition at the time of restraint application   Every 2 hours: Monitor safety, psychosocial status, comfort, nutrition and hydration  Taken 5/15/2023 0800 by Michell Ding RN  Remains free of injury from restraints (restraint for interference with medical device): Every 2 hours: Monitor safety, psychosocial status, comfort, nutrition and hydration  5/15/2023 0628 by Louise Zamora RN  Outcome: Not Progressing  Flowsheets (Taken 5/14/2023 2000)  Remains free of injury from

## 2023-05-19 NOTE — PATIENT CARE CONFERENCE
Intensive Care Daily Quality Rounding Checklist        ICU Team Members:  Dr. Jermaine Ojeda, Fareed Perez, clinical pharmacist, respiratory therapist, charge nurse and bedside nurse    ICU Day #: NUMBER: 8     Intubation Date: Trach May 12     Ventilator Day #: NUMBER: 8     Central Line Insertion Date: May 12                                                   Day #: NUMBER: 8                                                    Indication: CVC Indication: Limited/no vessels suitable for conventional peripheral access      Arterial Line Insertion Date: N/A                             Day #: N/A     Temporary Hemodialysis Catheter Insertion Date:  N/A                             Day # N/A     DVT Prophylaxis: Lovenox      GI Prophylaxis: Protonix                       Noel Catheter Insertion Date: May 12                                       Day #: 8                             Indications: Urinary retention, (acute or chronic)                             Continued need (if yes, reason documented and discussed with physician): yes, bladder scan over 500 cc     Skin Issues/ Wounds and ordered treatment discussed on rounds: yes, skin tear right arm     Goals/ Plans for the Day: Daily labs, wean vent as tolerated, soft wrist restraints to prevent pulling tubes, family coming later today possible change of code status

## 2023-05-20 VITALS
BODY MASS INDEX: 20.87 KG/M2 | SYSTOLIC BLOOD PRESSURE: 127 MMHG | DIASTOLIC BLOOD PRESSURE: 80 MMHG | HEIGHT: 72 IN | HEART RATE: 150 BPM | RESPIRATION RATE: 18 BRPM | WEIGHT: 154.1 LBS | OXYGEN SATURATION: 94 % | TEMPERATURE: 101.3 F

## 2023-05-20 PROCEDURE — 6370000000 HC RX 637 (ALT 250 FOR IP): Performed by: INTERNAL MEDICINE

## 2023-05-20 PROCEDURE — 2580000003 HC RX 258: Performed by: INTERNAL MEDICINE

## 2023-05-20 PROCEDURE — 6360000002 HC RX W HCPCS: Performed by: NURSE PRACTITIONER

## 2023-05-20 PROCEDURE — 2500000003 HC RX 250 WO HCPCS: Performed by: NURSE PRACTITIONER

## 2023-05-20 PROCEDURE — 6360000002 HC RX W HCPCS: Performed by: INTERNAL MEDICINE

## 2023-05-20 RX ORDER — MORPHINE SULFATE 4 MG/ML
4 INJECTION, SOLUTION INTRAMUSCULAR; INTRAVENOUS ONCE
Status: COMPLETED | OUTPATIENT
Start: 2023-05-20 | End: 2023-05-20

## 2023-05-20 RX ADMIN — GLYCOPYRROLATE 0.4 MG: 0.2 INJECTION INTRAMUSCULAR; INTRAVENOUS at 02:09

## 2023-05-20 RX ADMIN — ACETAMINOPHEN 650 MG: 650 SUPPOSITORY RECTAL at 13:43

## 2023-05-20 RX ADMIN — HEPARIN 300 UNITS: 100 SYRINGE at 08:33

## 2023-05-20 RX ADMIN — SODIUM CHLORIDE, PRESERVATIVE FREE 10 ML: 5 INJECTION INTRAVENOUS at 08:32

## 2023-05-20 RX ADMIN — MORPHINE SULFATE 4 MG: 4 INJECTION, SOLUTION INTRAMUSCULAR; INTRAVENOUS at 18:57

## 2023-05-20 RX ADMIN — CHLORHEXIDINE GLUCONATE 15 ML: 1.2 RINSE ORAL at 08:32

## 2023-05-20 RX ADMIN — Medication: at 08:32

## 2023-05-20 RX ADMIN — LORAZEPAM 1 MG: 2 INJECTION INTRAMUSCULAR; INTRAVENOUS at 00:27

## 2023-05-20 ASSESSMENT — PAIN SCALES - GENERAL
PAINLEVEL_OUTOF10: 0

## 2023-05-20 NOTE — DISCHARGE SUMMARY
[facility]   Discharge summary   Patient ID:  Marcia Pedersen  [id number]  1 y.o.  1950    Admit date: 5/11/2023    Discharge date and time: 5/20/2023    Admission Diagnoses:   Patient Active Problem List   Diagnosis    Atrial fibrillation with rapid ventricular response (HCC)    PVD (peripheral vascular disease) (HCC)    S/P AAA repair using bifurcation graft    COPD (chronic obstructive pulmonary disease) (HCC)    Squamous cell carcinoma of hypopharynx (HCC)    Moderate protein-calorie malnutrition (HCC)    Paroxysmal atrial fibrillation (HCC)    Acute hypoxemic respiratory failure (HCC)    Dysphagia    Benign essential hypertension    Osteoarthrosis    COPD exacerbation (HCC)    Acute on chronic respiratory failure with hypoxia (HCC)    TIA (transient ischemic attack)    Foreign body in trachea    Goals of care, counseling/discussion    Palliative care by specialist       Discharge Diagnoses: Food bolus impaction leading to acute respiratory failure, history of laryngeal cancer, atrial fibrillation with RVR    Consults: Care team, palliative care, hospice    Procedures: Trached and vented    Hospital Course: Patient is a 66-year-old male admitted to [facility] for  Acute supraglottic edema with food bolus impaction upper esophageal region in a patient with a 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  -Ged surg consulted  -General surgery consulted for food bolus impaction  -ENT seen in ER  -Continue Decadron 6 mg every 6 hours  -Continue IVF NS at 75     Atrial fibrillation  -Continue to monitor rate and rhythm  -Currently Eliquis Cardizem and metoprolol held due to n.p.o. status  -May need to change p.o. meds to IV if he remains n.p.o. for extended time     5/13/2023  --underwent emergent trach yesterday, transferred to ICU  --started on zosyn for pna  --sedated d/t agitation and for airway protection  --anticoags

## 2023-05-20 NOTE — PROGRESS NOTES
05/18/23 0802   Patient Observation   SpO2 91 %   Breath Sounds   Right Upper Lobe Clear   Right Middle Lobe Clear;Diminished   Right Lower Lobe Clear;Diminished   Left Upper Lobe Clear   Left Lower Lobe Clear;Diminished   Vent Information   Ventilator ID [id number]   Vent Mode AC/PRVC   Ventilator Settings   Insp Time (sec) 0.85 sec   Vent Patient Data (Readings)   Inspiratory Time 0.85 sec   Flow Sensitivity 3 L/min   I Time/ I Time % 0.85 s   Backup Apnea On   Backup Rate 20 Breaths Per Minute   Backup Vt 450   Backup I Time 20   Vent Alarm Settings   Low Pressure (cmH2O) 9.5 cmH2O   Low Minute Volume (lpm) 8 L/min   High Minute Volume (lpm) 20 L/min   Low Exhaled Vt (ml) 350 mL   High Exhaled Vt (ml) 880 mL   RR High (bpm) 35 br/min   Apnea (secs) 20 secs   Additional Respiratoray Assessments   Humidification Source Heated wire   Humidification Temp 374   Ambu Bag With Mask At Bedside Yes   Backup Trachs Available (Size) 6.0   Surgical Airway (Trach) 05/12/23 Shashi Cuffed   Placement Date/Time: 05/12/23 1508   Present on Admission/Arrival: No  Placed By: In surgery  Surgical Airway Type: Tracheostomy  Brand: Shashi  Style: Cuffed  Size (mm): 6   Status Secured   Treatment   $Treatment Type $Inhaled Therapy/Meds
[id number] [facility] Cardiology Inpatient Progress Note    Patient is a 74 y.o. male of Kanu Kirby MD seen in hospital follow up. Chief complaint: Afib    HPI: Patient with a history of paroxysmal atrial fibrillation status post cardioversions in the past and ablation in 2017. He also has a history of AAA repair by Dr. 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 He has an 80-pack-year smoking history and he quit recently.       Patient Active Problem List   Diagnosis    Atrial fibrillation with rapid ventricular response (HCC)    PVD (peripheral vascular disease) (HCC)    S/P AAA repair using bifurcation graft    COPD (chronic obstructive pulmonary disease) (HCC)    Squamous cell carcinoma of hypopharynx (HCC)    Moderate protein-calorie malnutrition (HCC)    Paroxysmal atrial fibrillation (HCC)    Acute hypoxemic respiratory failure (HCC)    Dysphagia    Benign essential hypertension    Osteoarthrosis    COPD exacerbation (HCC)    Acute on chronic respiratory failure with hypoxia (HCC)    TIA (transient ischemic attack)    Foreign body in trachea    Goals of care, counseling/discussion    Palliative care by specialist       No Known Allergies    Current Facility-Administered Medications   Medication Dose Route Frequency Provider Last Rate Last Admin    potassium phosphate 10 mmol in sodium chloride 0.9 % 250 mL IVPB  10 mmol IntraVENous Once ANGELI Edwards - CNP 62.5 mL/hr at 05/18/23 0636 10 mmol at 05/18/23 0636    medicated lip balm (BLISTEX/CARMEX) stick   Topical PRN Feradarsh Bunting, DO   Given at 05/17/23 0925    insulin lispro (HUMALOG) injection vial 0-8 Units  0-8 Units SubCUTAneous Q6H Fernando Law, DO   4 Units at 05/17/23 1811    glucose chewable tablet 16 g  4 tablet Oral PRN Josesito Law, DO        dextrose bolus 10% 125 mL  125 mL
[id number] [facility] Cardiology Inpatient Progress Note    Patient is a 74 y.o. male of Amauri Stinson MD seen in hospital follow up. Chief complaint: Afib    HPI: Patient with a history of paroxysmal atrial fibrillation status post cardioversions in the past and ablation in 2017. He also has a history of AAA repair by Dr. 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 He has an 80-pack-year smoking history and he quit recently. No acute issues.     Patient Active Problem List   Diagnosis    Atrial fibrillation with rapid ventricular response (HCC)    PVD (peripheral vascular disease) (HCC)    S/P AAA repair using bifurcation graft    COPD (chronic obstructive pulmonary disease) (HCC)    Squamous cell carcinoma of hypopharynx (HCC)    Moderate protein-calorie malnutrition (HCC)    Paroxysmal atrial fibrillation (HCC)    Acute hypoxemic respiratory failure (HCC)    Dysphagia    Benign essential hypertension    Osteoarthrosis    COPD exacerbation (HCC)    Acute on chronic respiratory failure with hypoxia (HCC)    TIA (transient ischemic attack)    Foreign body in trachea    Goals of care, counseling/discussion    Palliative care by specialist       No Known Allergies    Current Facility-Administered Medications   Medication Dose Route Frequency Provider Last Rate Last Admin    potassium phosphate 20 mmol in sodium chloride 0.9 % 250 mL IVPB  20 mmol IntraVENous Once ANGELI Puri CNP 62.5 mL/hr at 05/16/23 0642 Rate Verify at 05/16/23 1345    magnesium sulfate 2000 mg in 50 mL IVPB premix  2,000 mg IntraVENous Once ANGELI Puri - REVA        dexmedetomidine (PRECEDEX) 400 mcg in sodium chloride 0.9 % 100 mL infusion  0.1-1.5 mcg/kg/hr IntraVENous Continuous Nathan Law DO 13.98 mL/hr at 05/16/23 0642 0.8 mcg/kg/hr at 05/16/23 5423
[de-identified] Ambulance arrives to bedside to transport to [facility]. Paperwork handed to transport staff. Patient safely transferred onto stretcher. Patient comfortable at this time. No belongings present at time of discharge. Patient transported safely out of ICU.
Call placed to  Naya Juarez to discuss case and inquire when RN will be in touch with patient's family. Awaiting return call from [address].
[facility] Cardiology Inpatient Progress Note    Patient is a 74 y.o. male of Leila Hidalgo MD seen in hospital follow up. Chief complaint: Afib    HPI: Patient with a history of paroxysmal atrial fibrillation status post cardioversions in the past and ablation in 2017. He also has a history of AAA repair by Dr. 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 He has an 80-pack-year smoking history and he quit recently. No acute issues.     Patient Active Problem List   Diagnosis    Atrial fibrillation with rapid ventricular response (HCC)    PVD (peripheral vascular disease) (HCC)    S/P AAA repair using bifurcation graft    COPD (chronic obstructive pulmonary disease) (HCC)    Squamous cell carcinoma of hypopharynx (HCC)    Moderate protein-calorie malnutrition (HCC)    Paroxysmal atrial fibrillation (HCC)    Acute hypoxemic respiratory failure (HCC)    Dysphagia    Benign essential hypertension    Osteoarthrosis    COPD exacerbation (HCC)    Acute on chronic respiratory failure with hypoxia (HCC)    TIA (transient ischemic attack)    Foreign body in trachea    Goals of care, counseling/discussion    Palliative care by specialist       No Known Allergies    Current Facility-Administered Medications   Medication Dose Route Frequency Provider Last Rate Last Admin    potassium phosphate 15 mmol in sodium chloride 0.9 % 250 mL IVPB  15 mmol IntraVENous Once Isidore Laser, APRN - CNP 62.5 mL/hr at 05/15/23 0649 15 mmol at 05/15/23 0649    QUEtiapine (SEROQUEL) tablet 25 mg  25 mg Oral Nightly Chris Downey MD   25 mg at 05/14/23 2032    dilTIAZem (CARDIZEM) tablet 30 mg  30 mg Oral 4 times per day Chris Downey MD   30 mg at 05/15/23 0531    perflutren lipid microspheres (DEFINITY) injection 1.5 mL  1.5 mL IntraVENous ONCE PRN Cassidy Shine,
Critical Care Team - Daily Progress Note      Date and time: 5/15/2023 11:24 AM  Patient's name:  Debbie Young  Medical Record Number: [id number]  Patient's account/billing number: [de-identified]  Patient's YOB: 1950  Age: 74 y.o. Date of Admission: 5/11/2023  9:08 AM  Length of stay during current admission: 4      Primary Care Physician: Darya Tran MD  ICU Attending Physician: Dr. Bernice Robb    Code Status: Limited    Reason for ICU admission:   Status emergent tracheostomy placement  Acute respiratory failure      SUBJECTIVE:     OVERNIGHT EVENTS:           5/15: No acute overnight events. Patient still has some agitation. We will increase Seroquel and add Precedex to better assess his medication this morning. Again discussed with wife that this was not the outcome he wanted. Vasopressors/Drips: None  Awake and following commands: No  Current Ventilation: - Ventilator Settings:    Vt (Set, mL): 450 mL  Resp Rate (Set): 20 bmp  FiO2 : 55 %    PEEP/CPAP (cmH2O): 6     Secretions: minimal  Sedation: fentanyl and versed  Paralyzed: No    Arterial Blood Gas result:  pO2 67.5; pCO2 35.5; pH 7.451;  HCO3 24.2, %O2 Sat 93.8. ROS:  Unless stated above, ROS is otherwise negative. Initial HPI + past overnight events:   Debbie Young is a 74 y.o. male with a past medical history of AAA, atrial fibrillation, throat cancer, COPD, ISSAC on CPAP who was sent in for evaluation from our office after presenting with shortness of breath and stridor. He was just admitted to the hospital from 5/8-5/9 for TIA symptoms. He was started on Lipitor and Plavix. He recently had esophageal dilatation due to difficulty swallowing. He has had chemo and radiation for squamous cell carcinoma of the neck with tracheostomy. He was decannulated four years ago.  He has had a history of difficulty swallowing and has had speech therapy in the past. He underwent an upper esophageal scope with ENT and they found a
Critical Care Team - Daily Progress Note      Date and time: 5/17/2023 7:14 AM  Patient's name:  Kathy Carpio  Medical Record Number: [id number]  Patient's account/billing number: [de-identified]  Patient's YOB: 1950  Age: 74 y.o. Date of Admission: 5/11/2023  9:08 AM  Length of stay during current admission: 6      Primary Care Physician: Mary Hensley MD  ICU Attending Physician: Dr. Shea Ventura    Code Status: Limited    Reason for ICU admission:   Status emergent tracheostomy placement  Acute respiratory failure      SUBJECTIVE:     OVERNIGHT EVENTS:         5/17: No acute events reported overnight. Cell count and chemistry largely unchanged. BGL nearly 300 x2. Patient has been agitated during his ICU stay. Last night, patient was less agitated per nursing staff. Patient will intentionally not participate in neurologic exam.  Appears purposeful. Plan for today, daughter will be present for family meeting. Stop Solu-Medrol due to elevated blood glucose. Continue to wean sedation. Pending family discussion, we will need to replace central venous catheter. Vasopressors/Drips: None  Awake and following commands: No  Current Ventilation: - Ventilator Settings:    Vt (Set, mL): 450 mL  Resp Rate (Set): 20 bmp  FiO2 : 40 %    PEEP/CPAP (cmH2O): 6     Secretions: minimal  Sedation: fentanyl and precedex  Paralyzed: No    Arterial Blood Gas result: pH 7.43, PCO2 37.2, PO2 75.5, bicarb 24.4, O2 sat 94.9%      ROS:  Unless stated above, ROS is otherwise negative. Initial HPI + past overnight events:   Kathy Carpio is a 74 y.o. male with a past medical history of AAA, atrial fibrillation, throat cancer, COPD, ISSAC on CPAP who was sent in for evaluation from our office after presenting with shortness of breath and stridor. He was just admitted to the hospital from 5/8-5/9 for TIA symptoms. He was started on Lipitor and Plavix.  He recently had esophageal dilatation due to difficulty
Critical Care Team - Daily Progress Note      Date and time: 5/18/2023 9:08 AM  Patient's name:  Shama Lloyd  Medical Record Number: [id number]  Patient's account/billing number: [de-identified]  Patient's YOB: 1950  Age: 74 y.o. Date of Admission: 5/11/2023  9:08 AM  Length of stay during current admission: 7      Primary Care Physician: John Brenner MD  ICU Attending Physician: Dr. Callahan Single    Code Status: Limited    Reason for ICU admission:   Status emergent tracheostomy placement  Acute respiratory failure      SUBJECTIVE:     OVERNIGHT EVENTS:           5/18: Patient agitated overnight but is weaned on sedation. Plan to keep him more alert today so he can be more engaged in discussions with his wife regarding goals of care. He had multiple large bowel movements this morning. During rounds patient was removed from the ventilator and placed with seatbelt. He was somewhat delirious and looking about the hole in the ceiling. He was asked several times if he would like to continue treatment and keeping the trach. He definitively answered yes he like to continue with treatment and would not like to do hospice at this time. He also repeatedly asked to speak with Dr. Sigrid Zepeda about his condition and his future prognosis. Vasopressors/Drips: None  Awake and following commands: No  Current Ventilation: - Ventilator Settings:    Vt (Set, mL): 450 mL  Resp Rate (Set): 20 bmp  FiO2 : 40 %    PEEP/CPAP (cmH2O): 6     Secretions: minimal  Sedation: fentanyl and precedex  Paralyzed: No    Arterial Blood Gas result: pH 7.43, PCO2 37.2, PO2 75.5, bicarb 24.4, O2 sat 94.9%      ROS:  Unless stated above, ROS is otherwise negative. Initial HPI + past overnight events:   Shama Lloyd is a 74 y.o. male with a past medical history of AAA, atrial fibrillation, throat cancer, COPD, ISSAC on CPAP who was sent in for evaluation from our office after presenting with shortness of breath and stridor.  He
Critical Care Team - Daily Progress Note      Date and time: 5/19/2023 7:23 AM  Patient's name:  Davida Resendiz  Medical Record Number: [id number]  Patient's account/billing number: [de-identified]  Patient's YOB: 1950  Age: 74 y.o. Date of Admission: 5/11/2023  9:08 AM  Length of stay during current admission: 8      Primary Care Physician: Gloria Pham MD  ICU Attending Physician: Dr. Kesha Verma    Code Status: Limited    Reason for ICU admission:   Status emergent tracheostomy placement  Acute respiratory failure      SUBJECTIVE:     OVERNIGHT EVENTS:           5/19: Patient went into Afib with RVR overnight. He was treated with 500 cc bolus and 5 mg lopressor which reverted him to sinus rhythm. Also, he had additional episodes of agitation overnight which required increasing sedation. Plan to have Dr. Nusrat Gan come to discuss prognosis with patient today. Wife is also leaning towards starting comfort care this afternoon. Palliative is following and thinks he would be eligible for Hospice House. Vasopressors/Drips: None  Awake and following commands: No  Current Ventilation: - Ventilator Settings:    Vt (Set, mL): 450 mL  Resp Rate (Set): 20 bmp  FiO2 : (S) 50 %    PEEP/CPAP (cmH2O): 6     Secretions: minimal  Sedation: fentanyl and precedex  Paralyzed: No    Arterial Blood Gas result:  pO2 150.9; pCO2 36; pH 7.438;  HCO3 23.8, %O2 Sat 99.2.        ROS:  Unless stated above, ROS is otherwise negative. Initial HPI + past overnight events:   Davida Resendiz is a 74 y.o. male with a past medical history of AAA, atrial fibrillation, throat cancer, COPD, ISSAC on CPAP who was sent in for evaluation from our office after presenting with shortness of breath and stridor. He was just admitted to the hospital from 5/8-5/9 for TIA symptoms. He was started on Lipitor and Plavix. He recently had esophageal dilatation due to difficulty swallowing.  He has had chemo and radiation for squamous cell
HOSPICE [facility]    Consult received. Chart reviewed. Met with family at bedside. The hospice benefit and philosophy were explained including that hospice is end of life care in which, per Medicare, a patient has a terminal diagnosis that life expectancy would be 6 months or less. Explained that once in hospice care, all aggressive treatments would be stopped and allow nature to takes its course with focus on comfort care for the patient. Explained hospice services at [facility] with room/board private pay unless patient has Medicaid and the [facility] for short-term symptom management and respite. Spoke with Dr. Rosa Randle, medical director of [facility] who accepted patient for inpatient level of care hospice. Received bed 109 with a requested transport of as soon as possible. [facility] able to transport at 1630. Ambulance forms placed in soft chart. Updated bedside nurse and family. Requested discharge order be obtained. Please leave IV and cuba in place. Requested a trach mask be sent with patient. Gave nurse to nurse report to hospice house. Signed consents with Juancarlos Santoro.     Electronically signed by Lino Martini RN on 5/20/2023 at 1500.  [phone]; [phone]
[facility]   Progress note      Subjective:    Sedated and trach'd in the ICU on ventilator. He is sedated. Wife at bedside. Meeting w/  services. Palliative consulted as well. She expresses pt \"did not want to live like this again\" as he was preivously trach'd and PEGd for laryngeal CA. He has had low grade fevers, may be neurologically mediated. /70   Pulse (!) 118   Temp (!) 100.7 °F (38.2 °C) (Bladder)   Resp 23   Ht 6' (1.829 m)   Wt 154 lb 1.6 oz (69.9 kg)   SpO2 98%   BMI 20.90 kg/m²     In: 2275.6 [I.V.:1898.6; NG/GT:132]  Out: 1675   In: 2275.6   Out: 1675 [Urine:1675]    General appearance: sedated, juan miguel'd on vent  HEENT: AT/NC, MMM  Neck: FROM, supple  Lungs: ventilator, coarse breath sounds  CV: irregularly irregular, tachycardic  Vasc: Radial pulses 2+  Abdomen: Soft, non-tender; no masses or HSM  Extremities: No peripheral edema or digital cyanosis  Skin: no rash, lesions or ulcers  Psych: sedate  Neuro: sedate     Recent Labs     05/13/23 0337 05/14/23 0445 05/15/23  0424   WBC 6.9 7.4 7.4   HGB 9.3* 9.1* 10.0*   HCT 30.4* 29.7* 32.4*    124* 141       Recent Labs     05/13/23 0337 05/14/23 0445 05/15/23  0424    145 144   K 4.6 4.2 3.9   * 110* 112*   CO2 22 24 23   BUN 38* 39* 31*   CREATININE 1.2 1.1 1.1   CALCIUM 9.0 8.7 8.6       Assessment:    Principal Problem:    Foreign body in trachea  Active Problems:    Atrial fibrillation with rapid ventricular response (HCC)    Moderate protein-calorie malnutrition (HCC)    Acute hypoxemic respiratory failure (HCC)    Goals of care, counseling/discussion    Palliative care by specialist  Resolved Problems:    * No resolved hospital problems.  *      Plan:  Patient is a 73-year-old male admitted to [facility] for  Acute supraglottic edema with food bolus impaction upper esophageal region in a patient with a history of laryngeal squamous cell carcinoma with prior trach status post
[facility]   Progress note      Subjective:    Sedated and trach'd in the ICU on ventilator. He is sedated but responsive,does wake up spontaneously. Issues w/ a fib rvr overnight. family still working on decision making for care moving forward. He is getting tube feeds via NG. Remains agitated and needs restrained. Moving bowels. Voiding appropriately. No acute events overnight. BP (!) 93/45   Pulse 79   Temp (!) 101.2 °F (38.4 °C) (Bladder)   Resp 24   Ht 6' (1.829 m)   Wt 154 lb 1.6 oz (69.9 kg)   SpO2 97%   BMI 20.90 kg/m²     In: 3398.3 [I.V.:640.5; NG/GT:2084]  Out: 1485   In: 3398.3   Out: 5540 [Urine:1485]    General appearance:awake but somnolent, trach'd on vent  HEENT: AT/NC, MMM  Neck: FROM, supple  Lungs: ventilator, coarse breath sounds  CV: regular rhythm, tachycardic rate, no murmurs  Vasc: Radial pulses 2+  Abdomen: Soft, non-tender; no masses or HSM  Extremities: No peripheral edema or digital cyanosis; diffuse ecchymoses of the extremities. Edema of the upper and lower extremities. Skin: no rash, lesions or ulcers  Psych: anxious, sedate  Neuro: no focal deficit, moves extremities spontaneously when asked     Recent Labs     05/17/23  0405 05/18/23  0425 05/19/23  0320   WBC 5.5 7.2 10.6   HGB 7.5* 7.8* 7.6*   HCT 25.1* 25.7* 24.9*   PLT 98* 129* 129*       Recent Labs     05/17/23  0405 05/18/23  0425 05/19/23  0320   * 145 144   K 3.9 4.3 4.3   * 111* 110*   CO2 25 26 24   BUN 35* 36* 41*   CREATININE 1.0 1.0 1.2   CALCIUM 8.3* 7.9* 7.8*       Assessment:    Principal Problem:    Foreign body in trachea  Active Problems:    Atrial fibrillation with rapid ventricular response (HCC)    Moderate protein-calorie malnutrition (HCC)    Acute hypoxemic respiratory failure (HCC)    Goals of care, counseling/discussion    Palliative care by specialist  Resolved Problems:    * No resolved hospital problems.  *      Plan:  Patient is a 70-year-old male admitted to
[facility]   Progress note      Subjective:    Sedated and trach'd in the ICU on ventilator. He is sedated but responsive,does wake up spontaneously. Issues w/ a fib rvr overnight. family still working on decision making for care moving forward. He is getting tube feeds via NG. Remains agitated and needs restrained. Moving bowels. Voiding appropriately. No acute events overnight. /80   Pulse (!) 130   Temp (!) 100.8 °F (38.2 °C)   Resp 17   Ht 6' (1.829 m)   Wt 154 lb 1.6 oz (69.9 kg)   SpO2 94%   BMI 20.90 kg/m²     In: 462.7 [I.V.:325.5]  Out: 1110   In: 462.7   Out: 1110 [Urine:1110]    General appearance:awake but somnolent, trach'd on vent  HEENT: AT/NC, MMM  Neck: FROM, supple  Lungs: ventilator, coarse breath sounds  CV: regular rhythm, tachycardic rate, no murmurs  Vasc: Radial pulses 2+  Abdomen: Soft, non-tender; no masses or HSM  Extremities: No peripheral edema or digital cyanosis; diffuse ecchymoses of the extremities. Edema of the upper and lower extremities. Skin: no rash, lesions or ulcers  Psych: anxious, sedate  Neuro: no focal deficit, moves extremities spontaneously when asked     Recent Labs     05/18/23  0425 05/19/23  0320   WBC 7.2 10.6   HGB 7.8* 7.6*   HCT 25.7* 24.9*   * 129*         Recent Labs     05/18/23  0425 05/19/23  0320    144   K 4.3 4.3   * 110*   CO2 26 24   BUN 36* 41*   CREATININE 1.0 1.2   CALCIUM 7.9* 7.8*         Assessment:    Principal Problem:    Foreign body in trachea  Active Problems:    Atrial fibrillation with rapid ventricular response (HCC)    Moderate protein-calorie malnutrition (HCC)    Acute hypoxemic respiratory failure (HCC)    Goals of care, counseling/discussion    Palliative care by specialist  Resolved Problems:    * No resolved hospital problems.  *      Plan:  Patient is a 73-year-old male admitted to [facility] for  Acute supraglottic edema with food bolus impaction upper esophageal region in
Nutrition Note    Pt's wife has met with Palliative and wishes to go ahead with Comfort measures. Compassionate withdrawal and comfort measures planned later today, after pt's daughter arrives. No additional recommendations at this time. Dietitian is available by consult, if the need arises.       Electronically signed by Kash Maguire RD, CNSC, LD on 5/19/23 at 2:47 PM EDT    Contact: x 1988
Palliative Care Department  Palliative Care Progress Note  Provider: Esperanza Prema, ANGELI - CNP  [phone]    Hospital Day: 9  Date of Initial Consult:5/14/23  Referring Provider: Dr. Lina Hill was consulted for assistance with: Goals of Care    Chief Complaint: Jass Garcia is a 74 y.o. male with chief complaint of SOB    HPI:   Jass Garcia is a 74 y.o. male with significant medical history of squamous cell carcinoma of the neck 2019, planing chemoradiation therapy. During his treatment he required tracheostomy and PEG tube, but since been decannulated and had PEG tube removed. He has continued to have difficulty swallowing with esophageal stricture, and has undergone esophageal dilation, recently having dilation completed. He additionally was hospitalized recently due to TIA, discharged on 5/9, but since has continued to have progressive hoarseness difficulty with swallowing, thus he returned, and was admitted on 5/11/2023, he was seen by ENT and underwent flexible nasopharyngealaryngoscopy found to have a food bolus. He additionally was seen by general surgery for EGD to assess for likely food bolus impaction of the upper esophagus. He was to require intubation, however this is unable to be performed safely tracheostomy was placed. He has made a limited CODE STATUS as per critical care team.  Palliative service has been asked to assist further with goals of care.     ASSESSMENT/PLAN:     Pertinent Hospital Diagnoses:  Current medical issues leading to Palliative Medicine involvement include   Active Hospital Problems    Diagnosis Date Noted    Atrial fibrillation with rapid ventricular response ([facility] [address] [I48.91] 02/08/2012     Priority: High    Goals of care, counseling/discussion [Z71.89] 05/14/2023    Palliative care by specialist [Z51.5] 05/14/2023    Moderate protein-calorie malnutrition ([facility] [address] [E44.0] 05/13/2023    Foreign body in trachea [T17.408A] 05/11/2023    Acute
Palliative Care Department  Palliative Care Progress Note  Provider: Sophia Brown ANGELI - CNP  [phone]    Hospital Day: 7  Date of Initial Consult:5/14/23  Referring Provider: Dr. Scanlon Service was consulted for assistance with: Goals of Care    Chief Complaint: Sara Hampton is a 74 y.o. male with chief complaint of SOB    HPI:   Sara Hampton is a 74 y.o. male with significant medical history of squamous cell carcinoma of the neck 2019, planing chemoradiation therapy. During his treatment he required tracheostomy and PEG tube, but since been decannulated and had PEG tube removed. He has continued to have difficulty swallowing with esophageal stricture, and has undergone esophageal dilation, recently having dilation completed. He additionally was hospitalized recently due to TIA, discharged on 5/9, but since has continued to have progressive hoarseness difficulty with swallowing, thus he returned, and was admitted on 5/11/2023, he was seen by ENT and underwent flexible nasopharyngealaryngoscopy found to have a food bolus. He additionally was seen by general surgery for EGD to assess for likely food bolus impaction of the upper esophagus. He was to require intubation, however this is unable to be performed safely tracheostomy was placed. He has made a limited CODE STATUS as per critical care team.  Palliative service has been asked to assist further with goals of care.     ASSESSMENT/PLAN:     Pertinent Hospital Diagnoses:  Current medical issues leading to Palliative Medicine involvement include   Active Hospital Problems    Diagnosis Date Noted    Atrial fibrillation with rapid ventricular response ([facility] [address] [I48.91] 02/08/2012     Priority: High    Goals of care, counseling/discussion [Z71.89] 05/14/2023    Palliative care by specialist [Z51.5] 05/14/2023    Moderate protein-calorie malnutrition ([facility] [address] [E44.0] 05/13/2023    Foreign body in trachea [T17.408A] 05/11/2023    Acute
Patient medicated per order and extubated to trach mask. Family present at bedside. Support provided.
Place PICC to remove TLC per Dr Mg Jewell. Dr Mg Jewell made aware that patient has an elevated temp this afternoon.
[facility]   Progress note      Subjective:    Sedated and trach'd in the ICU on ventilator. He is sedated but responsive. I asked him to squeeze my hand once for yes and twice for no or shake his head in the affirmative or negative. When asked if the patient wanted a trach, he squeezed twice and shook his head no. There are some complicated issues with family here. He is getting tube feeds via NG. Remains agitated and needs restrained. BP (!) 114/52   Pulse 62   Temp 97.5 °F (36.4 °C) (Bladder)   Resp 21   Ht 6' (1.829 m)   Wt 154 lb 1.6 oz (69.9 kg)   SpO2 97%   BMI 20.90 kg/m²     In: 2856.1 [I.V.:1025.1; NG/GT:1260]  Out: 1000   In: 2856.1   Out: 1000 [Urine:1000]    General appearance: sedated, trach'd on vent  HEENT: AT/NC, MMM  Neck: FROM, supple  Lungs: ventilator, coarse breath sounds  CV: regular rhythm, tachycardic rate, no murmurs  Vasc: Radial pulses 2+  Abdomen: Soft, non-tender; no masses or HSM  Extremities: No peripheral edema or digital cyanosis; diffuse ecchymoses of the extremities. Edema of the upper and lower extremities.   Skin: no rash, lesions or ulcers  Psych: anxious, sedate  Neuro: no focal deficit, moves extremities spontaneously when asked     Recent Labs     05/15/23  0424 05/16/23  0504 05/17/23  0405   WBC 7.4 4.2* 5.5   HGB 10.0* 7.8* 7.5*   HCT 32.4* 25.5* 25.1*    98* 98*       Recent Labs     05/15/23  0424 05/16/23  0504 05/16/23  1517 05/17/23  0405    146  --  148*   K 3.9 3.9 3.8 3.9   * 115*  --  114*   CO2 23 26  --  25   BUN 31* 31*  --  35*   CREATININE 1.1 1.1  --  1.0   CALCIUM 8.6 8.1*  --  8.3*       Assessment:    Principal Problem:    Foreign body in trachea  Active Problems:    Atrial fibrillation with rapid ventricular response (HCC)    Moderate protein-calorie malnutrition (HCC)    Acute hypoxemic respiratory failure (HCC)    Goals of care, counseling/discussion    Palliative care by specialist  Resolved
ECHOCARDIOGRAM TRANSESOPHAGEAL  3/18/2013         ECHOCARDIOGRAM TRANSESOPHAGEAL  9/3/2013         GASTROSTOMY TUBE PLACEMENT N/A 7/9/2019    EGD PEG TUBE PLACEMENT performed by Gilda Kulkarni MD at [address] [place] [address] N/A 7/8/2019    DIRECT LARYNGOSCOPY WITH BIOPSY, ESOPHAGOSCOPY performed by Tejal Moeller MD at [place] [facility] N/A 5/12/2023    TRACHEOTOMY WITH BRONCHOSCOPY AND ESOPHAGOGASTRODUODENOSCOPY performed by Tejal Moeller MD at [place] [facility] N/A 7/8/2019    AWAKE TRACHEOSTOMY performed by Tejal Moeller MD at [facility] OR       Family History   Problem Relation Age of Onset    Diabetes Mother     Emphysema Father        No Known Allergies    ROS: UNABLE TO ASSESS DUE TO CURRENT MENTAL STATUS AND CONDITION     Physical Exam:  /63   Pulse (!) 106   Temp 99.9 °F (37.7 °C) (Bladder)   Resp 22   Ht 6' (1.829 m)   Wt 154 lb 1.6 oz (69.9 kg)   SpO2 98%   BMI 20.90 kg/m²     Gen: Ill-appearing, in no acute distress  Neck: Tracheostomy  Lungs: Mechanically ventilated  Abd:  Soft, non distended  M/S/Ext:  + edema  Skin:  Warm and dry  Neuro:  Sedated, follows simple commands    Objective data reviewed: labs, images, records, medication use, vitals, and chart    MDM/Time:  The total encounter time on this service date was 25 minutes, which was spent performing a face-to-face encounter and personally completing the provider-level activities documented in the note. This includes time spent prior to the visit and after the visit in direct care of the patient. This time does not include time spent in any separately reportable services. Ap Friday, APRN - CNP  Palliative Medicine    Patient and the plan of care discussed with the other IDT members of Palliative Care Team, and with consultants, Primary Attending, patient, family, and floor nurses, as appropriate and available.     Thank you for allowing Palliative Medicine to participate in the care of Brent Nails
expresses from everything he has told her she does not believe he would want to continue like this. Plan is for ENT doctor to speak with patient tomorrow.   OBJECTIVE:   Prognosis: depends upon goals    Past Medical History:   Diagnosis Date    AAA (abdominal aortic aneurysm) ([facility] [address] 2007    repair with stent    Arthritis     Atrial fibrillation ([facility] [address]     Cancer (HCC)     throat    COPD (chronic obstructive pulmonary disease) (HCC)     folllows with Dr. Braulio Ulloa every 6 months     CPAP (continuous positive airway pressure) dependence     Hearing loss     Wears bilateral hearing aids     Hyperlipidemia     Traumatic leg ulcer ([facility] [address] 07/08/2013       Past Surgical History:   Procedure Laterality Date    ABDOMINAL AORTIC ANEURYSM REPAIR, ENDOVASCULAR  11/17/08    Excluder - Delatore    BRONCHOSCOPY  9/9/2015    CARDIOVERSION  9-2013    COLONOSCOPY      ECHOCARDIOGRAM TRANSESOPHAGEAL  7/2013         ECHOCARDIOGRAM TRANSESOPHAGEAL  3/18/2013         ECHOCARDIOGRAM TRANSESOPHAGEAL  9/3/2013         GASTROSTOMY TUBE PLACEMENT N/A 7/9/2019    EGD PEG TUBE PLACEMENT performed by Natasha Delaney MD at [address] [place] [address] N/A 7/8/2019    DIRECT LARYNGOSCOPY WITH BIOPSY, ESOPHAGOSCOPY performed by Bindu Kaminski MD at [facility] N/A 5/12/2023    TRACHEOTOMY WITH BRONCHOSCOPY AND ESOPHAGOGASTRODUODENOSCOPY performed by Bindu Kaminski MD at [facility] N/A 7/8/2019    AWAKE TRACHEOSTOMY performed by Bindu Kaminski MD at [facility] OR       Family History   Problem Relation Age of Onset    Diabetes Mother     Emphysema Father        No Known Allergies    ROS: UNABLE TO ASSESS DUE TO CURRENT MENTAL STATUS AND CONDITION     Physical Exam:  BP (!) 116/58   Pulse 98   Temp 100.2 °F (37.9 °C) (Bladder)   Resp 28   Ht 6' (1.829 m)   Wt 154 lb 1.6 oz (69.9 kg)   SpO2 97%   BMI 20.90 kg/m²     Gen: Ill-appearing, agitated  Neck: Tracheostomy  Lungs: Mechanically ventilated  M/S/Ext:  + edema  Skin:  Warm
steroids for acute resp failure, laryngeal swelling vs stricture    5/16/2023  --palliative following, discussion planned for tomorrow when daughter is coming into town  --continues on sedation d/t agitation  --NG placed, dietary consulted for tube feeds  --continue NPO  --pulm following for acute resp failure, remains trach'd and sedated on vent in ICU  --cardiology following d/t a fib rvr, cardizem, lopressor  --eliquis held, lipitor and plavix continued  --scans of the brain neg, nothing really to account for behavioral changes  --consider psychiatry consult when pt awake and able to participate  --on seroquel and precedex, otherwise fentanyl and versed, wean sedation as able  --continue bronchodilator nebs for resp support  --stopped IVF  --monitor pancytopenia  --IV steroids for acute resp failure, laryngeal swelling vs stricture    Medication for other comorbidities continue as appropriate dose adjustment as necessary.      DVT prophylaxis PCD's, anticoags held  PT OT  Discharge planning          Aftab Colbert DO  3:28 PM  5/16/2023
vent  --cardiology following d/t a fib rvr  --eliquis held, lipitor and plavix continued  --scans of the brain neg, nothing really to account for behavioral changes  --consider psychiatry consult when pt awake and able to participate, however has trach, so discussion may be difficult  --on seroquel and precedex, otherwise fentanyl and versed, wean sedation as able  --continue nebs for resp support  --low grade temps, labs ok? May be developing pna and need abx per cxr, will continue to follow  --IV steroids for acute resp failure, laryngeal swelling vs stricture    5/16/2023  --palliative following, discussion planned for tomorrow when daughter is coming into town  --continues on sedation d/t agitation  --NG placed, dietary consulted for tube feeds  --continue NPO  --pulm following for acute resp failure, remains trach'd and sedated on vent in ICU  --cardiology following d/t a fib rvr, cardizem, lopressor  --eliquis held, lipitor and plavix continued  --scans of the brain neg, nothing really to account for behavioral changes  --consider psychiatry consult when pt awake and able to participate  --on seroquel and precedex, otherwise fentanyl and versed, wean sedation as able  --continue bronchodilator nebs for resp support  --stopped IVF  --monitor pancytopenia  --IV steroids for acute resp failure, laryngeal swelling vs stricture    5/17/2023  --during assessment, pt was adamantly against trach and peg.  Further discussion w/ family and palliative pending  --continues on sedation d/t agitation  --continues on restraints d/t agitation and pulling at lines  --NG placed, dietary consulted for tube feeds, tube feeds going  --continue NPO  --pulm following for acute resp failure, remains trach'd and sedated on vent in ICU  --cardiology following d/t a fib rvr, cardizem lopressor, he is currently in sinus  --eliquis held, lipitor and plavix continued  --scans of the brain neg, nothing really to account for behavioral
software. Every effort has been made to ensure accuracy; however, inadvertent computerized transcription errors may be present.
15  SpO2: 94 %  Position: Semi-Aguilar's  Humidification Source: Heated wire  Humidification Temp: 37  Circuit Condensation: Drained    Arterial Blood Gas 5/20/2023  Recent Labs     05/19/23  0556   PH 7.438   PCO2 36.0   PO2 150.9*   HCO3 23.8   BE -0.2   O2SAT 99.2*           Laboratory findings:    Complete Blood Count:   Recent Labs     05/18/23  0425 05/19/23  0320   WBC 7.2 10.6   HGB 7.8* 7.6*   HCT 25.7* 24.9*   * 129*          Last 3 Blood Glucose:   Recent Labs     05/18/23  0425 05/19/23  0320   GLUCOSE 171* 137*          PT/INR:    Lab Results   Component Value Date/Time    PROTIME 16.0 05/12/2023 05:00 PM    PROTIME 11.3 01/20/2011 10:15 AM    INR 1.4 05/12/2023 05:00 PM     PTT:    Lab Results   Component Value Date/Time    APTT 48.9 01/08/2014 06:30 AM       Comprehensive Metabolic Profile:   Recent Labs     05/18/23  0425 05/19/23  0320    144   K 4.3 4.3   * 110*   CO2 26 24   BUN 36* 41*   CREATININE 1.0 1.2   GLUCOSE 171* 137*   CALCIUM 7.9* 7.8*        Magnesium:   Lab Results   Component Value Date/Time    MG 1.9 05/19/2023 03:20 AM     Phosphorus:   Lab Results   Component Value Date/Time    PHOS 2.6 05/19/2023 03:20 AM     Ionized Calcium: No results found for: CAION     Urinalysis:     Troponin: No results for input(s): TROPONINI in the last 72 hours. Microbiology:  Cultures drawn: No    Radiology/Imaging:     Chest Xray (5/20/2023): Infiltrate and or atelectasis with moderate waning on the right and slight interval progression on the left. Pulmonary emphysema as before.      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
Comprehensive Metabolic Profile:   Recent Labs     05/14/23  0445 05/15/23  0424 05/16/23  0504    144 146   K 4.2 3.9 3.9   * 112* 115*   CO2 24 23 26   BUN 39* 31* 31*   CREATININE 1.1 1.1 1.1   GLUCOSE 135* 131* 262*   CALCIUM 8.7 8.6 8.1*        Magnesium:   Lab Results   Component Value Date/Time    MG 1.8 05/16/2023 05:04 AM     Phosphorus:   Lab Results   Component Value Date/Time    PHOS 1.5 05/16/2023 05:04 AM     Ionized Calcium: No results found for: CAION     Urinalysis:     Troponin: No results for input(s): TROPONINI in the last 72 hours. Microbiology:  Cultures drawn: No    Radiology/Imaging:     Chest Xray (5/16/2023): Infiltrate and or atelectasis with moderate waning on the right and slight interval progression on the left. Pulmonary emphysema as before.      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  History of esophageal stricture status post recent dilatation  Foreign body in trachea  History of A-fib    Patient Active Problem List    Diagnosis Date Noted    Atrial fibrillation with rapid ventricular response ([address] 02/08/2012    COPD exacerbation ([address] 12/17/2022    Acute on chronic respiratory failure with hypoxia ([address] 12/17/2022    Goals of care, counseling/discussion 05/14/2023    Palliative care by specialist 05/14/2023    Moderate protein-calorie malnutrition ([address] 05/13/2023    Foreign body in trachea 05/11/2023    TIA (transient ischemic attack) 05/08/2023    Paroxysmal atrial fibrillation ([address] 07/26/2019    Acute hypoxemic respiratory failure ([address] 07/26/2019    Dysphagia 07/26/2019    Squamous cell carcinoma of hypopharynx ([address] 07/08/2019    COPD (chronic obstructive pulmonary disease) ([address] 01/08/2014    S/P AAA repair using bifurcation graft 01/15/2013    Benign essential hypertension 02/16/2012    Osteoarthrosis 02/16/2012    PVD (peripheral
disease) ([facility] [address]    S/P AAA repair using bifurcation graft    COPD (chronic obstructive pulmonary disease) (HCC)    Squamous cell carcinoma of hypopharynx (HCC)    Moderate protein-calorie malnutrition (HCC)    Paroxysmal atrial fibrillation (HCC)    Acute hypoxemic respiratory failure (HCC)    Dysphagia    Benign essential hypertension    Osteoarthrosis    COPD exacerbation (HCC)    Acute on chronic respiratory failure with hypoxia (HCC)    TIA (transient ischemic attack)    Foreign body in trachea    Goals of care, counseling/discussion    Palliative care by specialist     1. PAfib:     History of PAF with ablation in 2017. [id number] 4. On lovenox. Typically on eliquis. Monitor. Cardizem. 2. Supraglottic edema with food bolus impaction in upper esophagus. Reportedly EGD unable to be performed due to not being able to pass the endoscope. Status post emergent tracheotomy    3. Acute on chronic respiratory failure/COPD: Per primary service. 4. AAA with repair by Dr. Soledad Burnham    5. HTN: Observe. 6. Lipids: Statin. 7. ISSAC with CPAP    8. Laryngeal carcinoma (7/2019) s/p treatment with chemo/XRT and prior tracheostomy    9. TIA 5/8/2023: Plavix and statin    10. Anemia: Follow labs. Katie Lou D.O.   Cardiologist  Cardiology, [facility]

## 2023-05-21 LAB
BACTERIA SPEC RESP CULT: ABNORMAL
BACTERIA UR CULT: NORMAL
ORGANISM: ABNORMAL
ORGANISM: ABNORMAL
SMEAR, RESPIRATORY: ABNORMAL

## 2023-05-22 LAB
BACTERIA SPEC RESP CULT: ABNORMAL
ORGANISM: ABNORMAL
ORGANISM: ABNORMAL
SMEAR, RESPIRATORY: ABNORMAL

## 2023-05-23 LAB
BACTERIA BLD CULT ORG #2: NORMAL
BACTERIA BLD CULT: NORMAL

## (undated) DEVICE — PAD,NON-ADHERENT,2X3,STERILE,LF,1/PK: Brand: MEDLINE

## (undated) DEVICE — NEEDLE HYPO 25GA L1.5IN BLU POLYPR HUB S STL REG BVL STR

## (undated) DEVICE — COVER,LIGHT HANDLE,FLX,2/PK: Brand: MEDLINE INDUSTRIES, INC.

## (undated) DEVICE — STANDARD HYPODERMIC NEEDLE,POLYPROPYLENE HUB: Brand: MONOJECT

## (undated) DEVICE — SKIN PREP TRAY 4 COMPARTM TRAY: Brand: MEDLINE INDUSTRIES, INC.

## (undated) DEVICE — SOLUTION IV IRRIG POUR BRL 0.9% SODIUM CHL 2F7124

## (undated) DEVICE — Z DISCONTINUED BY MEDLINE USE 2716051 TUBE TRACH SZ 6 L74MM OD10.8MM ID6.4MM CUF W/ DISP INNR

## (undated) DEVICE — BASIC SINGLE BASIN 1-LF: Brand: MEDLINE INDUSTRIES, INC.

## (undated) DEVICE — COVER HNDL LT DISP

## (undated) DEVICE — BLADE,STAINLESS-STEEL,11,STRL,DISPOSABLE: Brand: MEDLINE

## (undated) DEVICE — FORCEPS BX OVL CUP FEN DISPOSABLE CAP L 160CM RAD JAW 4

## (undated) DEVICE — 4-PORT MANIFOLD: Brand: NEPTUNE 2

## (undated) DEVICE — BLADE,STAINLESS-STEEL,15,STRL,DISPOSABLE: Brand: MEDLINE

## (undated) DEVICE — SPONGE GZ W4XL4IN RAYON POLY CVR W/NONWOVEN FAB STRL 2/PK

## (undated) DEVICE — NEEDLE FLTR 18GA L1.5IN MEM THK5UM BLNT DISP

## (undated) DEVICE — DOUBLE BASIN SET: Brand: MEDLINE INDUSTRIES, INC.

## (undated) DEVICE — SPONGE,DISSECTOR,ROUND CHERRY,XR,ST,5/PK: Brand: MEDLINE

## (undated) DEVICE — TUBE TRACH AD SZ 6 L77MM INNR CANN ID65MM OUTER CANN

## (undated) DEVICE — CATHETER URETH 16FR RED RUB INTMIT ALL PURP 12 PER CA

## (undated) DEVICE — SHEARS ENDOSCP L9CM CRV HARM FOCS +

## (undated) DEVICE — INTENDED FOR TISSUE SEPARATION, AND OTHER PROCEDURES THAT REQUIRE A SHARP SURGICAL BLADE TO PUNCTURE OR CUT.: Brand: BARD-PARKER ® STAINLESS STEEL BLADES

## (undated) DEVICE — DRESSING FOAM W3XL3IN POLYUR HYDRPHLC N ADH FEN PD OPTIFOAM

## (undated) DEVICE — SYRINGE 20ML LL S/C 50

## (undated) DEVICE — CATHETER,URETHRAL,REDRUBBER,STRL,16FR: Brand: MEDLINE

## (undated) DEVICE — TUBING, SUCTION, 3/16" X 12', STRAIGHT: Brand: MEDLINE

## (undated) DEVICE — CONTROL SYRINGE LUER-LOCK TIP: Brand: MONOJECT

## (undated) DEVICE — HYPODERMIC SAFETY NEEDLE: Brand: MAGELLAN

## (undated) DEVICE — COUNTER NDL 30 COUNT DBL MAG

## (undated) DEVICE — SURGICAL PROCEDURE PACK EENT CUST

## (undated) DEVICE — TOWEL,OR,DSP,ST,BLUE,STD,6/PK,12PK/CS: Brand: MEDLINE

## (undated) DEVICE — PENCIL ES CRD L10FT HND SWCHING ROCK SWCH W/ EDGE COAT BLDE

## (undated) DEVICE — PAD,EYE,1-5/8X2 5/8,STERILE,LF,1/PK: Brand: MEDLINE

## (undated) DEVICE — CATHETER ETER SUCT 14FR RED POLYPR STR OPN W VLV

## (undated) DEVICE — SPONGE,DRAIN,NONWVN,4"X4",6PLY,STRL,LF: Brand: MEDLINE

## (undated) DEVICE — SYRINGE,CONTROL,LL,FINGER,GRIP: Brand: MEDLINE INDUSTRIES, INC.

## (undated) DEVICE — TUBING SUCT 12FR MAL ALUM SHFT FN CAP VENT UNIV CONN W/ OBT

## (undated) DEVICE — TUBE TRACH CUF 7.5X10.8X74 MM 6.5 MM FLX SHILEY REUSE

## (undated) DEVICE — SUTURE VCRL + SZ 3-0 L18IN ABSRB UD PS-1 L24MM 3/8 CIR PRIM VCP683G

## (undated) DEVICE — GRADUATE TRIANG MEASURE 1000ML BLK PRNT

## (undated) DEVICE — GLOVE ORANGE PI 7 1/2   MSG9075

## (undated) DEVICE — BINDER ABD M/L H12IN FOR 46-62IN WHT 4 SLD PNL DSGN HOOP

## (undated) DEVICE — BLOCK BITE 60FR RUBBER ADLT DENTAL

## (undated) DEVICE — MARKER,SKIN,WI/RULER AND LABELS: Brand: MEDLINE

## (undated) DEVICE — GUARD TEETH AD PR NYL

## (undated) DEVICE — SPONGE GZ W4XL4IN RAYON POLY FILL CVR W/ NONWOVEN FAB

## (undated) DEVICE — 3M™ MICROPORE™ TAPE, 1530-2: Brand: 3M™ MICROPORE™

## (undated) DEVICE — BINDER ABD UNISX 4 PNL PREM 6INX6INX12IN L XL 4

## (undated) DEVICE — ELECTROSURGICAL PENCIL BUTTON SWITCH E-Z CLEAN COATED BLADE ELECTRODE 10 FT (3 M) CORD HOLSTER: Brand: MEGADYNE

## (undated) DEVICE — STRIP,CLOSURE,WOUND,MEDI-STRIP,1/2X4: Brand: MEDLINE

## (undated) DEVICE — READY WET SKIN SCRUB TRAY-LF: Brand: MEDLINE INDUSTRIES, INC.

## (undated) DEVICE — DRESSING FOAM W22XL25CM FILVE LAYR FOAM DP DEF SAFETAC

## (undated) DEVICE — SOLUTION IV IRRIG WATER 1000ML POUR BRL 2F7114

## (undated) DEVICE — Device

## (undated) DEVICE — CODMAN® SURGICAL PATTIES 1/2" X 1/2" (1.27CM X 1.27CM): Brand: CODMAN®

## (undated) DEVICE — PATIENT RETURN ELECTRODE, SINGLE-USE, CONTACT QUALITY MONITORING, ADULT, WITH 9FT CORD, FOR PATIENTS WEIGING OVER 33LBS. (15KG): Brand: MEGADYNE

## (undated) DEVICE — SYRINGE, LUER LOCK, 5ML: Brand: MEDLINE

## (undated) DEVICE — MIC* SAFETY PERCUTANEOUS ENDOSCOPIC GASTROSTOMY PEG KIT - 20 FR - PULL: Brand: MIC PEG TUBE

## (undated) DEVICE — ELECTRODE PT RET AD L9FT HI MOIST COND ADH HYDRGEL CORDED

## (undated) DEVICE — GLOVE SURG SZ 8 CRM LTX FREE POLYISOPRENE POLYMER BEAD ANTI

## (undated) DEVICE — GOWN,SIRUS,FABRNF,L,20/CS: Brand: MEDLINE